# Patient Record
Sex: FEMALE
[De-identification: names, ages, dates, MRNs, and addresses within clinical notes are randomized per-mention and may not be internally consistent; named-entity substitution may affect disease eponyms.]

---

## 2018-01-14 ENCOUNTER — TRANSCRIPTION ENCOUNTER (OUTPATIENT)
Age: 68
End: 2018-01-14

## 2018-12-18 ENCOUNTER — TRANSCRIPTION ENCOUNTER (OUTPATIENT)
Age: 68
End: 2018-12-18

## 2022-06-18 ENCOUNTER — NON-APPOINTMENT (OUTPATIENT)
Age: 72
End: 2022-06-18

## 2022-09-10 ENCOUNTER — NON-APPOINTMENT (OUTPATIENT)
Age: 72
End: 2022-09-10

## 2022-11-17 ENCOUNTER — OFFICE (OUTPATIENT)
Dept: URBAN - METROPOLITAN AREA CLINIC 63 | Facility: CLINIC | Age: 72
Setting detail: OPHTHALMOLOGY
End: 2022-11-17
Payer: MEDICARE

## 2022-11-17 DIAGNOSIS — H52.4: ICD-10-CM

## 2022-11-17 DIAGNOSIS — H35.3132: ICD-10-CM

## 2022-11-17 PROCEDURE — 92014 COMPRE OPH EXAM EST PT 1/>: CPT | Performed by: STUDENT IN AN ORGANIZED HEALTH CARE EDUCATION/TRAINING PROGRAM

## 2022-11-17 PROCEDURE — 92015 DETERMINE REFRACTIVE STATE: CPT | Performed by: STUDENT IN AN ORGANIZED HEALTH CARE EDUCATION/TRAINING PROGRAM

## 2022-11-17 PROCEDURE — 92134 CPTRZ OPH DX IMG PST SGM RTA: CPT | Performed by: STUDENT IN AN ORGANIZED HEALTH CARE EDUCATION/TRAINING PROGRAM

## 2022-11-17 ASSESSMENT — TONOMETRY
OD_IOP_MMHG: 18
OS_IOP_MMHG: 17

## 2022-11-17 ASSESSMENT — REFRACTION_MANIFEST
OS_ADD: +2.75
OS_CYLINDER: -0.75
OD_CYLINDER: -1.25
OS_VA1: 20/25
OD_VA1: 20/20
OD_AXIS: 110
OD_SPHERE: +0.25
OS_SPHERE: -0.50
OD_ADD: +2.75
OS_AXIS: 110

## 2022-11-17 ASSESSMENT — AXIALLENGTH_DERIVED
OD_AL: 22.0947
OS_AL: 22.5202
OD_AL: 22.0947
OS_AL: 22.4317

## 2022-11-17 ASSESSMENT — CONFRONTATIONAL VISUAL FIELD TEST (CVF)
OD_FINDINGS: FULL
OS_FINDINGS: FULL

## 2022-11-17 ASSESSMENT — KERATOMETRY
OD_K2POWER_DIOPTERS: 48.50
OD_AXISANGLE_DEGREES: 045
OS_AXISANGLE_DEGREES: 039
OD_K1POWER_DIOPTERS: 48.00
OS_K2POWER_DIOPTERS: 48.25
OS_K1POWER_DIOPTERS: 47.25

## 2022-11-17 ASSESSMENT — VISUAL ACUITY
OD_BCVA: 20/20
OS_BCVA: 20/25

## 2022-11-17 ASSESSMENT — REFRACTION_AUTOREFRACTION
OD_CYLINDER: -1.25
OS_CYLINDER: -0.75
OS_AXIS: 112
OS_SPHERE: -0.75
OD_AXIS: 109
OD_SPHERE: +0.25

## 2022-11-17 ASSESSMENT — SPHEQUIV_DERIVED
OS_SPHEQUIV: -0.875
OS_SPHEQUIV: -1.125
OD_SPHEQUIV: -0.375
OD_SPHEQUIV: -0.375

## 2023-01-24 ENCOUNTER — NON-APPOINTMENT (OUTPATIENT)
Age: 73
End: 2023-01-24

## 2023-01-28 ENCOUNTER — NON-APPOINTMENT (OUTPATIENT)
Age: 73
End: 2023-01-28

## 2023-01-29 ENCOUNTER — INPATIENT (INPATIENT)
Facility: HOSPITAL | Age: 73
LOS: 2 days | Discharge: HOME | DRG: 286 | End: 2023-02-01
Attending: FAMILY MEDICINE | Admitting: HOSPITALIST
Payer: MEDICARE

## 2023-01-29 VITALS
SYSTOLIC BLOOD PRESSURE: 144 MMHG | TEMPERATURE: 98 F | OXYGEN SATURATION: 98 % | RESPIRATION RATE: 18 BRPM | DIASTOLIC BLOOD PRESSURE: 98 MMHG | HEART RATE: 79 BPM

## 2023-01-29 PROCEDURE — 99285 EMERGENCY DEPT VISIT HI MDM: CPT

## 2023-01-29 PROCEDURE — 93010 ELECTROCARDIOGRAM REPORT: CPT

## 2023-01-29 RX ORDER — IBUPROFEN 200 MG
600 TABLET ORAL ONCE
Refills: 0 | Status: COMPLETED | OUTPATIENT
Start: 2023-01-29 | End: 2023-01-29

## 2023-01-29 NOTE — ED ADULT TRIAGE NOTE - CHIEF COMPLAINT QUOTE
Ambulatory reporting flu-like symptoms since Monday, was seen by her primary care provider and was placed on medications to treat her symptoms however patient is complaining of worsening symptoms and now chest pains/SOB. Patient VS WNL in triage, no accessory muscle use, denies sick contacts. EKG in progress. UNVACCINATED.

## 2023-01-30 DIAGNOSIS — R07.9 CHEST PAIN, UNSPECIFIED: ICD-10-CM

## 2023-01-30 DIAGNOSIS — I67.1 CEREBRAL ANEURYSM, NONRUPTURED: Chronic | ICD-10-CM

## 2023-01-30 DIAGNOSIS — Z98.890 OTHER SPECIFIED POSTPROCEDURAL STATES: Chronic | ICD-10-CM

## 2023-01-30 LAB
ALBUMIN SERPL ELPH-MCNC: 3.8 G/DL — SIGNIFICANT CHANGE UP (ref 3.3–5.2)
ALP SERPL-CCNC: 56 U/L — SIGNIFICANT CHANGE UP (ref 40–120)
ALT FLD-CCNC: 19 U/L — SIGNIFICANT CHANGE UP
ANION GAP SERPL CALC-SCNC: 10 MMOL/L — SIGNIFICANT CHANGE UP (ref 5–17)
ANION GAP SERPL CALC-SCNC: 11 MMOL/L — SIGNIFICANT CHANGE UP (ref 5–17)
ANISOCYTOSIS BLD QL: SLIGHT — SIGNIFICANT CHANGE UP
APTT BLD: 146.7 SEC — CRITICAL HIGH (ref 27.5–35.5)
APTT BLD: 38.9 SEC — HIGH (ref 27.5–35.5)
AST SERPL-CCNC: 62 U/L — HIGH
BASE EXCESS BLDV CALC-SCNC: 6.5 MMOL/L — HIGH (ref -2–3)
BASOPHILS # BLD AUTO: 0.04 K/UL — SIGNIFICANT CHANGE UP (ref 0–0.2)
BASOPHILS NFR BLD AUTO: 0.5 % — SIGNIFICANT CHANGE UP (ref 0–2)
BILIRUB SERPL-MCNC: <0.2 MG/DL — LOW (ref 0.4–2)
BUN SERPL-MCNC: 11.6 MG/DL — SIGNIFICANT CHANGE UP (ref 8–20)
BUN SERPL-MCNC: 11.6 MG/DL — SIGNIFICANT CHANGE UP (ref 8–20)
CA-I SERPL-SCNC: 1.11 MMOL/L — LOW (ref 1.15–1.33)
CALCIUM SERPL-MCNC: 8.5 MG/DL — SIGNIFICANT CHANGE UP (ref 8.4–10.5)
CALCIUM SERPL-MCNC: 8.9 MG/DL — SIGNIFICANT CHANGE UP (ref 8.4–10.5)
CHLORIDE BLDV-SCNC: 100 MMOL/L — SIGNIFICANT CHANGE UP (ref 96–108)
CHLORIDE SERPL-SCNC: 99 MMOL/L — SIGNIFICANT CHANGE UP (ref 96–108)
CHLORIDE SERPL-SCNC: 99 MMOL/L — SIGNIFICANT CHANGE UP (ref 96–108)
CK SERPL-CCNC: 39 U/L — SIGNIFICANT CHANGE UP (ref 25–170)
CO2 SERPL-SCNC: 25 MMOL/L — SIGNIFICANT CHANGE UP (ref 22–29)
CO2 SERPL-SCNC: 26 MMOL/L — SIGNIFICANT CHANGE UP (ref 22–29)
CREAT SERPL-MCNC: 0.46 MG/DL — LOW (ref 0.5–1.3)
CREAT SERPL-MCNC: 0.49 MG/DL — LOW (ref 0.5–1.3)
EGFR: 101 ML/MIN/1.73M2 — SIGNIFICANT CHANGE UP
EGFR: 102 ML/MIN/1.73M2 — SIGNIFICANT CHANGE UP
ELLIPTOCYTES BLD QL SMEAR: SLIGHT — SIGNIFICANT CHANGE UP
EOSINOPHIL # BLD AUTO: 0.21 K/UL — SIGNIFICANT CHANGE UP (ref 0–0.5)
EOSINOPHIL NFR BLD AUTO: 2.7 % — SIGNIFICANT CHANGE UP (ref 0–6)
GAS PNL BLDV: 135 MMOL/L — LOW (ref 136–145)
GAS PNL BLDV: SIGNIFICANT CHANGE UP
GLUCOSE BLDV-MCNC: 136 MG/DL — HIGH (ref 70–99)
GLUCOSE SERPL-MCNC: 133 MG/DL — HIGH (ref 70–99)
GLUCOSE SERPL-MCNC: 168 MG/DL — HIGH (ref 70–99)
HCO3 BLDV-SCNC: 32 MMOL/L — HIGH (ref 22–29)
HCOV PNL SPEC NAA+PROBE: DETECTED
HCT VFR BLD CALC: 39.5 % — SIGNIFICANT CHANGE UP (ref 34.5–45)
HCT VFR BLD CALC: 39.7 % — SIGNIFICANT CHANGE UP (ref 34.5–45)
HCT VFR BLD CALC: 40.3 % — SIGNIFICANT CHANGE UP (ref 34.5–45)
HCT VFR BLD CALC: 40.4 % — SIGNIFICANT CHANGE UP (ref 34.5–45)
HCT VFR BLDA CALC: 39 % — SIGNIFICANT CHANGE UP
HGB BLD CALC-MCNC: 12.9 G/DL — SIGNIFICANT CHANGE UP (ref 11.7–16.1)
HGB BLD-MCNC: 11.9 G/DL — SIGNIFICANT CHANGE UP (ref 11.5–15.5)
HGB BLD-MCNC: 12.1 G/DL — SIGNIFICANT CHANGE UP (ref 11.5–15.5)
HGB BLD-MCNC: 12.2 G/DL — SIGNIFICANT CHANGE UP (ref 11.5–15.5)
HGB BLD-MCNC: 12.5 G/DL — SIGNIFICANT CHANGE UP (ref 11.5–15.5)
IMM GRANULOCYTES NFR BLD AUTO: 1 % — HIGH (ref 0–0.9)
INR BLD: 1.07 RATIO — SIGNIFICANT CHANGE UP (ref 0.88–1.16)
LACTATE BLDV-MCNC: 1.5 MMOL/L — SIGNIFICANT CHANGE UP (ref 0.5–2)
LIDOCAIN IGE QN: 40 U/L — SIGNIFICANT CHANGE UP (ref 22–51)
LYMPHOCYTES # BLD AUTO: 1.41 K/UL — SIGNIFICANT CHANGE UP (ref 1–3.3)
LYMPHOCYTES # BLD AUTO: 17.9 % — SIGNIFICANT CHANGE UP (ref 13–44)
MAGNESIUM SERPL-MCNC: 2 MG/DL — SIGNIFICANT CHANGE UP (ref 1.6–2.6)
MANUAL SMEAR VERIFICATION: SIGNIFICANT CHANGE UP
MCHC RBC-ENTMCNC: 22.8 PG — LOW (ref 27–34)
MCHC RBC-ENTMCNC: 22.9 PG — LOW (ref 27–34)
MCHC RBC-ENTMCNC: 23 PG — LOW (ref 27–34)
MCHC RBC-ENTMCNC: 23 PG — LOW (ref 27–34)
MCHC RBC-ENTMCNC: 30 GM/DL — LOW (ref 32–36)
MCHC RBC-ENTMCNC: 30 GM/DL — LOW (ref 32–36)
MCHC RBC-ENTMCNC: 30.9 GM/DL — LOW (ref 32–36)
MCHC RBC-ENTMCNC: 30.9 GM/DL — LOW (ref 32–36)
MCV RBC AUTO: 74.4 FL — LOW (ref 80–100)
MCV RBC AUTO: 74.4 FL — LOW (ref 80–100)
MCV RBC AUTO: 76 FL — LOW (ref 80–100)
MCV RBC AUTO: 76.3 FL — LOW (ref 80–100)
MONOCYTES # BLD AUTO: 0.69 K/UL — SIGNIFICANT CHANGE UP (ref 0–0.9)
MONOCYTES NFR BLD AUTO: 8.8 % — SIGNIFICANT CHANGE UP (ref 2–14)
MRSA PCR RESULT.: SIGNIFICANT CHANGE UP
NEUTROPHILS # BLD AUTO: 5.45 K/UL — SIGNIFICANT CHANGE UP (ref 1.8–7.4)
NEUTROPHILS NFR BLD AUTO: 69.1 % — SIGNIFICANT CHANGE UP (ref 43–77)
NT-PROBNP SERPL-SCNC: 142 PG/ML — SIGNIFICANT CHANGE UP (ref 0–300)
OVALOCYTES BLD QL SMEAR: SLIGHT — SIGNIFICANT CHANGE UP
PCO2 BLDV: 52 MMHG — HIGH (ref 39–42)
PH BLDV: 7.39 — SIGNIFICANT CHANGE UP (ref 7.32–7.43)
PLAT MORPH BLD: NORMAL — SIGNIFICANT CHANGE UP
PLATELET # BLD AUTO: 261 K/UL — SIGNIFICANT CHANGE UP (ref 150–400)
PLATELET # BLD AUTO: 275 K/UL — SIGNIFICANT CHANGE UP (ref 150–400)
PLATELET # BLD AUTO: 281 K/UL — SIGNIFICANT CHANGE UP (ref 150–400)
PLATELET # BLD AUTO: 291 K/UL — SIGNIFICANT CHANGE UP (ref 150–400)
PO2 BLDV: 47 MMHG — HIGH (ref 25–45)
POIKILOCYTOSIS BLD QL AUTO: SLIGHT — SIGNIFICANT CHANGE UP
POLYCHROMASIA BLD QL SMEAR: SLIGHT — SIGNIFICANT CHANGE UP
POTASSIUM BLDV-SCNC: 4.8 MMOL/L — SIGNIFICANT CHANGE UP (ref 3.5–5.1)
POTASSIUM SERPL-MCNC: 3.8 MMOL/L — SIGNIFICANT CHANGE UP (ref 3.5–5.3)
POTASSIUM SERPL-MCNC: 6 MMOL/L — HIGH (ref 3.5–5.3)
POTASSIUM SERPL-SCNC: 3.8 MMOL/L — SIGNIFICANT CHANGE UP (ref 3.5–5.3)
POTASSIUM SERPL-SCNC: 6 MMOL/L — HIGH (ref 3.5–5.3)
PROT SERPL-MCNC: 7.7 G/DL — SIGNIFICANT CHANGE UP (ref 6.6–8.7)
PROTHROM AB SERPL-ACNC: 12.4 SEC — SIGNIFICANT CHANGE UP (ref 10.5–13.4)
RAPID RVP RESULT: DETECTED
RBC # BLD: 5.2 M/UL — SIGNIFICANT CHANGE UP (ref 3.8–5.2)
RBC # BLD: 5.3 M/UL — HIGH (ref 3.8–5.2)
RBC # BLD: 5.31 M/UL — HIGH (ref 3.8–5.2)
RBC # BLD: 5.43 M/UL — HIGH (ref 3.8–5.2)
RBC # FLD: 16.1 % — HIGH (ref 10.3–14.5)
RBC # FLD: 16.3 % — HIGH (ref 10.3–14.5)
RBC # FLD: 16.4 % — HIGH (ref 10.3–14.5)
RBC # FLD: 16.6 % — HIGH (ref 10.3–14.5)
RBC BLD AUTO: ABNORMAL
S AUREUS DNA NOSE QL NAA+PROBE: SIGNIFICANT CHANGE UP
SAO2 % BLDV: 69.2 % — SIGNIFICANT CHANGE UP
SARS-COV-2 RNA SPEC QL NAA+PROBE: SIGNIFICANT CHANGE UP
SODIUM SERPL-SCNC: 135 MMOL/L — SIGNIFICANT CHANGE UP (ref 135–145)
SODIUM SERPL-SCNC: 135 MMOL/L — SIGNIFICANT CHANGE UP (ref 135–145)
TROPONIN T SERPL-MCNC: <0.01 NG/ML — SIGNIFICANT CHANGE UP (ref 0–0.06)
TROPONIN T SERPL-MCNC: <0.01 NG/ML — SIGNIFICANT CHANGE UP (ref 0–0.06)
WBC # BLD: 12.38 K/UL — HIGH (ref 3.8–10.5)
WBC # BLD: 7.88 K/UL — SIGNIFICANT CHANGE UP (ref 3.8–10.5)
WBC # BLD: 8.59 K/UL — SIGNIFICANT CHANGE UP (ref 3.8–10.5)
WBC # BLD: 8.82 K/UL — SIGNIFICANT CHANGE UP (ref 3.8–10.5)
WBC # FLD AUTO: 12.38 K/UL — HIGH (ref 3.8–10.5)
WBC # FLD AUTO: 7.88 K/UL — SIGNIFICANT CHANGE UP (ref 3.8–10.5)
WBC # FLD AUTO: 8.59 K/UL — SIGNIFICANT CHANGE UP (ref 3.8–10.5)
WBC # FLD AUTO: 8.82 K/UL — SIGNIFICANT CHANGE UP (ref 3.8–10.5)

## 2023-01-30 PROCEDURE — 71045 X-RAY EXAM CHEST 1 VIEW: CPT | Mod: 26,77

## 2023-01-30 PROCEDURE — 93458 L HRT ARTERY/VENTRICLE ANGIO: CPT | Mod: 26

## 2023-01-30 PROCEDURE — 93970 EXTREMITY STUDY: CPT | Mod: 26

## 2023-01-30 PROCEDURE — 99223 1ST HOSP IP/OBS HIGH 75: CPT

## 2023-01-30 PROCEDURE — 93306 TTE W/DOPPLER COMPLETE: CPT | Mod: 26

## 2023-01-30 PROCEDURE — 99152 MOD SED SAME PHYS/QHP 5/>YRS: CPT

## 2023-01-30 PROCEDURE — 93010 ELECTROCARDIOGRAM REPORT: CPT | Mod: 76

## 2023-01-30 PROCEDURE — 99221 1ST HOSP IP/OBS SF/LOW 40: CPT

## 2023-01-30 PROCEDURE — 71045 X-RAY EXAM CHEST 1 VIEW: CPT | Mod: 26

## 2023-01-30 RX ORDER — PANTOPRAZOLE SODIUM 20 MG/1
40 TABLET, DELAYED RELEASE ORAL
Refills: 0 | Status: DISCONTINUED | OUTPATIENT
Start: 2023-01-30 | End: 2023-01-30

## 2023-01-30 RX ORDER — NITROGLYCERIN 6.5 MG
0.4 CAPSULE, EXTENDED RELEASE ORAL ONCE
Refills: 0 | Status: COMPLETED | OUTPATIENT
Start: 2023-01-30 | End: 2023-01-30

## 2023-01-30 RX ORDER — DILTIAZEM HCL 120 MG
10 CAPSULE, EXT RELEASE 24 HR ORAL ONCE
Refills: 0 | Status: COMPLETED | OUTPATIENT
Start: 2023-01-30 | End: 2023-01-30

## 2023-01-30 RX ORDER — HEPARIN SODIUM 5000 [USP'U]/ML
INJECTION INTRAVENOUS; SUBCUTANEOUS
Qty: 25000 | Refills: 0 | Status: DISCONTINUED | OUTPATIENT
Start: 2023-01-30 | End: 2023-01-30

## 2023-01-30 RX ORDER — METOPROLOL TARTRATE 50 MG
25 TABLET ORAL
Refills: 0 | Status: DISCONTINUED | OUTPATIENT
Start: 2023-01-30 | End: 2023-02-01

## 2023-01-30 RX ORDER — ASPIRIN/CALCIUM CARB/MAGNESIUM 324 MG
324 TABLET ORAL ONCE
Refills: 0 | Status: COMPLETED | OUTPATIENT
Start: 2023-01-30 | End: 2023-01-30

## 2023-01-30 RX ORDER — COLCHICINE 0.6 MG
0.6 TABLET ORAL DAILY
Refills: 0 | Status: DISCONTINUED | OUTPATIENT
Start: 2023-01-30 | End: 2023-02-01

## 2023-01-30 RX ORDER — PANTOPRAZOLE SODIUM 20 MG/1
40 TABLET, DELAYED RELEASE ORAL EVERY 12 HOURS
Refills: 0 | Status: DISCONTINUED | OUTPATIENT
Start: 2023-01-30 | End: 2023-02-01

## 2023-01-30 RX ORDER — ONDANSETRON 8 MG/1
4 TABLET, FILM COATED ORAL EVERY 8 HOURS
Refills: 0 | Status: DISCONTINUED | OUTPATIENT
Start: 2023-01-30 | End: 2023-02-01

## 2023-01-30 RX ORDER — HEPARIN SODIUM 5000 [USP'U]/ML
5000 INJECTION INTRAVENOUS; SUBCUTANEOUS EVERY 12 HOURS
Refills: 0 | Status: DISCONTINUED | OUTPATIENT
Start: 2023-01-30 | End: 2023-01-30

## 2023-01-30 RX ORDER — ACETAMINOPHEN 500 MG
1000 TABLET ORAL ONCE
Refills: 0 | Status: COMPLETED | OUTPATIENT
Start: 2023-01-30 | End: 2023-01-30

## 2023-01-30 RX ORDER — SODIUM CHLORIDE 9 MG/ML
500 INJECTION INTRAMUSCULAR; INTRAVENOUS; SUBCUTANEOUS ONCE
Refills: 0 | Status: COMPLETED | OUTPATIENT
Start: 2023-01-30 | End: 2023-01-30

## 2023-01-30 RX ORDER — DILTIAZEM HCL 120 MG
30 CAPSULE, EXT RELEASE 24 HR ORAL ONCE
Refills: 0 | Status: COMPLETED | OUTPATIENT
Start: 2023-01-30 | End: 2023-01-30

## 2023-01-30 RX ORDER — LEVOTHYROXINE SODIUM 125 MCG
50 TABLET ORAL DAILY
Refills: 0 | Status: DISCONTINUED | OUTPATIENT
Start: 2023-01-30 | End: 2023-02-01

## 2023-01-30 RX ORDER — HEPARIN SODIUM 5000 [USP'U]/ML
5500 INJECTION INTRAVENOUS; SUBCUTANEOUS EVERY 6 HOURS
Refills: 0 | Status: DISCONTINUED | OUTPATIENT
Start: 2023-01-30 | End: 2023-01-31

## 2023-01-30 RX ORDER — HEPARIN SODIUM 5000 [USP'U]/ML
900 INJECTION INTRAVENOUS; SUBCUTANEOUS
Qty: 25000 | Refills: 0 | Status: DISCONTINUED | OUTPATIENT
Start: 2023-01-30 | End: 2023-01-31

## 2023-01-30 RX ORDER — IBUPROFEN 200 MG
600 TABLET ORAL THREE TIMES A DAY
Refills: 0 | Status: DISCONTINUED | OUTPATIENT
Start: 2023-01-30 | End: 2023-02-01

## 2023-01-30 RX ORDER — SODIUM CHLORIDE 9 MG/ML
250 INJECTION INTRAMUSCULAR; INTRAVENOUS; SUBCUTANEOUS ONCE
Refills: 0 | Status: COMPLETED | OUTPATIENT
Start: 2023-01-30 | End: 2023-01-30

## 2023-01-30 RX ORDER — CHLORHEXIDINE GLUCONATE 213 G/1000ML
1 SOLUTION TOPICAL
Refills: 0 | Status: DISCONTINUED | OUTPATIENT
Start: 2023-01-30 | End: 2023-02-01

## 2023-01-30 RX ORDER — MORPHINE SULFATE 50 MG/1
2 CAPSULE, EXTENDED RELEASE ORAL ONCE
Refills: 0 | Status: DISCONTINUED | OUTPATIENT
Start: 2023-01-30 | End: 2023-01-30

## 2023-01-30 RX ORDER — HEPARIN SODIUM 5000 [USP'U]/ML
1100 INJECTION INTRAVENOUS; SUBCUTANEOUS
Qty: 25000 | Refills: 0 | Status: DISCONTINUED | OUTPATIENT
Start: 2023-01-30 | End: 2023-01-30

## 2023-01-30 RX ORDER — HEPARIN SODIUM 5000 [USP'U]/ML
2500 INJECTION INTRAVENOUS; SUBCUTANEOUS EVERY 6 HOURS
Refills: 0 | Status: DISCONTINUED | OUTPATIENT
Start: 2023-01-30 | End: 2023-01-31

## 2023-01-30 RX ORDER — ASPIRIN/CALCIUM CARB/MAGNESIUM 324 MG
324 TABLET ORAL ONCE
Refills: 0 | Status: DISCONTINUED | OUTPATIENT
Start: 2023-01-30 | End: 2023-01-30

## 2023-01-30 RX ORDER — LANOLIN ALCOHOL/MO/W.PET/CERES
3 CREAM (GRAM) TOPICAL AT BEDTIME
Refills: 0 | Status: DISCONTINUED | OUTPATIENT
Start: 2023-01-30 | End: 2023-02-01

## 2023-01-30 RX ORDER — ENOXAPARIN SODIUM 100 MG/ML
50 INJECTION SUBCUTANEOUS ONCE
Refills: 0 | Status: DISCONTINUED | OUTPATIENT
Start: 2023-01-30 | End: 2023-01-30

## 2023-01-30 RX ORDER — ACETAMINOPHEN 500 MG
650 TABLET ORAL EVERY 6 HOURS
Refills: 0 | Status: DISCONTINUED | OUTPATIENT
Start: 2023-01-30 | End: 2023-01-30

## 2023-01-30 RX ORDER — METOPROLOL TARTRATE 50 MG
5 TABLET ORAL ONCE
Refills: 0 | Status: COMPLETED | OUTPATIENT
Start: 2023-01-30 | End: 2023-01-30

## 2023-01-30 RX ORDER — ATORVASTATIN CALCIUM 80 MG/1
20 TABLET, FILM COATED ORAL AT BEDTIME
Refills: 0 | Status: DISCONTINUED | OUTPATIENT
Start: 2023-01-30 | End: 2023-02-01

## 2023-01-30 RX ORDER — METOPROLOL TARTRATE 50 MG
5 TABLET ORAL EVERY 6 HOURS
Refills: 0 | Status: DISCONTINUED | OUTPATIENT
Start: 2023-01-30 | End: 2023-02-01

## 2023-01-30 RX ADMIN — MORPHINE SULFATE 2 MILLIGRAM(S): 50 CAPSULE, EXTENDED RELEASE ORAL at 03:21

## 2023-01-30 RX ADMIN — SODIUM CHLORIDE 500 MILLILITER(S): 9 INJECTION INTRAMUSCULAR; INTRAVENOUS; SUBCUTANEOUS at 01:02

## 2023-01-30 RX ADMIN — Medication 200 MILLIGRAM(S): at 16:46

## 2023-01-30 RX ADMIN — PANTOPRAZOLE SODIUM 40 MILLIGRAM(S): 20 TABLET, DELAYED RELEASE ORAL at 16:42

## 2023-01-30 RX ADMIN — PANTOPRAZOLE SODIUM 40 MILLIGRAM(S): 20 TABLET, DELAYED RELEASE ORAL at 04:53

## 2023-01-30 RX ADMIN — Medication 30 MILLIGRAM(S): at 01:44

## 2023-01-30 RX ADMIN — Medication 600 MILLIGRAM(S): at 00:22

## 2023-01-30 RX ADMIN — Medication 25 MILLIGRAM(S): at 04:12

## 2023-01-30 RX ADMIN — Medication 40 MILLIGRAM(S): at 04:52

## 2023-01-30 RX ADMIN — Medication 324 MILLIGRAM(S): at 08:19

## 2023-01-30 RX ADMIN — Medication 600 MILLIGRAM(S): at 06:19

## 2023-01-30 RX ADMIN — MORPHINE SULFATE 2 MILLIGRAM(S): 50 CAPSULE, EXTENDED RELEASE ORAL at 02:51

## 2023-01-30 RX ADMIN — HEPARIN SODIUM 900 UNIT(S)/HR: 5000 INJECTION INTRAVENOUS; SUBCUTANEOUS at 15:13

## 2023-01-30 RX ADMIN — Medication 50 MICROGRAM(S): at 04:12

## 2023-01-30 RX ADMIN — Medication 0.4 MILLIGRAM(S): at 04:53

## 2023-01-30 RX ADMIN — HEPARIN SODIUM 1200 UNIT(S)/HR: 5000 INJECTION INTRAVENOUS; SUBCUTANEOUS at 07:06

## 2023-01-30 RX ADMIN — HEPARIN SODIUM 1100 UNIT(S)/HR: 5000 INJECTION INTRAVENOUS; SUBCUTANEOUS at 14:15

## 2023-01-30 RX ADMIN — Medication 0.6 MILLIGRAM(S): at 06:19

## 2023-01-30 RX ADMIN — Medication 600 MILLIGRAM(S): at 13:34

## 2023-01-30 RX ADMIN — Medication 1000 MILLIGRAM(S): at 02:33

## 2023-01-30 RX ADMIN — Medication 10 MILLIGRAM(S): at 01:38

## 2023-01-30 RX ADMIN — ATORVASTATIN CALCIUM 20 MILLIGRAM(S): 80 TABLET, FILM COATED ORAL at 22:32

## 2023-01-30 RX ADMIN — ONDANSETRON 4 MILLIGRAM(S): 8 TABLET, FILM COATED ORAL at 05:03

## 2023-01-30 RX ADMIN — Medication 600 MILLIGRAM(S): at 00:52

## 2023-01-30 RX ADMIN — Medication 400 MILLIGRAM(S): at 01:54

## 2023-01-30 RX ADMIN — Medication 5 MILLIGRAM(S): at 02:52

## 2023-01-30 RX ADMIN — SODIUM CHLORIDE 750 MILLILITER(S): 9 INJECTION INTRAMUSCULAR; INTRAVENOUS; SUBCUTANEOUS at 10:36

## 2023-01-30 RX ADMIN — Medication 650 MILLIGRAM(S): at 04:12

## 2023-01-30 RX ADMIN — Medication 3 MILLIGRAM(S): at 22:32

## 2023-01-30 RX ADMIN — HEPARIN SODIUM 5000 UNIT(S): 5000 INJECTION INTRAVENOUS; SUBCUTANEOUS at 04:14

## 2023-01-30 RX ADMIN — Medication 600 MILLIGRAM(S): at 14:13

## 2023-01-30 RX ADMIN — Medication 400 MILLIGRAM(S): at 16:43

## 2023-01-30 RX ADMIN — Medication 25 MILLIGRAM(S): at 16:42

## 2023-01-30 RX ADMIN — Medication 10 MILLIGRAM(S): at 01:30

## 2023-01-30 NOTE — ED PROVIDER NOTE - OBJECTIVE STATEMENT
72 y f with pmh of hypothyroidism and hld presenting for cough, congestion, and chest pain. Cough and congestion started monday. Chest pain started 5 hours ago. Pain is worse with deep inspiration and with change in position. Pt denies f/c, n/v, ab pain, diaphoresis, productive cough. Pt does not know cardiologist.

## 2023-01-30 NOTE — PATIENT PROFILE ADULT - NSPROPOAPRESSUREINJURY_GEN_A_NUR
This is in follow-up regarding Mr. Lizet Stewart last CTA. Had discussed with the radiologist who done the read on it. He did not feel convinced that progressed to 5 cm. On 3D reconstructions and looks seen from the CD it does appear to be at 5 cm with a very short neck. I feel this may represent the last opportunity to treat this endovascular certainly he has no candidate for an open repair my concern is opportunity if this is at 5 cm and endovascular solution as possible be prudent to do this now. The patient is also in agreement. He is a chronic longtime smoker chronic shortness of breath. He has had a significant symptomatic event with his thoracic aorta that he has lived through. The plan would be to do an infrarenal device is on a certain criteria met the aneurysm was appropriate size for fixing, appropriate landing neck was identified. And preservation of hypogastric flow. This can be determined with intraoperative IVUS and intraoperative imaging. If appears appropriate there would be an intent to treat with a stent at that time.  He like to proceed with plan of IVUS and endovascular repair of infrarenal aneurysm as appropriate
no

## 2023-01-30 NOTE — CHART NOTE - NSCHARTNOTEFT_GEN_A_CORE
HISTORY OF PRESENT ILLNESS: AVIS PATEL is a 72y old female Notified by RN patient C/O 7/10 chest pain. Pt. seen and evaluated at bedside. Pt was admitted A-fin w/ RVR, Pericarditis with persistent chest pain.  Pt was started on Ibuprofen and Chlochine for chest pain.  Pt states chest pain worsen since admission. The pain is described as 7/10 in severity, and is substernal. The pain worsens with inspiration, and coughing  The chest pain is not associated with palpitations, shortness of breath, diaphoresis, lightheadedness, or nausea.     Vital Signs Last 24 Hrs  T(C): 36.7 (30 Jan 2023 08:20), Max: 36.8 (30 Jan 2023 04:00)  T(F): 98 (30 Jan 2023 08:20), Max: 98.2 (30 Jan 2023 04:00)  HR: 158 (30 Jan 2023 08:20) (79 - 174)  BP: 99/75 (30 Jan 2023 08:20) (83/51 - 144/98)  BP(mean): --  RR: 18 (30 Jan 2023 08:20) (16 - 18)  SpO2: 98% (30 Jan 2023 08:20) (98% - 100%)    Parameters below as of 30 Jan 2023 08:20  Patient On (Oxygen Delivery Method): nasal cannula  O2 Flow (L/min): 2    PHYSICAL EXAM:  GENERAL: Lying in bed visibly uncomfortable.  HEAD:  Atraumatic, Normocephalic  EYES: EOMI, PERRL, conjunctiva and sclera clear  ENT: Moist mucous membranes  NECK: Supple, No JVD  CHEST/LUNG: Clear to auscultation bilaterally; No rales, rhonchi, wheezing, or rubs. Unlabored respirations  HEART: Irregular regular rhythm and rate  ABDOMEN: Bowel sounds present; Soft, Nontender, Nondistended   EXTREMITIES:  2+ Peripheral Pulses, brisk capillary refill. No clubbing, cyanosis, or edema  NERVOUS SYSTEM:  Alert & Oriented X3, speech clear. No facial droop, tongue protrusion midline. Answers questions appropriately. No deficits   MSK: FROM x 4 extremities   SKIN: No rashes or lesions; Vascular changes in LE bilaterally.                              12.2   8.82  )-----------( 291      ( 30 Jan 2023 06:33 )             39.5     01-30    135  |  99  |  11.6  ----------------------------<  168<H>  3.8   |  25.0  |  0.46<L>    Ca    8.5      30 Jan 2023 06:33  Mg     2.0     01-30    TPro  7.7  /  Alb  3.8  /  TBili  <0.2<L>  /  DBili  x   /  AST  62<H>  /  ALT  19  /  AlkPhos  56  01-30        EKG: Noted for ST-changes, as well a changes on the monitor.  Pending Follow evaluation.        ASSESSMENT/PLAN: AVIS PATEL is a 72y old female with chest pain. Cardio was consulted overnight and re-consulted this morning, given now pt with persistent chest pain, and now EKG changes.  STAT trop and CK ordered, 250 cc bolus given pt now with SBP 99. [facility] at bedside a time over assessment, and given EKG changes code STEMI was activated.  Pt already on a Heparin drip, and given STAT dose of ASA 324mg, and pt taken to CATH lab.

## 2023-01-30 NOTE — PROGRESS NOTE ADULT - SUBJECTIVE AND OBJECTIVE BOX
Patient is a 72y old  Female who presents with a chief complaint of AFib with RVR (30 Jan 2023 08:31)    HPI:  72F with hx of HLD, hypothyroidism, hx of cerebral aneurysm s/p coiling w/ post operative course complicated by Afib presented to the ED with complaint of cough/chest congestion and chest pain. Pt had been seen in urgent care twice for these complaints and received "cough medicine" without improvement. Pt denies any associated fevers/chills. Denies any nausea/vomiting/abdominal pain/diarrhea. Denies any hx of pulmonary disease. Denies any hx of dysuria. Pt found to be in Afib RVR. RVP positive for coronavirus. EKG was concerning for pericarditis and started on colchicine/prednisone. Pt developed new inferolateral ST elevations this AM and code STEMI was called with cardiac cath showing normal coronaries and normal EF. Echo showed EF 65%-70%, diastolic dysfunction and no pericardial effusion. Post procedure pt developed an episode of Afib RVR to 160s and received a load of amiodarone 150mg after which she converted to NSR. Pt has remained hemodynamically stable in the ICU.       PAST MEDICAL & SURGICAL HISTORY:  High cholesterol  Hypothyroidism  H/O knee surgery  Brain aneurysm  L hernia repair   L knee replacement   C section    Medications:  metoprolol tartrate 25 milliGRAM(s) Oral two times a day  metoprolol tartrate Injectable 5 milliGRAM(s) IV Push every 6 hours PRN  guaiFENesin Oral Liquid (Sugar-Free) 200 milliGRAM(s) Oral every 6 hours PRN  ibuprofen  Tablet. 600 milliGRAM(s) Oral three times a day  melatonin 3 milliGRAM(s) Oral at bedtime PRN  ondansetron Injectable 4 milliGRAM(s) IV Push every 8 hours PRN  heparin   Injectable 2500 Unit(s) IV Push every 6 hours PRN  heparin   Injectable 5500 Unit(s) IV Push every 6 hours PRN  heparin  Infusion. 900 Unit(s)/Hr IV Continuous <Continuous>  aluminum hydroxide/magnesium hydroxide/simethicone Suspension 30 milliLiter(s) Oral every 4 hours PRN  pantoprazole  Injectable 40 milliGRAM(s) IV Push every 12 hours  atorvastatin 20 milliGRAM(s) Oral at bedtime  colchicine 0.6 milliGRAM(s) Oral daily  levothyroxine 50 MICROGram(s) Oral daily  predniSONE   Tablet 40 milliGRAM(s) Oral daily  chlorhexidine 2% Cloths 1 Application(s) Topical <User Schedule>    Allergies: NKDA    Social: denies substance use     ICU Vital Signs Last 24 Hrs  T(C): 36.8 (30 Jan 2023 16:00), Max: 37.1 (30 Jan 2023 09:45)  T(F): 98.3 (30 Jan 2023 16:00), Max: 98.7 (30 Jan 2023 09:45)  HR: 83 (30 Jan 2023 16:00) (77 - 174)  BP: 118/95 (30 Jan 2023 16:00) (83/51 - 995/56)  BP(mean): 105 (30 Jan 2023 16:00) (67 - 110)  ABP: --  ABP(mean): --  RR: 22 (30 Jan 2023 16:00) (15 - 27)  SpO2: 99% (30 Jan 2023 16:00) (88% - 100%)    O2 Parameters below as of 30 Jan 2023 16:00  Patient On (Oxygen Delivery Method): nasal cannula  O2 Flow (L/min): 2    I&O's Detail    30 Jan 2023 07:01  -  30 Jan 2023 17:07  --------------------------------------------------------  IN:    Heparin Infusion: 33 mL    Heparin Infusion: 27 mL  Total IN: 60 mL    OUT:  Total OUT: 0 mL    Total NET: 60 mL    LABS:                        11.9   12.38 )-----------( 281      ( 30 Jan 2023 12:54 )             39.7     01-30    135  |  99  |  11.6  ----------------------------<  168<H>  3.8   |  25.0  |  0.46<L>    Ca    8.5      30 Jan 2023 06:33  Mg     2.0     01-30    TPro  7.7  /  Alb  3.8  /  TBili  <0.2<L>  /  DBili  x   /  AST  62<H>  /  ALT  19  /  AlkPhos  56  01-30      CARDIAC MARKERS ( 30 Jan 2023 06:33 )  x     / <0.01 ng/mL / 39 U/L / x     / x      CARDIAC MARKERS ( 30 Jan 2023 00:30 )  x     / <0.01 ng/mL / x     / x     / x        CAPILLARY BLOOD GLUCOSE    PT/INR - ( 30 Jan 2023 00:30 )   PT: 12.4 sec;   INR: 1.07 ratio         PTT - ( 30 Jan 2023 12:54 )  PTT:146.7 sec    CULTURES:  Rapid RVP Result: Detected (01-30 @ 00:30)      Physical Examination:    General: awake, alert, in NAD    HEENT: NC/AT, anicteric, PERRL, MMM    PULM: bibasilar crackles     NECK: Supple, trachea midline    CVS: S1S2, RRR    ABD: Soft, nondistended, nontender, normoactive bowel sounds    EXT: No edema, nontender    SKIN: Warm and well perfused, no rashes noted    NEURO: Alert, oriented, interactive, nonfocal      RADIOLOGY:  Heart magnified by technique.    There is a minimal linear density at left base.    IMPRESSION: As above.

## 2023-01-30 NOTE — H&P ADULT - NSHPPHYSICALEXAM_GEN_ALL_CORE
T(C): 36.7 (01-29-23 @ 22:31), Max: 36.7 (01-29-23 @ 22:31)  HR: 102 (01-30-23 @ 02:54) (79 - 174)  BP: 118/64 (01-30-23 @ 02:54) (118/64 - 144/98)  RR: 18 (01-30-23 @ 02:54) (18 - 18)  SpO2: 98% (01-30-23 @ 02:54) (98% - 98%)    GENERAL: patient appears well, no acute distress, appropriate, pleasant  EYES: sclera clear, no exudates  ENMT: oropharynx clear without erythema, no exudates, moist mucous membranes  NECK: supple, soft, no thyromegaly noted  LUNGS: good air entry bilaterally, clear to auscultation, symmetric breath sounds, no wheezing or rhonchi appreciated  HEART: soft S1/S2, regular rate and rhythm, no murmurs noted, no lower extremity edema  GASTROINTESTINAL: abdomen is soft, nontender, nondistended, normoactive bowel sounds, no palpable masses  INTEGUMENT: good skin turgor, warm skin, appears well perfused  MUSCULOSKELETAL: no clubbing or cyanosis, no obvious deformity  NEUROLOGIC: awake, alert, oriented x3, good muscle tone in 4 extremities, no obvious sensory deficits  PSYCHIATRIC: mood is good, affect is congruent, linear and logical thought process  HEME/LYMPH: no palpable supraclavicular nodules, no obvious ecchymosis or petechiae

## 2023-01-30 NOTE — H&P ADULT - ASSESSMENT
71yo F with PMHx of  HLD, hypothyroid presented to ED c/o cough, congestion for the past 5 days, Son at beside states pt initially went to UC was prescribed cough medication but without relief, pt went back to UC yesterday and was given more cough medications but was c/o worsening symptoms now with cp and sob came to ED for further workup.    Afib with RVR   -initial EKG concern for pericarditis but subsequent EKG with Afib with RVR, given Ibuprofen 600mg x 1 dose.   -HR in the 150s, received diltiazem 10mg IVP x 2 doses and 30mg PO x 1 dose with minimal rate control  -Given stat Lopressor 5mg IVP x 1 dose with good rate control   -started on Lopressor 25mg PO BID, and Lopressor IV Q6hrs prn for HR>130  -CHADS-VASc score of  2, will give full dose Lovenox x 1 dose   -Troponin neg x 1, check serial Troponin, EKG   -check TTE   -Cardiology [facility] consulted     Chest pain   -likely demand from Afib with rvr   -troponin neg x 1, as noted above   -given stat ASA, Morphine 2mg x 1 dose   -trend troponin x 3, serial EKG with positive troponin   -given full dose Lovenox x 1 dose   -Cardiology consulted     HLD   -resumed atorvastatin     Hypothyroidism  -cont synthroid     DVT ppx  given full dose lovenox x 1 dose       73yo F with PMHx of  HLD, hypothyroid presented to ED c/o cough, congestion for the past 5 days, Son at beside states pt initially went to UC was prescribed cough medication but without relief, pt went back to UC yesterday and was given more cough medications but was c/o worsening symptoms now with cp and sob came to ED for further workup.    Afib with RVR   -initial EKG concern for pericarditis but subsequent EKG with Afib with RVR, given Ibuprofen 600mg x 1 dose.   -HR in the 150s, received diltiazem 10mg IVP x 2 doses and 30mg PO x 1 dose with minimal rate control  -Given stat Lopressor 5mg IVP x 1 dose with good rate control   -started on Lopressor 25mg PO BID, and Lopressor IV Q6hrs prn for HR>130  -CHADS-VASc score of  2, Heparin gtt ordered   -Troponin neg x 1, check serial Troponin, EKG   -check TTE   -Cardiology [facility] consulted     Pleuritic Chest pain concern for pericarditis    -initial EKG as noted above received Ibuprofen 600mg x 1 dose   -started on Colicine 0,6mg daily, Prednisone 40mg daily   -cont Ibuprofen 600mg TID      -troponin neg x 1  -Low risk threshold for PE, will hold off CTA for now   -trend troponin x 3, serial EKG with positive troponin   -start full dose heparin as above    -Cardiology consulted     HLD   -resumed atorvastatin     Hypothyroidism  -cont synthroid     DVT ppx  given full dose lovenox x 1 dose

## 2023-01-30 NOTE — CONSULT NOTE ADULT - SUBJECTIVE AND OBJECTIVE BOX
[facility]                                              INTERVENTIONAL CARDIOLOGY AT [address], [place] [address]                                             Telephone: [phone]. Fax:[phone]                                                       INTERVENTIONAL CARDIOLOGY PROGRESS NOTE                                                                                             History obtained by: Patient and medical record   Community Cardiologist: CARINA Gore Dr.   Reason for Consultation: Evaluation for cardiac catheterization  Available pt records reviewed: Yes [ x ] No [  ]    Chief complaint:    Patient is a 72y old  Female who presents with a chief complaint of AFib with RVR (30 Jan 2023 07:54)      HPI:  71yo F with PMHx of  HLD, hypothyroid presented to ED c/o cough, congestion for the past 5 days, Son at [facility] states pt initially went to  was prescribed cough medication but without relief, pt went back to UC yesterday and was given more cough medications but was c/o worsening symptoms now with cp and sob came to ED for further workup. Pt c/o 7/10 chest pressure worsened with breathing, denies fever, chills, n/v, abdominal pain, diarrhea or dysuria. In the ED found to have AFib with RVR HR in the 150s, pt was given diltiazem 10mg IVP x 2 doses and 30mg PO x 1 dose, initial EKG concern for pericarditis but subsequent EKG improved, she was given Ibuprofen 600mg x 1 dose. During encounter pt c/o substernal chest pressure, HR 150s on tele monitor, given stat Lopressor 5mg IV x 1 dose with good rate control, given morphine 2mg IV x dose. RVP positive for Coronavirus(229E), troponin neg x 1.    (30 Jan 2023 02:44)    UPDATES: STEMI called a by Dr Gore.  Pt developed cheat pain at 7 am 1st 722  EKG revealed ST elevation in anterior and inferior leads afib pain 9 of 10.  Remains on heparin drip, 324mg aspirin given nitro given in CDU. pt brought to the cath lab for emergent catheterization. Dr Aquino at bedside. Heparin 4000 IV fentanyl 50mcg given initally.      Anginal Class:        Angina (Class):        Ischemic Symptoms:     Heart Failure:        Systolic/Diastolic/Combined:        NYHA Class (within 2 weeks):       PAST MEDICAL HISTORY  High cholesterol  Hypothyroidism        Associated Risk Factors:        Frailty Assessment: (none/mild/mod/severe):       Cerebrovascular Disease: N/A       Chronic Lung Disease: N/A       Peripheral Arterial Disease: N/A       Chronic Kidney Disease (if yes, what is GFR): N/A       Uncontrolled Diabetes (if yes, what is HgbA1C or FBS): N/A       Poorly Controlled Hypertension (if yes, what is SBP): N/A       Morbid Obesity (if yes, what is BMI): N/A       History of Recent Ventricular Arrhythmia: N/A       Inability to Ambulate Safely: N/A       Need for Therapeutic Anticoagulation: N/A       Antiplatelet or Contrast Allergy: N/A      PAST SURGICAL HISTORY  H/O knee surgery  Brain aneurysm  C section      SOCIAL HISTORY:  No etoh No smoker     FAMILY HISTORY: non contributory   No pertinent family history    Family History of Premature Cardiovascular Disease:  Yes [  ] No [ x ]    HOME MEDICATIONS:  atorvastatin 20 mg oral tablet: 1 tab(s) orally once a day (30 Jan 2023 03:58)  levothyroxine 50 mcg (0.05 mg) oral tablet: 1 tab(s) orally once a day (30 Jan 2023 03:58)  omeprazole 40 mg oral delayed release capsule: 1 cap(s) orally once a day (30 Jan 2023 03:58)      CURRENT CARDIAC MEDICATIONS:  metoprolol tartrate 25 milliGRAM(s) Oral two times a day  metoprolol tartrate Injectable 5 milliGRAM(s) IV Push every 6 hours PRN for HR>130      Antianginal Therapies:        Beta Blockers:  metoprolol        Calcium Channel Blockers:        Long Acting Nitrates:        Ranexa:     ALLERGIES:   No Known Allergies      REVIEW OF SYMPTOMS:   CONSTITUTIONAL: o fever, no chills, no weight loss, no weight gain, no fatigue   CARDIOVASCULAR: Afib chest pain   RESPIRATORY: no Shortness of breath, no cough, no wheezing  : No dysuria, no hematuria   GI: No dark color stool, no nausea, no diarrhea, no constipation, no abdominal pain   NEURO: No headache, no slurred speech   ALL OTHER REVIEW OF SYSTEMS ARE NEGATIVE.    VITAL SIGNS:  T(C): 36.7 (01-30-23 @ 08:20), Max: 36.8 (01-30-23 @ 04:00)  T(F): 98 (01-30-23 @ 08:20), Max: 98.2 (01-30-23 @ 04:00)  HR: 158 (01-30-23 @ 08:20) (79 - 174)  BP: 99/75 (01-30-23 @ 08:20) (83/51 - 144/98)  RR: 18 (01-30-23 @ 08:20) (16 - 18)  SpO2: 98% (01-30-23 @ 08:20) (98% - 100%)    INTAKE AND OUTPUT:       PHYSICAL EXAM:  Constitutional: Comfortable . No acute distress.   HEENT: Atraumatic and normocephalic , neck is supple . no JVD. No carotid bruit.  CNS: A&Ox3. No focal deficits.   Respiratory: CTAB, unlabored   Cardiovascular: IRR afib   Gastrointestinal: Soft, non-tender. +Bowel sounds.   Extremities: 2+ Peripheral Pulses, No clubbing, cyanosis, or edema  Psychiatric: Calm . no agitation.   Skin: Warm and dry, no ulcers on extremities     LABS:  ( 30 Jan 2023 06:33 )  Troponin T  <0.01,  CPK  39   , CKMB  X    , BNP X        , ( 30 Jan 2023 00:30 )  Troponin T  <0.01,  CPK  X    , CKMB  X    ,                                 12.2   8.82  )-----------( 291      ( 30 Jan 2023 06:33 )             39.5     01-30    135  |  99  |  11.6  ----------------------------<  168<H>  3.8   |  25.0  |  0.46<L>    Ca    8.5      30 Jan 2023 06:33  Mg     2.0     01-30    TPro  7.7  /  Alb  3.8  /  TBili  <0.2<L>  /  DBili  x   /  AST  62<H>  /  ALT  19  /  AlkPhos  56  01-30    PT/INR - ( 30 Jan 2023 00:30 )   PT: 12.4 sec;   INR: 1.07 ratio         PTT - ( 30 Jan 2023 00:30 )  PTT:38.9 sec        Thyroid Stimulating Hormone, Serum: 1.54 uIU/mL (01-30-23 @ 00:30)        ECG:   Prior ECG: Yes [ x ] No [  ]    CARDIAC TESTING   ECHO:     STRESS:     Cardiac Interventions:    CATH: report pending  - prelim = normal coronaries, normal LV    ELECTROPHYSIOLOGY:       Indication:   MARIKA Score: only if ACS     Risk Stratification:  ASA: 2  Mallampati: 2   Bleeding Risk: 7.4  Creatinine: 0.46  GFR: 102    Assessment:   71 yo female admitted for rapid afib developed chest pain at 7 am ekg repeat revealed ST elevation in anterior and inferior leads.  STEMI called  brought to cath lab [facility] revealed normal coronaries. CP most likely due to pericarditis. Rate 160 irregular 150mg of AMIO given.      Plan/Recommendations:   -Admit to ICU report ICU pa.   -plan for TTE today   -Amiodarone 150mg X1   -preferred access: RRA Failed   -patient seen and examined  -confirmed appropriate NPO duration  -ECG and Labs reviewed  -Continue IV heparin   -Aspirin 325mg po pre cath   -NS 1liter   -procedure discussed with patient; risks and benefits explained, questions answered  -consent obtained by attending IC   -SBC - following  case discussed with Dr Gore                                                     [facility]                                              INTERVENTIONAL CARDIOLOGY AT [address], [place] [address]                                             Telephone: [phone]. Fax:[phone]                                                       INTERVENTIONAL CARDIOLOGY PROGRESS NOTE                                                                                             History obtained by: Patient and medical record   Community Cardiologist: CARINA Gore Dr.   Reason for Consultation: Evaluation for cardiac catheterization  Available pt records reviewed: Yes [ x ] No [  ]    Chief complaint:    Patient is a 72y old  Female who presents with a chief complaint of AFib with RVR (30 Jan 2023 07:54)      HPI:  71yo F with PMHx of  HLD, hypothyroid presented to ED c/o cough, congestion for the past 5 days, Son at [facility] states pt initially went to  was prescribed cough medication but without relief, pt went back to UC yesterday and was given more cough medications but was c/o worsening symptoms now with cp and sob came to ED for further workup. Pt c/o 7/10 chest pressure worsened with breathing, denies fever, chills, n/v, abdominal pain, diarrhea or dysuria. In the ED found to have AFib with RVR HR in the 150s, pt was given diltiazem 10mg IVP x 2 doses and 30mg PO x 1 dose, initial EKG concern for pericarditis but subsequent EKG improved, she was given Ibuprofen 600mg x 1 dose. During encounter pt c/o substernal chest pressure, HR 150s on tele monitor, given stat Lopressor 5mg IV x 1 dose with good rate control, given morphine 2mg IV x dose. RVP positive for Coronavirus(229E), troponin neg x 1.    (30 Jan 2023 02:44)    UPDATES: STEMI called a by Dr Gore.  Pt developed cheat pain at 7 am 1st 722  EKG revealed ST elevation in anterior and inferior leads afib pain 9 of 10.  Remains on heparin drip, 324mg aspirin given nitro given in CDU. pt brought to the cath lab for emergent catheterization. Dr Aquino at bedside. Heparin 4000 IV fentanyl 50mcg given initally.      Anginal Class:        Angina (Class):        Ischemic Symptoms:     Heart Failure:        Systolic/Diastolic/Combined:        NYHA Class (within 2 weeks):       PAST MEDICAL HISTORY  High cholesterol  Hypothyroidism        Associated Risk Factors:        Frailty Assessment: (none/mild/mod/severe):       Cerebrovascular Disease: N/A       Chronic Lung Disease: N/A       Peripheral Arterial Disease: N/A       Chronic Kidney Disease (if yes, what is GFR): N/A       Uncontrolled Diabetes (if yes, what is HgbA1C or FBS): N/A       Poorly Controlled Hypertension (if yes, what is SBP): N/A       Morbid Obesity (if yes, what is BMI): N/A       History of Recent Ventricular Arrhythmia: N/A       Inability to Ambulate Safely: N/A       Need for Therapeutic Anticoagulation: N/A       Antiplatelet or Contrast Allergy: N/A      PAST SURGICAL HISTORY  H/O knee surgery  Brain aneurysm  C section      SOCIAL HISTORY:  No etoh No smoker     FAMILY HISTORY: non contributory   No pertinent family history    Family History of Premature Cardiovascular Disease:  Yes [  ] No [ x ]    HOME MEDICATIONS:  atorvastatin 20 mg oral tablet: 1 tab(s) orally once a day (30 Jan 2023 03:58)  levothyroxine 50 mcg (0.05 mg) oral tablet: 1 tab(s) orally once a day (30 Jan 2023 03:58)  omeprazole 40 mg oral delayed release capsule: 1 cap(s) orally once a day (30 Jan 2023 03:58)      CURRENT CARDIAC MEDICATIONS:  metoprolol tartrate 25 milliGRAM(s) Oral two times a day  metoprolol tartrate Injectable 5 milliGRAM(s) IV Push every 6 hours PRN for HR>130      Antianginal Therapies:        Beta Blockers:  metoprolol        Calcium Channel Blockers:        Long Acting Nitrates:        Ranexa:     ALLERGIES:   No Known Allergies      REVIEW OF SYMPTOMS:   CONSTITUTIONAL: o fever, no chills, no weight loss, no weight gain, no fatigue   CARDIOVASCULAR: Afib chest pain   RESPIRATORY: no Shortness of breath, no cough, no wheezing  : No dysuria, no hematuria   GI: No dark color stool, no nausea, no diarrhea, no constipation, no abdominal pain   NEURO: No headache, no slurred speech   ALL OTHER REVIEW OF SYSTEMS ARE NEGATIVE.    VITAL SIGNS:  T(C): 36.7 (01-30-23 @ 08:20), Max: 36.8 (01-30-23 @ 04:00)  T(F): 98 (01-30-23 @ 08:20), Max: 98.2 (01-30-23 @ 04:00)  HR: 158 (01-30-23 @ 08:20) (79 - 174)  BP: 99/75 (01-30-23 @ 08:20) (83/51 - 144/98)  RR: 18 (01-30-23 @ 08:20) (16 - 18)  SpO2: 98% (01-30-23 @ 08:20) (98% - 100%)    INTAKE AND OUTPUT:       PHYSICAL EXAM:  Constitutional: Comfortable . No acute distress.   HEENT: Atraumatic and normocephalic , neck is supple . no JVD. No carotid bruit.  CNS: A&Ox3. No focal deficits.   Respiratory: CTAB, unlabored   Cardiovascular: IRR afib   Gastrointestinal: Soft, non-tender. +Bowel sounds.   Extremities: 2+ Peripheral Pulses, No clubbing, cyanosis, or edema  Psychiatric: Calm . no agitation.   Skin: Warm and dry, no ulcers on extremities     LABS:  ( 30 Jan 2023 06:33 )  Troponin T  <0.01,  CPK  39   , CKMB  X    , BNP X        , ( 30 Jan 2023 00:30 )  Troponin T  <0.01,  CPK  X    , CKMB  X    ,                                 12.2   8.82  )-----------( 291      ( 30 Jan 2023 06:33 )             39.5     01-30    135  |  99  |  11.6  ----------------------------<  168<H>  3.8   |  25.0  |  0.46<L>    Ca    8.5      30 Jan 2023 06:33  Mg     2.0     01-30    TPro  7.7  /  Alb  3.8  /  TBili  <0.2<L>  /  DBili  x   /  AST  62<H>  /  ALT  19  /  AlkPhos  56  01-30    PT/INR - ( 30 Jan 2023 00:30 )   PT: 12.4 sec;   INR: 1.07 ratio         PTT - ( 30 Jan 2023 00:30 )  PTT:38.9 sec        Thyroid Stimulating Hormone, Serum: 1.54 uIU/mL (01-30-23 @ 00:30)        ECG:   Prior ECG: Yes [ x ] No [  ]    CARDIAC TESTING   ECHO:     STRESS:     Cardiac Interventions:    CATH: report pending  - prelim = normal coronaries, normal LV    ELECTROPHYSIOLOGY:       Indication: NSTEMI   MARIKA Score: 1     Risk Stratification:  ASA: 2  Mallampati: 2   Bleeding Risk: 7.4  Creatinine: 0.46  GFR: 102    Assessment:   73 yo female admitted for rapid afib developed chest pain at 7 am ekg repeat revealed ST elevation in anterior and inferior leads.  STEMI called  brought to cath lab [facility] revealed normal coronaries. CP most likely due to pericarditis. Rate 160 irregular 150mg of AMIO given.      Plan/Recommendations:   -Admit to ICU report ICU pa.   -plan for TTE today   -Amiodarone 150mg X1   -preferred access: RRA Failed   -patient seen and examined  -confirmed appropriate NPO duration  -ECG and Labs reviewed  -Continue IV heparin   -Aspirin 325mg po pre cath   -NS 1liter   -procedure discussed with patient; risks and benefits explained, questions answered  -consent obtained by attending IC   -SBC - following  case discussed with Dr Gore

## 2023-01-30 NOTE — ED ADULT NURSE REASSESSMENT NOTE - NS ED NURSE REASSESS COMMENT FT1
Pt resting comfortably, stated her CP has decreased greatly./ RR even and unlabored on RA, afib on CM up to 127. CM and  in place, bed locked in the lowest position, side rails up. Pt admitted to telemetry.

## 2023-01-30 NOTE — PATIENT PROFILE ADULT - HEALTH LITERACY
HPI:  47 yo Male with hx of drug abuse, hepatitis C, chronic back pain, possible new diagnosis of hepatocellular carcinoma transferred from [facility] for pathologic fracture. Patient initially presented to the hospital two weeks ago for worsening lower back and left hip/leg pain. Pain limited ambulation. Imaging revealed pathologic fractures of the left sacrum, left ilium, and right acetabulum. Patient was accepted by Dr. Back (orthopedic surgery).   Denies fevers, chills, nausea, vomiting, hemoptysis, chest pain, SOB, abdominal pain, abdominal swelling, lower extremity swelling, rashes. Does have intermittent nose bleeds. (03 Feb 2018 04:49)      Allergies    No Known Allergies    Intolerances        ROS: [  ] Fever  [  ] Chills  [  ]Chest Pain [  ] SOB  [  ]Cough [  ] N/V  [  ] Diarrhea [  ]Constipation [  ]Other ROS:  [  ] ROS otherwise negative    PAST MEDICAL & SURGICAL HISTORY:  IV drug abuse  Hepatitis C  No significant past surgical history      FAMILY HISTORY:  No pertinent family history in first degree relatives      MEDICATIONS  (STANDING):  calcium carbonate 1250 mG + Vitamin D (OsCal 500 + D) 1 Tablet(s) Oral daily  gabapentin 300 milliGRAM(s) Oral every 8 hours  oxyCODONE  ER Tablet 20 milliGRAM(s) Oral every 12 hours  propranolol 10 milliGRAM(s) Oral every 12 hours    MEDICATIONS  (PRN):  HYDROmorphone  Injectable 3 milliGRAM(s) IV Push every 4 hours PRN Severe Pain (7 - 10)  polyethylene glycol 3350 17 Gram(s) Oral daily PRN Constipation      PHYSICAL EXAM  Vital Signs Last 24 Hrs  T(C): 36.7 (05 Feb 2018 05:58), Max: 37 (04 Feb 2018 14:41)  T(F): 98.1 (05 Feb 2018 05:58), Max: 98.6 (04 Feb 2018 14:41)  HR: 76 (05 Feb 2018 05:58) (76 - 83)  BP: 114/81 (05 Feb 2018 05:58) (100/63 - 127/82)  BP(mean): --  RR: 18 (05 Feb 2018 05:58) (18 - 18)  SpO2: 99% (05 Feb 2018 05:58) (97% - 99%)    General: Well nourished, well developed, no acute distress, KPS 60  HEENT: NC/AT; EOMI, PERRL, sclera nonicteric; external ears normal; no rhinorrhea or epistaxis; mucous membranes moist; oropharynx clear and without erythema  CV: NR, RR; no appreciable r/m/g  Lungs: CTAB, no increased work of breathing  Abdomen: Bowel sounds present; soft, NTND  MSK: Vertebral spine non-tender to palpation. L hip TTP  Neuro: AAOx3; cranial nerves II-XII intact; strength 5/5 in upper and lower extremities; sensation to light touch in tact bilaterally.  Psych: Full affect; mood congruent  Skin: no visible rashes on limited examination        ASSESSMENT/PLAN    CANDICE ARROYO is a 48y man with pain in L hip/leg 2/2 pathologic fracture, likely due to metastatic disease from hepatocellular carcinoma.  According to the patient, he had a biopsy done of the L hip while at [facility], which was non-diagnostic. If this is indeed true, he will need to be scheduled for another biopsy, likely by interventional radiology.     Assuming pathology comes back positive for metastatic malignancy, he may be a candidate for palliative radiation. Ortho is following patient, who requested MRI of the hip to evaluate whether he would be a candidate for surgery. If he is a surgical candidate, radiation could be done in the adjuvant setting, which would be scheduled about 2 weeks post-op. That could be done in the inpatient setting if he cannot be discharged soon after surgery, but would preferably done outpatient at the [facility] ([facility]) after discharge. If that is the case, we will set him up with an appointment for outpatient consultation.     We discussed the use of palliative radiation in this setting, namely to improve quality of life through the reduction of symptoms.  We talked about the risks, benefits, acute and long term side effects, as well as expected treatment outcomes.  He was given the opportunity to ask questions, which were answered to his apparent satisfaction.  He provided written consent to proceed with radiation therapy, should it be necessary. Please reach out to radiation oncology after pathology results are final and ortho has made their decision about surgical intervention. no

## 2023-01-30 NOTE — ED ADULT NURSE REASSESSMENT NOTE - NS ED NURSE REASSESS COMMENT FT1
monitor tech called this RN reporting pts HR was in the 160s, DR. Snow and Dr. Canada made aware, /61 [id number].   cardizem 10mg IVP given 0123 - HR came down to 128bpm but went back to up 150s again.   EKG in progress.   report given to Reva SANDHU of critical.

## 2023-01-30 NOTE — ED ADULT NURSE REASSESSMENT NOTE - NS ED NURSE REASSESS COMMENT FT1
Dr. Washington at bedside, pts HR at 174bpm with verbal orders made - 5mg Metoprolol given IVP.  0240 - HR 116bpm, /69

## 2023-01-30 NOTE — CONSULT NOTE ADULT - SUBJECTIVE AND OBJECTIVE BOX
[place] [facility], THE HEART CENTER                                   [address] [place] [address]                                                      PHONE: [phone]                                                         FAX: [phone]  [email or web address]  ---------------------------------------------------------------------------------------------------------------------------------    Reason for Consult: afib    HPI:  AVIS PATEL is an 72y Female with hypothyroidism HLD nonobst CAD by coronary CTA, normal EF, no sig valvular disease, admitted with cough, inspirational chest pain and new onset AF.    PAST MEDICAL & SURGICAL HISTORY:  High cholesterol      Hypothyroidism      H/O knee surgery      Brain aneurysm          No Known Allergies      MEDICATIONS  (STANDING):  atorvastatin 20 milliGRAM(s) Oral at bedtime  colchicine 0.6 milliGRAM(s) Oral daily  heparin  Infusion.  Unit(s)/Hr (12 mL/Hr) IV Continuous <Continuous>  ibuprofen  Tablet. 600 milliGRAM(s) Oral three times a day  levothyroxine 50 MICROGram(s) Oral daily  metoprolol tartrate 25 milliGRAM(s) Oral two times a day  pantoprazole  Injectable 40 milliGRAM(s) IV Push every 12 hours  predniSONE   Tablet 40 milliGRAM(s) Oral daily    MEDICATIONS  (PRN):  aluminum hydroxide/magnesium hydroxide/simethicone Suspension 30 milliLiter(s) Oral every 4 hours PRN Dyspepsia  heparin   Injectable 5500 Unit(s) IV Push every 6 hours PRN For aPTT less than 40  heparin   Injectable 2500 Unit(s) IV Push every 6 hours PRN For aPTT between 40 - 57  melatonin 3 milliGRAM(s) Oral at bedtime PRN Insomnia  metoprolol tartrate Injectable 5 milliGRAM(s) IV Push every 6 hours PRN for HR>130  ondansetron Injectable 4 milliGRAM(s) IV Push every 8 hours PRN Nausea and/or Vomiting      Social History:  Cigarettes:       neg             Alchohol:       neg          Illicit Drug Abuse:  neg  neg FH CAD    ROS: Negative other than as mentioned in HPI.    Vital Signs Last 24 Hrs  T(C): 36.8 (30 Jan 2023 04:00), Max: 36.8 (30 Jan 2023 04:00)  T(F): 98.2 (30 Jan 2023 04:00), Max: 98.2 (30 Jan 2023 04:00)  HR: 131 (30 Jan 2023 07:31) (79 - 174)  BP: 92/63 (30 Jan 2023 07:31) (92/63 - 144/98)  BP(mean): --  RR: 18 (30 Jan 2023 04:00) (18 - 18)  SpO2: 100% (30 Jan 2023 04:00) (98% - 100%)    Parameters below as of 30 Jan 2023 04:00  Patient On (Oxygen Delivery Method): nasal cannula  O2 Flow (L/min): 2    ICU Vital Signs Last 24 Hrs  AFAF ABIZEID  I&O's Detail    I&O's Summary    Drug Dosing Weight  AFAF ABIZEID      PHYSICAL EXAM:  General: Appears alert and cooperative.  HEENT: Head; normocephalic, atraumatic.  Eyes: Pupils reactive, cornea wnl.  Neck: Supple, no nodes adenopathy, no NVD or carotid bruit or thyromegaly.  CARDIOVASCULAR: Normal S1 and S2, No murmur, rub, gallop or lift.   LUNGS: No rales, rhonchi or wheeze. Normal breath sounds bilaterally.  ABDOMEN: Soft, nontender without mass or organomegaly. bowel sounds normoactive.  EXTREMITIES: No clubbing, cyanosis or edema. Distal pulses wnl.   SKIN: warm and dry with normal turgor.  NEURO: Alert/oriented x 3/normal motor exam. No pathologic reflexes.    PSYCH: normal affect.        LABS:                        12.2   8.82  )-----------( 291      ( 30 Jan 2023 06:33 )             39.5     01-30    135  |  99  |  11.6  ----------------------------<  168<H>  3.8   |  25.0  |  0.46<L>    Ca    8.5      30 Jan 2023 06:33  Mg     2.0     01-30    TPro  7.7  /  Alb  3.8  /  TBili  <0.2<L>  /  DBili  x   /  AST  62<H>  /  ALT  19  /  AlkPhos  56  01-30    AFAF ABIZEID  CARDIAC MARKERS ( 30 Jan 2023 06:33 )  x     / <0.01 ng/mL / 39 U/L / x     / x      CARDIAC MARKERS ( 30 Jan 2023 00:30 )  x     / <0.01 ng/mL / x     / x     / x          PT/INR - ( 30 Jan 2023 00:30 )   PT: 12.4 sec;   INR: 1.07 ratio         PTT - ( 30 Jan 2023 00:30 )  PTT:38.9 sec      RADIOLOGY & ADDITIONAL STUDIES:    INTERPRETATION OF TELEMETRY (personally reviewed):    ECG: reveiwed AF with MVR no acute changes                         [place] [facility], THE HEART CENTER                                   [address] [place] [address]                                                      PHONE: [phone]                                                         FAX: [phone]  [email or web address]  ---------------------------------------------------------------------------------------------------------------------------------    Reason for Consult: afib    HPI:  AVIS PATEL is an 72y Female with hypothyroidism HLD nonobst CAD by coronary CTA, normal EF, no sig valvular disease, admitted with cough, inspirational chest pain and new onset AF.  Initial ECGs revealed AF with no acute ST segment changes. Pt given lopressor and cardizem for rate control and placed on IV heparin.  Pt had recurrent chest pain at 7 am 7/10 with new ST segment elevations 3 F V4-V6 in setting of rapid AF, /78. STEMI team activated for urgent cath.    PAST MEDICAL & SURGICAL HISTORY:  High cholesterol      Hypothyroidism      H/O knee surgery      Brain aneurysm          No Known Allergies      MEDICATIONS  (STANDING):  atorvastatin 20 milliGRAM(s) Oral at bedtime  colchicine 0.6 milliGRAM(s) Oral daily  heparin  Infusion.  Unit(s)/Hr (12 mL/Hr) IV Continuous <Continuous>  ibuprofen  Tablet. 600 milliGRAM(s) Oral three times a day  levothyroxine 50 MICROGram(s) Oral daily  metoprolol tartrate 25 milliGRAM(s) Oral two times a day  pantoprazole  Injectable 40 milliGRAM(s) IV Push every 12 hours  predniSONE   Tablet 40 milliGRAM(s) Oral daily    MEDICATIONS  (PRN):  aluminum hydroxide/magnesium hydroxide/simethicone Suspension 30 milliLiter(s) Oral every 4 hours PRN Dyspepsia  heparin   Injectable 5500 Unit(s) IV Push every 6 hours PRN For aPTT less than 40  heparin   Injectable 2500 Unit(s) IV Push every 6 hours PRN For aPTT between 40 - 57  melatonin 3 milliGRAM(s) Oral at bedtime PRN Insomnia  metoprolol tartrate Injectable 5 milliGRAM(s) IV Push every 6 hours PRN for HR>130  ondansetron Injectable 4 milliGRAM(s) IV Push every 8 hours PRN Nausea and/or Vomiting      Social History:  Cigarettes:       neg             Alchohol:       neg          Illicit Drug Abuse:  neg  neg FH CAD    ROS: Negative other than as mentioned in HPI.    Vital Signs Last 24 Hrs  T(C): 36.8 (30 Jan 2023 04:00), Max: 36.8 (30 Jan 2023 04:00)  T(F): 98.2 (30 Jan 2023 04:00), Max: 98.2 (30 Jan 2023 04:00)  HR: 131 (30 Jan 2023 07:31) (79 - 174)  BP: 92/63 (30 Jan 2023 07:31) (92/63 - 144/98)  BP(mean): --  RR: 18 (30 Jan 2023 04:00) (18 - 18)  SpO2: 100% (30 Jan 2023 04:00) (98% - 100%)    Parameters below as of 30 Jan 2023 04:00  Patient On (Oxygen Delivery Method): nasal cannula  O2 Flow (L/min): 2    ICU Vital Signs Last 24 Hrs  AFAF ABIZEID  I&O's Detail    I&O's Summary    Drug Dosing Weight  AFAF ABIZEID      PHYSICAL EXAM:  General: Appears alert and cooperative.  HEENT: Head; normocephalic, atraumatic.  Eyes: Pupils reactive, cornea wnl.  Neck: Supple, no nodes adenopathy, no NVD or carotid bruit or thyromegaly.  CARDIOVASCULAR: Normal S1 and S2, No murmur, rub, gallop or lift.   LUNGS: No rales, rhonchi or wheeze. Normal breath sounds bilaterally.  ABDOMEN: Soft, nontender without mass or organomegaly. bowel sounds normoactive.  EXTREMITIES: No clubbing, cyanosis or edema. Distal pulses wnl.   SKIN: warm and dry with normal turgor.  NEURO: Alert/oriented x 3/normal motor exam. No pathologic reflexes.    PSYCH: normal affect.        LABS:                        12.2   8.82  )-----------( 291      ( 30 Jan 2023 06:33 )             39.5     01-30    135  |  99  |  11.6  ----------------------------<  168<H>  3.8   |  25.0  |  0.46<L>    Ca    8.5      30 Jan 2023 06:33  Mg     2.0     01-30    TPro  7.7  /  Alb  3.8  /  TBili  <0.2<L>  /  DBili  x   /  AST  62<H>  /  ALT  19  /  AlkPhos  56  01-30    AFAF ABIZEID  CARDIAC MARKERS ( 30 Jan 2023 06:33 )  x     / <0.01 ng/mL / 39 U/L / x     / x      CARDIAC MARKERS ( 30 Jan 2023 00:30 )  x     / <0.01 ng/mL / x     / x     / x          PT/INR - ( 30 Jan 2023 00:30 )   PT: 12.4 sec;   INR: 1.07 ratio         PTT - ( 30 Jan 2023 00:30 )  PTT:38.9 sec      RADIOLOGY & ADDITIONAL STUDIES: CXR reviewed: clear lungs    INTERPRETATION OF TELEMETRY (personally reviewed):    ECG: reveiwed # 1-4 :AF with MVR no acute changes         repeat this am AF with RVR 2 mm ST segment elevations 3 F V4-V6

## 2023-01-30 NOTE — ED ADULT NURSE NOTE - OBJECTIVE STATEMENT
son at bedside reports pt has been having coughing episodes x 2 days and today had chest pain while at rest, non radiating, no shortness of breath, dizziness, headache, jaw pain and neck pain.   reports hx of elevated cholesterol. pt anxious.

## 2023-01-30 NOTE — PATIENT PROFILE ADULT - FALL HARM RISK - HARM RISK INTERVENTIONS

## 2023-01-30 NOTE — CHART NOTE - NSCHARTNOTEFT_GEN_A_CORE
72 year old female with PMH Dyslipidemia, Hypothyroidism and remote smoking presented with cough, congestion and chest pain.  RVP significant for Coronavirus (Non-COVID). Admitted and treated for AF with RVR and pericarditis. Underwent cardiac catheterization which showed normal coronaries, depressed EF.  Patient was upgraded to MICU, now being downgraded.    Chart reviewed; patient seen, examined for AF, Coronavirus infection.  Continues to complain of chest pain, pressure - worse on coughing. Scant brown phlegm, denies any fever or chills  Occasional dizziness, none currently.    OBJECTIVE:  Vital Signs Last 24 Hrs  T(C): 37.2 (30 Jan 2023 23:15), Max: 37.2 (30 Jan 2023 23:15)  T(F): 99 (30 Jan 2023 23:15), Max: 99 (30 Jan 2023 23:15)  HR: 76 (30 Jan 2023 21:00) (76 - 174)  BP: 99/72 (30 Jan 2023 21:00) (83/51 - 995/56)  BP(mean): 68 (30 Jan 2023 21:00) (67 - 117)  RR: 15 (30 Jan 2023 21:00) (15 - 27)  SpO2: 94% (30 Jan 2023 21:00) (87% - 100%)    Parameters below as of 30 Jan 2023 21:00  Patient On (Oxygen Delivery Method): nasal cannula        PHYSICAL EXAMINATION  General: Middle aged female sitting up in bed, NAD  HEENT:  EOMI  NECK: Supple   CVS: Irregular S1 S2  RESP:  CTAB  GI:  Soft nondistended nontender BS+  : No suprapubic tenderness  MSK:  FROM  CNS:  AAOX3. no gross global or focal deficit  INTEG:  warm dry skin  PSYCH:  Fair mood    LE doppler (-)  TTE with EF 65-70%, Grade II DD  K 6 at presentation  RVP - Coronavirus  Cardiac Catheterization with clear coronaries; No effusion        A/P    Pericarditis  Coronavirus infection  AF with RVR  Hypothyroidism, normal TSH    - monitoring  - rate control with BB, Anticoagulation with Heparin  - Colchicine, NSAID. ?Need for Prednisone (will discontinue;  review with cardiology in am)  - LT4  - GI prophylaxis

## 2023-01-30 NOTE — ED ADULT NURSE NOTE - CHIEF COMPLAINT QUOTE
Ambulatory reporting flu-like symptoms since Monday, was seen by her primary care provider and was placed on medications to treat her symptoms however patient is complaining of worsening symptoms and now chest pains/SOB. Patient VS WNL in triage, no accessory muscle use, denies sick contacts. EKG in progress. UNVACCINATED. Female

## 2023-01-30 NOTE — ED PROVIDER NOTE - PROGRESS NOTE DETAILS
Pt developed new onset rapid A-fib for which Cardizem was given 10 mg x 2 IVP and then 30 mg po with improved control.  Cardiology consult called to [facility] and case d/w Hospitalist and will admit to Tele

## 2023-01-30 NOTE — CONSULT NOTE ADULT - ASSESSMENT
Assessment  Acute STEMI inferolateral distribution onset < 1 hour  New onset AF with RVR  Recent noncovid viral illness  HLD  H/o Nonobst LAD disease  hypothyroidism on supplementation    Rec:  STEMI team activated  IVF  IV lopressor for rate control  empiric statin  hold heparin precath  trend troponin  further rec based on cath results  echo     Assessment  Acute STEMI inferolateral distribution onset < 1 hour  New onset AF with RVR  Recent noncovid viral illness  HLD  H/o Nonobst LAD disease  hypothyroidism on supplementation    Rec:  STEMI team activated  IVF  IV lopressor for rate control  empiric statin  hold heparin precath  trend troponin  further rec based on cath results  echo        Addendum:  Cardiac cath revealed normal coronaries, normal EF  ECG c/w likely pericarditis  Will obtain echo to exclude pericardial effusion  Cont rate control and AC

## 2023-01-30 NOTE — ED PROVIDER NOTE - CLINICAL SUMMARY MEDICAL DECISION MAKING FREE TEXT BOX
71 yo F c/o increased cough and congestion 1 week and developed pleuritic chest pain hours PTA.  While in ED pt developed new onset A-fib with RVR.  Rate controlled with IV/po Cardizem and Cardiology consult called for this AM.  Case d/w Hospitalist and will admit to Tele

## 2023-01-30 NOTE — PROGRESS NOTE ADULT - ASSESSMENT
72F with hx of HLD, hypothyroidism, hx of cerebral aneurysm s/p coiling w/ post operative course complicated by Afib presented to the ED with complaint of cough/chest congestion and chest pain found to have coronavirus and EKG w/ pericarditis     Afib with RVR   likely in the setting of acute illness w/ coronavirus   s/p amio bolus w/ conversion to NSR   c/w metoprolol 25mg q12h and titrate as tolerated   c/w heparin drip   will check LE duplex although lower suspicion for DVT/PE as cause and given recent contrast exposure     Pericarditis   likely in setting of viral infection   c/w colchicine/prednisone/ibuprofen as per cardiology recs   Echo with no evidence of pericardial effusion     Full Code   on heparin drip     Pt stable for transfer to telemetry. Sign out provided to Dr. YIN Hale. Please call back with any questions or concerns.

## 2023-01-30 NOTE — ED ADULT NURSE REASSESSMENT NOTE - NS ED NURSE REASSESS COMMENT FT1
Assumed care of pt at 0135. ED monitor tech notified previous RN pt entered rapid afib, pt with no PMH of afib. Pt stated she is having CP. EKG completed.

## 2023-01-30 NOTE — ED PROVIDER NOTE - ATTENDING CONTRIBUTION TO CARE
73 yo F presents to ED c/o non-prod cough with assoc congestion which started 1 week ago.  Pt now c/o chest pain which started 5 hrs PTA which worsens with inspiration and movement.  No assoc fever, chills, abd pain, N/V or syncope.  On exam awake and alert in NAD, PERRL, throat clear mm moist, Neck supple, Cor Reg, Lungs clear b/l, Abd soft, NT, Ext FROM, Neuro non-focal.  CXR NADIYA, will check labs CE, Flu HAIR and re-eval after meds

## 2023-01-31 LAB
ANION GAP SERPL CALC-SCNC: 12 MMOL/L — SIGNIFICANT CHANGE UP (ref 5–17)
APTT BLD: 53 SEC — HIGH (ref 27.5–35.5)
APTT BLD: >200 SEC — CRITICAL HIGH (ref 27.5–35.5)
BASOPHILS # BLD AUTO: 0.04 K/UL — SIGNIFICANT CHANGE UP (ref 0–0.2)
BASOPHILS NFR BLD AUTO: 0.6 % — SIGNIFICANT CHANGE UP (ref 0–2)
BUN SERPL-MCNC: 10.3 MG/DL — SIGNIFICANT CHANGE UP (ref 8–20)
CALCIUM SERPL-MCNC: 8.3 MG/DL — LOW (ref 8.4–10.5)
CHLORIDE SERPL-SCNC: 102 MMOL/L — SIGNIFICANT CHANGE UP (ref 96–108)
CO2 SERPL-SCNC: 25 MMOL/L — SIGNIFICANT CHANGE UP (ref 22–29)
CREAT SERPL-MCNC: 0.35 MG/DL — LOW (ref 0.5–1.3)
EGFR: 109 ML/MIN/1.73M2 — SIGNIFICANT CHANGE UP
EOSINOPHIL # BLD AUTO: 0.18 K/UL — SIGNIFICANT CHANGE UP (ref 0–0.5)
EOSINOPHIL NFR BLD AUTO: 2.6 % — SIGNIFICANT CHANGE UP (ref 0–6)
GLUCOSE SERPL-MCNC: 123 MG/DL — HIGH (ref 70–99)
HCT VFR BLD CALC: 34 % — LOW (ref 34.5–45)
HCT VFR BLD CALC: 39.4 % — SIGNIFICANT CHANGE UP (ref 34.5–45)
HCV AB S/CO SERPL IA: 0.11 S/CO — SIGNIFICANT CHANGE UP (ref 0–0.99)
HCV AB SERPL-IMP: SIGNIFICANT CHANGE UP
HGB BLD-MCNC: 10.6 G/DL — LOW (ref 11.5–15.5)
HGB BLD-MCNC: 11.7 G/DL — SIGNIFICANT CHANGE UP (ref 11.5–15.5)
IMM GRANULOCYTES NFR BLD AUTO: 1 % — HIGH (ref 0–0.9)
LYMPHOCYTES # BLD AUTO: 1.48 K/UL — SIGNIFICANT CHANGE UP (ref 1–3.3)
LYMPHOCYTES # BLD AUTO: 21.7 % — SIGNIFICANT CHANGE UP (ref 13–44)
MAGNESIUM SERPL-MCNC: 2.1 MG/DL — SIGNIFICANT CHANGE UP (ref 1.6–2.6)
MCHC RBC-ENTMCNC: 22.6 PG — LOW (ref 27–34)
MCHC RBC-ENTMCNC: 23.2 PG — LOW (ref 27–34)
MCHC RBC-ENTMCNC: 29.7 GM/DL — LOW (ref 32–36)
MCHC RBC-ENTMCNC: 31.2 GM/DL — LOW (ref 32–36)
MCV RBC AUTO: 74.6 FL — LOW (ref 80–100)
MCV RBC AUTO: 76.1 FL — LOW (ref 80–100)
MONOCYTES # BLD AUTO: 0.6 K/UL — SIGNIFICANT CHANGE UP (ref 0–0.9)
MONOCYTES NFR BLD AUTO: 8.8 % — SIGNIFICANT CHANGE UP (ref 2–14)
NEUTROPHILS # BLD AUTO: 4.46 K/UL — SIGNIFICANT CHANGE UP (ref 1.8–7.4)
NEUTROPHILS NFR BLD AUTO: 65.3 % — SIGNIFICANT CHANGE UP (ref 43–77)
PHOSPHATE SERPL-MCNC: 2.2 MG/DL — LOW (ref 2.4–4.7)
PLATELET # BLD AUTO: 263 K/UL — SIGNIFICANT CHANGE UP (ref 150–400)
PLATELET # BLD AUTO: 282 K/UL — SIGNIFICANT CHANGE UP (ref 150–400)
POTASSIUM SERPL-MCNC: 3.8 MMOL/L — SIGNIFICANT CHANGE UP (ref 3.5–5.3)
POTASSIUM SERPL-SCNC: 3.8 MMOL/L — SIGNIFICANT CHANGE UP (ref 3.5–5.3)
RBC # BLD: 4.56 M/UL — SIGNIFICANT CHANGE UP (ref 3.8–5.2)
RBC # BLD: 5.18 M/UL — SIGNIFICANT CHANGE UP (ref 3.8–5.2)
RBC # FLD: 16.2 % — HIGH (ref 10.3–14.5)
RBC # FLD: 16.5 % — HIGH (ref 10.3–14.5)
SODIUM SERPL-SCNC: 139 MMOL/L — SIGNIFICANT CHANGE UP (ref 135–145)
WBC # BLD: 6.83 K/UL — SIGNIFICANT CHANGE UP (ref 3.8–10.5)
WBC # BLD: 7.35 K/UL — SIGNIFICANT CHANGE UP (ref 3.8–10.5)
WBC # FLD AUTO: 6.83 K/UL — SIGNIFICANT CHANGE UP (ref 3.8–10.5)
WBC # FLD AUTO: 7.35 K/UL — SIGNIFICANT CHANGE UP (ref 3.8–10.5)

## 2023-01-31 PROCEDURE — 99233 SBSQ HOSP IP/OBS HIGH 50: CPT

## 2023-01-31 PROCEDURE — 71250 CT THORAX DX C-: CPT | Mod: 26

## 2023-01-31 PROCEDURE — 93010 ELECTROCARDIOGRAM REPORT: CPT | Mod: 76

## 2023-01-31 RX ORDER — ACETAMINOPHEN 500 MG
650 TABLET ORAL EVERY 6 HOURS
Refills: 0 | Status: DISCONTINUED | OUTPATIENT
Start: 2023-01-31 | End: 2023-02-01

## 2023-01-31 RX ORDER — POTASSIUM PHOSPHATE, MONOBASIC POTASSIUM PHOSPHATE, DIBASIC 236; 224 MG/ML; MG/ML
15 INJECTION, SOLUTION INTRAVENOUS ONCE
Refills: 0 | Status: COMPLETED | OUTPATIENT
Start: 2023-01-31 | End: 2023-01-31

## 2023-01-31 RX ORDER — APIXABAN 2.5 MG/1
5 TABLET, FILM COATED ORAL EVERY 12 HOURS
Refills: 0 | Status: DISCONTINUED | OUTPATIENT
Start: 2023-01-31 | End: 2023-02-01

## 2023-01-31 RX ADMIN — Medication 25 MILLIGRAM(S): at 16:39

## 2023-01-31 RX ADMIN — Medication 600 MILLIGRAM(S): at 05:45

## 2023-01-31 RX ADMIN — PANTOPRAZOLE SODIUM 40 MILLIGRAM(S): 20 TABLET, DELAYED RELEASE ORAL at 16:38

## 2023-01-31 RX ADMIN — Medication 200 MILLIGRAM(S): at 21:42

## 2023-01-31 RX ADMIN — APIXABAN 5 MILLIGRAM(S): 2.5 TABLET, FILM COATED ORAL at 14:05

## 2023-01-31 RX ADMIN — Medication 600 MILLIGRAM(S): at 16:40

## 2023-01-31 RX ADMIN — POTASSIUM PHOSPHATE, MONOBASIC POTASSIUM PHOSPHATE, DIBASIC 62.5 MILLIMOLE(S): 236; 224 INJECTION, SOLUTION INTRAVENOUS at 08:58

## 2023-01-31 RX ADMIN — Medication 600 MILLIGRAM(S): at 22:00

## 2023-01-31 RX ADMIN — PANTOPRAZOLE SODIUM 40 MILLIGRAM(S): 20 TABLET, DELAYED RELEASE ORAL at 05:40

## 2023-01-31 RX ADMIN — HEPARIN SODIUM 700 UNIT(S)/HR: 5000 INJECTION INTRAVENOUS; SUBCUTANEOUS at 06:10

## 2023-01-31 RX ADMIN — Medication 200 MILLIGRAM(S): at 04:15

## 2023-01-31 RX ADMIN — Medication 25 MILLIGRAM(S): at 05:39

## 2023-01-31 RX ADMIN — HEPARIN SODIUM 0 UNIT(S)/HR: 5000 INJECTION INTRAVENOUS; SUBCUTANEOUS at 05:00

## 2023-01-31 RX ADMIN — Medication 3 MILLIGRAM(S): at 21:12

## 2023-01-31 RX ADMIN — Medication 5 MILLIGRAM(S): at 14:55

## 2023-01-31 RX ADMIN — Medication 50 MICROGRAM(S): at 05:40

## 2023-01-31 RX ADMIN — Medication 0.6 MILLIGRAM(S): at 16:39

## 2023-01-31 RX ADMIN — Medication 600 MILLIGRAM(S): at 21:11

## 2023-01-31 RX ADMIN — ATORVASTATIN CALCIUM 20 MILLIGRAM(S): 80 TABLET, FILM COATED ORAL at 21:11

## 2023-01-31 NOTE — CHART NOTE - NSCHARTNOTEFT_GEN_A_CORE
Called by RN, reporting pt c/o CP.  Pt seen resting in bed. Reports she has intermittent chest pain for about 30 minutes, exacerbated by coughing.  s/p LHC yesterday- nml coronary arteries. Pain c/w pericarditis. Hx of PAF CT chest pending.  Pt denies SOB.  afebrile /64  O2 sat 97 % on 1L NC    A & O x 3 NAD   Lungs - coarse BS throughout, no wheeze  Heart- irregularly irregular    EKG - afib at 109 bpm compared to EKG from yesterday, no acute change    Continue Metroprolol, eliquis, colchicine/ motrin  Chest CT    Dr. Boyle aware, no new orders at this time.

## 2023-01-31 NOTE — CONSULT NOTE ADULT - SUBJECTIVE AND OBJECTIVE BOX
[place] [facility], THE HEART CENTER                                   [address] [place] [address]                                                      PHONE: [phone]                                                         FAX: [phone]  [email or web address]  ---------------------------------------------------------------------------------------------------------------------------------    Overnight events/patient complaints: converted to SR after IV amio yesterday, cough persists, echo normal EF no pericardial effusion      No Known Allergies    MEDICATIONS  (STANDING):  atorvastatin 20 milliGRAM(s) Oral at bedtime  chlorhexidine 2% Cloths 1 Application(s) Topical <User Schedule>  colchicine 0.6 milliGRAM(s) Oral daily  heparin  Infusion. 900 Unit(s)/Hr (9 mL/Hr) IV Continuous <Continuous>  ibuprofen  Tablet. 600 milliGRAM(s) Oral three times a day  levothyroxine 50 MICROGram(s) Oral daily  metoprolol tartrate 25 milliGRAM(s) Oral two times a day  pantoprazole  Injectable 40 milliGRAM(s) IV Push every 12 hours  potassium phosphate IVPB 15 milliMole(s) IV Intermittent once    MEDICATIONS  (PRN):  aluminum hydroxide/magnesium hydroxide/simethicone Suspension 30 milliLiter(s) Oral every 4 hours PRN Dyspepsia  guaiFENesin Oral Liquid (Sugar-Free) 200 milliGRAM(s) Oral every 6 hours PRN Cough  heparin   Injectable 5500 Unit(s) IV Push every 6 hours PRN For aPTT less than 40  heparin   Injectable 2500 Unit(s) IV Push every 6 hours PRN For aPTT between 40 - 57  melatonin 3 milliGRAM(s) Oral at bedtime PRN Insomnia  metoprolol tartrate Injectable 5 milliGRAM(s) IV Push every 6 hours PRN for HR>130  ondansetron Injectable 4 milliGRAM(s) IV Push every 8 hours PRN Nausea and/or Vomiting      Vital Signs Last 24 Hrs  T(C): 37.1 (2023 07:05), Max: 37.2 (2023 23:15)  T(F): 98.7 (2023 07:05), Max: 99 (2023 23:15)  HR: 74 (2023 07:00) (67 - 123)  BP: 102/76 (2023 07:00) (87/76 - 995/56)  BP(mean): 86 (2023 07:00) (67 - 117)  RR: 20 (2023 07:00) (13 - 27)  SpO2: 99% (2023 07:00) (87% - 100%)    Parameters below as of 2023 22:00  Patient On (Oxygen Delivery Method): nasal cannula      Daily Height in cm: 157.48 (2023 09:45)    Daily Weight in k.1 (2023 03:26)  ICU Vital Signs Last 24 Hrs  CHUYAF ABIZEID  I&O's Detail    2023 07:01  -  2023 07:00  --------------------------------------------------------  IN:    Heparin Infusion: 33 mL    Heparin Infusion: 150 mL    IV PiggyBack: 100 mL    Oral Fluid: 400 mL  Total IN: 683 mL    OUT:    Voided (mL): 1300 mL  Total OUT: 1300 mL    Total NET: -617 mL        I&O's Summary    2023 07:01  -  2023 07:00  --------------------------------------------------------  IN: 683 mL / OUT: 1300 mL / NET: -617 mL      Drug Dosing Weight  AFAF ABIZEID      PHYSICAL EXAM:  General: Appears alert and cooperative.  HEENT: Head; normocephalic, atraumatic.  Eyes: Pupils reactive, cornea wnl.  Neck: Supple, no nodes adenopathy, no NVD or carotid bruit or thyromegaly.  CARDIOVASCULAR: Normal S1 and S2, No murmur, rub, gallop or lift.   LUNGS: rales left base  ABDOMEN: Soft, nontender without mass or organomegaly. bowel sounds normoactive.  EXTREMITIES: No clubbing, cyanosis or edema. Distal pulses wnl.   SKIN: warm and dry with normal turgor.  NEURO: Alert/oriented x 3/normal motor exam. No pathologic reflexes.    PSYCH: normal affect.        LABS:                        10.6   7.35  )-----------( 263      ( 2023 04:18 )             34.0         139  |  102  |  10.3  ----------------------------<  123<H>  3.8   |  25.0  |  0.35<L>    Ca    8.3<L>      2023 04:18  Phos  2.2       Mg     2.1         TPro  7.7  /  Alb  3.8  /  TBili  <0.2<L>  /  DBili  x   /  AST  62<H>  /  ALT  19  /  AlkPhos  56      AFAF ABIZEID  CARDIAC MARKERS ( 2023 06:33 )  x     / <0.01 ng/mL / 39 U/L / x     / x      CARDIAC MARKERS ( 2023 00:30 )  x     / <0.01 ng/mL / x     / x     / x          PT/INR - ( 2023 00:30 )   PT: 12.4 sec;   INR: 1.07 ratio         PTT - ( 2023 04:18 )  PTT:>200.0 sec      RADIOLOGY & ADDITIONAL STUDIES: CXR negative    INTERPRETATION OF TELEMETRY (personally reviewed):    EC/31 in ER Af with ST elevation 3 F V4-V6    ECHO:< from: TTE Echo Complete w/ Contrast w/ Doppler (23 @ 11:50) >      PHYSICIAN INTERPRETATION:  Left Ventricle: Endocardial visualization was enhanced with intravenous   echo contrast. The left ventricular internal cavity size is normal. Left   ventricular wall thickness is normal.  Global LV systolic function was normal. Left ventricular ejection   fraction, by visual estimation, is 65 to 70%. Spectral Doppler shows   pseudonormal pattern of left ventricular myocardial filling (Grade II   diastolic dysfunction).  Right Ventricle: Normal right ventricular size and function.  Left Atrium: Moderately enlarged left atrium.  Right Atrium: The right atrium is normal in size.  Pericardium: There is no evidence of pericardial effusion.  Mitral Valve: There is mild mitral annular calcification. No evidence of   mitral stenosis. Trace mitral valve regurgitation is seen.  Tricuspid Valve: The tricuspid valve is normal in structure.   Mild-moderate tricuspid regurgitation is visualized. Estimated pulmonary   artery systolic pressure is 36.1 mmHg assuming a right atrial pressure of   8 mmHg, which is consistent with borderline pulmonary hypertension.  Aortic Valve: The aortic valve is trileaflet. No evidence of aortic   stenosis. Trivial aortic valve regurgitation is seen.  Pulmonic Valve: The pulmonic valve was not well visualized. Mild pulmonic   valve regurgitation.  Aorta: The aortic root and ascending aorta are structurally normal, with   no evidence of dilitation.  Pulmonary Artery: The main pulmonary artery is normal in size.  Venous: The inferior vena cava was normal sized, with respiratory size   variation less than 50%.  In comparison to the previous echocardiogram(s): There are no prior   studies on this patient for comparison purposes.      Summary:   1. Technically difficult study.   2. Endocardial visualization was enhanced with intravenous echo contrast.   3. Left ventricular ejection fraction, by visual estimation, is 65 to   70%.   4. Normal global left ventricular systolic function.   5. Spectral Doppler shows pseudonormal pattern of left ventricular   myocardial filling (Grade IIdiastolic dysfunction).   6. Normal right ventricular size and function.   7. Moderately enlarged left atrium.   8. Mild mitral annular calcification.   9. Trace mitral valve regurgitation.  10. Mild-moderate tricuspid regurgitation.  11. Mild pulmonic valve regurgitation.  12. Estimated pulmonary artery systolic pressure is 36.1 mmHg assuming a   right atrial pressure of 8 mmHg, which is consistent with borderline   pulmonary hypertension.    Josh Cortés MD Electronically signed on 2023 at 1:57:56 PM        < end of copied text >      STRESS TEST:    CARDIAC CATHETERIZATION:< from: Cardiac Catheterization (23 @ 08:39) >      Study Date:     2023   Name:           AVIS PATEL   :            1950   (72 years)   Gender:         female   MR#:            [id number]   [facility]#:           [id number]   Patient Class:  Inpatient     Cath Lab Report    Diagnostic Cardiologist:       Tyler Aquino MD   Fellow:                        Kitty Nelson     Procedures Performed   Procedures:               1.    Arterial Access - Right Radial     2.    Ultrasound Guided Access   3.    Arterial Access - Right Femoral   4.   Left Heart Cath   5.    Diagnostic Coronary Angiography     Indications:                Chest pain   ST elevations     Diagnostic Conclusions:     Normal coronary arteries   Normal LV EDP   Moderately depressed LVEF with basal to mid inferior hypokinesis.   Her presentation is more likely percarditis in the setting of COVID  infection, and/or takasubu in setting of rapid Afib  Recommendations:     Risk factor reduction for CAD   Consideration of rhythm control for atrial fibrillation.     < end of copied text >

## 2023-01-31 NOTE — PROGRESS NOTE ADULT - ASSESSMENT
72F with hx of HLD, hypothyroidism, hx of cerebral aneurysm s/p coiling w/ post operative course complicated by Afib presented to the ED with complaint of cough/chest congestion and chest pain. Pt found to be in Afib RVR. RVP positive for coronavirus(not covid 19). EKG was concerning for pericarditis and started on colchicine/prednisone. Course complicated by chest pain with  Ekg changed with new inferolateral ST elevations 01/30/23 am  and code STEMI was called. Pt was transferred to MICU. s/p URGENT cardiac cath showing normal coronaries and normal EF. Echo showed EF 65%-70%, diastolic dysfunction and no pericardial effusion. Post procedure pt developed an episode of Afib RVR to 160s and received a load of amiodarone 150mg after which she converted to NSR. Pt has remained hemodynamically stable and transferred to medicine 01/30/23 Evening.    Pericarditis   Pericardial effusion  Chest pain ruled out ACS   -S/P LHC with normal coronary  -on Colicine 0,6mg daily,  -cont Ibuprofen 600mg TID  -Echo showed EF 65%-70%, diastolic dysfunction and no pericardial effusion.    New onset Afib rvr  -rate controlled, NSR  - cont Lopressor 25mg PO BID, and Lopressor IV Q6hrs prn for HR>130  - on Heparin gtt     Viral URI  -positive  rvp,corona virus (not covid 19)  -supportive care      HTN  -monitor bp closley  -bb    HLP   -statin    Hypothyroidism  -synthroid    dvt ppx heparin   GI PPX-PPI 72F with hx of HLD, hypothyroidism, hx of cerebral aneurysm s/p coiling w/ post operative course complicated by Afib presented to the ED with complaint of cough/chest congestion and chest pain. Pt found to be in Afib RVR. RVP positive for coronavirus(not covid 19). EKG was concerning for pericarditis and started on colchicine/prednisone. Course complicated by chest pain with  Ekg changed with new inferolateral ST elevations 01/30/23 am  and code STEMI was called. Pt was transferred to MICU. s/p URGENT cardiac cath showing normal coronaries and normal EF. Echo showed EF 65%-70%, diastolic dysfunction and no pericardial effusion. Post procedure pt developed an episode of Afib RVR to 160s and received a load of amiodarone 150mg after which she converted to NSR. Pt has remained hemodynamically stable and transferred to medicine 01/30/23 Evening.      Acute hypoxic respiratory failure multifactorial  Viral URI,positive  rvp,corona virus (not covid 19)  Pericarditis   Pericardial effusion  Chest pain ruled out ACS   -pt on 4l oxygen,will titrate of as tolerate  -ct chest r/o pna  -S/P LHC with normal coronary  -on Colicine 0,6mg daily,  -cont Ibuprofen 600mg TID  -Echo showed EF 65%-70%, diastolic dysfunction and no pericardial effusion.      New onset Afib rvr  -rate controlled, NSR  - cont Lopressor 25mg PO BID, and Lopressor IV Q6hrs prn for HR>130  - on Heparin gtt,will change to eliquis     Viral URI  -positive  rvp,corona virus (not covid 19)  -supportive care      HTN  -monitor bp wiley  -bb    HLP   -statin    Hypothyroidism  -synthroid    dvt ppx heparin   GI PPX-PPI    plan of care vic pt and pt's sister at bedside  vic moran  PT johanna 72F with hx of HLD, hypothyroidism, hx of cerebral aneurysm s/p coiling w/ post operative course complicated by Afib presented to the ED with complaint of cough/chest congestion and chest pain. Pt found to be in Afib RVR. RVP positive for coronavirus(not covid 19). EKG was concerning for pericarditis and started on colchicine/prednisone. Course complicated by chest pain with  Ekg changed with new inferolateral ST elevations 01/30/23 am  and code STEMI was called. Pt was transferred to MICU. s/p URGENT cardiac cath showing normal coronaries and normal EF. Echo showed EF 65%-70%, diastolic dysfunction and no pericardial effusion. Post procedure pt developed an episode of Afib RVR to 160s and received a load of amiodarone 150mg after which she converted to NSR. Pt has remained hemodynamically stable and transferred to medicine 01/30/23 Evening.      Acute hypoxic respiratory failure multifactorial  Viral URI,positive  rvp,corona virus (not covid 19)  Pericarditis   Pericardial effusion  Chest pain ruled out ACS   -pt on 4l oxygen,will titrate of as tolerate  -ct chest r/o pna  -S/P LHC with normal coronary  -on Colicine 0,6mg daily,  -cont Ibuprofen 600mg TID  -Echo showed EF 65%-70%, diastolic dysfunction and no pericardial effusion.      New onset Afib rvr  -rate controlled, NSR  - cont Lopressor 25mg PO BID, and Lopressor IV Q6hrs prn for HR>130  - on Heparin gtt,will change to eliquis     Viral URI  -positive  rvp,corona virus (not covid 19)  -supportive care      HTN  -monitor bp closley  -bb    HLP   -statin    Hypothyroidism  -synthroid    dvt ppx heparin   GI PPX-PPI    plan of care dw pt and pt's sister at bedside  called son--left msg  dw rn  PT selvinal

## 2023-01-31 NOTE — PHYSICAL THERAPY INITIAL EVALUATION ADULT - ADDITIONAL COMMENTS
Pt reports living with son and family (live upstairs) in a house with 0 ELIUD and resides on the main level. Pt amb without device and is independent with functional mobility, ADLs, and IADLs. Pt drives and is retired, however negotiates 10 steps c rail to babysit grandson. Pt has support of family. Pt owns [employer].

## 2023-01-31 NOTE — CONSULT NOTE ADULT - ASSESSMENT
Assessment  chest pain in setting of coronavirus non-covid c/w pericarditis  Normal coronary arteries, normal EF, no evidence of pericardial effusion  AF ? duration clinical picture c/w  > 1 week duration  PAF elevated Chadsvasc score 2  Viral illness  ? LLL infiltrate on exam      Rec  transfer to tele  repeat ECG this am  Cont metoprolol  DC IV heparin switch to eliquis 5 BID  agree with cont colchicine  TFTs  consider CT chest w/o contrast to exclude LLL infiltrate  ambulate today, consider DC home later this afternoon

## 2023-01-31 NOTE — PROGRESS NOTE ADULT - SUBJECTIVE AND OBJECTIVE BOX
Patient is a 72y old  Female who presents with a chief complaint of AFib with RVR (31 Jan 2023 08:49)      pt is c/o cough,sob on exertion,pl chest pain  denies abd pain,n/v/d/dysuria  REVIEW OF SYSTEMS: All systems are reviewed and found to be negative except above    MEDICATIONS  (STANDING):  atorvastatin 20 milliGRAM(s) Oral at bedtime  chlorhexidine 2% Cloths 1 Application(s) Topical <User Schedule>  colchicine 0.6 milliGRAM(s) Oral daily  heparin  Infusion. 900 Unit(s)/Hr (9 mL/Hr) IV Continuous <Continuous>  ibuprofen  Tablet. 600 milliGRAM(s) Oral three times a day  levothyroxine 50 MICROGram(s) Oral daily  metoprolol tartrate 25 milliGRAM(s) Oral two times a day  pantoprazole  Injectable 40 milliGRAM(s) IV Push every 12 hours    MEDICATIONS  (PRN):  aluminum hydroxide/magnesium hydroxide/simethicone Suspension 30 milliLiter(s) Oral every 4 hours PRN Dyspepsia  guaiFENesin Oral Liquid (Sugar-Free) 200 milliGRAM(s) Oral every 6 hours PRN Cough  heparin   Injectable 5500 Unit(s) IV Push every 6 hours PRN For aPTT less than 40  heparin   Injectable 2500 Unit(s) IV Push every 6 hours PRN For aPTT between 40 - 57  melatonin 3 milliGRAM(s) Oral at bedtime PRN Insomnia  metoprolol tartrate Injectable 5 milliGRAM(s) IV Push every 6 hours PRN for HR>130  ondansetron Injectable 4 milliGRAM(s) IV Push every 8 hours PRN Nausea and/or Vomiting      CAPILLARY BLOOD GLUCOSE        I&O's Summary    30 Jan 2023 07:01  -  31 Jan 2023 07:00  --------------------------------------------------------  IN: 683 mL / OUT: 1300 mL / NET: -617 mL        PHYSICAL EXAM:  Vital Signs Last 24 Hrs  T(C): 36.7 (31 Jan 2023 12:26), Max: 37.2 (30 Jan 2023 23:15)  T(F): 98.1 (31 Jan 2023 12:26), Max: 99 (30 Jan 2023 23:15)  HR: 82 (31 Jan 2023 12:26) (67 - 91)  BP: 128/73 (31 Jan 2023 12:26) (90/73 - 150/98)  BP(mean): 92 (31 Jan 2023 11:00) (68 - 117)  RR: 18 (31 Jan 2023 12:26) (13 - 26)  SpO2: 95% (4l nc)          CONSTITUTIONAL: NAD,  EYES: PERRLA; conjunctiva and sclera clear  ENMT: Moist oral mucosa,   RESPIRATORY: Normal respiratory effort; crackles  to auscultation bilaterally  CARDIOVASCULAR: Regular rate and rhythm, normal S1 and S2, no murmur   EXTS: No lower extremity edema; Peripheral pulses are 2+ bilaterally  ABDOMEN: Nontender to palpation, normoactive bowel sounds, no rebound/guarding;   MUSCLOSKELETAL:   no joint swelling or tenderness to palpation  PSYCH: affect appropriate  NEUROLOGY: A+O to person, place, and time; CN 2-12 are intact and symmetric; no gross sensory deficits;       LABS:                        10.6   7.35  )-----------( 263      ( 31 Jan 2023 04:18 )             34.0     01-31    139  |  102  |  10.3  ----------------------------<  123<H>  3.8   |  25.0  |  0.35<L>    Ca    8.3<L>      31 Jan 2023 04:18  Phos  2.2     01-31  Mg     2.1     01-31    TPro  7.7  /  Alb  3.8  /  TBili  <0.2<L>  /  DBili  x   /  AST  62<H>  /  ALT  19  /  AlkPhos  56  01-30    PT/INR - ( 30 Jan 2023 00:30 )   PT: 12.4 sec;   INR: 1.07 ratio         PTT - ( 31 Jan 2023 04:18 )  PTT:>200.0 sec  CARDIAC MARKERS ( 30 Jan 2023 06:33 )  x     / <0.01 ng/mL / 39 U/L / x     / x      CARDIAC MARKERS ( 30 Jan 2023 00:30 )  x     / <0.01 ng/mL / x     / x     / x                RADIOLOGY & ADDITIONAL TESTS:  Results Reviewed:

## 2023-02-01 ENCOUNTER — TRANSCRIPTION ENCOUNTER (OUTPATIENT)
Age: 73
End: 2023-02-01

## 2023-02-01 VITALS
RESPIRATION RATE: 16 BRPM | HEART RATE: 108 BPM | DIASTOLIC BLOOD PRESSURE: 60 MMHG | TEMPERATURE: 98 F | SYSTOLIC BLOOD PRESSURE: 100 MMHG | OXYGEN SATURATION: 97 %

## 2023-02-01 LAB
ALBUMIN SERPL ELPH-MCNC: 2.9 G/DL — LOW (ref 3.3–5.2)
ALP SERPL-CCNC: 54 U/L — SIGNIFICANT CHANGE UP (ref 40–120)
ALT FLD-CCNC: 7 U/L — SIGNIFICANT CHANGE UP
ANION GAP SERPL CALC-SCNC: 13 MMOL/L — SIGNIFICANT CHANGE UP (ref 5–17)
AST SERPL-CCNC: 11 U/L — SIGNIFICANT CHANGE UP
BASOPHILS # BLD AUTO: 0.02 K/UL — SIGNIFICANT CHANGE UP (ref 0–0.2)
BASOPHILS NFR BLD AUTO: 0.3 % — SIGNIFICANT CHANGE UP (ref 0–2)
BILIRUB SERPL-MCNC: 0.3 MG/DL — LOW (ref 0.4–2)
BUN SERPL-MCNC: 11.2 MG/DL — SIGNIFICANT CHANGE UP (ref 8–20)
CALCIUM SERPL-MCNC: 8.4 MG/DL — SIGNIFICANT CHANGE UP (ref 8.4–10.5)
CHLORIDE SERPL-SCNC: 104 MMOL/L — SIGNIFICANT CHANGE UP (ref 96–108)
CO2 SERPL-SCNC: 24 MMOL/L — SIGNIFICANT CHANGE UP (ref 22–29)
CREAT SERPL-MCNC: 0.42 MG/DL — LOW (ref 0.5–1.3)
EGFR: 105 ML/MIN/1.73M2 — SIGNIFICANT CHANGE UP
EOSINOPHIL # BLD AUTO: 0.2 K/UL — SIGNIFICANT CHANGE UP (ref 0–0.5)
EOSINOPHIL NFR BLD AUTO: 3.1 % — SIGNIFICANT CHANGE UP (ref 0–6)
GLUCOSE SERPL-MCNC: 103 MG/DL — HIGH (ref 70–99)
HCT VFR BLD CALC: 35.7 % — SIGNIFICANT CHANGE UP (ref 34.5–45)
HGB BLD-MCNC: 10.9 G/DL — LOW (ref 11.5–15.5)
IMM GRANULOCYTES NFR BLD AUTO: 0.8 % — SIGNIFICANT CHANGE UP (ref 0–0.9)
LYMPHOCYTES # BLD AUTO: 1.44 K/UL — SIGNIFICANT CHANGE UP (ref 1–3.3)
LYMPHOCYTES # BLD AUTO: 22.1 % — SIGNIFICANT CHANGE UP (ref 13–44)
MCHC RBC-ENTMCNC: 22.8 PG — LOW (ref 27–34)
MCHC RBC-ENTMCNC: 30.5 GM/DL — LOW (ref 32–36)
MCV RBC AUTO: 74.7 FL — LOW (ref 80–100)
MONOCYTES # BLD AUTO: 0.57 K/UL — SIGNIFICANT CHANGE UP (ref 0–0.9)
MONOCYTES NFR BLD AUTO: 8.7 % — SIGNIFICANT CHANGE UP (ref 2–14)
NEUTROPHILS # BLD AUTO: 4.25 K/UL — SIGNIFICANT CHANGE UP (ref 1.8–7.4)
NEUTROPHILS NFR BLD AUTO: 65 % — SIGNIFICANT CHANGE UP (ref 43–77)
PHOSPHATE SERPL-MCNC: 2.9 MG/DL — SIGNIFICANT CHANGE UP (ref 2.4–4.7)
PLATELET # BLD AUTO: 280 K/UL — SIGNIFICANT CHANGE UP (ref 150–400)
POTASSIUM SERPL-MCNC: 3.7 MMOL/L — SIGNIFICANT CHANGE UP (ref 3.5–5.3)
POTASSIUM SERPL-SCNC: 3.7 MMOL/L — SIGNIFICANT CHANGE UP (ref 3.5–5.3)
PROT SERPL-MCNC: 6 G/DL — LOW (ref 6.6–8.7)
RBC # BLD: 4.78 M/UL — SIGNIFICANT CHANGE UP (ref 3.8–5.2)
RBC # FLD: 16.2 % — HIGH (ref 10.3–14.5)
SODIUM SERPL-SCNC: 141 MMOL/L — SIGNIFICANT CHANGE UP (ref 135–145)
WBC # BLD: 6.53 K/UL — SIGNIFICANT CHANGE UP (ref 3.8–10.5)
WBC # FLD AUTO: 6.53 K/UL — SIGNIFICANT CHANGE UP (ref 3.8–10.5)

## 2023-02-01 PROCEDURE — 87640 STAPH A DNA AMP PROBE: CPT

## 2023-02-01 PROCEDURE — 85610 PROTHROMBIN TIME: CPT

## 2023-02-01 PROCEDURE — 71045 X-RAY EXAM CHEST 1 VIEW: CPT

## 2023-02-01 PROCEDURE — 84484 ASSAY OF TROPONIN QUANT: CPT

## 2023-02-01 PROCEDURE — 84443 ASSAY THYROID STIM HORMONE: CPT

## 2023-02-01 PROCEDURE — 99285 EMERGENCY DEPT VISIT HI MDM: CPT

## 2023-02-01 PROCEDURE — 80048 BASIC METABOLIC PNL TOTAL CA: CPT

## 2023-02-01 PROCEDURE — 83690 ASSAY OF LIPASE: CPT

## 2023-02-01 PROCEDURE — 0225U NFCT DS DNA&RNA 21 SARSCOV2: CPT

## 2023-02-01 PROCEDURE — 82330 ASSAY OF CALCIUM: CPT

## 2023-02-01 PROCEDURE — 83880 ASSAY OF NATRIURETIC PEPTIDE: CPT

## 2023-02-01 PROCEDURE — C1760: CPT

## 2023-02-01 PROCEDURE — C1894: CPT

## 2023-02-01 PROCEDURE — 84132 ASSAY OF SERUM POTASSIUM: CPT

## 2023-02-01 PROCEDURE — 83735 ASSAY OF MAGNESIUM: CPT

## 2023-02-01 PROCEDURE — C1887: CPT

## 2023-02-01 PROCEDURE — 84100 ASSAY OF PHOSPHORUS: CPT

## 2023-02-01 PROCEDURE — 85027 COMPLETE CBC AUTOMATED: CPT

## 2023-02-01 PROCEDURE — 80053 COMPREHEN METABOLIC PANEL: CPT

## 2023-02-01 PROCEDURE — 36415 COLL VENOUS BLD VENIPUNCTURE: CPT

## 2023-02-01 PROCEDURE — C8929: CPT

## 2023-02-01 PROCEDURE — 82550 ASSAY OF CK (CPK): CPT

## 2023-02-01 PROCEDURE — 99239 HOSP IP/OBS DSCHRG MGMT >30: CPT

## 2023-02-01 PROCEDURE — 85014 HEMATOCRIT: CPT

## 2023-02-01 PROCEDURE — 85018 HEMOGLOBIN: CPT

## 2023-02-01 PROCEDURE — 93458 L HRT ARTERY/VENTRICLE ANGIO: CPT

## 2023-02-01 PROCEDURE — 83605 ASSAY OF LACTIC ACID: CPT

## 2023-02-01 PROCEDURE — C1769: CPT

## 2023-02-01 PROCEDURE — 82435 ASSAY OF BLOOD CHLORIDE: CPT

## 2023-02-01 PROCEDURE — 82947 ASSAY GLUCOSE BLOOD QUANT: CPT

## 2023-02-01 PROCEDURE — 85025 COMPLETE CBC W/AUTO DIFF WBC: CPT

## 2023-02-01 PROCEDURE — 87641 MR-STAPH DNA AMP PROBE: CPT

## 2023-02-01 PROCEDURE — 84295 ASSAY OF SERUM SODIUM: CPT

## 2023-02-01 PROCEDURE — 93970 EXTREMITY STUDY: CPT

## 2023-02-01 PROCEDURE — 93005 ELECTROCARDIOGRAM TRACING: CPT

## 2023-02-01 PROCEDURE — 96375 TX/PRO/DX INJ NEW DRUG ADDON: CPT

## 2023-02-01 PROCEDURE — 96365 THER/PROPH/DIAG IV INF INIT: CPT

## 2023-02-01 PROCEDURE — 71250 CT THORAX DX C-: CPT

## 2023-02-01 PROCEDURE — 82803 BLOOD GASES ANY COMBINATION: CPT

## 2023-02-01 PROCEDURE — 86803 HEPATITIS C AB TEST: CPT

## 2023-02-01 PROCEDURE — 85730 THROMBOPLASTIN TIME PARTIAL: CPT

## 2023-02-01 RX ORDER — APIXABAN 2.5 MG/1
1 TABLET, FILM COATED ORAL
Qty: 60 | Refills: 0
Start: 2023-02-01 | End: 2023-03-02

## 2023-02-01 RX ORDER — IBUPROFEN 200 MG
1 TABLET ORAL
Qty: 42 | Refills: 0
Start: 2023-02-01 | End: 2023-02-14

## 2023-02-01 RX ORDER — COLCHICINE 0.6 MG
1 TABLET ORAL
Qty: 30 | Refills: 0
Start: 2023-02-01 | End: 2023-03-02

## 2023-02-01 RX ORDER — LEVOTHYROXINE SODIUM 125 MCG
1 TABLET ORAL
Qty: 0 | Refills: 0 | DISCHARGE
Start: 2023-02-01

## 2023-02-01 RX ORDER — ATORVASTATIN CALCIUM 80 MG/1
1 TABLET, FILM COATED ORAL
Qty: 0 | Refills: 0 | DISCHARGE
Start: 2023-02-01

## 2023-02-01 RX ORDER — METOPROLOL TARTRATE 50 MG
3 TABLET ORAL
Qty: 90 | Refills: 0
Start: 2023-02-01 | End: 2023-03-02

## 2023-02-01 RX ORDER — ATORVASTATIN CALCIUM 80 MG/1
1 TABLET, FILM COATED ORAL
Qty: 0 | Refills: 0 | DISCHARGE

## 2023-02-01 RX ORDER — METOPROLOL TARTRATE 50 MG
75 TABLET ORAL DAILY
Refills: 0 | Status: DISCONTINUED | OUTPATIENT
Start: 2023-02-01 | End: 2023-02-01

## 2023-02-01 RX ORDER — METOPROLOL TARTRATE 50 MG
50 TABLET ORAL ONCE
Refills: 0 | Status: COMPLETED | OUTPATIENT
Start: 2023-02-01 | End: 2023-02-01

## 2023-02-01 RX ORDER — LEVOTHYROXINE SODIUM 125 MCG
1 TABLET ORAL
Qty: 0 | Refills: 0 | DISCHARGE

## 2023-02-01 RX ORDER — METOPROLOL TARTRATE 50 MG
1 TABLET ORAL
Qty: 60 | Refills: 0
Start: 2023-02-01 | End: 2023-03-02

## 2023-02-01 RX ADMIN — Medication 25 MILLIGRAM(S): at 05:22

## 2023-02-01 RX ADMIN — Medication 600 MILLIGRAM(S): at 06:12

## 2023-02-01 RX ADMIN — Medication 50 MICROGRAM(S): at 05:22

## 2023-02-01 RX ADMIN — PANTOPRAZOLE SODIUM 40 MILLIGRAM(S): 20 TABLET, DELAYED RELEASE ORAL at 05:22

## 2023-02-01 RX ADMIN — Medication 600 MILLIGRAM(S): at 05:22

## 2023-02-01 RX ADMIN — Medication 0.6 MILLIGRAM(S): at 12:16

## 2023-02-01 RX ADMIN — PANTOPRAZOLE SODIUM 40 MILLIGRAM(S): 20 TABLET, DELAYED RELEASE ORAL at 16:46

## 2023-02-01 RX ADMIN — APIXABAN 5 MILLIGRAM(S): 2.5 TABLET, FILM COATED ORAL at 05:22

## 2023-02-01 RX ADMIN — Medication 200 MILLIGRAM(S): at 04:12

## 2023-02-01 RX ADMIN — Medication 600 MILLIGRAM(S): at 13:09

## 2023-02-01 RX ADMIN — Medication 200 MILLIGRAM(S): at 15:04

## 2023-02-01 RX ADMIN — Medication 50 MILLIGRAM(S): at 12:15

## 2023-02-01 RX ADMIN — CHLORHEXIDINE GLUCONATE 1 APPLICATION(S): 213 SOLUTION TOPICAL at 05:25

## 2023-02-01 RX ADMIN — APIXABAN 5 MILLIGRAM(S): 2.5 TABLET, FILM COATED ORAL at 16:46

## 2023-02-01 RX ADMIN — Medication 600 MILLIGRAM(S): at 12:15

## 2023-02-01 NOTE — DISCHARGE NOTE PROVIDER - NSDCMRMEDTOKEN_GEN_ALL_CORE_FT
apixaban 5 mg oral tablet: 1 tab(s) orally every 12 hours  atorvastatin 20 mg oral tablet: 1 tab(s) orally once a day (at bedtime)  colchicine 0.6 mg oral tablet: 1 tab(s) orally once a day  ibuprofen 600 mg oral tablet: 1 tab(s) orally 3 times a day  levothyroxine 50 mcg (0.05 mg) oral tablet: 1 tab(s) orally once a day  metoprolol tartrate 25 mg oral tablet: 1 tab(s) orally 2 times a day  omeprazole 40 mg oral delayed release capsule: 1 cap(s) orally once a day   apixaban 5 mg oral tablet: 1 tab(s) orally every 12 hours  atorvastatin 20 mg oral tablet: 1 tab(s) orally once a day (at bedtime)  colchicine 0.6 mg oral tablet: 1 tab(s) orally once a day  ibuprofen 600 mg oral tablet: 1 tab(s) orally 3 times a day  levothyroxine 50 mcg (0.05 mg) oral tablet: 1 tab(s) orally once a day  Metoprolol Succinate ER 25 mg oral tablet, extended release: 3 tab(s) orally once a day   omeprazole 40 mg oral delayed release capsule: 1 cap(s) orally once a day

## 2023-02-01 NOTE — DIETITIAN INITIAL EVALUATION ADULT - PERTINENT LABORATORY DATA
02-01    141  |  104  |  11.2  ----------------------------<  103<H>  3.7   |  24.0  |  0.42<L>    Ca    8.4      01 Feb 2023 05:55  Phos  2.9     02-01  Mg     2.1     01-31    TPro  6.0<L>  /  Alb  2.9<L>  /  TBili  0.3<L>  /  DBili  x   /  AST  11  /  ALT  7   /  AlkPhos  54  02-01

## 2023-02-01 NOTE — DIETITIAN INITIAL EVALUATION ADULT - PERTINENT MEDS FT
MEDICATIONS  (STANDING):  apixaban 5 milliGRAM(s) Oral every 12 hours  atorvastatin 20 milliGRAM(s) Oral at bedtime  chlorhexidine 2% Cloths 1 Application(s) Topical <User Schedule>  colchicine 0.6 milliGRAM(s) Oral daily  ibuprofen  Tablet. 600 milliGRAM(s) Oral three times a day  levothyroxine 50 MICROGram(s) Oral daily  metoprolol succinate ER 75 milliGRAM(s) Oral daily  pantoprazole  Injectable 40 milliGRAM(s) IV Push every 12 hours    MEDICATIONS  (PRN):  acetaminophen     Tablet .. 650 milliGRAM(s) Oral every 6 hours PRN Moderate Pain (4 - 6)  aluminum hydroxide/magnesium hydroxide/simethicone Suspension 30 milliLiter(s) Oral every 4 hours PRN Dyspepsia  guaiFENesin Oral Liquid (Sugar-Free) 200 milliGRAM(s) Oral every 6 hours PRN Cough  melatonin 3 milliGRAM(s) Oral at bedtime PRN Insomnia  metoprolol tartrate Injectable 5 milliGRAM(s) IV Push every 6 hours PRN for HR>130  ondansetron Injectable 4 milliGRAM(s) IV Push every 8 hours PRN Nausea and/or Vomiting

## 2023-02-01 NOTE — DIETITIAN INITIAL EVALUATION ADULT - CALCULATED FROM (G/KG)
Pt states he noticed 3 days his right hand was swollen, pt c/o 9/10 pain, hand noted to be swollen and warm to the touch.
78.36

## 2023-02-01 NOTE — DIETITIAN INITIAL EVALUATION ADULT - OTHER INFO
72F with hx of HLD, hypothyroidism, hx of cerebral aneurysm s/p coiling w/ post operative course complicated by Afib presented to the ED with complaint of cough/chest congestion and chest pain. Pt found to be in Afib RVR. RVP positive for coronavirus(not covid 19). EKG was concerning for pericarditis and started on colchicine/prednisone. Course complicated by chest pain with  Ekg changed with new inferolateral ST elevations 01/30/23 am  and code STEMI was called. Pt was transferred to MICU. s/p URGENT cardiac cath showing normal coronaries and normal EF. Echo showed EF 65%-70%, diastolic dysfunction and no pericardial effusion. Post procedure pt developed an episode of Afib RVR to 160s and received a load of amiodarone 150mg after which she converted to NSR. Pt has remained hemodynamically stable and transferred to medicine 01/30/23 Evening.

## 2023-02-01 NOTE — DISCHARGE NOTE NURSING/CASE MANAGEMENT/SOCIAL WORK - PATIENT PORTAL LINK FT
You can access the FollowMyHealth Patient Portal offered by [facility] by registering at the following website: [email or web address]. By joining [employer]’s FollowMyHealth portal, you will also be able to view your health information using other applications (apps) compatible with our system.

## 2023-02-01 NOTE — DISCHARGE NOTE PROVIDER - NSDCCPCAREPLAN_GEN_ALL_CORE_FT
PRINCIPAL DISCHARGE DIAGNOSIS  Diagnosis: Acute respiratory failure with hypoxia  Assessment and Plan of Treatment:       SECONDARY DISCHARGE DIAGNOSES  Diagnosis: New onset atrial fibrillation  Assessment and Plan of Treatment:     Diagnosis: Pericarditis  Assessment and Plan of Treatment:     Diagnosis: Lung nodules  Assessment and Plan of Treatment:     Diagnosis: Hypothyroid  Assessment and Plan of Treatment:     Diagnosis: Urinary tract infection  Assessment and Plan of Treatment:

## 2023-02-01 NOTE — DIETITIAN INITIAL EVALUATION ADULT - ORAL INTAKE PTA/DIET HISTORY
Pt reported poor po intake x 2 days PTA, now improved. Stated  lbs. Encouraged po intake and HBV protein. Pt denied further nutrition related questions/concerns.

## 2023-02-01 NOTE — DISCHARGE NOTE NURSING/CASE MANAGEMENT/SOCIAL WORK - NSDCPEFALRISK_GEN_ALL_CORE
For information on Fall & Injury Prevention, visit: [email or web address] OR  [email or web address] OR  https://www.cdc.gov/steadi/patient.html

## 2023-02-01 NOTE — DISCHARGE NOTE PROVIDER - CARE PROVIDER_API CALL
Dilip Blackmon)  Critical Care Medicine; HospicePalliative Medicine; Pulmonary Disease; Sleep Medicine  [address] [facility] [address]  [place], [place] [id number]  Phone: [phone]  Fax: [phone]  Follow Up Time: 1 month    Sharda Gore)  Internal Medicine  [address] [place] [id number]  Phone: [phone]  Fax: [phone]  Follow Up Time: 1 week

## 2023-02-01 NOTE — PROGRESS NOTE ADULT - SUBJECTIVE AND OBJECTIVE BOX
[facility] - [facility]                                   [address] [place] [address]                                                      PHONE: [phone]                                                         FAX: [phone]  [email or web address]  ---------------------------------------------------------------------------------------------------------------------------------    Overnight events/patient complaints:      NAD     No Known Allergies    MEDICATIONS  (STANDING):  apixaban 5 milliGRAM(s) Oral every 12 hours  atorvastatin 20 milliGRAM(s) Oral at bedtime  chlorhexidine 2% Cloths 1 Application(s) Topical <User Schedule>  colchicine 0.6 milliGRAM(s) Oral daily  ibuprofen  Tablet. 600 milliGRAM(s) Oral three times a day  levothyroxine 50 MICROGram(s) Oral daily  metoprolol succinate ER 75 milliGRAM(s) Oral daily  pantoprazole  Injectable 40 milliGRAM(s) IV Push every 12 hours    MEDICATIONS  (PRN):  acetaminophen     Tablet .. 650 milliGRAM(s) Oral every 6 hours PRN Moderate Pain (4 - 6)  aluminum hydroxide/magnesium hydroxide/simethicone Suspension 30 milliLiter(s) Oral every 4 hours PRN Dyspepsia  guaiFENesin Oral Liquid (Sugar-Free) 200 milliGRAM(s) Oral every 6 hours PRN Cough  melatonin 3 milliGRAM(s) Oral at bedtime PRN Insomnia  metoprolol tartrate Injectable 5 milliGRAM(s) IV Push every 6 hours PRN for HR>130  ondansetron Injectable 4 milliGRAM(s) IV Push every 8 hours PRN Nausea and/or Vomiting      Vital Signs Last 24 Hrs  T(C): 36.8 (01 Feb 2023 08:02), Max: 37.1 (31 Jan 2023 11:05)  T(F): 98.2 (01 Feb 2023 08:02), Max: 98.8 (31 Jan 2023 15:20)  HR: 96 (01 Feb 2023 08:02) (78 - 130)  BP: 104/66 (01 Feb 2023 08:02) (96/66 - 128/73)  BP(mean): 92 (31 Jan 2023 11:00) (76 - 92)  RR: 18 (01 Feb 2023 08:02) (15 - 19)  SpO2: 98% (01 Feb 2023 08:02) (94% - 98%)    Parameters below as of 01 Feb 2023 08:02  Patient On (Oxygen Delivery Method): nasal cannula      ICU Vital Signs Last 24 Hrs  AFAF ABIZEID  I&O's Detail    I&O's Summary    Drug Dosing Weight  AFAF ABIZEID      PHYSICAL EXAM:  General: Appears well developed, alert and cooperative.  HEENT: Head; normocephalic, atraumatic.  Eyes: Pupils reactive, cornea wnl.  Neck: Supple, no nodes adenopathy, no NVD or carotid bruit or thyromegaly.  CARDIOVASCULAR: Normal S1 and S2, No murmur, rub, gallop or lift.   LUNGS: No rales, rhonchi or wheeze. Normal breath sounds bilaterally.  ABDOMEN: Soft, nontender without mass or organomegaly. bowel sounds normoactive.  EXTREMITIES: No clubbing, cyanosis or edema. Distal pulses wnl.   SKIN: warm and dry with normal turgor.  NEURO: Alert/oriented x 3/normal motor exam. No pathologic reflexes.    PSYCH: normal affect.        LABS:                        10.9   6.53  )-----------( 280      ( 01 Feb 2023 05:55 )             35.7     02-01    141  |  104  |  11.2  ----------------------------<  103<H>  3.7   |  24.0  |  0.42<L>    Ca    8.4      01 Feb 2023 05:55  Phos  2.9     02-01  Mg     2.1     01-31    TPro  6.0<L>  /  Alb  2.9<L>  /  TBili  0.3<L>  /  DBili  x   /  AST  11  /  ALT  7   /  AlkPhos  54  02-01    AFAF ABIZEID      PTT - ( 31 Jan 2023 12:15 )  PTT:53.0 sec      RADIOLOGY & ADDITIONAL STUDIES:    INTERPRETATION OF TELEMETRY (personally reviewed):    < from: TTE Echo Complete w/ Contrast w/ Doppler (01.30.23 @ 11:50) >      Summary:   1. Technically difficult study.   2. Endocardial visualization was enhanced with intravenous echo contrast.   3. Left ventricular ejection fraction, by visual estimation, is 65 to   70%.   4. Normal global left ventricular systolic function.   5. Spectral Doppler shows pseudonormal pattern of left ventricular   myocardial filling (Grade IIdiastolic dysfunction).   6. Normal right ventricular size and function.   7. Moderately enlarged left atrium.   8. Mild mitral annular calcification.   9. Trace mitral valve regurgitation.  10. Mild-moderate tricuspid regurgitation.  11. Mild pulmonic valve regurgitation.  12. Estimated pulmonary artery systolic pressure is 36.1 mmHg assuming a   right atrial pressure of 8 mmHg, which is consistent with borderline   pulmonary hypertension.    Josh Cortés MD Electronically signed on 1/30/2023 at 1:57:56 PM    < end of copied text >      < from: Cardiac Catheterization (01.30.23 @ 08:39) >    Coronary Angiography   Left Coronary System:   A catheter was positioned into the vessel ostia under fluoroscopic  guidance. Angiograms were obtained in multiple views.  Right Coronary System:   A catheter was positioned into the vessel ostia under fluoroscopic  guidance. Angiograms were obtained in multiple views.    Diagnostic Findings:     Coronary Angiography   The coronary circulation is left dominant.      LM   Left main artery: The segment is visually normal in size and  structure. Angiography shows no disease.    LAD  Left anterior descending artery: The segment is visually normal in  size and structure. Angiography shows no disease.    CX   Circumflex: The segment is visually normal in size and structure.  Angiography shows no disease.    RCA   Right coronary artery: The segment is visually normal in size and  structure. Angiography shows no disease.    Left Heart Cath   and demonstrates abnormal LV wall motion. Ejection fraction was  visually estimated by LV Gram with a value of 40%. The left  ventricular end diastolic pressure was 13 mmHg. Basal to mid inferior  hypokinesis, normal pressure..    History and Risk Factors:   Hypertension:                                 Yes   A-Fib or Flutter in past 2 weeks:               Yes     X-Ray:   Diagnostic Flouro time:       4.7 min.                     Exam record  DAP:           21.40 cGycm2  Total Flouro Time:            4.7 min.                     Exam total  DAP:            21.40  cGycm2    Exam Start Time:   08:39 AM   Exam End Time:     09:05 AM   Exam Duration:     26 min     < end of copied text >      ASSESSMENT AND PLAN:  Assessment  chest pain in setting of coronavirus non-covid c/w pericarditis  Normal coronary arteries, normal EF, no evidence of pericardial effusion  AF ? duration clinical picture c/w  > 1 week duration  PAF elevated Chadsvasc score 2  Viral illness      Plan  Agree with long term AC   B-Blockers with Toprol XL 75 mg daily   Continue Colchicine     DC planning

## 2023-02-01 NOTE — PROGRESS NOTE ADULT - ASSESSMENT
72F with hx of HLD, hypothyroidism, hx of cerebral aneurysm s/p coiling w/ post operative course complicated by Afib presented to the ED with complaint of cough/chest congestion and chest pain. Pt found to be in Afib RVR. RVP positive for coronavirus(not covid 19). EKG was concerning for pericarditis and started on colchicine/prednisone. Course complicated by chest pain with  Ekg changed with new inferolateral ST elevations 01/30/23 am  and code STEMI was called. Pt was transferred to MICU. s/p URGENT cardiac cath showing normal coronaries and normal EF. Echo showed EF 65%-70%, diastolic dysfunction and no pericardial effusion. Post procedure pt developed an episode of Afib RVR to 160s and received a load of amiodarone 150mg after which she converted to NSR. Pt has remained hemodynamically stable and transferred to medicine 01/30/23 Evening.      Acute hypoxic respiratory failure multifactorial  Viral URI,positive  rvp,corona virus (not covid 19)  Pericarditis   Pericardial effusion  Chest pain ruled out ACS   -pt on 1l oxygen,will titrate of as tolerate  -ct chest r/o pna  -S/P LHC with normal coronary  -on Colicine 0,6mg daily,  -cont Ibuprofen 600mg TID  -Echo showed EF 65%-70%, diastolic dysfunction and no pericardial effusion.  -ct chest neg PNA      New onset Afib rvr  -rate controlled, NSR  - cont Lopressor 25mg PO BID, and Lopressor IV Q6hrs prn for HR>130  - DC Heparin gtt, changeD to eliquis     0.7 cm nodular opacity in the left lower lobe  -f/u pulm out pt,needs repeat ct chest in 3 months    Viral URI  -positive  rvp,corona virus (not covid 19)  -supportive care      HTN  -monitor bp closley  -bb    HLP   -statin    Hypothyroidism  -synthroid    dvt ppx heparin   GI PPX-PPI    plan of care dw  PENDING AMBULATORY SO2   dw rn  PT eval HOME HOME PT 72F with hx of HLD, hypothyroidism, hx of cerebral aneurysm s/p coiling w/ post operative course complicated by Afib presented to the ED with complaint of cough/chest congestion and chest pain. Pt found to be in Afib RVR. RVP positive for coronavirus(not covid 19). EKG was concerning for pericarditis and started on colchicine/prednisone. Course complicated by chest pain with  Ekg changed with new inferolateral ST elevations 01/30/23 am  and code STEMI was called. Pt was transferred to MICU. s/p URGENT cardiac cath showing normal coronaries and normal EF. Echo showed EF 65%-70%, diastolic dysfunction and no pericardial effusion. Post procedure pt developed an episode of Afib RVR to 160s and received a load of amiodarone 150mg after which she converted to NSR. Pt has remained hemodynamically stable and transferred to medicine 01/30/23 Evening.      Acute hypoxic respiratory failure multifactorial  Viral URI,positive  rvp,corona virus (not covid 19)  Pericarditis   Pericardial effusion  Chest pain ruled out ACS   -pt on 1l oxygen,will titrate of as tolerate  -ct chest r/o pna  -S/P LHC with normal coronary  -on Colicine 0,6mg daily,  -cont Ibuprofen 600mg TID  -Echo showed EF 65%-70%, diastolic dysfunction and no pericardial effusion.  -ct chest neg PNA      New onset Afib rvr  -rate controlled, NSR  - cont Lopressor 25mg PO BID, and Lopressor IV Q6hrs prn for HR>130  - DC Heparin gtt, changeD to eliquis     0.7 cm nodular opacity in the left lower lobe  -f/u pulm out pt,needs repeat ct chest in 3 months    Viral URI  -positive  rvp,corona virus (not covid 19)  -supportive care      HTN  -monitor bp closley  -bb    HLP   -statin    Hypothyroidism  -synthroid    dvt ppx heparin   GI PPX-PPI  dc plan HOME HOME PT  plan of care dw  pt and sister in detailed at bedside    dw rn

## 2023-02-01 NOTE — DISCHARGE NOTE PROVIDER - PROVIDER TOKENS
PROVIDER:[TOKEN:[[id number]],FOLLOWUP:[1 month]],PROVIDER:[TOKEN:[[id number]],FOLLOWUP:[1 week]]

## 2023-02-01 NOTE — PROGRESS NOTE ADULT - SUBJECTIVE AND OBJECTIVE BOX
< from: TTE Echo Complete w/ Contrast w/ Doppler (01.30.23 @ 11:50) >   1. Technically difficult study.   2. Endocardial visualization was enhanced with intravenous echo contrast.   3. Left ventricular ejection fraction, by visual estimation, is 65 to   70%.   4. Normal global left ventricular systolic function.   5. Spectral Doppler shows pseudonormal pattern of left ventricular   myocardial filling (Grade IIdiastolic dysfunction).   6. Normal right ventricular size and function.   7. Moderately enlarged left atrium.   8. Mild mitral annular calcification.   9. Trace mitral valve regurgitation.  10. Mild-moderate tricuspid regurgitation.  11. Mild pulmonic valve regurgitation.  12. Estimated pulmonary artery systolic pressure is 36.1 mmHg assuming a   right atrial pressure of 8 mmHg, which is consistent with borderline   pulmonary hypertension.    < end of copied text >  < from: CT Chest No Cont (01.31.23 @ 18:12) >  IMPRESSION:.    No evidence of pneumonia.    Small bilateral pleural effusions.    0.7 cm nodular opacity in the left lower lobe abutting the descending   thoracic aorta is nonspecific, but may represent subsegmental   atelectasis. Recommend CT chest follow-up in 3 months to assess for   stability/clearing.    < end of copied text >       Patient is a 72y old  Female who presents with a chief complaint of Chest pain     (01 Feb 2023 10:17)    pt denies any cp,sob,fever,chill,cough,n/v/d. Breathing well RA , SO2 96-97%  pt is ambulating to bathroom w/o assist w/o sob    REVIEW OF SYSTEMS: All systems are reviewed and found to be negative except above    MEDICATIONS  (STANDING):  apixaban 5 milliGRAM(s) Oral every 12 hours  atorvastatin 20 milliGRAM(s) Oral at bedtime  chlorhexidine 2% Cloths 1 Application(s) Topical <User Schedule>  colchicine 0.6 milliGRAM(s) Oral daily  ibuprofen  Tablet. 600 milliGRAM(s) Oral three times a day  levothyroxine 50 MICROGram(s) Oral daily  metoprolol succinate ER 75 milliGRAM(s) Oral daily  pantoprazole  Injectable 40 milliGRAM(s) IV Push every 12 hours    MEDICATIONS  (PRN):  acetaminophen     Tablet .. 650 milliGRAM(s) Oral every 6 hours PRN Moderate Pain (4 - 6)  aluminum hydroxide/magnesium hydroxide/simethicone Suspension 30 milliLiter(s) Oral every 4 hours PRN Dyspepsia  guaiFENesin Oral Liquid (Sugar-Free) 200 milliGRAM(s) Oral every 6 hours PRN Cough  melatonin 3 milliGRAM(s) Oral at bedtime PRN Insomnia  metoprolol tartrate Injectable 5 milliGRAM(s) IV Push every 6 hours PRN for HR>130  ondansetron Injectable 4 milliGRAM(s) IV Push every 8 hours PRN Nausea and/or Vomiting      CAPILLARY BLOOD GLUCOSE        I&O's Summary      PHYSICAL EXAM:  Vital Signs Last 24 Hrs  T(C): 36.8 (01 Feb 2023 08:02), Max: 37.1 (31 Jan 2023 15:20)  T(F): 98.2 (01 Feb 2023 08:02), Max: 98.8 (31 Jan 2023 15:20)  HR: 96 (01 Feb 2023 08:02) (78 - 130)  BP: 104/66 (01 Feb 2023 08:02) (98/65 - 128/73)  BP(mean): --  RR: 18 (01 Feb 2023 08:02) (17 - 18)  SpO2: 96% (01 Feb 2023 10:00) (94% - 98%)    Parameters below as of 01 Feb 2023 10:00  Patient On (Oxygen Delivery Method): room air        CONSTITUTIONAL: NAD,  EYES: PERRLA; conjunctiva and sclera clear  ENMT: Moist oral mucosa,   RESPIRATORY: Normal respiratory effort; lungs are clear to auscultation bilaterally  CARDIOVASCULAR: Regular rate and rhythm, normal S1 and S2, no murmur   EXTS: No lower extremity edema; Peripheral pulses are 2+ bilaterally  ABDOMEN: Nontender to palpation, normoactive bowel sounds, no rebound/guarding;   MUSCLOSKELETAL:   no joint swelling or tenderness to palpation  PSYCH: affect appropriate  NEUROLOGY: A+O to person, place, and time; CN 2-12 are intact and symmetric; no gross sensory/MOTOR deficits;       LABS:                        10.9   6.53  )-----------( 280      ( 01 Feb 2023 05:55 )             35.7     02-01    141  |  104  |  11.2  ----------------------------<  103<H>  3.7   |  24.0  |  0.42<L>    Ca    8.4      01 Feb 2023 05:55  Phos  2.9     02-01  Mg     2.1     01-31    TPro  6.0<L>  /  Alb  2.9<L>  /  TBili  0.3<L>  /  DBili  x   /  AST  11  /  ALT  7   /  AlkPhos  54  02-01    PTT - ( 31 Jan 2023 12:15 )  PTT:53.0 sec            RADIOLOGY & ADDITIONAL TESTS:  Results Reviewed:                   < from: TTE Echo Complete w/ Contrast w/ Doppler (01.30.23 @ 11:50) >   1. Technically difficult study.   2. Endocardial visualization was enhanced with intravenous echo contrast.   3. Left ventricular ejection fraction, by visual estimation, is 65 to   70%.   4. Normal global left ventricular systolic function.   5. Spectral Doppler shows pseudonormal pattern of left ventricular   myocardial filling (Grade IIdiastolic dysfunction).   6. Normal right ventricular size and function.   7. Moderately enlarged left atrium.   8. Mild mitral annular calcification.   9. Trace mitral valve regurgitation.  10. Mild-moderate tricuspid regurgitation.  11. Mild pulmonic valve regurgitation.  12. Estimated pulmonary artery systolic pressure is 36.1 mmHg assuming a   right atrial pressure of 8 mmHg, which is consistent with borderline   pulmonary hypertension.    < end of copied text >  < from: CT Chest No Cont (01.31.23 @ 18:12) >  IMPRESSION:.    No evidence of pneumonia.    Small bilateral pleural effusions.    0.7 cm nodular opacity in the left lower lobe abutting the descending   thoracic aorta is nonspecific, but may represent subsegmental   atelectasis. Recommend CT chest follow-up in 3 months to assess for   stability/clearing.    < end of copied text >

## 2023-02-01 NOTE — DISCHARGE NOTE PROVIDER - HOSPITAL COURSE
72F with hx of HLD, hypothyroidism, hx of cerebral aneurysm s/p coiling w/ post operative course complicated by Afib presented to the ED with complaint of cough/chest congestion and chest pain. Pt found to be in Afib RVR. RVP positive for coronavirus(not covid 19). EKG was concerning for pericarditis and started on colchicine/prednisone. Course complicated by chest pain with  Ekg changed with new inferolateral ST elevations 01/30/23 am  and code STEMI was called. Pt was transferred to MICU. s/p URGENT cardiac cath showing normal coronaries and normal EF. Echo showed EF 65%-70%, diastolic dysfunction and no pericardial effusion. Post procedure pt developed an episode of Afib RVR to 160s and received a load of amiodarone 150mg after which she converted to NSR. Pt has remained hemodynamically stable and transferred to medicine 01/30/23 Evening.Acute hypoxic respiratory failure likely multifactorial from Viral URI,positive  rvp,corona virus (not covid 19) Pericarditis,Pericardial effusion.continue Colicine 0,6mg daily, Ibuprofen 600mg TID, bb for afib and dc heparin gtt,started eliquis. Pt to f/u with cardiology one week.Found to have lt lung nodule 0.4mm , rec out pt pulmonary for for repeat ct chest in 3 months. Wean off oxygen. home with home PT.

## 2023-02-02 PROBLEM — E03.9 HYPOTHYROIDISM, UNSPECIFIED: Chronic | Status: ACTIVE | Noted: 2023-01-30

## 2023-02-16 ENCOUNTER — INPATIENT (INPATIENT)
Facility: HOSPITAL | Age: 73
LOS: 9 days | Discharge: HOME | DRG: 314 | End: 2023-02-26
Attending: HOSPITALIST | Admitting: HOSPITALIST
Payer: MEDICARE

## 2023-02-16 VITALS
RESPIRATION RATE: 26 BRPM | HEART RATE: 160 BPM | TEMPERATURE: 100 F | HEIGHT: 62 IN | OXYGEN SATURATION: 96 % | SYSTOLIC BLOOD PRESSURE: 110 MMHG | WEIGHT: 139.99 LBS | DIASTOLIC BLOOD PRESSURE: 67 MMHG

## 2023-02-16 DIAGNOSIS — Z98.890 OTHER SPECIFIED POSTPROCEDURAL STATES: Chronic | ICD-10-CM

## 2023-02-16 DIAGNOSIS — I48.91 UNSPECIFIED ATRIAL FIBRILLATION: ICD-10-CM

## 2023-02-16 DIAGNOSIS — I67.1 CEREBRAL ANEURYSM, NONRUPTURED: Chronic | ICD-10-CM

## 2023-02-16 LAB
ALBUMIN SERPL ELPH-MCNC: 4.1 G/DL — SIGNIFICANT CHANGE UP (ref 3.3–5.2)
ALP SERPL-CCNC: 57 U/L — SIGNIFICANT CHANGE UP (ref 40–120)
ALT FLD-CCNC: 10 U/L — SIGNIFICANT CHANGE UP
ANION GAP SERPL CALC-SCNC: 11 MMOL/L — SIGNIFICANT CHANGE UP (ref 5–17)
ANION GAP SERPL CALC-SCNC: 12 MMOL/L — SIGNIFICANT CHANGE UP (ref 5–17)
APPEARANCE UR: ABNORMAL
APTT BLD: 40.6 SEC — HIGH (ref 27.5–35.5)
AST SERPL-CCNC: 11 U/L — SIGNIFICANT CHANGE UP
BACTERIA # UR AUTO: ABNORMAL
BASE EXCESS BLDV CALC-SCNC: 4.9 MMOL/L — HIGH (ref -2–3)
BASOPHILS # BLD AUTO: 0.03 K/UL — SIGNIFICANT CHANGE UP (ref 0–0.2)
BASOPHILS NFR BLD AUTO: 0.4 % — SIGNIFICANT CHANGE UP (ref 0–2)
BILIRUB SERPL-MCNC: 0.5 MG/DL — SIGNIFICANT CHANGE UP (ref 0.4–2)
BILIRUB UR-MCNC: NEGATIVE — SIGNIFICANT CHANGE UP
BUN SERPL-MCNC: 11 MG/DL — SIGNIFICANT CHANGE UP (ref 8–20)
BUN SERPL-MCNC: 14.6 MG/DL — SIGNIFICANT CHANGE UP (ref 8–20)
CA-I SERPL-SCNC: 1.06 MMOL/L — LOW (ref 1.15–1.33)
CALCIUM SERPL-MCNC: 8 MG/DL — LOW (ref 8.4–10.5)
CALCIUM SERPL-MCNC: 9.5 MG/DL — SIGNIFICANT CHANGE UP (ref 8.4–10.5)
CHLORIDE BLDV-SCNC: 103 MMOL/L — SIGNIFICANT CHANGE UP (ref 96–108)
CHLORIDE SERPL-SCNC: 101 MMOL/L — SIGNIFICANT CHANGE UP (ref 96–108)
CHLORIDE SERPL-SCNC: 105 MMOL/L — SIGNIFICANT CHANGE UP (ref 96–108)
CO2 SERPL-SCNC: 24 MMOL/L — SIGNIFICANT CHANGE UP (ref 22–29)
CO2 SERPL-SCNC: 27 MMOL/L — SIGNIFICANT CHANGE UP (ref 22–29)
COLOR SPEC: YELLOW — SIGNIFICANT CHANGE UP
COMMENT - URINE: SIGNIFICANT CHANGE UP
CREAT SERPL-MCNC: 0.54 MG/DL — SIGNIFICANT CHANGE UP (ref 0.5–1.3)
CREAT SERPL-MCNC: 0.63 MG/DL — SIGNIFICANT CHANGE UP (ref 0.5–1.3)
DIFF PNL FLD: NEGATIVE — SIGNIFICANT CHANGE UP
EGFR: 95 ML/MIN/1.73M2 — SIGNIFICANT CHANGE UP
EGFR: 98 ML/MIN/1.73M2 — SIGNIFICANT CHANGE UP
EOSINOPHIL # BLD AUTO: 0.11 K/UL — SIGNIFICANT CHANGE UP (ref 0–0.5)
EOSINOPHIL NFR BLD AUTO: 1.4 % — SIGNIFICANT CHANGE UP (ref 0–6)
EPI CELLS # UR: SIGNIFICANT CHANGE UP
GAS PNL BLDV: 134 MMOL/L — LOW (ref 136–145)
GAS PNL BLDV: SIGNIFICANT CHANGE UP
GLUCOSE BLDC GLUCOMTR-MCNC: 125 MG/DL — HIGH (ref 70–99)
GLUCOSE BLDV-MCNC: 121 MG/DL — HIGH (ref 70–99)
GLUCOSE SERPL-MCNC: 123 MG/DL — HIGH (ref 70–99)
GLUCOSE SERPL-MCNC: 124 MG/DL — HIGH (ref 70–99)
GLUCOSE UR QL: NEGATIVE MG/DL — SIGNIFICANT CHANGE UP
HCO3 BLDV-SCNC: 29 MMOL/L — SIGNIFICANT CHANGE UP (ref 22–29)
HCT VFR BLD CALC: 37.9 % — SIGNIFICANT CHANGE UP (ref 34.5–45)
HCT VFR BLDA CALC: 36 % — SIGNIFICANT CHANGE UP
HGB BLD CALC-MCNC: 11.9 G/DL — SIGNIFICANT CHANGE UP (ref 11.7–16.1)
HGB BLD-MCNC: 11.9 G/DL — SIGNIFICANT CHANGE UP (ref 11.5–15.5)
IMM GRANULOCYTES NFR BLD AUTO: 0.5 % — SIGNIFICANT CHANGE UP (ref 0–0.9)
INR BLD: 1.32 RATIO — HIGH (ref 0.88–1.16)
KETONES UR-MCNC: ABNORMAL
LACTATE BLDV-MCNC: 1.6 MMOL/L — SIGNIFICANT CHANGE UP (ref 0.5–2)
LEUKOCYTE ESTERASE UR-ACNC: ABNORMAL
LYMPHOCYTES # BLD AUTO: 1.57 K/UL — SIGNIFICANT CHANGE UP (ref 1–3.3)
LYMPHOCYTES # BLD AUTO: 19.7 % — SIGNIFICANT CHANGE UP (ref 13–44)
MAGNESIUM SERPL-MCNC: 2 MG/DL — SIGNIFICANT CHANGE UP (ref 1.6–2.6)
MCHC RBC-ENTMCNC: 23.3 PG — LOW (ref 27–34)
MCHC RBC-ENTMCNC: 31.4 GM/DL — LOW (ref 32–36)
MCV RBC AUTO: 74.3 FL — LOW (ref 80–100)
MONOCYTES # BLD AUTO: 0.76 K/UL — SIGNIFICANT CHANGE UP (ref 0–0.9)
MONOCYTES NFR BLD AUTO: 9.6 % — SIGNIFICANT CHANGE UP (ref 2–14)
NEUTROPHILS # BLD AUTO: 5.44 K/UL — SIGNIFICANT CHANGE UP (ref 1.8–7.4)
NEUTROPHILS NFR BLD AUTO: 68.4 % — SIGNIFICANT CHANGE UP (ref 43–77)
NITRITE UR-MCNC: NEGATIVE — SIGNIFICANT CHANGE UP
PCO2 BLDV: 44 MMHG — HIGH (ref 39–42)
PH BLDV: 7.43 — SIGNIFICANT CHANGE UP (ref 7.32–7.43)
PH UR: 5 — SIGNIFICANT CHANGE UP (ref 5–8)
PHOSPHATE SERPL-MCNC: 3.2 MG/DL — SIGNIFICANT CHANGE UP (ref 2.4–4.7)
PLATELET # BLD AUTO: 305 K/UL — SIGNIFICANT CHANGE UP (ref 150–400)
PO2 BLDV: 70 MMHG — HIGH (ref 25–45)
POTASSIUM BLDV-SCNC: 5.8 MMOL/L — HIGH (ref 3.5–5.1)
POTASSIUM SERPL-MCNC: 4 MMOL/L — SIGNIFICANT CHANGE UP (ref 3.5–5.3)
POTASSIUM SERPL-MCNC: 4.2 MMOL/L — SIGNIFICANT CHANGE UP (ref 3.5–5.3)
POTASSIUM SERPL-SCNC: 4 MMOL/L — SIGNIFICANT CHANGE UP (ref 3.5–5.3)
POTASSIUM SERPL-SCNC: 4.2 MMOL/L — SIGNIFICANT CHANGE UP (ref 3.5–5.3)
PROT SERPL-MCNC: 7.2 G/DL — SIGNIFICANT CHANGE UP (ref 6.6–8.7)
PROT UR-MCNC: 15
PROTHROM AB SERPL-ACNC: 15.3 SEC — HIGH (ref 10.5–13.4)
RAPID RVP RESULT: SIGNIFICANT CHANGE UP
RBC # BLD: 5.1 M/UL — SIGNIFICANT CHANGE UP (ref 3.8–5.2)
RBC # FLD: 16.6 % — HIGH (ref 10.3–14.5)
RBC CASTS # UR COMP ASSIST: NEGATIVE /HPF — SIGNIFICANT CHANGE UP (ref 0–4)
SAO2 % BLDV: 95.9 % — SIGNIFICANT CHANGE UP
SARS-COV-2 RNA SPEC QL NAA+PROBE: SIGNIFICANT CHANGE UP
SODIUM SERPL-SCNC: 140 MMOL/L — SIGNIFICANT CHANGE UP (ref 135–145)
SODIUM SERPL-SCNC: 140 MMOL/L — SIGNIFICANT CHANGE UP (ref 135–145)
SP GR SPEC: 1.02 — SIGNIFICANT CHANGE UP (ref 1.01–1.02)
TROPONIN T SERPL-MCNC: <0.01 NG/ML — SIGNIFICANT CHANGE UP (ref 0–0.06)
UROBILINOGEN FLD QL: NEGATIVE MG/DL — SIGNIFICANT CHANGE UP
WBC # BLD: 7.95 K/UL — SIGNIFICANT CHANGE UP (ref 3.8–10.5)
WBC # FLD AUTO: 7.95 K/UL — SIGNIFICANT CHANGE UP (ref 3.8–10.5)
WBC UR QL: SIGNIFICANT CHANGE UP /HPF (ref 0–5)

## 2023-02-16 PROCEDURE — 99223 1ST HOSP IP/OBS HIGH 75: CPT

## 2023-02-16 PROCEDURE — 71045 X-RAY EXAM CHEST 1 VIEW: CPT | Mod: 26

## 2023-02-16 PROCEDURE — 99291 CRITICAL CARE FIRST HOUR: CPT

## 2023-02-16 PROCEDURE — 93010 ELECTROCARDIOGRAM REPORT: CPT

## 2023-02-16 RX ORDER — NITROGLYCERIN 6.5 MG
0.4 CAPSULE, EXTENDED RELEASE ORAL ONCE
Refills: 0 | Status: COMPLETED | OUTPATIENT
Start: 2023-02-16 | End: 2023-02-16

## 2023-02-16 RX ORDER — KETOROLAC TROMETHAMINE 30 MG/ML
15 SYRINGE (ML) INJECTION ONCE
Refills: 0 | Status: DISCONTINUED | OUTPATIENT
Start: 2023-02-16 | End: 2023-02-16

## 2023-02-16 RX ORDER — SODIUM CHLORIDE 9 MG/ML
500 INJECTION INTRAMUSCULAR; INTRAVENOUS; SUBCUTANEOUS ONCE
Refills: 0 | Status: COMPLETED | OUTPATIENT
Start: 2023-02-16 | End: 2023-02-16

## 2023-02-16 RX ORDER — PANTOPRAZOLE SODIUM 20 MG/1
40 TABLET, DELAYED RELEASE ORAL
Refills: 0 | Status: DISCONTINUED | OUTPATIENT
Start: 2023-02-16 | End: 2023-02-22

## 2023-02-16 RX ORDER — SODIUM CHLORIDE 9 MG/ML
1000 INJECTION INTRAMUSCULAR; INTRAVENOUS; SUBCUTANEOUS ONCE
Refills: 0 | Status: COMPLETED | OUTPATIENT
Start: 2023-02-16 | End: 2023-02-16

## 2023-02-16 RX ORDER — METOPROLOL TARTRATE 50 MG
25 TABLET ORAL DAILY
Refills: 0 | Status: DISCONTINUED | OUTPATIENT
Start: 2023-02-16 | End: 2023-02-17

## 2023-02-16 RX ORDER — IBUPROFEN 200 MG
800 TABLET ORAL THREE TIMES A DAY
Refills: 0 | Status: DISCONTINUED | OUTPATIENT
Start: 2023-02-16 | End: 2023-02-22

## 2023-02-16 RX ORDER — AMIODARONE HYDROCHLORIDE 400 MG/1
150 TABLET ORAL ONCE
Refills: 0 | Status: COMPLETED | OUTPATIENT
Start: 2023-02-16 | End: 2023-02-16

## 2023-02-16 RX ORDER — ENOXAPARIN SODIUM 100 MG/ML
60 INJECTION SUBCUTANEOUS EVERY 12 HOURS
Refills: 0 | Status: DISCONTINUED | OUTPATIENT
Start: 2023-02-16 | End: 2023-02-18

## 2023-02-16 RX ORDER — DILTIAZEM HCL 120 MG
60 CAPSULE, EXT RELEASE 24 HR ORAL THREE TIMES A DAY
Refills: 0 | Status: DISCONTINUED | OUTPATIENT
Start: 2023-02-16 | End: 2023-02-20

## 2023-02-16 RX ORDER — METOPROLOL TARTRATE 50 MG
5 TABLET ORAL ONCE
Refills: 0 | Status: COMPLETED | OUTPATIENT
Start: 2023-02-16 | End: 2023-02-16

## 2023-02-16 RX ORDER — LEVOTHYROXINE SODIUM 125 MCG
50 TABLET ORAL DAILY
Refills: 0 | Status: DISCONTINUED | OUTPATIENT
Start: 2023-02-16 | End: 2023-02-22

## 2023-02-16 RX ORDER — METOPROLOL TARTRATE 50 MG
25 TABLET ORAL ONCE
Refills: 0 | Status: COMPLETED | OUTPATIENT
Start: 2023-02-16 | End: 2023-02-16

## 2023-02-16 RX ORDER — ATORVASTATIN CALCIUM 80 MG/1
20 TABLET, FILM COATED ORAL AT BEDTIME
Refills: 0 | Status: DISCONTINUED | OUTPATIENT
Start: 2023-02-16 | End: 2023-02-22

## 2023-02-16 RX ORDER — ACETAMINOPHEN 500 MG
1000 TABLET ORAL ONCE
Refills: 0 | Status: COMPLETED | OUTPATIENT
Start: 2023-02-16 | End: 2023-02-16

## 2023-02-16 RX ADMIN — Medication 800 MILLIGRAM(S): at 13:59

## 2023-02-16 RX ADMIN — ATORVASTATIN CALCIUM 20 MILLIGRAM(S): 80 TABLET, FILM COATED ORAL at 21:15

## 2023-02-16 RX ADMIN — AMIODARONE HYDROCHLORIDE 618 MILLIGRAM(S): 400 TABLET ORAL at 07:05

## 2023-02-16 RX ADMIN — Medication 1000 MILLIGRAM(S): at 16:40

## 2023-02-16 RX ADMIN — Medication 800 MILLIGRAM(S): at 11:09

## 2023-02-16 RX ADMIN — Medication 60 MILLIGRAM(S): at 09:46

## 2023-02-16 RX ADMIN — Medication 100 MILLIGRAM(S): at 21:15

## 2023-02-16 RX ADMIN — PANTOPRAZOLE SODIUM 40 MILLIGRAM(S): 20 TABLET, DELAYED RELEASE ORAL at 17:59

## 2023-02-16 RX ADMIN — Medication 5 MILLIGRAM(S): at 06:05

## 2023-02-16 RX ADMIN — SODIUM CHLORIDE 1000 MILLILITER(S): 9 INJECTION INTRAMUSCULAR; INTRAVENOUS; SUBCUTANEOUS at 21:40

## 2023-02-16 RX ADMIN — Medication 800 MILLIGRAM(S): at 12:00

## 2023-02-16 RX ADMIN — Medication 15 MILLIGRAM(S): at 09:23

## 2023-02-16 RX ADMIN — Medication 400 MILLIGRAM(S): at 16:14

## 2023-02-16 RX ADMIN — ENOXAPARIN SODIUM 60 MILLIGRAM(S): 100 INJECTION SUBCUTANEOUS at 17:59

## 2023-02-16 RX ADMIN — Medication 60 MILLIGRAM(S): at 21:14

## 2023-02-16 RX ADMIN — Medication 800 MILLIGRAM(S): at 21:14

## 2023-02-16 RX ADMIN — Medication 15 MILLIGRAM(S): at 05:49

## 2023-02-16 RX ADMIN — Medication 25 MILLIGRAM(S): at 09:44

## 2023-02-16 RX ADMIN — Medication 5 MILLIGRAM(S): at 22:29

## 2023-02-16 RX ADMIN — Medication 0.4 MILLIGRAM(S): at 22:25

## 2023-02-16 RX ADMIN — SODIUM CHLORIDE 1000 MILLILITER(S): 9 INJECTION INTRAMUSCULAR; INTRAVENOUS; SUBCUTANEOUS at 19:24

## 2023-02-16 RX ADMIN — Medication 800 MILLIGRAM(S): at 14:45

## 2023-02-16 RX ADMIN — Medication 100 MILLIGRAM(S): at 11:47

## 2023-02-16 RX ADMIN — Medication 5 MILLIGRAM(S): at 05:49

## 2023-02-16 RX ADMIN — Medication 25 MILLIGRAM(S): at 06:12

## 2023-02-16 RX ADMIN — SODIUM CHLORIDE 1000 MILLILITER(S): 9 INJECTION INTRAMUSCULAR; INTRAVENOUS; SUBCUTANEOUS at 05:50

## 2023-02-16 NOTE — H&P ADULT - HISTORY OF PRESENT ILLNESS
72yr old Icelandic speaking female with PMH hypothyroidism, cerebral aneurysm s/p coiling w/ post operative course complicated by Afib recently admitted 1/30/23-2/1/23 with pericarditis possibly related non-covid coronaviral illness, afib RVR. She presents with same symptoms of left sided chest pain that acutely started yesterday, her pain is exacerbated with couging, some mucoid sputum, denies any fever/chills. In ER,. she was found to be in Afib RVR with HR in 140s, trop - neg, Coivd - neg, RVP - neg she received iv and po lopressor, with some improvement in -->110s, she still c/o mild chest pain. She was seen by cardio and is recommended admission.

## 2023-02-16 NOTE — ED ADULT NURSE NOTE - NSIMPLEMENTINTERV_GEN_ALL_ED
-- DO NOT REPLY / DO NOT REPLY ALL --  -- Message is from the [facility]--    General Patient Message      Reason for Call:   [employer] is calling to advise injection is rejected, they need referral for injection.  Please call back to obtain approval.      Caller Information       Type Contact Phone/Fax    07/12/2022 10:19 AM CDT Phone (Incoming) [facility]  (Network) [phone]     w/ KANE          Alternative phone number:   [phone] patient     Turnaround time given to caller:   \"This message will be sent to [state Provider's name]. The clinical team will fulfill your request as soon as they review your message.\"     Implemented All Universal Safety Interventions:  [place] to call system. Call bell, personal items and telephone within reach. Instruct patient to call for assistance. Room bathroom lighting operational. Non-slip footwear when patient is off stretcher. Physically safe environment: no spills, clutter or unnecessary equipment. Stretcher in lowest position, wheels locked, appropriate side rails in place.

## 2023-02-16 NOTE — ED ADULT NURSE REASSESSMENT NOTE - NS ED NURSE REASSESS COMMENT FT1
EKG done and given to Dr. zaidi, provider at bedside with pt.
assumed care of patient 0715 charting as noted. pt a&ox4 charting as noted. pt denies current chest pain. pt maintains in afib. HR 97, amio bolus finisihed
pt received from critical care RN at this time. pt is awake and alert, no SOB noted, chest pain present but improved from time of arrival. AF noted on cardiac monitor at this time. pt with otherwise stable vitals as charted. pt on room air, skin warm dry and in tact. son at bedside, made aware of plan for cardio consult. S/L to right AC in place and patent. awaiting cardiology at this time, will monitor.
pt intermittently showing NSR on monitor, but converts back to AF. pt remains awake and alert with no change in mental status, report handed off to [facility] RN and pt moved to holding.

## 2023-02-16 NOTE — ED PROVIDER NOTE - CLINICAL SUMMARY MEDICAL DECISION MAKING FREE TEXT BOX
72F hx AF, recent pericarditis presents for acutely worsening L sided chest pain. On exam, pt uncomfortable appearing 2/2 pain otherwise normal. EKG showing AF with RVR. Pt takes beta blockers at home, will attempt to rate control with metoprolol. Pt febrile on rectal temp - possibly 2/2 pericarditis, but will eval for sepsis as trigger for AF with RVR as well. Bloodwork including cultures, CXR, sx control, dispo pending workup

## 2023-02-16 NOTE — H&P ADULT - ASSESSMENT
72yr old Kiswahili speaking female with PMH hypothyroidism, cerebral aneurysm s/p coiling w/ post operative course complicated by Afib recently admitted 1/30/23-2/1/23 with pericarditis possibly related non-covid coronaviral illness, afib RVR. She presents with same symptoms of left sided chest pain that acutely started yesterday admitted with Afib RVR with HR.    # Chest pain - likely 2/2 Afib RVR vs Acute pericarditis Vs other   Started on Motrin 800mg tid , add PPI  was on colchicine before   ECHo recent - EF normal   rate control for afib   Recent cath 1/30 - Normal coronaries - Moderately depressed LVEF with basal to mid inferior hypokinesis. Her presentation is more likely percarditis in the setting of coronaviral infection, and/or takasubu in setting of rapid Afib        # Afib RVR - start po lopressors + cardizem po   telemetry   was on apixaban at home - would switch to lovenox for possible need for intervention?    # Hypothyroidism - ct levothyroxine, check TSH  # VTe px - full AC     SOn was updated over phone at bedside.

## 2023-02-16 NOTE — PROVIDER CONTACT NOTE (CHANGE IN STATUS NOTIFICATION) - BACKGROUND
Patient a readmission from 01/30 with same CC of chest pain. Patient admitted today with chest pain worsened by cough, Afib RVR. Patient received on shift with -120. Physicians were aware and had transfer orders to the unit. At 2114 patient given cough PRN medicine and cardizem for A FIB RVR.

## 2023-02-16 NOTE — ED PROVIDER NOTE - PROGRESS NOTE DETAILS
Received sign-out from night team pending Cards consult. Seen by Dr. Yun who recommends medical admission for continued tx of afib and pericarditis. Patient otherwise comfortable and in no distress with HR in low 100s on monitor. - Daniel

## 2023-02-16 NOTE — PROVIDER CONTACT NOTE (CHANGE IN STATUS NOTIFICATION) - RECOMMENDATIONS
Patient stated and witnessed by staff that family member gave her coffee. PHOEBE Helton and I advised patient and family to not drink coffee as it has caffeine and will induce more tachycardia.  02 NC 6L, cardiac monitor, ekg, manual BP.

## 2023-02-16 NOTE — PROVIDER CONTACT NOTE (CHANGE IN STATUS NOTIFICATION) - SITUATION
Reported by PHOEBE valencia during other patient transfer that patient was experiencing s/s of crushing chest pain.

## 2023-02-16 NOTE — RAPID RESPONSE TEAM SUMMARY - NSSITUATIONBACKGROUNDRRT_GEN_ALL_CORE
72yr old Irish speaking female with PMH hypothyroidism, cerebral aneurysm s/p coiling w/ post operative course complicated by Afib recently admitted 1/30/23-2/1/23 with pericarditis possibly related non-covid coronaviral illness, afib RVR. She presented to the ED with same symptoms of left sided chest pain that acutely started yesterday, her pain is exacerbated with coughing, some mucoid sputum, denies any fever/chills. In ER,. she was found to be in Afib RVR with HR in 140s, trop - neg, Coivd - neg, RVP - neg she received iv and po lopressor, with some improvement in -->110s, she still c/o mild chest pain. She was seen by cardio - continue with po lopressor and cardizem. At 1900 HR increased to 120 with EKG afib with RVR , BP 93/64 requiring NS 1L bolus. Blood pressure increased to 113/75 at 21:03 and patient was given po cardizem 60mg.   Rapid response was called for increased chest pain 10/10 at 22:20. Vitals /66, , RR 18, temperature 97.5F orally, Spo2 93%, . Patient was accessed at bedside, ordered stat EKG, BMP, mag, phos, and troponin labs ordered.  Physical Exam  CONSTITUTIONAL: patient laying in bed, in apparent distress  RESP: no respiratory distress, no use of accessory muscles; CTA b/l, no WRR  CV: irregularly irregular rhythm, +S1S2  PSYCH: Appropriate insight/judgment; A+O x 3, mood and affect appropriate, recent/remote memory intact    At 22:27 nitroglycerin sublingual given. Patient with some relief - chest pain down to 7/10 severity. Repeat vitals with /75 and  afib, RR 17 and SpO2 94%. EKG with afib in RVR, no other acute changes noted. Lopressor 5mg    72yr old Hungarian speaking female with PMH hypothyroidism, cerebral aneurysm s/p coiling w/ post operative course complicated by Afib recently admitted 1/30/23-2/1/23 with pericarditis possibly related non-covid coronaviral illness, afib RVR. She presented to the ED with same symptoms of left sided chest pain that acutely started yesterday, her pain is exacerbated with coughing, some mucoid sputum, denies any fever/chills. In ER,. she was found to be in Afib RVR with HR in 140s, trop - neg, Coivd - neg, RVP - neg she received iv and po lopressor, with some improvement in -->110s, she still c/o mild chest pain. She was seen by cardio - continue with po lopressor and cardizem. At 1900 HR increased to 120 with EKG afib with RVR , BP 93/64 requiring NS 1L bolus. Blood pressure increased to 113/75 at 21:03 and patient was given po cardizem 60mg.   Rapid response was called for increased chest pain 10/10 at 22:20. Vitals /66, , RR 18, temperature 97.5F orally, Spo2 93%, . Patient was accessed at bedside, ordered stat EKG, BMP, mag, phos, and troponin labs ordered.  Physical Exam  CONSTITUTIONAL: patient laying in bed, in apparent distress  RESP: no respiratory distress, no use of accessory muscles; CTA b/l, no WRR  CV: irregularly irregular rhythm, +S1S2  PSYCH: Appropriate insight/judgment; A+O x 3, mood and affect appropriate, recent/remote memory intact    At 22:27 nitroglycerin sublingual given. Patient with some relief - chest pain down to 7/10 severity. Repeat vitals with /75 and  afib, RR 17 and SpO2 94%. EKG with afib in RVR, no other acute changes noted. Lopressor 5mg given    72yr old Urdu speaking female with PMH hypothyroidism, cerebral aneurysm s/p coiling w/ post operative course complicated by Afib recently admitted 1/30/23-2/1/23 with pericarditis possibly related non-covid coronaviral illness, afib RVR. She presented to the ED with same symptoms of left sided chest pain that acutely started yesterday, her pain is exacerbated with coughing, some mucoid sputum, denies any fever/chills. In ER,. she was found to be in Afib RVR with HR in 140s, trop - neg, Coivd - neg, RVP - neg she received iv and po lopressor, with some improvement in -->110s, she still c/o mild chest pain. She was seen by cardio - continue with po lopressor and cardizem. At 1900 HR increased to 120 with EKG afib with RVR , BP 93/64 requiring NS 1L bolus. Blood pressure increased to 113/75 at 21:03 and patient was given po cardizem 60mg.   Rapid response was called for increased chest pain 10/10 at 22:20. Vitals /66, , RR 18, temperature 97.5F orally, Spo2 93%, . Patient was accessed at bedside, ordered stat EKG, BMP, mag, phos, and troponin labs ordered.  Physical Exam  CONSTITUTIONAL: patient laying in bed, in apparent distress  RESP: no respiratory distress, no use of accessory muscles; CTA b/l, no WRR  CV: irregularly irregular rhythm, +S1S2  PSYCH: Appropriate insight/judgment; A+O x 3, mood and affect appropriate, recent/remote memory intact    At 22:27 nitroglycerin sublingual given. Patient with some relief - chest pain down to 7/10 severity. Repeat vitals with /75 and  afib, RR 17 and SpO2 94%. EKG with afib in RVR, no other acute changes noted. Lopressor 5mg IVP given with improvement of HR to 110 at 22:37 and BP 96/53. Patient given 500cc NS bolus.  Physical Exam at 22:40   CONSTITUTIONAL: patient laying in bed, reduced apparent distress  RESP: No respiratory distress, no use of accessory muscles; CTA b/l, no WRR  CV: irregularly irregular rhythm, +S1S2  PSYCH: Appropriate insight/judgment; A+O x 3, mood and affect appropriate, recent/remote memory intact

## 2023-02-16 NOTE — ED PROVIDER NOTE - NS ED ROS FT
General: Denies fever, chills  HEENT: Denies sore throat  Neck: Denies neck pain  Resp: Denies coughing, SOB  Cardiovascular: CP. Denies palpitations, LE edema  GI: Denies nausea, vomiting, abdominal pain, diarrhea, constipation, blood in stool  : Denies dysuria, hematuria, frequency, incontinence  MSK: Denies back pain  Neuro: Denies HA, dizziness, numbness, weakness  Skin: Denies rashes.

## 2023-02-16 NOTE — ED ADULT NURSE NOTE - PAIN: PRESENCE, MLM
PT PRESENTS TO PAST WITH NO SOB, CP, PALPITATIONS, DYSURIA, UTI OR URI AT PRESENT.   PT UNABLE TO WALK UP 2-3 FLIGHTS OF STEPS WITH NO SOB. PT STATES-- EXERCISE TOLERANCE-  VERY LIMITED.    AS PER THE PT, THIS IS HIS/HER COMPLETE MEDICAL AND SURGICAL HX, INCLUDING MEDICATIONS PRESCRIBED AND OVER THE COUNTER  pt denies any covid s/s, or tested positive in the past--PT AWARE OF DATE AND TIME OF COVID TESTING PRIOR TO SURGERY.  pt advised self quarantine till day of procedure  denies travel outside the [place] in the past 30 days  Anesthesia Alert  NO--Difficult Airway  NO--History of neck surgery or radiation  NO--Limited ROM of neck  NO--History of Malignant hyperthermia  NO--Personal or family history of Pseudocholinesterase deficiency  NO--Prior Anesthesia Complication  NO--Latex Allergy  NO--Loose teeth  NO--History of Rheumatoid Arthritis  YES --TU  NO BLEEDING RISK  NO--Other_____   chest/complains of pain/discomfort

## 2023-02-16 NOTE — CONSULT NOTE ADULT - SUBJECTIVE AND OBJECTIVE BOX
[facility], [facility]                              [address] [place] [address]                                                 PHONE: [phone]                                                 FAX: [phone]  ------------------------------------------------------------------------------------------------    Chief complaint: chest pain    72y Female with past medical history as under with acute onset L sided chest pain similar to her previous pericarditis pain starting last night. Pt known to us from recent admission for pericarditis. As per pt, she had been taking Ibuprofen only as needed and was compliant with the remainder of her medications. She was noted to be in AF with RVR. She received iv metoprolol and iv amiodarone but still remains in rapid AF.  At the time of evaluation, pt still reports chest pain and palpitations.    PAST MEDICAL & SURGICAL HISTORY:  High cholesterol      Hypothyroidism      H/O knee surgery      Brain aneurysm          No Known Allergies      Social History:  No smoking   No alcohol  No other drug use    Vital Signs Last 24 Hrs  T(C): 36.9 (16 Feb 2023 07:18), Max: 38.2 (16 Feb 2023 05:31)  T(F): 98.5 (16 Feb 2023 07:18), Max: 100.8 (16 Feb 2023 05:31)  HR: 112 (16 Feb 2023 08:00) (76 - 162)  BP: 110/64 (16 Feb 2023 08:00) (94/65 - 120/77)  BP(mean): 74 (16 Feb 2023 07:18) (67 - 92)  RR: 18 (16 Feb 2023 08:00) (17 - 26)  SpO2: 97% (16 Feb 2023 08:00) (95% - 97%)    Parameters below as of 16 Feb 2023 08:00  Patient On (Oxygen Delivery Method): room air        PHYSICAL EXAM:  Constitutional: Appears in discomfort  Eyes: Conjunctiva normal, No scleral icterus  Neck: Supple, No JVD  Cardiovascular: irregular S1, S2 tachy  Respiratory: Lungs clear to auscultation; no crepitations, no wheeze  Gastrointestinal:  Soft, Non-tender, + BS	  Musculoskeletal: No edema          LABS:                        11.9   7.95  )-----------( 305      ( 16 Feb 2023 05:40 )             37.9     02-16    140  |  101  |  11.0  ----------------------------<  123<H>  4.2   |  27.0  |  0.54    Ca    9.5      16 Feb 2023 05:40    TPro  7.2  /  Alb  4.1  /  TBili  0.5  /  DBili  x   /  AST  11  /  ALT  10  /  AlkPhos  57  02-16    CARDIAC MARKERS ( 16 Feb 2023 05:40 )  x     / <0.01 ng/mL / x     / x     / x          PT/INR - ( 16 Feb 2023 05:40 )   PT: 15.3 sec;   INR: 1.32 ratio         PTT - ( 16 Feb 2023 05:40 )  PTT:40.6 sec    RADIOLOGY & ADDITIONAL STUDIES: (reviewed)  CXR was independently visualized/reviewed and demonstrated: small effusion    CARDIOLOGY TESTING:(reviewed)     ECG (Independent visualization): AF with RVR    ECHOCARDIOGRAM :  Summary:   1. Technically difficult study.   2. Endocardial visualization was enhanced with intravenous echo contrast.   3. Left ventricular ejection fraction, by visual estimation, is 65 to   70%.   4. Normal global left ventricular systolic function.   5. Spectral Doppler shows pseudonormal pattern of left ventricular   myocardial filling (Grade IIdiastolic dysfunction).   6. Normal right ventricular size and function.   7. Moderately enlarged left atrium.   8. Mild mitral annular calcification.   9. Trace mitral valve regurgitation.  10. Mild-moderate tricuspid regurgitation.  11. Mild pulmonic valve regurgitation.  12. Estimated pulmonary artery systolic pressure is 36.1 mmHg assuming a   right atrial pressure of 8 mmHg, which is consistent with borderline   pulmonary hypertension.    CARDIAC CATHETERIZATION:  Normal coronary arteries   Normal LV EDP   Moderately depressed LVEF with basal to mid inferior hypokinesis.   Her presentation is more likely percarditis in the setting of COVID  infection, and/or takasubu in setting of rapid Afib    TELEMETRY independently visualized/reviewed and demonstrated : AF    MEDICATIONS:(reviewed)  Home Medications:    MEDICATIONS  (STANDING):    MEDICATIONS  (PRN):      ASSESSMENT AND PLAN:    72y Female with prior history of recent admission for chest pain in setting of coronavirus non-covid c/w pericarditis, Normal coronary arteries, normal EF, no evidence of pericardial effusion, AF with RVR who presented with chest pain and palpitations.    Chest pain  - ? related to pericarditis vs. rapid AF   -Troponin negative so far and recent cath with normal coronaries  - Will need admission for rate and pain control  - Add Cardizem and increase metoprolol  - Continue Eliquis  - Add Ibuprofen 800 mg TID with colchicine  - GI prophylaxis with BID Protonix  -  Defer ALLYSSA guided CVN at this time given concern for acute pericarditis  - Echo ordered to assess LV function    - Telemetry monitoring    Plan discussed with Dr. Wilson    Additional history obtained and plan discussed with family by phone                                                          [facility], [facility]                              [address] [place] [address]                                                 PHONE: [phone]                                                 FAX: [phone]  ------------------------------------------------------------------------------------------------    Chief complaint: chest pain    72y Female with past medical history as under with acute onset L sided chest pain similar to her previous pericarditis pain starting last night. Pt known to us from recent admission for pericarditis. As per pt, she had been taking Ibuprofen only as needed and was compliant with the remainder of her medications. She was noted to be in AF with RVR. She received iv metoprolol and iv amiodarone but still remains in rapid AF.  At the time of evaluation, pt still reports chest pain and palpitations.    PAST MEDICAL & SURGICAL HISTORY:  High cholesterol      Hypothyroidism      H/O knee surgery      Brain aneurysm          No Known Allergies      Social History:  No smoking   No alcohol  No other drug use    Vital Signs Last 24 Hrs  T(C): 36.9 (16 Feb 2023 07:18), Max: 38.2 (16 Feb 2023 05:31)  T(F): 98.5 (16 Feb 2023 07:18), Max: 100.8 (16 Feb 2023 05:31)  HR: 112 (16 Feb 2023 08:00) (76 - 162)  BP: 110/64 (16 Feb 2023 08:00) (94/65 - 120/77)  BP(mean): 74 (16 Feb 2023 07:18) (67 - 92)  RR: 18 (16 Feb 2023 08:00) (17 - 26)  SpO2: 97% (16 Feb 2023 08:00) (95% - 97%)    Parameters below as of 16 Feb 2023 08:00  Patient On (Oxygen Delivery Method): room air        PHYSICAL EXAM:  Constitutional: Appears in discomfort  Eyes: Conjunctiva normal, No scleral icterus  Neck: Supple, No JVD  Cardiovascular: irregular S1, S2 tachy  Respiratory: Lungs clear to auscultation; no crepitations, no wheeze  Gastrointestinal:  Soft, Non-tender, + BS	  Musculoskeletal: No edema          LABS:                        11.9   7.95  )-----------( 305      ( 16 Feb 2023 05:40 )             37.9     02-16    140  |  101  |  11.0  ----------------------------<  123<H>  4.2   |  27.0  |  0.54    Ca    9.5      16 Feb 2023 05:40    TPro  7.2  /  Alb  4.1  /  TBili  0.5  /  DBili  x   /  AST  11  /  ALT  10  /  AlkPhos  57  02-16    CARDIAC MARKERS ( 16 Feb 2023 05:40 )  x     / <0.01 ng/mL / x     / x     / x          PT/INR - ( 16 Feb 2023 05:40 )   PT: 15.3 sec;   INR: 1.32 ratio         PTT - ( 16 Feb 2023 05:40 )  PTT:40.6 sec    RADIOLOGY & ADDITIONAL STUDIES: (reviewed)  CXR was independently visualized/reviewed and demonstrated: small effusion    CARDIOLOGY TESTING:(reviewed)     ECG (Independent visualization): AF with RVR    ECHOCARDIOGRAM :  Summary:   1. Technically difficult study.   2. Endocardial visualization was enhanced with intravenous echo contrast.   3. Left ventricular ejection fraction, by visual estimation, is 65 to   70%.   4. Normal global left ventricular systolic function.   5. Spectral Doppler shows pseudonormal pattern of left ventricular   myocardial filling (Grade IIdiastolic dysfunction).   6. Normal right ventricular size and function.   7. Moderately enlarged left atrium.   8. Mild mitral annular calcification.   9. Trace mitral valve regurgitation.  10. Mild-moderate tricuspid regurgitation.  11. Mild pulmonic valve regurgitation.  12. Estimated pulmonary artery systolic pressure is 36.1 mmHg assuming a   right atrial pressure of 8 mmHg, which is consistent with borderline   pulmonary hypertension.    CARDIAC CATHETERIZATION:  Normal coronary arteries   Normal LV EDP   Moderately depressed LVEF with basal to mid inferior hypokinesis.   Her presentation is more likely percarditis in the setting of COVID  infection, and/or takasubu in setting of rapid Afib    TELEMETRY independently visualized/reviewed and demonstrated : AF    MEDICATIONS:(reviewed)  Home Medications:    MEDICATIONS  (STANDING):    MEDICATIONS  (PRN):      ASSESSMENT AND PLAN:    72y Female with prior history of recent admission for chest pain in setting of coronavirus non-covid c/w pericarditis, Normal coronary arteries, normal EF, no evidence of pericardial effusion, AF with RVR who presented with chest pain and palpitations.    Chest pain  - ? related to pericarditis vs. rapid AF   -Troponin negative so far and recent cath with normal coronaries  - Will need admission for rate and pain control  - Add Cardizem and increase metoprolol  - Continue Eliquis  - Add Ibuprofen 800 mg TID with colchicine  - If pt continues to have chest pain, will consider short course of prednisone  - GI prophylaxis with BID Protonix  - Defer ALLYSSA guided CVN at this time given concern for acute pericarditis  - Echo to r/o pericardial effusion   - Telemetry monitoring    Plan discussed with Dr. Wilson    Additional history obtained and plan discussed with family by phone

## 2023-02-16 NOTE — ED PROVIDER NOTE - PHYSICAL EXAMINATION
General: Awake, alert, lying in bed in moderate distress 2/2 pain  HEENT: Normocephalic, atraumatic. No scleral icterus or conjunctival injection. EOMI. Moist mucous membranes. Oropharynx clear.   Neck:. Soft and supple.  Cardiac: Tachy, irregularly irregular, +S1/S2. No murmurs, rubs, gallops. Peripheral pulses 2+ and symmetric. No LE edema.  Resp: Lungs CTAB. Speaking in full sentences. No accessory muscle use  Abd: Soft, non-tender, non-distended. No guarding, rebound, or rigidity.  Back: Spine midline and non-tender. No CVA tenderness.    Skin: No rashes, abrasions, or lacerations.  Neuro: AO x 4. Moves all extremities symmetrically. Motor strength and sensation grossly intact.  Psych: Appropriate mood and affect

## 2023-02-16 NOTE — ED ADULT NURSE NOTE - OBJECTIVE STATEMENT
pt to ED with complaints of chest pain. pt with pmh pericarditis. pt states pain started yesterday at 1800. pt states she was lying in bed this morning and she had a sudden onset of worsening pain. pt states the pain is 8/10 constant pain that radiates to her left arm and neck. pt reports Left sided abdominal pain and states she was nauseous but denies any vomiting or diarrhea. pt denies any fever/chills/sick contacts. pt brought to  area of ED. pt placed on CM.

## 2023-02-16 NOTE — ED PROVIDER NOTE - ATTENDING CONTRIBUTION TO CARE
AF w/ RVR recent pericarditis, requiring IV medications for rate control. Will admit for continued rate control and optimization.

## 2023-02-16 NOTE — ED PROVIDER NOTE - OBJECTIVE STATEMENT
72F hx AF on Eliquis, recent pericarditis presents with acute onset L sided chest pain similar to her previous pericarditis pain starting last night. Pt and son at bedside state pain worsened through the night, now 10/10, prompting ED visit. No fever, chills, cough, SOB, N/V, dizziness

## 2023-02-16 NOTE — H&P ADULT - NSHPPHYSICALEXAM_GEN_ALL_CORE
PHYSICAL EXAM:    GENERAL: NAD, well-groomed, well-developed, appears anxious  HEAD:  Atraumatic, Normocephalic  EYES: EOMI, PERRLA, conjunctiva and sclera clear  ENMT:  dry mucous membranes, Good dentition  NECK: Supple, No JVD  NERVOUS SYSTEM:  Alert & Oriented X3, Good concentration; Motor Strength 5/5 B/L upper and lower extremities  CHEST/LUNG: Clear to percussion bilaterally; No rales, rhonchi,  HEART: irregular tachycardic, No murmurs,   ABDOMEN: Soft, Nontender, Nondistended; Bowel sounds present  EXTREMITIES:   No clubbing, cyanosis, or edema  SKIN: No rashes or lesions

## 2023-02-16 NOTE — PATIENT PROFILE ADULT - FALL HARM RISK - HARM RISK INTERVENTIONS

## 2023-02-16 NOTE — ED ADULT TRIAGE NOTE - CHIEF COMPLAINT QUOTE
BIBA from home c/o chest pain started about an hour ago that woke pt up from sleep. hx of pericarditis and was admitted 2 weeks ago. complaint eliquis and aspirin. 10/10 pain

## 2023-02-16 NOTE — CHART NOTE - NSCHARTNOTEFT_GEN_A_CORE
Interval Hx: Called by RN for patient in afib with RVR, HR 120s and BP 93/64, with chest pain and palpitations from admission. Patient seen at bedside with complaints of chest pain and palpitations and slight difficulty breathing. Denies lightheadedness, dizziness, and lethargy.     Vitals  T(C): 36.8 (02-16-23 @ 15:58), Max: 38.2 (02-16-23 @ 05:31)  HR: 120 (02-16-23 @ 18:44) (76 - 162) irregular  BP: 93/64 (02-16-23 @ 18:44) (87/54 - 120/77)  RR: 19 (02-16-23 @ 11:33) (17 - 26)  SpO2: 98% (02-16-23 @ 15:58) (95% - 98%) on RA    Physical Exam  CONSTITUTIONAL: Patient laying in bed, mild apparent distress   RESP: No respiratory distress, no use of accessory muscles; CTA b/l, no WRR  CV: irregularly irregular rhythm, +S1S2  PSYCH: Appropriate insight/judgment; A+O x 3, mood and affect anxious, recent/remote memory intact    EKG irregularly irregular rhythm, afib with RVR, no acute changes.  ECHO - EF 65-70%    A/P: 72yr old Khmer speaking female with PMH hypothyroidism, cerebral aneurysm s/p coiling w/ post operative course complicated by Afib recently admitted 1/30/23-2/1/23 with pericarditis possibly related non-covid coronaviral illness, afib RVR. She presented with same symptoms of left sided chest pain that acutely started yesterday admitted with Afib RVR with HR, seen today for lack of relief and lack of rate control for Afib. NS 1L bolus given to stablize blood pressure before administering PO lopressor or Cardizem. RN aware to recheck vitals after bolus. Will continue to monitor and RN to call with any changes in patient status.

## 2023-02-17 LAB
ALBUMIN SERPL ELPH-MCNC: 3.6 G/DL — SIGNIFICANT CHANGE UP (ref 3.3–5.2)
ALP SERPL-CCNC: 69 U/L — SIGNIFICANT CHANGE UP (ref 40–120)
ALT FLD-CCNC: 10 U/L — SIGNIFICANT CHANGE UP
ANION GAP SERPL CALC-SCNC: 12 MMOL/L — SIGNIFICANT CHANGE UP (ref 5–17)
ANION GAP SERPL CALC-SCNC: 14 MMOL/L — SIGNIFICANT CHANGE UP (ref 5–17)
AST SERPL-CCNC: 14 U/L — SIGNIFICANT CHANGE UP
BILIRUB SERPL-MCNC: 0.5 MG/DL — SIGNIFICANT CHANGE UP (ref 0.4–2)
BUN SERPL-MCNC: 13.1 MG/DL — SIGNIFICANT CHANGE UP (ref 8–20)
BUN SERPL-MCNC: 16.4 MG/DL — SIGNIFICANT CHANGE UP (ref 8–20)
CALCIUM SERPL-MCNC: 8.3 MG/DL — LOW (ref 8.4–10.5)
CALCIUM SERPL-MCNC: 8.3 MG/DL — LOW (ref 8.4–10.5)
CHLORIDE SERPL-SCNC: 103 MMOL/L — SIGNIFICANT CHANGE UP (ref 96–108)
CHLORIDE SERPL-SCNC: 106 MMOL/L — SIGNIFICANT CHANGE UP (ref 96–108)
CK SERPL-CCNC: 26 U/L — SIGNIFICANT CHANGE UP (ref 25–170)
CO2 SERPL-SCNC: 19 MMOL/L — LOW (ref 22–29)
CO2 SERPL-SCNC: 23 MMOL/L — SIGNIFICANT CHANGE UP (ref 22–29)
CREAT SERPL-MCNC: 0.58 MG/DL — SIGNIFICANT CHANGE UP (ref 0.5–1.3)
CREAT SERPL-MCNC: 0.68 MG/DL — SIGNIFICANT CHANGE UP (ref 0.5–1.3)
EGFR: 93 ML/MIN/1.73M2 — SIGNIFICANT CHANGE UP
EGFR: 97 ML/MIN/1.73M2 — SIGNIFICANT CHANGE UP
GLUCOSE SERPL-MCNC: 123 MG/DL — HIGH (ref 70–99)
GLUCOSE SERPL-MCNC: 125 MG/DL — HIGH (ref 70–99)
HCT VFR BLD CALC: 33.5 % — LOW (ref 34.5–45)
HCT VFR BLD CALC: 34 % — LOW (ref 34.5–45)
HGB BLD-MCNC: 10.2 G/DL — LOW (ref 11.5–15.5)
HGB BLD-MCNC: 10.6 G/DL — LOW (ref 11.5–15.5)
MAGNESIUM SERPL-MCNC: 2.1 MG/DL — SIGNIFICANT CHANGE UP (ref 1.6–2.6)
MAGNESIUM SERPL-MCNC: 2.2 MG/DL — SIGNIFICANT CHANGE UP (ref 1.6–2.6)
MCHC RBC-ENTMCNC: 23.1 PG — LOW (ref 27–34)
MCHC RBC-ENTMCNC: 23.5 PG — LOW (ref 27–34)
MCHC RBC-ENTMCNC: 30.4 GM/DL — LOW (ref 32–36)
MCHC RBC-ENTMCNC: 31.2 GM/DL — LOW (ref 32–36)
MCV RBC AUTO: 75.4 FL — LOW (ref 80–100)
MCV RBC AUTO: 76 FL — LOW (ref 80–100)
PHOSPHATE SERPL-MCNC: 3 MG/DL — SIGNIFICANT CHANGE UP (ref 2.4–4.7)
PLATELET # BLD AUTO: 235 K/UL — SIGNIFICANT CHANGE UP (ref 150–400)
PLATELET # BLD AUTO: 254 K/UL — SIGNIFICANT CHANGE UP (ref 150–400)
POTASSIUM SERPL-MCNC: 3.9 MMOL/L — SIGNIFICANT CHANGE UP (ref 3.5–5.3)
POTASSIUM SERPL-MCNC: 4.8 MMOL/L — SIGNIFICANT CHANGE UP (ref 3.5–5.3)
POTASSIUM SERPL-SCNC: 3.9 MMOL/L — SIGNIFICANT CHANGE UP (ref 3.5–5.3)
POTASSIUM SERPL-SCNC: 4.8 MMOL/L — SIGNIFICANT CHANGE UP (ref 3.5–5.3)
PROT SERPL-MCNC: 6.8 G/DL — SIGNIFICANT CHANGE UP (ref 6.6–8.7)
RBC # BLD: 4.41 M/UL — SIGNIFICANT CHANGE UP (ref 3.8–5.2)
RBC # BLD: 4.51 M/UL — SIGNIFICANT CHANGE UP (ref 3.8–5.2)
RBC # FLD: 16.6 % — HIGH (ref 10.3–14.5)
RBC # FLD: 16.7 % — HIGH (ref 10.3–14.5)
SODIUM SERPL-SCNC: 138 MMOL/L — SIGNIFICANT CHANGE UP (ref 135–145)
SODIUM SERPL-SCNC: 139 MMOL/L — SIGNIFICANT CHANGE UP (ref 135–145)
TROPONIN T SERPL-MCNC: <0.01 NG/ML — SIGNIFICANT CHANGE UP (ref 0–0.06)
TSH SERPL-MCNC: 1.22 UIU/ML — SIGNIFICANT CHANGE UP (ref 0.27–4.2)
WBC # BLD: 6.37 K/UL — SIGNIFICANT CHANGE UP (ref 3.8–10.5)
WBC # BLD: 8.62 K/UL — SIGNIFICANT CHANGE UP (ref 3.8–10.5)
WBC # FLD AUTO: 6.37 K/UL — SIGNIFICANT CHANGE UP (ref 3.8–10.5)
WBC # FLD AUTO: 8.62 K/UL — SIGNIFICANT CHANGE UP (ref 3.8–10.5)

## 2023-02-17 PROCEDURE — 93010 ELECTROCARDIOGRAM REPORT: CPT | Mod: 76

## 2023-02-17 PROCEDURE — 99233 SBSQ HOSP IP/OBS HIGH 50: CPT

## 2023-02-17 RX ORDER — POLYETHYLENE GLYCOL 3350 17 G/17G
17 POWDER, FOR SOLUTION ORAL DAILY
Refills: 0 | Status: DISCONTINUED | OUTPATIENT
Start: 2023-02-17 | End: 2023-02-22

## 2023-02-17 RX ORDER — METOPROLOL TARTRATE 50 MG
2.5 TABLET ORAL ONCE
Refills: 0 | Status: COMPLETED | OUTPATIENT
Start: 2023-02-17 | End: 2023-02-17

## 2023-02-17 RX ORDER — SENNA PLUS 8.6 MG/1
2 TABLET ORAL AT BEDTIME
Refills: 0 | Status: DISCONTINUED | OUTPATIENT
Start: 2023-02-17 | End: 2023-02-22

## 2023-02-17 RX ORDER — KETOROLAC TROMETHAMINE 30 MG/ML
15 SYRINGE (ML) INJECTION ONCE
Refills: 0 | Status: DISCONTINUED | OUTPATIENT
Start: 2023-02-17 | End: 2023-02-21

## 2023-02-17 RX ORDER — METOPROLOL TARTRATE 50 MG
25 TABLET ORAL
Refills: 0 | Status: DISCONTINUED | OUTPATIENT
Start: 2023-02-17 | End: 2023-02-20

## 2023-02-17 RX ORDER — COLCHICINE 0.6 MG
0.6 TABLET ORAL DAILY
Refills: 0 | Status: DISCONTINUED | OUTPATIENT
Start: 2023-02-17 | End: 2023-02-20

## 2023-02-17 RX ORDER — ACETAMINOPHEN 500 MG
1000 TABLET ORAL ONCE
Refills: 0 | Status: COMPLETED | OUTPATIENT
Start: 2023-02-17 | End: 2023-02-17

## 2023-02-17 RX ORDER — KETOROLAC TROMETHAMINE 30 MG/ML
15 SYRINGE (ML) INJECTION ONCE
Refills: 0 | Status: DISCONTINUED | OUTPATIENT
Start: 2023-02-17 | End: 2023-02-17

## 2023-02-17 RX ADMIN — Medication 800 MILLIGRAM(S): at 07:06

## 2023-02-17 RX ADMIN — Medication 100 MILLIGRAM(S): at 22:18

## 2023-02-17 RX ADMIN — ENOXAPARIN SODIUM 60 MILLIGRAM(S): 100 INJECTION SUBCUTANEOUS at 06:09

## 2023-02-17 RX ADMIN — Medication 0.6 MILLIGRAM(S): at 14:43

## 2023-02-17 RX ADMIN — POLYETHYLENE GLYCOL 3350 17 GRAM(S): 17 POWDER, FOR SOLUTION ORAL at 13:45

## 2023-02-17 RX ADMIN — PANTOPRAZOLE SODIUM 40 MILLIGRAM(S): 20 TABLET, DELAYED RELEASE ORAL at 18:22

## 2023-02-17 RX ADMIN — Medication 25 MILLIGRAM(S): at 06:06

## 2023-02-17 RX ADMIN — Medication 100 MILLIGRAM(S): at 06:18

## 2023-02-17 RX ADMIN — PANTOPRAZOLE SODIUM 40 MILLIGRAM(S): 20 TABLET, DELAYED RELEASE ORAL at 06:07

## 2023-02-17 RX ADMIN — Medication 50 MICROGRAM(S): at 06:06

## 2023-02-17 RX ADMIN — Medication 800 MILLIGRAM(S): at 21:28

## 2023-02-17 RX ADMIN — Medication 100 MILLIGRAM(S): at 18:26

## 2023-02-17 RX ADMIN — Medication 800 MILLIGRAM(S): at 22:28

## 2023-02-17 RX ADMIN — Medication 800 MILLIGRAM(S): at 12:49

## 2023-02-17 RX ADMIN — Medication 400 MILLIGRAM(S): at 02:29

## 2023-02-17 RX ADMIN — Medication 1000 MILLIGRAM(S): at 02:59

## 2023-02-17 RX ADMIN — Medication 15 MILLIGRAM(S): at 21:58

## 2023-02-17 RX ADMIN — Medication 60 MILLIGRAM(S): at 13:45

## 2023-02-17 RX ADMIN — Medication 800 MILLIGRAM(S): at 06:06

## 2023-02-17 RX ADMIN — ENOXAPARIN SODIUM 60 MILLIGRAM(S): 100 INJECTION SUBCUTANEOUS at 18:22

## 2023-02-17 RX ADMIN — Medication 25 MILLIGRAM(S): at 18:22

## 2023-02-17 RX ADMIN — SENNA PLUS 2 TABLET(S): 8.6 TABLET ORAL at 21:29

## 2023-02-17 RX ADMIN — Medication 15 MILLIGRAM(S): at 21:28

## 2023-02-17 RX ADMIN — Medication 60 MILLIGRAM(S): at 06:06

## 2023-02-17 RX ADMIN — Medication 800 MILLIGRAM(S): at 13:50

## 2023-02-17 RX ADMIN — ATORVASTATIN CALCIUM 20 MILLIGRAM(S): 80 TABLET, FILM COATED ORAL at 21:31

## 2023-02-17 RX ADMIN — Medication 60 MILLIGRAM(S): at 21:28

## 2023-02-17 NOTE — CHART NOTE - NSCHARTNOTEFT_GEN_A_CORE
LATE ENTRY  Patient seen at 00:20 AM 02/17/2023 for follow up from rapid response called for increased chest pain. Patient states that her chest pain is improved and that the chest pain is now 6/10 severity. Denies lightheadedness, dizziness, lethargy, palpitations, SOB, difficulty breathing, abdominal pain.     Vitals  T(C): 37.2 (02-17-23 @ 00:11), Max: 38.2 (02-16-23 @ 05:31)  HR: 113 (02-17-23 @ 00:11) (76 - 162)  BP: 120/88 (02-17-23 @ 00:11) (87/54 - 120/88)  RR: 21 (02-17-23 @ 00:11) (17 - 26)  SpO2: 97% (02-17-23 @ 00:11) (95% - 98%)    Physical Exam  CONSTITUTIONAL: patient sitting in bed, no apparent distress  RESP: No respiratory distress, no use of accessory muscles; CTA b/l, no WRR  CV: slight tenderness to palpation of sternum area, irregularly irregular rhythm, +S1S2  GI: Soft, NT, ND, no rebound, no guarding  PSYCH: Appropriate insight/judgment; A+O x 3, mood and affect appropriate, recent/remote memory intact    Labs  02-16-23 @ 22:45    140  |  105  |  14.6             --------------------------< 124<H>     4.0  |  24.0  | 0.63    Magnesium: 2.0   Phosphorus: 3.2    Troponin T, Serum: <0.01: Reference Interval for Troponin T   Less than 0.06 ng/mL - includes the 99th percentile of a healthy   population at a method C.V. of 10% or less.   NOTE: Troponin T is measured by the Roche CLIA method and these   results are not interchangable with Troponin I results since they are   different assays with different reference intervals. ng/mL (02.16.23 @ 22:45)     A/P: LATE ENTRY  Patient seen at 00:20 AM 02/17/2023 for follow up from rapid response called for increased chest pain. Patient states that her chest pain is improved and that the chest pain is now 6/10 severity. Denies lightheadedness, dizziness, lethargy, palpitations, SOB, difficulty breathing, abdominal pain.     Vitals  T(C): 37.2 (02-17-23 @ 00:11), Max: 38.2 (02-16-23 @ 05:31)  HR: 113 (02-17-23 @ 00:11) (76 - 162)  BP: 120/88 (02-17-23 @ 00:11) (87/54 - 120/88)  RR: 21 (02-17-23 @ 00:11) (17 - 26)  SpO2: 97% (02-17-23 @ 00:11) (95% - 98%)    Physical Exam  CONSTITUTIONAL: patient sitting in bed, no apparent distress  RESP: No respiratory distress, no use of accessory muscles; CTA b/l, no WRR  CV: slight tenderness to palpation of sternum area, irregularly irregular rhythm, +S1S2  GI: Soft, NT, ND, no rebound, no guarding  PSYCH: Appropriate insight/judgment; A+O x 3, mood and affect appropriate, recent/remote memory intact    Labs  02-16-23 @ 22:45    140  |  105  |  14.6             --------------------------< 124<H>     4.0  |  24.0  | 0.63    Magnesium: 2.0   Phosphorus: 3.2    Troponin T, Serum: <0.01: Reference Interval for Troponin T   Less than 0.06 ng/mL - includes the 99th percentile of a healthy   population at a method C.V. of 10% or less.   NOTE: Troponin T is measured by the Roche CLIA method and these   results are not interchangable with Troponin I results since they are   different assays with different reference intervals. ng/mL (02.16.23 @ 22:45)     CXR - < from: Xray Chest 1 View- PORTABLE-Urgent (02.16.23 @ 06:12) >      ACC: [id number] EXAM:  XR CHEST PORTABLE URGENT 1V   ORDERED BY: OLEG MOBLEY     PROCEDURE DATE:  02/16/2023          INTERPRETATION:  AP erect chest on February 16, 2023 at 5:52 AM. Patient   has sepsis.    Heart magnified by technique.    There is 1 persistent linear density each lung base.    Chest is similar to January 30 this year.    IMPRESSION: Unchanged chest as above.    --- End of Report ---    < end of copied text >        A/P:  72yr old female admitted with chest pain and Afib in RVR, with rapid response 2 hours ago for chest pain, seen now for a follow-up. Patient with improved symptoms and vitals. ofirmev ordered for pain. Troponin negative - will continue to trend. CXR negative. BMP wnl. Goals of K+ above 4 and Mag above 2. f/u AM labs. Will continue to monitor and RN to call provider with any changes in patient status.

## 2023-02-17 NOTE — PROGRESS NOTE ADULT - ASSESSMENT
72 yr old female with hypothyroidism, cerebral aneurysm s/p coiling, atrial fibrillation, recent pericarditis presented with complaints of left sided chest pain and cough. Noted to be in atrial fibrillation with RVR in ED. She was evaluated by cardiology in ED, rate control medications were adjusted. Eliquis was given instead Lovenox. CT chest, no pneumonia, small effusion. RRT was called overnight for chest pain and rapid atrial fibrillation. She was given fluids, IV Lopressor and NTG. ACS was ruled out. Ibuprofen given in addition to Colchicine for pericarditis.  72 yr old female with hypothyroidism, cerebral aneurysm s/p coiling, atrial fibrillation, recent pericarditis presented with complaints of left sided chest pain and cough. Noted to be in atrial fibrillation with RVR in ED. She was evaluated by cardiology in ED, rate control medications were adjusted. Eliquis was given instead Lovenox. CT chest, no pneumonia, small effusion. RRT was called overnight for chest pain and rapid atrial fibrillation. She was given fluids, IV Lopressor and NTG. ACS was ruled out. Ibuprofen given in addition to Colchicine for pericarditis.     1. Atrial fibrillation with RVR:  Continue Diltiazem  Toprol 25 mg BID  on Lovenox  cardiology following  continue telemetry.    2. Acute pericarditis:  Continue Ibuprofen and Colchicine  Pantoprazole for GI ppx.    3. Hypothyroidism:  Continue Synthroid.    4. DVT ppx:  Lovenox.    Discussed with patient.  Updated son Yovany over the phone.

## 2023-02-17 NOTE — PROGRESS NOTE ADULT - SUBJECTIVE AND OBJECTIVE BOX
INTERVAL HPI/OVERNIGHT EVENTS:    CC: atrial fibrillation with RVR, acute pericarditis, hypothyroidism      Chart and course reviewed.  events noted  still with some shortness of breath    Vital Signs Last 24 Hrs  T(C): 37.3 (2023 11:19), Max: 37.3 (2023 11:19)  T(F): 99.2 (2023 11:19), Max: 99.2 (2023 11:19)  HR: 106 (2023 11:) (91 - 120)  BP: 101/68 (2023 11:19) (87/54 - 120/88)  BP(mean): 79 (:) (79 - 99)  RR: 20 (:) (20 - 21)  SpO2: 97% (:) (96% - 98%)    Parameters below as of 2023 11:19  Patient On (Oxygen Delivery Method): nasal cannula  O2 Flow (L/min): 2      PHYSICAL EXAM:    GENERAL: alert, not in distress  CHEST/LUNG: b/l air entry  HEART: irregular  ABDOMEN: soft, non tender  EXTREMITIES:  no edema, tenderness    MEDICATIONS  (STANDING):  atorvastatin 20 milliGRAM(s) Oral at bedtime  diltiazem    Tablet 60 milliGRAM(s) Oral three times a day  enoxaparin Injectable 60 milliGRAM(s) SubCutaneous every 12 hours  ibuprofen  Tablet. 800 milliGRAM(s) Oral three times a day  levothyroxine 50 MICROGram(s) Oral daily  metoprolol succinate ER 25 milliGRAM(s) Oral two times a day  pantoprazole    Tablet 40 milliGRAM(s) Oral two times a day    MEDICATIONS  (PRN):  guaiFENesin Oral Liquid (Sugar-Free) 100 milliGRAM(s) Oral every 6 hours PRN Cough      Allergies    No Known Allergies    Intolerances          LABS:                          10.2   6.37  )-----------( 235      ( 2023 06:09 )             33.5     02-17    139  |  106  |  13.1  ----------------------------<  125<H>  3.9   |  19.0<L>  |  0.58    Ca    8.3<L>      2023 06:09  Phos  3.2     02-  Mg     2.1     -    TPro  7.2  /  Alb  4.1  /  TBili  0.5  /  DBili  x   /  AST  11  /  ALT  10  /  AlkPhos  57  02-16    PT/INR - ( 2023 05:40 )   PT: 15.3 sec;   INR: 1.32 ratio         PTT - ( 2023 05:40 )  PTT:40.6 sec  Urinalysis Basic - ( 2023 09:35 )    Color: Yellow / Appearance: Slightly Turbid / S.020 / pH: x  Gluc: x / Ketone: Trace  / Bili: Negative / Urobili: Negative mg/dL   Blood: x / Protein: 15 / Nitrite: Negative   Leuk Esterase: Trace / RBC: Negative /HPF / WBC 3-5 /HPF   Sq Epi: x / Non Sq Epi: Few / Bacteria: Few        RADIOLOGY & ADDITIONAL TESTS:

## 2023-02-17 NOTE — CHART NOTE - NSCHARTNOTEFT_GEN_A_CORE
RN called patient was having CP which has been consistent for 2 days.   72y Female with PMHx of coronavirus, afib RVR  pt is currently being w/u w/ pericarditis. pt recently had a cath showing normal coronary arteries, normal EF, no evidence of pericardial effusion. Pt admits to 6/10 dull chest pain which is radiates from the sternal border to the LT side of the chest pain. Pt states she has been having the same chest pain x 2 days. pt states the pain is worse with coughing and movement. Pt states the pain medication have been improving her pain. Pt admits to "some palpitations". Denies SOB, dyspnea, dizziness, HA, and leg pain, fever, chills . She admits to cough and nausea. Tajik translation done by [employer] [id number].    T(C): 37.3  HR: 79   BP: 114/78   RR: 18  SpO2: 96%     CONSTITUTIONAL: Well groomed, laying in bed   EYES: PERRLA and symmetric, EOMI  ENMT: Oral mucosa with moist membranes.   RESP: No respiratory distress, no use of accessory muscles; CTA b/l, no WRR  CV: regular rhythm, +S1S2, no MRG; no JVD; no peripheral edema; chest tenderness noted with palpitation of the sternal border, and LT chest region   GI: Soft, admits to mild epigastric tenderness   LYMPH: No cervical LAD or tenderness; no axillary LAD or tenderness; no inguinal LAD or tenderness  SKIN: No rashes or ulcers noted  PSYCH: Appropriate insight/judgment; A+O x 3, mood and affect appropriate, speaking Yoruba.     EKG: normal sinus rhythm nonspecific ST abnormality particularly noted in lead 1,2     Chest pain ? 2/2 pericarditis   - c/w eliquis, ibuprofen 800 mg TID w/ colchicine   -add on toradol IV for chest discomfort  -consider MDP if pain continues   -Discussed w/ [facility], in agreement with plan   -check labs    -recent cath with normal coronaries  - continue Cardizem and increased metoprolol to BID   - GI prophylaxis with BID Protonix; continue to monitor epigastric sx w/ NSAID use   -will continue to monitor VS, labs and sx

## 2023-02-17 NOTE — PROGRESS NOTE ADULT - SUBJECTIVE AND OBJECTIVE BOX
[place] [facility] - [facility], THE HEART CENTER                                   [address] [place] [address]                                                      PHONE: [phone]                                                         FAX: [phone]  [email or web address]  ---------------------------------------------------------------------------------------------------------------------------------    Overnight events/patient complaints:    INTERPRETATION OF TELEMETRY (personally reviewed):    ECG:    ECHO:    STRESS TEST:    CARDIAC CATHETERIZATION:    I&O's Summary      PAST MEDICAL & SURGICAL HISTORY:  High cholesterol      Hypothyroidism      H/O knee surgery      Brain aneurysm          No Known Allergies    MEDICATIONS  (STANDING):  atorvastatin 20 milliGRAM(s) Oral at bedtime  diltiazem    Tablet 60 milliGRAM(s) Oral three times a day  enoxaparin Injectable 60 milliGRAM(s) SubCutaneous every 12 hours  ibuprofen  Tablet. 800 milliGRAM(s) Oral three times a day  levothyroxine 50 MICROGram(s) Oral daily  metoprolol succinate ER 25 milliGRAM(s) Oral daily  pantoprazole    Tablet 40 milliGRAM(s) Oral two times a day    MEDICATIONS  (PRN):  guaiFENesin Oral Liquid (Sugar-Free) 100 milliGRAM(s) Oral every 6 hours PRN Cough      Vital Signs Last 24 Hrs  T(C): 36.7 (17 Feb 2023 05:59), Max: 37.2 (17 Feb 2023 00:11)  T(F): 98 (17 Feb 2023 05:59), Max: 99 (17 Feb 2023 00:11)  HR: 116 (17 Feb 2023 05:59) (79 - 120)  BP: 107/72 (17 Feb 2023 05:59) (87/54 - 120/88)  BP(mean): 99 (17 Feb 2023 00:11) (74 - 99)  RR: 20 (17 Feb 2023 05:59) (19 - 21)  SpO2: 96% (17 Feb 2023 05:59) (95% - 98%)    Parameters below as of 17 Feb 2023 05:59  Patient On (Oxygen Delivery Method): nasal cannula  O2 Flow (L/min): 2    ICU Vital Signs Last 24 Hrs    PHYSICAL EXAM:  General: Appears well developed, well nourished alert and cooperative.  HEENT: Head; normocephalic, atraumatic.Pupils reactive, cornea wnl. Neck supple, no nodes adenopathy, no JVD  CARDIOVASCULAR: IRIR   LUNGS: No rales, rhonchi or wheeze. Normal breath sounds bilaterally.  ABDOMEN: Soft, nontender without mass or organomegaly. bowel sounds normoactive.  EXTREMITIES: No clubbing, cyanosis or edema.   SKIN: warm and dry with normal turgor.  NEURO: Alert/oriented x 3/normal motor exam.   PSYCH: normal affect.        LABS:                        10.2   6.37  )-----------( 235      ( 17 Feb 2023 06:09 )             33.5     02-17    139  |  106  |  13.1  ----------------------------<  125<H>  3.9   |  19.0<L>  |  0.58    Ca    8.3<L>      17 Feb 2023 06:09  Phos  3.2     02-16  Mg     2.1     02-17    TPro  7.2  /  Alb  4.1  /  TBili  0.5  /  DBili  x   /  AST  11  /  ALT  10  /  AlkPhos  57  02-16    AFAF ABIZEID  CARDIAC MARKERS ( 16 Feb 2023 22:45 )  x     / <0.01 ng/mL / x     / x     / x      CARDIAC MARKERS ( 16 Feb 2023 05:40 )  x     / <0.01 ng/mL / x     / x     / x          PT/INR - ( 16 Feb 2023 05:40 )   PT: 15.3 sec;   INR: 1.32 ratio         PTT - ( 16 Feb 2023 05:40 )  PTT:40.6 sec      RADIOLOGY & ADDITIONAL STUDIES:    ASSESSMENT AND PLAN:  In summary, AFAF ABIZEID is a 72y Female with past medical history significant for     Thank you for allowing [facility] to participate in the care of this patient.  Please feel free to call with any questions or concerns.                  [facility], THE HEART CENTER                                   [address] [place] [address]                                                      PHONE: [phone]                                                         FAX: [phone]  [email or web address]  ---------------------------------------------------------------------------------------------------------------------------------    Overnight events/patient complaints: rapid response for chest pain in the setting of AF with RVR   s/p IV amiodarone and currently with improvement in rate control   Denies chest pain at time of assessment     INTERPRETATION OF TELEMETRY (personally reviewed):    < from: TTE Echo Complete w/ Contrast w/ Doppler (01.30.23 @ 11:50) >  Summary:   1. Technically difficult study.   2. Endocardial visualization was enhanced with intravenous echo contrast.   3. Left ventricular ejection fraction, by visual estimation, is 65 to   70%.   4. Normal global left ventricular systolic function.   5. Spectral Doppler shows pseudonormal pattern of left ventricular   myocardial filling (Grade IIdiastolic dysfunction).   6. Normal right ventricular size and function.   7. Moderately enlarged left atrium.   8. Mild mitral annular calcification.   9. Trace mitral valve regurgitation.  10. Mild-moderate tricuspid regurgitation.  11. Mild pulmonic valve regurgitation.  12. Estimated pulmonary artery systolic pressure is 36.1 mmHg assuming a   right atrial pressure of 8 mmHg, which is consistent with borderline   pulmonary hypertension.    I&O's Summary    PAST MEDICAL & SURGICAL HISTORY:  High cholesterol  Hypothyroidism  H/O knee surgery  Brain aneurysm    No Known Allergies    MEDICATIONS  (STANDING):  atorvastatin 20 milliGRAM(s) Oral at bedtime  diltiazem    Tablet 60 milliGRAM(s) Oral three times a day  enoxaparin Injectable 60 milliGRAM(s) SubCutaneous every 12 hours  ibuprofen  Tablet. 800 milliGRAM(s) Oral three times a day  levothyroxine 50 MICROGram(s) Oral daily  metoprolol succinate ER 25 milliGRAM(s) Oral daily  pantoprazole    Tablet 40 milliGRAM(s) Oral two times a day    MEDICATIONS  (PRN):  guaiFENesin Oral Liquid (Sugar-Free) 100 milliGRAM(s) Oral every 6 hours PRN Cough      Vital Signs Last 24 Hrs  T(C): 36.7 (17 Feb 2023 05:59), Max: 37.2 (17 Feb 2023 00:11)  T(F): 98 (17 Feb 2023 05:59), Max: 99 (17 Feb 2023 00:11)  HR: 116 (17 Feb 2023 05:59) (79 - 120)  BP: 107/72 (17 Feb 2023 05:59) (87/54 - 120/88)  BP(mean): 99 (17 Feb 2023 00:11) (74 - 99)  RR: 20 (17 Feb 2023 05:59) (19 - 21)  SpO2: 96% (17 Feb 2023 05:59) (95% - 98%)    Parameters below as of 17 Feb 2023 05:59  Patient On (Oxygen Delivery Method): nasal cannula  O2 Flow (L/min): 2    ICU Vital Signs Last 24 Hrs    PHYSICAL EXAM:  General: no acute distress  HEENT: Head; normocephalic, atraumatic.Pupils reactive, cornea wnl. Neck supple, no nodes adenopathy, no JVD  CARDIOVASCULAR: IRIR, 2/6 HILARIO  LUNGS: No rales, rhonchi or wheeze. Normal breath sounds bilaterally.  ABDOMEN: Soft, nontender without mass or organomegaly. bowel sounds normoactive.  EXTREMITIES: No clubbing, cyanosis or edema.   SKIN: warm and dry with normal turgor.  NEURO: Alert/oriented x 3/normal motor exam.   PSYCH: normal affect.        LABS:                        10.2   6.37  )-----------( 235      ( 17 Feb 2023 06:09 )             33.5     02-17    139  |  106  |  13.1  ----------------------------<  125<H>  3.9   |  19.0<L>  |  0.58    Ca    8.3<L>      17 Feb 2023 06:09  Phos  3.2     02-16  Mg     2.1     02-17    TPro  7.2  /  Alb  4.1  /  TBili  0.5  /  DBili  x   /  AST  11  /  ALT  10  /  AlkPhos  57  02-16    AVIS PATEL  CARDIAC MARKERS ( 16 Feb 2023 22:45 )  x     / <0.01 ng/mL / x     / x     / x      CARDIAC MARKERS ( 16 Feb 2023 05:40 )  x     / <0.01 ng/mL / x     / x     / x          PT/INR - ( 16 Feb 2023 05:40 )   PT: 15.3 sec;   INR: 1.32 ratio         PTT - ( 16 Feb 2023 05:40 )  PTT:40.6 sec      RADIOLOGY & ADDITIONAL STUDIES:    ASSESSMENT AND PLAN:  In summary, AVIS PATEL is a 72y Female with past medical history significant for  recent admission for chest pain in setting of coronavirus non-covid c/w pericarditis, Normal coronary arteries, normal EF, no evidence of pericardial effusion, AF with RVR who presented with chest pain and palpitations.    Chest pain  - ? related to pericarditis vs. rapid AF   -Troponin negative so far and recent cath with normal coronaries  - continue Cardizem and increased metoprolol to BID   - Continue Eliquis  - Ibuprofen 800 mg TID with colchicine  - Consider short course of prednisone  - GI prophylaxis with BID Protonix  - Defer ALLYSSA guided CVN at this time given concern for acute pericarditis  - Echo to r/o pericardial effusion pending       Thank you for allowing [facility] to participate in the care of this patient.  Please feel free to call with any questions or concerns.

## 2023-02-18 LAB
ANION GAP SERPL CALC-SCNC: 15 MMOL/L — SIGNIFICANT CHANGE UP (ref 5–17)
BASOPHILS # BLD AUTO: 0.02 K/UL — SIGNIFICANT CHANGE UP (ref 0–0.2)
BASOPHILS NFR BLD AUTO: 0.3 % — SIGNIFICANT CHANGE UP (ref 0–2)
BUN SERPL-MCNC: 18.8 MG/DL — SIGNIFICANT CHANGE UP (ref 8–20)
CALCIUM SERPL-MCNC: 8.5 MG/DL — SIGNIFICANT CHANGE UP (ref 8.4–10.5)
CHLORIDE SERPL-SCNC: 103 MMOL/L — SIGNIFICANT CHANGE UP (ref 96–108)
CO2 SERPL-SCNC: 21 MMOL/L — LOW (ref 22–29)
CREAT SERPL-MCNC: 0.8 MG/DL — SIGNIFICANT CHANGE UP (ref 0.5–1.3)
CULTURE RESULTS: SIGNIFICANT CHANGE UP
EGFR: 79 ML/MIN/1.73M2 — SIGNIFICANT CHANGE UP
EOSINOPHIL # BLD AUTO: 0.06 K/UL — SIGNIFICANT CHANGE UP (ref 0–0.5)
EOSINOPHIL NFR BLD AUTO: 0.9 % — SIGNIFICANT CHANGE UP (ref 0–6)
GLUCOSE SERPL-MCNC: 106 MG/DL — HIGH (ref 70–99)
HCT VFR BLD CALC: 34.1 % — LOW (ref 34.5–45)
HGB BLD-MCNC: 10.6 G/DL — LOW (ref 11.5–15.5)
IMM GRANULOCYTES NFR BLD AUTO: 0.4 % — SIGNIFICANT CHANGE UP (ref 0–0.9)
LYMPHOCYTES # BLD AUTO: 1.36 K/UL — SIGNIFICANT CHANGE UP (ref 1–3.3)
LYMPHOCYTES # BLD AUTO: 19.4 % — SIGNIFICANT CHANGE UP (ref 13–44)
MAGNESIUM SERPL-MCNC: 2.3 MG/DL — SIGNIFICANT CHANGE UP (ref 1.8–2.6)
MCHC RBC-ENTMCNC: 23.1 PG — LOW (ref 27–34)
MCHC RBC-ENTMCNC: 31.1 GM/DL — LOW (ref 32–36)
MCV RBC AUTO: 74.5 FL — LOW (ref 80–100)
MONOCYTES # BLD AUTO: 0.79 K/UL — SIGNIFICANT CHANGE UP (ref 0–0.9)
MONOCYTES NFR BLD AUTO: 11.3 % — SIGNIFICANT CHANGE UP (ref 2–14)
NEUTROPHILS # BLD AUTO: 4.76 K/UL — SIGNIFICANT CHANGE UP (ref 1.8–7.4)
NEUTROPHILS NFR BLD AUTO: 67.7 % — SIGNIFICANT CHANGE UP (ref 43–77)
PHOSPHATE SERPL-MCNC: 3.3 MG/DL — SIGNIFICANT CHANGE UP (ref 2.4–4.7)
PLATELET # BLD AUTO: 235 K/UL — SIGNIFICANT CHANGE UP (ref 150–400)
POTASSIUM SERPL-MCNC: 4 MMOL/L — SIGNIFICANT CHANGE UP (ref 3.5–5.3)
POTASSIUM SERPL-SCNC: 4 MMOL/L — SIGNIFICANT CHANGE UP (ref 3.5–5.3)
RBC # BLD: 4.58 M/UL — SIGNIFICANT CHANGE UP (ref 3.8–5.2)
RBC # FLD: 16.7 % — HIGH (ref 10.3–14.5)
SODIUM SERPL-SCNC: 139 MMOL/L — SIGNIFICANT CHANGE UP (ref 135–145)
SPECIMEN SOURCE: SIGNIFICANT CHANGE UP
TROPONIN T SERPL-MCNC: <0.01 NG/ML — SIGNIFICANT CHANGE UP (ref 0–0.06)
WBC # BLD: 7.02 K/UL — SIGNIFICANT CHANGE UP (ref 3.8–10.5)
WBC # FLD AUTO: 7.02 K/UL — SIGNIFICANT CHANGE UP (ref 3.8–10.5)

## 2023-02-18 PROCEDURE — 93306 TTE W/DOPPLER COMPLETE: CPT | Mod: 26

## 2023-02-18 PROCEDURE — 99233 SBSQ HOSP IP/OBS HIGH 50: CPT

## 2023-02-18 RX ORDER — ENOXAPARIN SODIUM 100 MG/ML
40 INJECTION SUBCUTANEOUS EVERY 24 HOURS
Refills: 0 | Status: DISCONTINUED | OUTPATIENT
Start: 2023-02-18 | End: 2023-02-22

## 2023-02-18 RX ADMIN — Medication 50 MICROGRAM(S): at 05:30

## 2023-02-18 RX ADMIN — Medication 100 MILLIGRAM(S): at 13:20

## 2023-02-18 RX ADMIN — Medication 25 MILLIGRAM(S): at 17:17

## 2023-02-18 RX ADMIN — ENOXAPARIN SODIUM 40 MILLIGRAM(S): 100 INJECTION SUBCUTANEOUS at 21:37

## 2023-02-18 RX ADMIN — Medication 800 MILLIGRAM(S): at 21:10

## 2023-02-18 RX ADMIN — Medication 60 MILLIGRAM(S): at 05:31

## 2023-02-18 RX ADMIN — Medication 800 MILLIGRAM(S): at 20:08

## 2023-02-18 RX ADMIN — Medication 800 MILLIGRAM(S): at 13:51

## 2023-02-18 RX ADMIN — Medication 25 MILLIGRAM(S): at 05:30

## 2023-02-18 RX ADMIN — Medication 800 MILLIGRAM(S): at 06:30

## 2023-02-18 RX ADMIN — Medication 800 MILLIGRAM(S): at 13:20

## 2023-02-18 RX ADMIN — Medication 60 MILLIGRAM(S): at 13:25

## 2023-02-18 RX ADMIN — Medication 800 MILLIGRAM(S): at 05:30

## 2023-02-18 RX ADMIN — SENNA PLUS 2 TABLET(S): 8.6 TABLET ORAL at 21:37

## 2023-02-18 RX ADMIN — PANTOPRAZOLE SODIUM 40 MILLIGRAM(S): 20 TABLET, DELAYED RELEASE ORAL at 05:31

## 2023-02-18 RX ADMIN — Medication 100 MILLIGRAM(S): at 05:30

## 2023-02-18 RX ADMIN — PANTOPRAZOLE SODIUM 40 MILLIGRAM(S): 20 TABLET, DELAYED RELEASE ORAL at 17:17

## 2023-02-18 RX ADMIN — Medication 0.6 MILLIGRAM(S): at 13:20

## 2023-02-18 RX ADMIN — Medication 100 MILLIGRAM(S): at 21:37

## 2023-02-18 RX ADMIN — Medication 60 MILLIGRAM(S): at 21:37

## 2023-02-18 RX ADMIN — ENOXAPARIN SODIUM 60 MILLIGRAM(S): 100 INJECTION SUBCUTANEOUS at 05:30

## 2023-02-18 RX ADMIN — ATORVASTATIN CALCIUM 20 MILLIGRAM(S): 80 TABLET, FILM COATED ORAL at 21:37

## 2023-02-18 NOTE — PROGRESS NOTE ADULT - SUBJECTIVE AND OBJECTIVE BOX
[facility] Division of Hospital Medicine    Chief Complaint: Atrial fibrillation with RVR, acute pericarditis    SUBJECTIVE / OVERNIGHT EVENTS:   Pt reports she feels tired   Has been walking to the bathroom but feels exhausted   +cough, requesting stronger meds     Patient denies chest pain, abd pain, N/V, fever, chills, dysuria or any other complaints. All remainder ROS negative.     MEDICATIONS  (STANDING):  atorvastatin 20 milliGRAM(s) Oral at bedtime  benzonatate 100 milliGRAM(s) Oral three times a day  colchicine 0.6 milliGRAM(s) Oral daily  diltiazem    Tablet 60 milliGRAM(s) Oral three times a day  enoxaparin Injectable 60 milliGRAM(s) SubCutaneous every 12 hours  ibuprofen  Tablet. 800 milliGRAM(s) Oral three times a day  ketorolac   Injectable 15 milliGRAM(s) IV Push once  levothyroxine 50 MICROGram(s) Oral daily  metoprolol succinate ER 25 milliGRAM(s) Oral two times a day  pantoprazole    Tablet 40 milliGRAM(s) Oral two times a day  polyethylene glycol 3350 17 Gram(s) Oral daily  senna 2 Tablet(s) Oral at bedtime    MEDICATIONS  (PRN):  guaiFENesin Oral Liquid (Sugar-Free) 100 milliGRAM(s) Oral every 6 hours PRN Cough        I&O's Summary    17 Feb 2023 07:01  -  18 Feb 2023 07:00  --------------------------------------------------------  IN: 0 mL / OUT: 400 mL / NET: -400 mL        PHYSICAL EXAM:  Vital Signs Last 24 Hrs  T(C): 36.5 (18 Feb 2023 11:25), Max: 37.3 (17 Feb 2023 17:16)  T(F): 97.7 (18 Feb 2023 11:25), Max: 99.2 (17 Feb 2023 17:16)  HR: 109 (18 Feb 2023 11:25) (79 - 118)  BP: 100/69 (18 Feb 2023 11:25) (100/69 - 114/78)  BP(mean): 90 (17 Feb 2023 21:24) (90 - 90)  RR: 19 (18 Feb 2023 11:25) (18 - 19)  SpO2: 94% (18 Feb 2023 11:25) (93% - 96%)    Parameters below as of 18 Feb 2023 05:00  Patient On (Oxygen Delivery Method): nasal cannula  O2 Flow (L/min): 2        CONSTITUTIONAL: NAD, sitting up in bed  ENMT: Moist oral mucosa, EOMI   RESPIRATORY: Normal respiratory effort; lungs are clear to auscultation bilaterally  CARDIOVASCULAR: Irregular rate and rhythm, normal S1 and S2, No lower extremity edema  ABDOMEN: Nontender to palpation, normoactive bowel sounds  MUSCULOSKELETAL:  No clubbing or cyanosis of digits   PSYCH: A+O to person, place, and time; affect appropriate  NEUROLOGY: No gross sensory or motor deficits   SKIN: No rashes; no palpable lesions      LABS:                        10.6   7.02  )-----------( 235      ( 18 Feb 2023 05:02 )             34.1     02-18    139  |  103  |  18.8  ----------------------------<  106<H>  4.0   |  21.0<L>  |  0.80    Ca    8.5      18 Feb 2023 05:02  Phos  3.3     02-18  Mg     2.3     02-18    TPro  6.8  /  Alb  3.6  /  TBili  0.5  /  DBili  x   /  AST  14  /  ALT  10  /  AlkPhos  69  02-17      CARDIAC MARKERS ( 18 Feb 2023 05:02 )  x     / <0.01 ng/mL / x     / x     / x      CARDIAC MARKERS ( 17 Feb 2023 19:40 )  x     / <0.01 ng/mL / 26 U/L / x     / x      CARDIAC MARKERS ( 16 Feb 2023 22:45 )  x     / <0.01 ng/mL / x     / x     / x              Culture - Urine (collected 16 Feb 2023 09:35)  Source: Clean Catch Clean Catch (Midstream)  Final Report (18 Feb 2023 08:33):    >=3 organisms. Probable collection contamination.    Culture - Blood (collected 16 Feb 2023 05:45)  Source: .Blood Blood-Peripheral  Preliminary Report (17 Feb 2023 09:02):    No growth to date.    Culture - Blood (collected 16 Feb 2023 05:40)  Source: .Blood Blood-Peripheral  Preliminary Report (17 Feb 2023 09:02):    No growth to date.

## 2023-02-18 NOTE — PROGRESS NOTE ADULT - ASSESSMENT
72 yr old female with hypothyroidism, cerebral aneurysm s/p coiling, atrial fibrillation, recent pericarditis presented with complaints of left sided chest pain and cough. Noted to be in atrial fibrillation with RVR in ED. She was evaluated by cardiology in ED, rate control medications were adjusted. CT chest showed no pneumonia, small effusion, Pt worked up for chest pain and rapid atrial fibrillation. ACS was ruled out. Ibuprofen given in addition to Colchicine for pericarditis.     Atrial fibrillation with RVR   Continue Diltiazem and Toprol   Lovenox prophylaxis dose given increase in pericardial effusion   cardiology follow up appreciated   continue telemetry    Acute pericarditis  with associated pericardial effusion without tamponade physiology   repeat TTE today with increase pericardial effusion   Hold Lovenox full dose and switch to prophylaxis dose   Continue Ibuprofen and Colchicine   Pantoprazole for GI ppx     Hypothyroidism   Continue Synthroid     DVT ppx: Lovenox     Discussed with patient and son

## 2023-02-18 NOTE — PROGRESS NOTE ADULT - SUBJECTIVE AND OBJECTIVE BOX
[place] [facility] - [facility], THE HEART CENTER                                   [address] [place] [address]                                                      PHONE: [phone]                                                         FAX: [phone]  [email or web address]  ---------------------------------------------------------------------------------------------------------------------------------    Overnight events/patient complaints: continues to have intermittent nocturnal chest pain, however, currently is chest pain free  s/p toradol IV     INTERPRETATION OF TELEMETRY (personally reviewed):    ECG:    ECHO:      I&O's Summary    17 Feb 2023 07:01  -  18 Feb 2023 07:00  --------------------------------------------------------  IN: 0 mL / OUT: 400 mL / NET: -400 mL        PAST MEDICAL & SURGICAL HISTORY:  High cholesterol      Hypothyroidism      H/O knee surgery      Brain aneurysm          No Known Allergies    MEDICATIONS  (STANDING):  atorvastatin 20 milliGRAM(s) Oral at bedtime  benzonatate 100 milliGRAM(s) Oral three times a day  colchicine 0.6 milliGRAM(s) Oral daily  diltiazem    Tablet 60 milliGRAM(s) Oral three times a day  enoxaparin Injectable 60 milliGRAM(s) SubCutaneous every 12 hours  ibuprofen  Tablet. 800 milliGRAM(s) Oral three times a day  ketorolac   Injectable 15 milliGRAM(s) IV Push once  levothyroxine 50 MICROGram(s) Oral daily  metoprolol succinate ER 25 milliGRAM(s) Oral two times a day  pantoprazole    Tablet 40 milliGRAM(s) Oral two times a day  polyethylene glycol 3350 17 Gram(s) Oral daily  senna 2 Tablet(s) Oral at bedtime    MEDICATIONS  (PRN):  guaiFENesin Oral Liquid (Sugar-Free) 100 milliGRAM(s) Oral every 6 hours PRN Cough      Vital Signs Last 24 Hrs  T(C): 36.5 (18 Feb 2023 11:25), Max: 37.3 (17 Feb 2023 17:16)  T(F): 97.7 (18 Feb 2023 11:25), Max: 99.2 (17 Feb 2023 17:16)  HR: 109 (18 Feb 2023 11:25) (79 - 118)  BP: 100/69 (18 Feb 2023 11:25) (100/69 - 114/78)  BP(mean): 90 (17 Feb 2023 21:24) (90 - 90)  RR: 19 (18 Feb 2023 11:25) (18 - 19)  SpO2: 94% (18 Feb 2023 11:25) (93% - 96%)    Parameters below as of 18 Feb 2023 05:00  Patient On (Oxygen Delivery Method): nasal cannula  O2 Flow (L/min): 2    ICU Vital Signs Last 24 Hrs    PHYSICAL EXAM:  General: Appears well developed, well nourished alert and cooperative.  HEENT: Head; normocephalic, atraumatic.Pupils reactive, cornea wnl. Neck supple, no nodes adenopathy, no JVD  CARDIOVASCULAR: Normal S1 and S2, 1/6 HILARIO, no rub, gallop or lift.   LUNGS: No rales, rhonchi or wheeze. Normal breath sounds bilaterally.  ABDOMEN: Soft, nontender without mass or organomegaly. bowel sounds normoactive.  EXTREMITIES: No clubbing, cyanosis or edema.   SKIN: warm and dry with normal turgor.  NEURO: Alert/oriented x 3/normal motor exam.   PSYCH: normal affect.        LABS:                        10.6   7.02  )-----------( 235      ( 18 Feb 2023 05:02 )             34.1     02-18    139  |  103  |  18.8  ----------------------------<  106<H>  4.0   |  21.0<L>  |  0.80    Ca    8.5      18 Feb 2023 05:02  Phos  3.3     02-18  Mg     2.3     02-18    TPro  6.8  /  Alb  3.6  /  TBili  0.5  /  DBili  x   /  AST  14  /  ALT  10  /  AlkPhos  69  02-17    AFCHUY PATEL  CARDIAC MARKERS ( 18 Feb 2023 05:02 )  x     / <0.01 ng/mL / x     / x     / x      CARDIAC MARKERS ( 17 Feb 2023 19:40 )  x     / <0.01 ng/mL / 26 U/L / x     / x      CARDIAC MARKERS ( 16 Feb 2023 22:45 )  x     / <0.01 ng/mL / x     / x     / x          ASSESSMENT AND PLAN:  In summary, AVIS PATEL is a 72y Female with past medical history significant for  recent admission for chest pain in setting of coronavirus non-covid c/w pericarditis, Normal coronary arteries, normal EF, no evidence of pericardial effusion, AF with RVR who presented with chest pain and palpitations.    Pleuritic chest pain consistent with pericarditis with associated pericardial effusion without tamponade physiology    -Troponin negative so far and recent cath with normal coronaries  - continue Cardizem and metoprolol to BID   - given increasing pericardial effusion, will hold systemic AC   - Ibuprofen 800 mg TID with colchicine  - Consider short course of prednisone   - GI prophylaxis with BID Protonix  - Defer ALLYSSA guided CVN at this time given concern for acute pericarditis  - DVT ppx     Thank you for allowing [facility] to participate in the care of this patient.  Please feel free to call with any questions or concerns.

## 2023-02-19 LAB
ANION GAP SERPL CALC-SCNC: 13 MMOL/L — SIGNIFICANT CHANGE UP (ref 5–17)
ANISOCYTOSIS BLD QL: SLIGHT — SIGNIFICANT CHANGE UP
BASOPHILS # BLD AUTO: 0.02 K/UL — SIGNIFICANT CHANGE UP (ref 0–0.2)
BASOPHILS NFR BLD AUTO: 0.4 % — SIGNIFICANT CHANGE UP (ref 0–2)
BUN SERPL-MCNC: 23.5 MG/DL — HIGH (ref 8–20)
BURR CELLS BLD QL SMEAR: PRESENT — SIGNIFICANT CHANGE UP
CALCIUM SERPL-MCNC: 8.3 MG/DL — LOW (ref 8.4–10.5)
CHLORIDE SERPL-SCNC: 102 MMOL/L — SIGNIFICANT CHANGE UP (ref 96–108)
CO2 SERPL-SCNC: 21 MMOL/L — LOW (ref 22–29)
CREAT SERPL-MCNC: 0.95 MG/DL — SIGNIFICANT CHANGE UP (ref 0.5–1.3)
EGFR: 64 ML/MIN/1.73M2 — SIGNIFICANT CHANGE UP
ELLIPTOCYTES BLD QL SMEAR: SLIGHT — SIGNIFICANT CHANGE UP
EOSINOPHIL # BLD AUTO: 0.12 K/UL — SIGNIFICANT CHANGE UP (ref 0–0.5)
EOSINOPHIL NFR BLD AUTO: 2.2 % — SIGNIFICANT CHANGE UP (ref 0–6)
GLUCOSE SERPL-MCNC: 125 MG/DL — HIGH (ref 70–99)
HCT VFR BLD CALC: 30.7 % — LOW (ref 34.5–45)
HGB BLD-MCNC: 9.7 G/DL — LOW (ref 11.5–15.5)
IMM GRANULOCYTES NFR BLD AUTO: 0.4 % — SIGNIFICANT CHANGE UP (ref 0–0.9)
LYMPHOCYTES # BLD AUTO: 0.88 K/UL — LOW (ref 1–3.3)
LYMPHOCYTES # BLD AUTO: 16.1 % — SIGNIFICANT CHANGE UP (ref 13–44)
MANUAL SMEAR VERIFICATION: SIGNIFICANT CHANGE UP
MCHC RBC-ENTMCNC: 23 PG — LOW (ref 27–34)
MCHC RBC-ENTMCNC: 31.6 GM/DL — LOW (ref 32–36)
MCV RBC AUTO: 72.9 FL — LOW (ref 80–100)
MONOCYTES # BLD AUTO: 0.62 K/UL — SIGNIFICANT CHANGE UP (ref 0–0.9)
MONOCYTES NFR BLD AUTO: 11.4 % — SIGNIFICANT CHANGE UP (ref 2–14)
NEUTROPHILS # BLD AUTO: 3.79 K/UL — SIGNIFICANT CHANGE UP (ref 1.8–7.4)
NEUTROPHILS NFR BLD AUTO: 69.5 % — SIGNIFICANT CHANGE UP (ref 43–77)
OVALOCYTES BLD QL SMEAR: SLIGHT — SIGNIFICANT CHANGE UP
PLAT MORPH BLD: NORMAL — SIGNIFICANT CHANGE UP
PLATELET # BLD AUTO: 293 K/UL — SIGNIFICANT CHANGE UP (ref 150–400)
POIKILOCYTOSIS BLD QL AUTO: SIGNIFICANT CHANGE UP
POLYCHROMASIA BLD QL SMEAR: SLIGHT — SIGNIFICANT CHANGE UP
POTASSIUM SERPL-MCNC: 4 MMOL/L — SIGNIFICANT CHANGE UP (ref 3.5–5.3)
POTASSIUM SERPL-SCNC: 4 MMOL/L — SIGNIFICANT CHANGE UP (ref 3.5–5.3)
RBC # BLD: 4.21 M/UL — SIGNIFICANT CHANGE UP (ref 3.8–5.2)
RBC # FLD: 16.4 % — HIGH (ref 10.3–14.5)
RBC BLD AUTO: ABNORMAL
SODIUM SERPL-SCNC: 136 MMOL/L — SIGNIFICANT CHANGE UP (ref 135–145)
WBC # BLD: 5.45 K/UL — SIGNIFICANT CHANGE UP (ref 3.8–10.5)
WBC # FLD AUTO: 5.45 K/UL — SIGNIFICANT CHANGE UP (ref 3.8–10.5)

## 2023-02-19 PROCEDURE — 99233 SBSQ HOSP IP/OBS HIGH 50: CPT

## 2023-02-19 RX ORDER — METOPROLOL TARTRATE 50 MG
2.5 TABLET ORAL ONCE
Refills: 0 | Status: COMPLETED | OUTPATIENT
Start: 2023-02-19 | End: 2023-02-19

## 2023-02-19 RX ADMIN — Medication 800 MILLIGRAM(S): at 22:10

## 2023-02-19 RX ADMIN — Medication 60 MILLIGRAM(S): at 21:32

## 2023-02-19 RX ADMIN — Medication 100 MILLIGRAM(S): at 13:11

## 2023-02-19 RX ADMIN — ENOXAPARIN SODIUM 40 MILLIGRAM(S): 100 INJECTION SUBCUTANEOUS at 21:34

## 2023-02-19 RX ADMIN — Medication 100 MILLIGRAM(S): at 17:12

## 2023-02-19 RX ADMIN — Medication 800 MILLIGRAM(S): at 21:33

## 2023-02-19 RX ADMIN — Medication 100 MILLIGRAM(S): at 23:45

## 2023-02-19 RX ADMIN — Medication 25 MILLIGRAM(S): at 05:34

## 2023-02-19 RX ADMIN — Medication 100 MILLIGRAM(S): at 04:07

## 2023-02-19 RX ADMIN — PANTOPRAZOLE SODIUM 40 MILLIGRAM(S): 20 TABLET, DELAYED RELEASE ORAL at 17:12

## 2023-02-19 RX ADMIN — PANTOPRAZOLE SODIUM 40 MILLIGRAM(S): 20 TABLET, DELAYED RELEASE ORAL at 05:35

## 2023-02-19 RX ADMIN — Medication 60 MILLIGRAM(S): at 05:34

## 2023-02-19 RX ADMIN — Medication 2.5 MILLIGRAM(S): at 23:33

## 2023-02-19 RX ADMIN — Medication 50 MICROGRAM(S): at 05:34

## 2023-02-19 RX ADMIN — Medication 800 MILLIGRAM(S): at 14:11

## 2023-02-19 RX ADMIN — Medication 100 MILLIGRAM(S): at 21:33

## 2023-02-19 RX ADMIN — Medication 0.6 MILLIGRAM(S): at 13:10

## 2023-02-19 RX ADMIN — Medication 800 MILLIGRAM(S): at 13:11

## 2023-02-19 RX ADMIN — Medication 100 MILLIGRAM(S): at 05:34

## 2023-02-19 RX ADMIN — SENNA PLUS 2 TABLET(S): 8.6 TABLET ORAL at 21:33

## 2023-02-19 RX ADMIN — Medication 60 MILLIGRAM(S): at 14:56

## 2023-02-19 RX ADMIN — Medication 60 MILLIGRAM(S): at 13:11

## 2023-02-19 RX ADMIN — ATORVASTATIN CALCIUM 20 MILLIGRAM(S): 80 TABLET, FILM COATED ORAL at 21:34

## 2023-02-19 RX ADMIN — Medication 800 MILLIGRAM(S): at 06:40

## 2023-02-19 RX ADMIN — Medication 800 MILLIGRAM(S): at 05:35

## 2023-02-19 RX ADMIN — Medication 25 MILLIGRAM(S): at 17:12

## 2023-02-19 NOTE — CHART NOTE - NSCHARTNOTEFT_GEN_A_CORE
RN called patient had a fib RVR @ 150's sustaining. VSS otherwise stable. lopressor 2.5 mg IV push x1 given. Will continue to monitor labs, VS and sx.

## 2023-02-19 NOTE — PROGRESS NOTE ADULT - SUBJECTIVE AND OBJECTIVE BOX
[facility] Division of Hospital Medicine    Chief Complaint: Atrial fibrillation with RVR, acute pericarditis    SUBJECTIVE / OVERNIGHT EVENTS:   Pt reports she feels tired   cough is better with meds   Spoke to son on the phone      Patient denies chest pain, abd pain, N/V, fever, chills, dysuria or any other complaints. All remainder ROS negative.     MEDICATIONS  (STANDING):  atorvastatin 20 milliGRAM(s) Oral at bedtime  benzonatate 100 milliGRAM(s) Oral three times a day  colchicine 0.6 milliGRAM(s) Oral daily  diltiazem    Tablet 60 milliGRAM(s) Oral three times a day  enoxaparin Injectable 40 milliGRAM(s) SubCutaneous every 24 hours  ibuprofen  Tablet. 800 milliGRAM(s) Oral three times a day  ketorolac   Injectable 15 milliGRAM(s) IV Push once  levothyroxine 50 MICROGram(s) Oral daily  metoprolol succinate ER 25 milliGRAM(s) Oral two times a day  pantoprazole    Tablet 40 milliGRAM(s) Oral two times a day  polyethylene glycol 3350 17 Gram(s) Oral daily  senna 2 Tablet(s) Oral at bedtime    MEDICATIONS  (PRN):  guaiFENesin Oral Liquid (Sugar-Free) 100 milliGRAM(s) Oral every 6 hours PRN Cough        I&O's Summary    18 Feb 2023 07:01  -  19 Feb 2023 07:00  --------------------------------------------------------  IN: 240 mL / OUT: 750 mL / NET: -510 mL        PHYSICAL EXAM:  Vital Signs Last 24 Hrs  T(C): 36.7 (19 Feb 2023 04:56), Max: 36.7 (18 Feb 2023 21:34)  T(F): 98.1 (19 Feb 2023 04:56), Max: 98.1 (18 Feb 2023 21:34)  HR: 111 (19 Feb 2023 04:56) (105 - 115)  BP: 105/69 (19 Feb 2023 04:56) (99/67 - 107/71)  BP(mean): 78 (18 Feb 2023 21:34) (78 - 78)  RR: 18 (19 Feb 2023 04:56) (18 - 18)  SpO2: 96% (19 Feb 2023 04:56) (92% - 96%)    Parameters below as of 18 Feb 2023 21:34  Patient On (Oxygen Delivery Method): room air        CONSTITUTIONAL: NAD, sitting up in bed  ENMT: Moist oral mucosa, EOMI   RESPIRATORY: Normal respiratory effort; lungs are clear to auscultation bilaterally  CARDIOVASCULAR: Irregular rate and rhythm, normal S1 and S2, No lower extremity edema  ABDOMEN: Nontender to palpation, normoactive bowel sounds  MUSCULOSKELETAL:  No clubbing or cyanosis of digits   PSYCH: A+O to person, place, and time; affect appropriate  NEUROLOGY: No gross sensory or motor deficits   SKIN: No rashes; no palpable lesions      LABS:                        9.7    5.45  )-----------( 293      ( 19 Feb 2023 07:08 )             30.7     02-19    136  |  102  |  23.5<H>  ----------------------------<  125<H>  4.0   |  21.0<L>  |  0.95    Ca    8.3<L>      19 Feb 2023 07:08  Phos  3.3     02-18  Mg     2.3     02-18    TPro  6.8  /  Alb  3.6  /  TBili  0.5  /  DBili  x   /  AST  14  /  ALT  10  /  AlkPhos  69  02-17      CARDIAC MARKERS ( 18 Feb 2023 05:02 )  x     / <0.01 ng/mL / x     / x     / x      CARDIAC MARKERS ( 17 Feb 2023 19:40 )  x     / <0.01 ng/mL / 26 U/L / x     / x

## 2023-02-19 NOTE — PROGRESS NOTE ADULT - ASSESSMENT
72 yr old female with hypothyroidism, cerebral aneurysm s/p coiling, atrial fibrillation, recent pericarditis presented with complaints of left sided chest pain and cough. Noted to be in atrial fibrillation with RVR in ED. She was evaluated by cardiology in ED, rate control medications were adjusted. CT chest showed no pneumonia, small effusion, Pt worked up for chest pain and rapid atrial fibrillation. ACS was ruled out. Ibuprofen given in addition to Colchicine for pericarditis.     Atrial fibrillation with RVR   Continue Diltiazem and Toprol   change AC to Lovenox prophylaxis dose given new pericardial effusion   cardiology follow up appreciated   continue telemetry    Acute pericarditis  with associated pericardial effusion without tamponade physiology   repeat TTE 2/18 with increase pericardial effusion   Hedld Lovenox full dose and switched to prophylaxis dose   Continue Ibuprofen and Colchicine  consider steroids    Pantoprazole for GI ppx     Hypothyroidism   Continue Synthroid     DVT ppx: Lovenox     Discussed with patient and son

## 2023-02-19 NOTE — PROGRESS NOTE ADULT - SUBJECTIVE AND OBJECTIVE BOX
[facility], THE [facility]ENTER                                   [address] [place] [address]                                                      PHONE: [phone]                                                         FAX: [phone]  [email or web address]  ---------------------------------------------------------------------------------------------------------------------------------    Overnight events/patient complaints: continues to report pleuritic chest pain. overall HR trend is improved     INTERPRETATION OF TELEMETRY (personally reviewed): AF    < from: TTE Echo Complete w/o Contrast w/ Doppler (02.18.23 @ 12:04) >   1. Left ventricular ejection fraction, by visual estimation, is 50 to 55%.   2. Normal global left ventricular systolic function.   3. The mitral in-flow pattern reveals no discernable A-wave, therefore   no comment on diastolic function can be made.   4. Normal right ventricular size and function.   5. Estimated pulmonary artery systolic pressure is 39.1 mmHg assuming a   right atrial pressure of 8 mmHg, which is consistent with borderline   pulmonary hypertension.   6. Mildly enlarged right atrium.   7. Moderately enlarged left atrium.   8. Mild mitral annular calcification.   9. Trace mitral valve regurgitation.  10. Moderate tricuspid regurgitation.  11. Sclerotic aortic valvewith normal opening.  12. Small to moderate size circumferential pericardial effusion (measures   up to 1.3 cm) with fibrinous thickened layering over the RV. No   echocardiographic evidence of tamponade.  13. Compared to the prior TTE study from 1/30/23, new pericardial effusion noted. Findings reported to Dr. Flynn.    I&O's Summary    18 Feb 2023 07:01  -  19 Feb 2023 07:00  --------------------------------------------------------  IN: 240 mL / OUT: 750 mL / NET: -510 mL        PAST MEDICAL & SURGICAL HISTORY:  High cholesterol      Hypothyroidism      H/O knee surgery      Brain aneurysm          No Known Allergies    MEDICATIONS  (STANDING):  atorvastatin 20 milliGRAM(s) Oral at bedtime  benzonatate 100 milliGRAM(s) Oral three times a day  colchicine 0.6 milliGRAM(s) Oral daily  diltiazem    Tablet 60 milliGRAM(s) Oral three times a day  enoxaparin Injectable 40 milliGRAM(s) SubCutaneous every 24 hours  ibuprofen  Tablet. 800 milliGRAM(s) Oral three times a day  ketorolac   Injectable 15 milliGRAM(s) IV Push once  levothyroxine 50 MICROGram(s) Oral daily  metoprolol succinate ER 25 milliGRAM(s) Oral two times a day  pantoprazole    Tablet 40 milliGRAM(s) Oral two times a day  polyethylene glycol 3350 17 Gram(s) Oral daily  senna 2 Tablet(s) Oral at bedtime    MEDICATIONS  (PRN):  guaiFENesin Oral Liquid (Sugar-Free) 100 milliGRAM(s) Oral every 6 hours PRN Cough      Vital Signs Last 24 Hrs  T(C): 36.9 (19 Feb 2023 12:20), Max: 36.9 (19 Feb 2023 12:20)  T(F): 98.5 (19 Feb 2023 12:20), Max: 98.5 (19 Feb 2023 12:20)  HR: 98 (19 Feb 2023 12:20) (98 - 115)  BP: 106/68 (19 Feb 2023 12:20) (99/67 - 107/71)  BP(mean): 78 (18 Feb 2023 21:34) (78 - 78)  RR: 18 (19 Feb 2023 12:20) (18 - 18)  SpO2: 92% (19 Feb 2023 12:20) (92% - 96%)    Parameters below as of 19 Feb 2023 12:20  Patient On (Oxygen Delivery Method): room air      ICU Vital Signs Last 24 Hrs    PHYSICAL EXAM:  General: Appears well developed, well nourished alert and cooperative.  HEENT: Head; normocephalic, atraumatic.Pupils reactive, cornea wnl. Neck supple, no nodes adenopathy, no JVD  CARDIOVASCULAR: Normal S1 and S2, 1/6 HILARIO, no rub, gallop or lift.   LUNGS: No rales, rhonchi or wheeze. Normal breath sounds bilaterally.  ABDOMEN: Soft, nontender without mass or organomegaly. bowel sounds normoactive.  EXTREMITIES: No clubbing, cyanosis or edema.   SKIN: warm and dry with normal turgor.  NEURO: Alert/oriented x 3/normal motor exam.   PSYCH: normal affect.        LABS:                        9.7    5.45  )-----------( 293      ( 19 Feb 2023 07:08 )             30.7     02-19    136  |  102  |  23.5<H>  ----------------------------<  125<H>  4.0   |  21.0<L>  |  0.95    Ca    8.3<L>      19 Feb 2023 07:08  Phos  3.3     02-18  Mg     2.3     02-18    TPro  6.8  /  Alb  3.6  /  TBili  0.5  /  DBili  x   /  AST  14  /  ALT  10  /  AlkPhos  69  02-17    AVIS PATEL  CARDIAC MARKERS ( 18 Feb 2023 05:02 )  x     / <0.01 ng/mL / x     / x     / x      CARDIAC MARKERS ( 17 Feb 2023 19:40 )  x     / <0.01 ng/mL / 26 U/L / x     / x        ASSESSMENT AND PLAN:  In summary, AVIS PATEL is a 72y Female with past medical history significant for  recent admission for chest pain in setting of coronavirus non-covid c/w pericarditis, Normal coronary arteries, normal EF, no evidence of pericardial effusion, AF with RVR who presented with chest pain and palpitations.    Pleuritic chest pain consistent with pericarditis with associated pericardial effusion without tamponade physiology    -Troponin negative so far and recent cath with normal coronaries  - continue Cardizem and metoprolol to BID   - given increasing pericardial effusion, will hold systemic AC   - Ibuprofen 800 mg TID with colchicine  - persistent CP: recommend short course of prednisone   - GI prophylaxis with BID Protonix  - Defer ALLYSSA guided CVN at this time given concern for acute pericarditis  - DVT ppx     Thank you for allowing [facility] to participate in the care of this patient.  Please feel free to call with any questions or concerns.

## 2023-02-20 LAB
ANION GAP SERPL CALC-SCNC: 14 MMOL/L — SIGNIFICANT CHANGE UP (ref 5–17)
ANION GAP SERPL CALC-SCNC: 15 MMOL/L — SIGNIFICANT CHANGE UP (ref 5–17)
BASOPHILS # BLD AUTO: 0.01 K/UL — SIGNIFICANT CHANGE UP (ref 0–0.2)
BASOPHILS NFR BLD AUTO: 0.2 % — SIGNIFICANT CHANGE UP (ref 0–2)
BUN SERPL-MCNC: 22.9 MG/DL — HIGH (ref 8–20)
BUN SERPL-MCNC: 25.1 MG/DL — HIGH (ref 8–20)
CALCIUM SERPL-MCNC: 8 MG/DL — LOW (ref 8.4–10.5)
CALCIUM SERPL-MCNC: 8 MG/DL — LOW (ref 8.4–10.5)
CHLORIDE SERPL-SCNC: 102 MMOL/L — SIGNIFICANT CHANGE UP (ref 96–108)
CHLORIDE SERPL-SCNC: 104 MMOL/L — SIGNIFICANT CHANGE UP (ref 96–108)
CO2 SERPL-SCNC: 21 MMOL/L — LOW (ref 22–29)
CO2 SERPL-SCNC: 21 MMOL/L — LOW (ref 22–29)
CREAT SERPL-MCNC: 0.86 MG/DL — SIGNIFICANT CHANGE UP (ref 0.5–1.3)
CREAT SERPL-MCNC: 0.91 MG/DL — SIGNIFICANT CHANGE UP (ref 0.5–1.3)
EGFR: 67 ML/MIN/1.73M2 — SIGNIFICANT CHANGE UP
EGFR: 72 ML/MIN/1.73M2 — SIGNIFICANT CHANGE UP
EOSINOPHIL # BLD AUTO: 0 K/UL — SIGNIFICANT CHANGE UP (ref 0–0.5)
EOSINOPHIL NFR BLD AUTO: 0 % — SIGNIFICANT CHANGE UP (ref 0–6)
GLUCOSE SERPL-MCNC: 132 MG/DL — HIGH (ref 70–99)
GLUCOSE SERPL-MCNC: 206 MG/DL — HIGH (ref 70–99)
HCT VFR BLD CALC: 32.4 % — LOW (ref 34.5–45)
HGB BLD-MCNC: 10.1 G/DL — LOW (ref 11.5–15.5)
IMM GRANULOCYTES NFR BLD AUTO: 0.6 % — SIGNIFICANT CHANGE UP (ref 0–0.9)
LYMPHOCYTES # BLD AUTO: 0.58 K/UL — LOW (ref 1–3.3)
LYMPHOCYTES # BLD AUTO: 11.8 % — LOW (ref 13–44)
MAGNESIUM SERPL-MCNC: 2.6 MG/DL — SIGNIFICANT CHANGE UP (ref 1.6–2.6)
MCHC RBC-ENTMCNC: 23.1 PG — LOW (ref 27–34)
MCHC RBC-ENTMCNC: 31.2 GM/DL — LOW (ref 32–36)
MCV RBC AUTO: 74.1 FL — LOW (ref 80–100)
MONOCYTES # BLD AUTO: 0.37 K/UL — SIGNIFICANT CHANGE UP (ref 0–0.9)
MONOCYTES NFR BLD AUTO: 7.5 % — SIGNIFICANT CHANGE UP (ref 2–14)
NEUTROPHILS # BLD AUTO: 3.93 K/UL — SIGNIFICANT CHANGE UP (ref 1.8–7.4)
NEUTROPHILS NFR BLD AUTO: 79.9 % — HIGH (ref 43–77)
PHOSPHATE SERPL-MCNC: 3.3 MG/DL — SIGNIFICANT CHANGE UP (ref 2.4–4.7)
PLATELET # BLD AUTO: 303 K/UL — SIGNIFICANT CHANGE UP (ref 150–400)
POTASSIUM SERPL-MCNC: 4 MMOL/L — SIGNIFICANT CHANGE UP (ref 3.5–5.3)
POTASSIUM SERPL-MCNC: 4.3 MMOL/L — SIGNIFICANT CHANGE UP (ref 3.5–5.3)
POTASSIUM SERPL-SCNC: 4 MMOL/L — SIGNIFICANT CHANGE UP (ref 3.5–5.3)
POTASSIUM SERPL-SCNC: 4.3 MMOL/L — SIGNIFICANT CHANGE UP (ref 3.5–5.3)
RBC # BLD: 4.37 M/UL — SIGNIFICANT CHANGE UP (ref 3.8–5.2)
RBC # FLD: 16.3 % — HIGH (ref 10.3–14.5)
SODIUM SERPL-SCNC: 137 MMOL/L — SIGNIFICANT CHANGE UP (ref 135–145)
SODIUM SERPL-SCNC: 140 MMOL/L — SIGNIFICANT CHANGE UP (ref 135–145)
WBC # BLD: 4.92 K/UL — SIGNIFICANT CHANGE UP (ref 3.8–10.5)
WBC # FLD AUTO: 4.92 K/UL — SIGNIFICANT CHANGE UP (ref 3.8–10.5)

## 2023-02-20 PROCEDURE — 99233 SBSQ HOSP IP/OBS HIGH 50: CPT

## 2023-02-20 RX ORDER — METOPROLOL TARTRATE 50 MG
50 TABLET ORAL
Refills: 0 | Status: DISCONTINUED | OUTPATIENT
Start: 2023-02-20 | End: 2023-02-21

## 2023-02-20 RX ORDER — COLCHICINE 0.6 MG
0.6 TABLET ORAL
Refills: 0 | Status: DISCONTINUED | OUTPATIENT
Start: 2023-02-20 | End: 2023-02-26

## 2023-02-20 RX ORDER — DILTIAZEM HCL 120 MG
90 CAPSULE, EXT RELEASE 24 HR ORAL EVERY 6 HOURS
Refills: 0 | Status: DISCONTINUED | OUTPATIENT
Start: 2023-02-20 | End: 2023-02-21

## 2023-02-20 RX ORDER — CALCIUM CARBONATE 500(1250)
1 TABLET ORAL ONCE
Refills: 0 | Status: DISCONTINUED | OUTPATIENT
Start: 2023-02-20 | End: 2023-02-22

## 2023-02-20 RX ADMIN — ENOXAPARIN SODIUM 40 MILLIGRAM(S): 100 INJECTION SUBCUTANEOUS at 21:39

## 2023-02-20 RX ADMIN — ATORVASTATIN CALCIUM 20 MILLIGRAM(S): 80 TABLET, FILM COATED ORAL at 21:38

## 2023-02-20 RX ADMIN — Medication 60 MILLIGRAM(S): at 06:21

## 2023-02-20 RX ADMIN — Medication 800 MILLIGRAM(S): at 06:21

## 2023-02-20 RX ADMIN — Medication 800 MILLIGRAM(S): at 21:38

## 2023-02-20 RX ADMIN — Medication 50 MICROGRAM(S): at 06:21

## 2023-02-20 RX ADMIN — Medication 25 MILLIGRAM(S): at 06:47

## 2023-02-20 RX ADMIN — Medication 0.6 MILLIGRAM(S): at 17:31

## 2023-02-20 RX ADMIN — Medication 50 MILLIGRAM(S): at 17:31

## 2023-02-20 RX ADMIN — Medication 90 MILLIGRAM(S): at 11:32

## 2023-02-20 RX ADMIN — Medication 100 MILLIGRAM(S): at 14:24

## 2023-02-20 RX ADMIN — Medication 800 MILLIGRAM(S): at 07:00

## 2023-02-20 RX ADMIN — Medication 100 MILLIGRAM(S): at 06:20

## 2023-02-20 RX ADMIN — Medication 100 MILLIGRAM(S): at 21:38

## 2023-02-20 RX ADMIN — POLYETHYLENE GLYCOL 3350 17 GRAM(S): 17 POWDER, FOR SOLUTION ORAL at 11:32

## 2023-02-20 RX ADMIN — PANTOPRAZOLE SODIUM 40 MILLIGRAM(S): 20 TABLET, DELAYED RELEASE ORAL at 17:31

## 2023-02-20 RX ADMIN — Medication 800 MILLIGRAM(S): at 22:00

## 2023-02-20 RX ADMIN — SENNA PLUS 2 TABLET(S): 8.6 TABLET ORAL at 21:38

## 2023-02-20 RX ADMIN — Medication 90 MILLIGRAM(S): at 23:03

## 2023-02-20 RX ADMIN — PANTOPRAZOLE SODIUM 40 MILLIGRAM(S): 20 TABLET, DELAYED RELEASE ORAL at 06:20

## 2023-02-20 RX ADMIN — Medication 90 MILLIGRAM(S): at 17:31

## 2023-02-20 NOTE — PROGRESS NOTE ADULT - SUBJECTIVE AND OBJECTIVE BOX
[facility], [facility]                              [address] [place] [address]                                                 PHONE: [phone]                                                 FAX: [phone]  -----------------------------------------------------------------------------------------------  Pt seen and examined. FU for pericarditis    Overnight events/Complaints: Pt reports no chest pain. Did have rapid AF last nite but pt reports she was sleeping. No significant palpitations. Spoke with son by phone.    Vital Signs Last 24 Hrs  T(C): 36.5 (20 Feb 2023 04:53), Max: 36.9 (19 Feb 2023 12:20)  T(F): 97.7 (20 Feb 2023 04:53), Max: 98.5 (19 Feb 2023 12:20)  HR: 78 (20 Feb 2023 06:00) (78 - 150)  BP: 124/72 (20 Feb 2023 06:00) (96/62 - 124/72)  BP(mean): --  RR: 18 (20 Feb 2023 06:00) (18 - 20)  SpO2: 96% (20 Feb 2023 06:00) (92% - 97%)    Parameters below as of 20 Feb 2023 06:00  Patient On (Oxygen Delivery Method): nasal cannula  O2 Flow (L/min): 2    I&O's Summary    19 Feb 2023 07:01  -  20 Feb 2023 07:00  --------------------------------------------------------  IN: 0 mL / OUT: 100 mL / NET: -100 mL        PHYSICAL EXAM:  Constitutional: Appears well; alert and co-operative  Eyes: Conjunctiva normal, No scleral icterus  Neck: Supple, No JVD  Cardiovascular: irregular S1, S2 tachy  Respiratory: Lungs clear to auscultation; no crepitations, no wheeze  Gastrointestinal:  Soft, Non-tender, + BS	  Musculoskeletal: No edema        LABS:                        10.1   4.92  )-----------( 303      ( 20 Feb 2023 06:12 )             32.4     02-20    140  |  104  |  25.1<H>  ----------------------------<  132<H>  4.0   |  21.0<L>  |  0.91    Ca    8.0<L>      20 Feb 2023 06:12  Phos  3.3     02-19  Mg     2.6     02-19      RADIOLOGY & ADDITIONAL STUDIES: (reviewed)  CXR was independently visualized/reviewed and demonstrated: small effusion    CARDIOLOGY TESTING:(reviewed)     ECG (Independent visualization): AF with RVR    ECHOCARDIOGRAM :  Summary:   1. Left ventricular ejection fraction, by visual estimation, is 50 to   55%.   2. Normal global left ventricular systolic function.   3. The mitral in-flow pattern reveals no discernable A-wave, therefore   no comment on diastolic function can be made.   4. Normal right ventricular size and function.   5. Estimated pulmonary artery systolic pressure is 39.1 mmHg assuming a   right atrial pressure of 8 mmHg, which is consistent with borderline   pulmonary hypertension.   6. Mildly enlarged right atrium.   7. Moderately enlarged left atrium.   8. Mild mitral annular calcification.   9. Trace mitral valve regurgitation.  10. Moderate tricuspid regurgitation.  11. Sclerotic aortic valvewith normal opening.  12. Small to moderate size circumferential pericardial effusion (measures   up to 1.3 cm) with fibrinous thickened layering over the RV. No   echocardiographic evidence of tamponade.  13. Compared to the prior TTE study from 1/30/23, new pericardial   effusion noted. Findings reported to Dr. Flynn.    Maynor Sal DO Electronically signed on 2/18/2023 at 12:50:24 PM    CARDIAC CATHETERIZATION:  Normal coronary arteries   Normal LV EDP   Moderately depressed LVEF with basal to mid inferior hypokinesis.   Her presentation is more likely percarditis in the setting of COVID  infection, and/or takasubu in setting of rapid Afib    TELEMETRY independently visualized/reviewed and demonstrated : AF    MEDICATIONS:(reviewed)  MEDICATIONS  (STANDING):  atorvastatin 20 milliGRAM(s) Oral at bedtime  benzonatate 100 milliGRAM(s) Oral three times a day  calcium carbonate    500 mG (Tums) Chewable 1 Tablet(s) Chew once  colchicine 0.6 milliGRAM(s) Oral daily  diltiazem    Tablet 60 milliGRAM(s) Oral three times a day  enoxaparin Injectable 40 milliGRAM(s) SubCutaneous every 24 hours  ibuprofen  Tablet. 800 milliGRAM(s) Oral three times a day  ketorolac   Injectable 15 milliGRAM(s) IV Push once  levothyroxine 50 MICROGram(s) Oral daily  metoprolol succinate ER 25 milliGRAM(s) Oral two times a day  pantoprazole    Tablet 40 milliGRAM(s) Oral two times a day  polyethylene glycol 3350 17 Gram(s) Oral daily  predniSONE   Tablet 60 milliGRAM(s) Oral daily  senna 2 Tablet(s) Oral at bedtime      ASSESSMENT AND PLAN:    72y Female with past medical history significant for  recent admission for chest pain in setting of coronavirus non-covid c/w pericarditis, Normal coronary arteries, normal EF, no evidence of pericardial effusion, AF with RVR who presented with chest pain and palpitations.    Pleuritic chest pain consistent with pericarditis with associated pericardial effusion without tamponade physiology    -Troponin negative so far and recent cath with normal coronaries  - Increase cardizem to 90 mg q6 and toprol to 100 mg daily  - given increasing pericardial effusion, will hold systemic AC   - Ibuprofen 800 mg TID with colchicine  - Continue prednisone with plan for slow taper over 4 weeks  - FU Echo in office in 1-2 weeks to assess effusion  - GI prophylaxis with BID Protonix  - Defer ALLYSSA guided CVN at this time given concern for acute pericarditis  - DVT ppx   - If pt remains chest pain free, can plan for discharge in AM    Additional history obtained and plan discussed with son by phone    Total time dedicated to this patient visit today including preparing to see the patient (eg. Review of tests), obtaining and/or revieweing separately obtained history, obtaining/ reviewing vitals, performing a medically appropriate examination, and/or evaluation, counseling and educating the patient/family/caregiver, ordering medications, reviewing previous notes and test results, and procedures, communicating with other health professionals (when not separately reported), documenting clinical information, in the electronic or other health records, independently interpreting results (not separately reported) and communicating results to the patient/family/caregiver, was 42 minutes.                                                           [facility], [facility]                              [address] [place] [address]                                                 PHONE: [phone]                                                 FAX: [phone]  -----------------------------------------------------------------------------------------------  Pt seen and examined. FU for pericarditis    Overnight events/Complaints: Pt reports no chest pain. Did have rapid AF last nite but pt reports she was sleeping. No significant palpitations. Spoke with son by phone.    Vital Signs Last 24 Hrs  T(C): 36.5 (20 Feb 2023 04:53), Max: 36.9 (19 Feb 2023 12:20)  T(F): 97.7 (20 Feb 2023 04:53), Max: 98.5 (19 Feb 2023 12:20)  HR: 78 (20 Feb 2023 06:00) (78 - 150)  BP: 124/72 (20 Feb 2023 06:00) (96/62 - 124/72)  BP(mean): --  RR: 18 (20 Feb 2023 06:00) (18 - 20)  SpO2: 96% (20 Feb 2023 06:00) (92% - 97%)    Parameters below as of 20 Feb 2023 06:00  Patient On (Oxygen Delivery Method): nasal cannula  O2 Flow (L/min): 2    I&O's Summary    19 Feb 2023 07:01  -  20 Feb 2023 07:00  --------------------------------------------------------  IN: 0 mL / OUT: 100 mL / NET: -100 mL        PHYSICAL EXAM:  Constitutional: Appears well; alert and co-operative  Eyes: Conjunctiva normal, No scleral icterus  Neck: Supple, No JVD  Cardiovascular: irregular S1, S2 tachy  Respiratory: Lungs clear to auscultation; no crepitations, no wheeze  Gastrointestinal:  Soft, Non-tender, + BS	  Musculoskeletal: No edema        LABS:                        10.1   4.92  )-----------( 303      ( 20 Feb 2023 06:12 )             32.4     02-20    140  |  104  |  25.1<H>  ----------------------------<  132<H>  4.0   |  21.0<L>  |  0.91    Ca    8.0<L>      20 Feb 2023 06:12  Phos  3.3     02-19  Mg     2.6     02-19      RADIOLOGY & ADDITIONAL STUDIES: (reviewed)  CXR was independently visualized/reviewed and demonstrated: small effusion    CARDIOLOGY TESTING:(reviewed)     ECG (Independent visualization): AF with RVR    ECHOCARDIOGRAM :  Summary:   1. Left ventricular ejection fraction, by visual estimation, is 50 to   55%.   2. Normal global left ventricular systolic function.   3. The mitral in-flow pattern reveals no discernable A-wave, therefore   no comment on diastolic function can be made.   4. Normal right ventricular size and function.   5. Estimated pulmonary artery systolic pressure is 39.1 mmHg assuming a   right atrial pressure of 8 mmHg, which is consistent with borderline   pulmonary hypertension.   6. Mildly enlarged right atrium.   7. Moderately enlarged left atrium.   8. Mild mitral annular calcification.   9. Trace mitral valve regurgitation.  10. Moderate tricuspid regurgitation.  11. Sclerotic aortic valvewith normal opening.  12. Small to moderate size circumferential pericardial effusion (measures   up to 1.3 cm) with fibrinous thickened layering over the RV. No   echocardiographic evidence of tamponade.  13. Compared to the prior TTE study from 1/30/23, new pericardial   effusion noted. Findings reported to Dr. Flynn.    Maynor Sal DO Electronically signed on 2/18/2023 at 12:50:24 PM    CARDIAC CATHETERIZATION:  Normal coronary arteries   Normal LV EDP   Moderately depressed LVEF with basal to mid inferior hypokinesis.   Her presentation is more likely percarditis in the setting of COVID  infection, and/or takasubu in setting of rapid Afib    TELEMETRY independently visualized/reviewed and demonstrated : AF    MEDICATIONS:(reviewed)  MEDICATIONS  (STANDING):  atorvastatin 20 milliGRAM(s) Oral at bedtime  benzonatate 100 milliGRAM(s) Oral three times a day  calcium carbonate    500 mG (Tums) Chewable 1 Tablet(s) Chew once  colchicine 0.6 milliGRAM(s) Oral daily  diltiazem    Tablet 60 milliGRAM(s) Oral three times a day  enoxaparin Injectable 40 milliGRAM(s) SubCutaneous every 24 hours  ibuprofen  Tablet. 800 milliGRAM(s) Oral three times a day  ketorolac   Injectable 15 milliGRAM(s) IV Push once  levothyroxine 50 MICROGram(s) Oral daily  metoprolol succinate ER 25 milliGRAM(s) Oral two times a day  pantoprazole    Tablet 40 milliGRAM(s) Oral two times a day  polyethylene glycol 3350 17 Gram(s) Oral daily  predniSONE   Tablet 60 milliGRAM(s) Oral daily  senna 2 Tablet(s) Oral at bedtime      ASSESSMENT AND PLAN:    72y Female with past medical history significant for  recent admission for chest pain in setting of coronavirus non-covid c/w pericarditis, Normal coronary arteries, normal EF, no evidence of pericardial effusion, AF with RVR who presented with chest pain and palpitations.    Pleuritic chest pain consistent with pericarditis with associated pericardial effusion without tamponade physiology    -Troponin negative so far and recent cath with normal coronaries  - Increase cardizem to 90 mg q6 and toprol to 100 mg daily  - given increasing pericardial effusion, will hold systemic AC   - Ibuprofen 800 mg TID with colchicine 0.6 mg BID [increased dose]  - Continue prednisone with plan for slow taper over 4 weeks  - FU Echo in office in 1-2 weeks to assess effusion  - GI prophylaxis with BID Protonix  - Defer ALLYSSA guided CVN at this time given concern for acute pericarditis  - DVT ppx   - If pt remains chest pain free, can plan for discharge in AM    Additional history obtained and plan discussed with son by phone    Total time dedicated to this patient visit today including preparing to see the patient (eg. Review of tests), obtaining and/or revieweing separately obtained history, obtaining/ reviewing vitals, performing a medically appropriate examination, and/or evaluation, counseling and educating the patient/family/caregiver, ordering medications, reviewing previous notes and test results, and procedures, communicating with other health professionals (when not separately reported), documenting clinical information, in the electronic or other health records, independently interpreting results (not separately reported) and communicating results to the patient/family/caregiver, was 42 minutes.                                                           [facility], [facility]                              [address] [place] [address]                                                 PHONE: [phone]                                                 FAX: [phone]  -----------------------------------------------------------------------------------------------  Pt seen and examined. FU for pericarditis    Overnight events/Complaints: Pt reports no chest pain. Did have rapid AF last nite but pt reports she was sleeping. No significant palpitations. Spoke with son by phone.    Vital Signs Last 24 Hrs  T(C): 36.5 (20 Feb 2023 04:53), Max: 36.9 (19 Feb 2023 12:20)  T(F): 97.7 (20 Feb 2023 04:53), Max: 98.5 (19 Feb 2023 12:20)  HR: 78 (20 Feb 2023 06:00) (78 - 150)  BP: 124/72 (20 Feb 2023 06:00) (96/62 - 124/72)  BP(mean): --  RR: 18 (20 Feb 2023 06:00) (18 - 20)  SpO2: 96% (20 Feb 2023 06:00) (92% - 97%)    Parameters below as of 20 Feb 2023 06:00  Patient On (Oxygen Delivery Method): nasal cannula  O2 Flow (L/min): 2    I&O's Summary    19 Feb 2023 07:01  -  20 Feb 2023 07:00  --------------------------------------------------------  IN: 0 mL / OUT: 100 mL / NET: -100 mL        PHYSICAL EXAM:  Constitutional: Appears well; alert and co-operative  Eyes: Conjunctiva normal, No scleral icterus  Neck: Supple, No JVD  Cardiovascular: irregular S1, S2 tachy  Respiratory: Lungs clear to auscultation; no crepitations, no wheeze  Gastrointestinal:  Soft, Non-tender, + BS	  Musculoskeletal: No edema        LABS:                        10.1   4.92  )-----------( 303      ( 20 Feb 2023 06:12 )             32.4     02-20    140  |  104  |  25.1<H>  ----------------------------<  132<H>  4.0   |  21.0<L>  |  0.91    Ca    8.0<L>      20 Feb 2023 06:12  Phos  3.3     02-19  Mg     2.6     02-19      RADIOLOGY & ADDITIONAL STUDIES: (reviewed)  CXR was independently visualized/reviewed and demonstrated: small effusion    CARDIOLOGY TESTING:(reviewed)     ECG (Independent visualization): AF with RVR    ECHOCARDIOGRAM :  Summary:   1. Left ventricular ejection fraction, by visual estimation, is 50 to   55%.   2. Normal global left ventricular systolic function.   3. The mitral in-flow pattern reveals no discernable A-wave, therefore   no comment on diastolic function can be made.   4. Normal right ventricular size and function.   5. Estimated pulmonary artery systolic pressure is 39.1 mmHg assuming a   right atrial pressure of 8 mmHg, which is consistent with borderline   pulmonary hypertension.   6. Mildly enlarged right atrium.   7. Moderately enlarged left atrium.   8. Mild mitral annular calcification.   9. Trace mitral valve regurgitation.  10. Moderate tricuspid regurgitation.  11. Sclerotic aortic valvewith normal opening.  12. Small to moderate size circumferential pericardial effusion (measures   up to 1.3 cm) with fibrinous thickened layering over the RV. No   echocardiographic evidence of tamponade.  13. Compared to the prior TTE study from 1/30/23, new pericardial   effusion noted. Findings reported to Dr. Flynn.    Maynor Sal DO Electronically signed on 2/18/2023 at 12:50:24 PM    CARDIAC CATHETERIZATION:  Normal coronary arteries   Normal LV EDP   Moderately depressed LVEF with basal to mid inferior hypokinesis.   Her presentation is more likely percarditis in the setting of COVID  infection, and/or takasubu in setting of rapid Afib    TELEMETRY independently visualized/reviewed and demonstrated : AF    MEDICATIONS:(reviewed)  MEDICATIONS  (STANDING):  atorvastatin 20 milliGRAM(s) Oral at bedtime  benzonatate 100 milliGRAM(s) Oral three times a day  calcium carbonate    500 mG (Tums) Chewable 1 Tablet(s) Chew once  colchicine 0.6 milliGRAM(s) Oral daily  diltiazem    Tablet 60 milliGRAM(s) Oral three times a day  enoxaparin Injectable 40 milliGRAM(s) SubCutaneous every 24 hours  ibuprofen  Tablet. 800 milliGRAM(s) Oral three times a day  ketorolac   Injectable 15 milliGRAM(s) IV Push once  levothyroxine 50 MICROGram(s) Oral daily  metoprolol succinate ER 25 milliGRAM(s) Oral two times a day  pantoprazole    Tablet 40 milliGRAM(s) Oral two times a day  polyethylene glycol 3350 17 Gram(s) Oral daily  predniSONE   Tablet 60 milliGRAM(s) Oral daily  senna 2 Tablet(s) Oral at bedtime      ASSESSMENT AND PLAN:    72y Female with past medical history significant for  recent admission for chest pain in setting of coronavirus non-covid c/w pericarditis, Normal coronary arteries, normal EF, no evidence of pericardial effusion, AF with RVR who presented with chest pain and palpitations.    Pleuritic chest pain consistent with pericarditis with associated pericardial effusion without tamponade physiology    -Troponin negative so far and recent cath with normal coronaries  - Increase cardizem to 90 mg q6 and toprol to 100 mg daily on discharge  - given increasing pericardial effusion, will hold systemic AC   - Ibuprofen 800 mg TID with colchicine 0.6 mg BID [increased dose]  - Continue prednisone with plan for slow taper over 4 weeks  - FU Echo in office in 1-2 weeks to assess effusion  - GI prophylaxis with BID Protonix  - Defer ALLYSSA guided CVN at this time given concern for acute pericarditis  - DVT ppx   - If pt remains chest pain free, can plan for discharge in AM    Additional history obtained and plan discussed with son by phone    Total time dedicated to this patient visit today including preparing to see the patient (eg. Review of tests), obtaining and/or revieweing separately obtained history, obtaining/ reviewing vitals, performing a medically appropriate examination, and/or evaluation, counseling and educating the patient/family/caregiver, ordering medications, reviewing previous notes and test results, and procedures, communicating with other health professionals (when not separately reported), documenting clinical information, in the electronic or other health records, independently interpreting results (not separately reported) and communicating results to the patient/family/caregiver, was 42 minutes.

## 2023-02-20 NOTE — PROGRESS NOTE ADULT - SUBJECTIVE AND OBJECTIVE BOX
[facility] Division of Hospital Medicine    Chief Complaint: Atrial fibrillation with RVR, acute pericarditis    SUBJECTIVE / OVERNIGHT EVENTS:   Pt reports she feels better   Overnight HR went into 150s   cough is better with meds   Spoke to son on the phone      Patient denies chest pain, abd pain, N/V, fever, chills, dysuria or any other complaints. All remainder ROS negative.     MEDICATIONS  (STANDING):  atorvastatin 20 milliGRAM(s) Oral at bedtime  benzonatate 100 milliGRAM(s) Oral three times a day  calcium carbonate    500 mG (Tums) Chewable 1 Tablet(s) Chew once  colchicine 0.6 milliGRAM(s) Oral two times a day  diltiazem    Tablet 90 milliGRAM(s) Oral every 6 hours  enoxaparin Injectable 40 milliGRAM(s) SubCutaneous every 24 hours  ibuprofen  Tablet. 800 milliGRAM(s) Oral three times a day  ketorolac   Injectable 15 milliGRAM(s) IV Push once  levothyroxine 50 MICROGram(s) Oral daily  metoprolol succinate ER 50 milliGRAM(s) Oral two times a day  pantoprazole    Tablet 40 milliGRAM(s) Oral two times a day  polyethylene glycol 3350 17 Gram(s) Oral daily  predniSONE   Tablet 60 milliGRAM(s) Oral daily  senna 2 Tablet(s) Oral at bedtime    MEDICATIONS  (PRN):  guaiFENesin Oral Liquid (Sugar-Free) 100 milliGRAM(s) Oral every 6 hours PRN Cough        I&O's Summary    19 Feb 2023 07:01  -  20 Feb 2023 07:00  --------------------------------------------------------  IN: 0 mL / OUT: 100 mL / NET: -100 mL        PHYSICAL EXAM:  Vital Signs Last 24 Hrs  T(C): 36.6 (20 Feb 2023 11:22), Max: 36.9 (19 Feb 2023 12:20)  T(F): 97.9 (20 Feb 2023 11:22), Max: 98.5 (19 Feb 2023 12:20)  HR: 76 (20 Feb 2023 11:22) (76 - 150)  BP: 106/69 (20 Feb 2023 11:22) (96/62 - 124/72)  BP(mean): --  RR: 18 (20 Feb 2023 11:22) (18 - 20)  SpO2: 93% (20 Feb 2023 11:22) (92% - 97%)    Parameters below as of 20 Feb 2023 11:22  Patient On (Oxygen Delivery Method): nasal cannula        CONSTITUTIONAL: NAD, sitting up in bed  ENMT: Moist oral mucosa, EOMI   RESPIRATORY: Normal respiratory effort; lungs are clear to auscultation bilaterally  CARDIOVASCULAR: Irregular rate and rhythm, normal S1 and S2, No lower extremity edema  ABDOMEN: Nontender to palpation, normoactive bowel sounds  MUSCULOSKELETAL:  No clubbing or cyanosis of digits   PSYCH: A+O to person, place, and time; affect appropriate  NEUROLOGY: No gross sensory or motor deficits   SKIN: No rashes; no palpable lesions      LABS:                        10.1   4.92  )-----------( 303      ( 20 Feb 2023 06:12 )             32.4     02-20    140  |  104  |  25.1<H>  ----------------------------<  132<H>  4.0   |  21.0<L>  |  0.91    Ca    8.0<L>      20 Feb 2023 06:12  Phos  3.3     02-19  Mg     2.6     02-19

## 2023-02-20 NOTE — PROGRESS NOTE ADULT - ASSESSMENT
72 yr old female with hypothyroidism, cerebral aneurysm s/p coiling, atrial fibrillation, recent pericarditis presented with complaints of left sided chest pain and cough. Noted to be in atrial fibrillation with RVR in ED. She was evaluated by cardiology in ED, rate control medications were adjusted. CT chest showed no pneumonia, small effusion, Pt worked up for chest pain and rapid atrial fibrillation. ACS was ruled out. Ibuprofen given in addition to Colchicine for pericarditis.     Atrial fibrillation with RVR   Continue Diltiazem and Toprol, increased doses today given RVR overnight   change AC to Lovenox prophylaxis dose given new pericardial effusion   cardiology follow up appreciated   continue telemetry    Acute pericarditis  with associated pericardial effusion without tamponade physiology   repeat TTE 2/18 with increase pericardial effusion   Held Lovenox full dose and switched to prophylaxis dose   Continue Ibuprofen and Colchicine  started on steroids, prednisone 60 mg for 3 days then taper over 4 weeks   Pantoprazole for GI ppx     Hypothyroidism   Continue Synthroid     DVT ppx: Lovenox     Discussed with patient and son

## 2023-02-21 ENCOUNTER — TRANSCRIPTION ENCOUNTER (OUTPATIENT)
Age: 73
End: 2023-02-21

## 2023-02-21 LAB
ANION GAP SERPL CALC-SCNC: 13 MMOL/L — SIGNIFICANT CHANGE UP (ref 5–17)
BASOPHILS # BLD AUTO: 0.01 K/UL — SIGNIFICANT CHANGE UP (ref 0–0.2)
BASOPHILS NFR BLD AUTO: 0.2 % — SIGNIFICANT CHANGE UP (ref 0–2)
BUN SERPL-MCNC: 28.2 MG/DL — HIGH (ref 8–20)
CALCIUM SERPL-MCNC: 8.5 MG/DL — SIGNIFICANT CHANGE UP (ref 8.4–10.5)
CHLORIDE SERPL-SCNC: 101 MMOL/L — SIGNIFICANT CHANGE UP (ref 96–108)
CO2 SERPL-SCNC: 23 MMOL/L — SIGNIFICANT CHANGE UP (ref 22–29)
CREAT SERPL-MCNC: 0.99 MG/DL — SIGNIFICANT CHANGE UP (ref 0.5–1.3)
CULTURE RESULTS: SIGNIFICANT CHANGE UP
CULTURE RESULTS: SIGNIFICANT CHANGE UP
EGFR: 61 ML/MIN/1.73M2 — SIGNIFICANT CHANGE UP
EOSINOPHIL # BLD AUTO: 0.03 K/UL — SIGNIFICANT CHANGE UP (ref 0–0.5)
EOSINOPHIL NFR BLD AUTO: 0.5 % — SIGNIFICANT CHANGE UP (ref 0–6)
GLUCOSE SERPL-MCNC: 111 MG/DL — HIGH (ref 70–99)
HCT VFR BLD CALC: 31.5 % — LOW (ref 34.5–45)
HGB BLD-MCNC: 9.8 G/DL — LOW (ref 11.5–15.5)
IMM GRANULOCYTES NFR BLD AUTO: 0.6 % — SIGNIFICANT CHANGE UP (ref 0–0.9)
LYMPHOCYTES # BLD AUTO: 1.53 K/UL — SIGNIFICANT CHANGE UP (ref 1–3.3)
LYMPHOCYTES # BLD AUTO: 24 % — SIGNIFICANT CHANGE UP (ref 13–44)
MCHC RBC-ENTMCNC: 22.7 PG — LOW (ref 27–34)
MCHC RBC-ENTMCNC: 31.1 GM/DL — LOW (ref 32–36)
MCV RBC AUTO: 72.9 FL — LOW (ref 80–100)
MONOCYTES # BLD AUTO: 0.58 K/UL — SIGNIFICANT CHANGE UP (ref 0–0.9)
MONOCYTES NFR BLD AUTO: 9.1 % — SIGNIFICANT CHANGE UP (ref 2–14)
NEUTROPHILS # BLD AUTO: 4.18 K/UL — SIGNIFICANT CHANGE UP (ref 1.8–7.4)
NEUTROPHILS NFR BLD AUTO: 65.6 % — SIGNIFICANT CHANGE UP (ref 43–77)
PLATELET # BLD AUTO: 400 K/UL — SIGNIFICANT CHANGE UP (ref 150–400)
POTASSIUM SERPL-MCNC: 4 MMOL/L — SIGNIFICANT CHANGE UP (ref 3.5–5.3)
POTASSIUM SERPL-SCNC: 4 MMOL/L — SIGNIFICANT CHANGE UP (ref 3.5–5.3)
RBC # BLD: 4.32 M/UL — SIGNIFICANT CHANGE UP (ref 3.8–5.2)
RBC # FLD: 16.3 % — HIGH (ref 10.3–14.5)
SODIUM SERPL-SCNC: 137 MMOL/L — SIGNIFICANT CHANGE UP (ref 135–145)
SPECIMEN SOURCE: SIGNIFICANT CHANGE UP
SPECIMEN SOURCE: SIGNIFICANT CHANGE UP
WBC # BLD: 6.37 K/UL — SIGNIFICANT CHANGE UP (ref 3.8–10.5)
WBC # FLD AUTO: 6.37 K/UL — SIGNIFICANT CHANGE UP (ref 3.8–10.5)

## 2023-02-21 PROCEDURE — 99232 SBSQ HOSP IP/OBS MODERATE 35: CPT

## 2023-02-21 RX ORDER — METOPROLOL TARTRATE 50 MG
100 TABLET ORAL DAILY
Refills: 0 | Status: DISCONTINUED | OUTPATIENT
Start: 2023-02-22 | End: 2023-02-22

## 2023-02-21 RX ORDER — METOPROLOL TARTRATE 50 MG
50 TABLET ORAL
Refills: 0 | Status: COMPLETED | OUTPATIENT
Start: 2023-02-21 | End: 2023-02-21

## 2023-02-21 RX ORDER — DILTIAZEM HCL 120 MG
240 CAPSULE, EXT RELEASE 24 HR ORAL DAILY
Refills: 0 | Status: DISCONTINUED | OUTPATIENT
Start: 2023-02-22 | End: 2023-02-22

## 2023-02-21 RX ORDER — DILTIAZEM HCL 120 MG
90 CAPSULE, EXT RELEASE 24 HR ORAL EVERY 6 HOURS
Refills: 0 | Status: DISCONTINUED | OUTPATIENT
Start: 2023-02-21 | End: 2023-02-22

## 2023-02-21 RX ADMIN — PANTOPRAZOLE SODIUM 40 MILLIGRAM(S): 20 TABLET, DELAYED RELEASE ORAL at 17:09

## 2023-02-21 RX ADMIN — POLYETHYLENE GLYCOL 3350 17 GRAM(S): 17 POWDER, FOR SOLUTION ORAL at 14:21

## 2023-02-21 RX ADMIN — Medication 100 MILLIGRAM(S): at 06:07

## 2023-02-21 RX ADMIN — ENOXAPARIN SODIUM 40 MILLIGRAM(S): 100 INJECTION SUBCUTANEOUS at 21:52

## 2023-02-21 RX ADMIN — SENNA PLUS 2 TABLET(S): 8.6 TABLET ORAL at 21:52

## 2023-02-21 RX ADMIN — Medication 90 MILLIGRAM(S): at 06:07

## 2023-02-21 RX ADMIN — PANTOPRAZOLE SODIUM 40 MILLIGRAM(S): 20 TABLET, DELAYED RELEASE ORAL at 06:07

## 2023-02-21 RX ADMIN — Medication 90 MILLIGRAM(S): at 17:10

## 2023-02-21 RX ADMIN — ATORVASTATIN CALCIUM 20 MILLIGRAM(S): 80 TABLET, FILM COATED ORAL at 21:52

## 2023-02-21 RX ADMIN — Medication 50 MILLIGRAM(S): at 17:09

## 2023-02-21 RX ADMIN — Medication 50 MICROGRAM(S): at 06:06

## 2023-02-21 RX ADMIN — Medication 800 MILLIGRAM(S): at 07:00

## 2023-02-21 RX ADMIN — Medication 60 MILLIGRAM(S): at 06:06

## 2023-02-21 RX ADMIN — Medication 0.6 MILLIGRAM(S): at 06:06

## 2023-02-21 RX ADMIN — Medication 100 MILLIGRAM(S): at 14:19

## 2023-02-21 RX ADMIN — Medication 0.6 MILLIGRAM(S): at 17:09

## 2023-02-21 RX ADMIN — Medication 800 MILLIGRAM(S): at 06:06

## 2023-02-21 RX ADMIN — Medication 800 MILLIGRAM(S): at 14:19

## 2023-02-21 RX ADMIN — Medication 800 MILLIGRAM(S): at 15:15

## 2023-02-21 RX ADMIN — Medication 50 MILLIGRAM(S): at 06:07

## 2023-02-21 RX ADMIN — Medication 100 MILLIGRAM(S): at 21:52

## 2023-02-21 RX ADMIN — Medication 800 MILLIGRAM(S): at 21:52

## 2023-02-21 NOTE — PROGRESS NOTE ADULT - SUBJECTIVE AND OBJECTIVE BOX
[facility]                              [address] [place] [address]                                                 PHONE: [phone]                                                 FAX: [phone]  -----------------------------------------------------------------------------------------------  Pt seen and examined. FU for pericarditis    Overnight events/Complaints: Pt reports no chest pain. BP low but AF rates controlled. No significant palpitations. Spoke with son by phone.    Vital Signs Last 24 Hrs  T(C): 36.3 (21 Feb 2023 08:32), Max: 36.6 (20 Feb 2023 11:22)  T(F): 97.4 (21 Feb 2023 08:32), Max: 97.9 (20 Feb 2023 11:22)  HR: 72 (21 Feb 2023 08:32) (72 - 100)  BP: 92/62 (21 Feb 2023 08:32) (92/62 - 106/69)  BP(mean): --  RR: 18 (21 Feb 2023 08:32) (18 - 18)  SpO2: 94% (21 Feb 2023 08:32) (93% - 100%)    Parameters below as of 21 Feb 2023 08:32  Patient On (Oxygen Delivery Method): room air      I&O's Summary    PHYSICAL EXAM:  Constitutional: Appears well; alert and co-operative  Eyes: Conjunctiva normal, No scleral icterus  Neck: Supple, No JVD  Cardiovascular: S1, S2 normal.  Respiratory: Lungs clear to auscultation; no crepitations, no wheeze  Gastrointestinal:  Soft, Non-tender, + BS	  Musculoskeletal: No edema        LABS:                        9.8    6.37  )-----------( 400      ( 21 Feb 2023 06:37 )             31.5     02-21    137  |  101  |  28.2<H>  ----------------------------<  111<H>  4.0   |  23.0  |  0.99    Ca    8.5      21 Feb 2023 06:37  Phos  3.3     02-19  Mg     2.6     02-19      RADIOLOGY & ADDITIONAL STUDIES: (reviewed)  CXR was independently visualized/reviewed and demonstrated: small effusion    CARDIOLOGY TESTING:(reviewed)     ECG (Independent visualization): AF with RVR    ECHOCARDIOGRAM :  Summary:   1. Left ventricular ejection fraction, by visual estimation, is 50 to   55%.   2. Normal global left ventricular systolic function.   3. The mitral in-flow pattern reveals no discernable A-wave, therefore   no comment on diastolic function can be made.   4. Normal right ventricular size and function.   5. Estimated pulmonary artery systolic pressure is 39.1 mmHg assuming a   right atrial pressure of 8 mmHg, which is consistent with borderline   pulmonary hypertension.   6. Mildly enlarged right atrium.   7. Moderately enlarged left atrium.   8. Mild mitral annular calcification.   9. Trace mitral valve regurgitation.  10. Moderate tricuspid regurgitation.  11. Sclerotic aortic valvewith normal opening.  12. Small to moderate size circumferential pericardial effusion (measures   up to 1.3 cm) with fibrinous thickened layering over the RV. No   echocardiographic evidence of tamponade.  13. Compared to the prior TTE study from 1/30/23, new pericardial   effusion noted. Findings reported to Dr. Flynn.    Maynor Sal DO Electronically signed on 2/18/2023 at 12:50:24 PM    CARDIAC CATHETERIZATION:  Normal coronary arteries   Normal LV EDP   Moderately depressed LVEF with basal to mid inferior hypokinesis.   Her presentation is more likely percarditis in the setting of COVID  infection, and/or takasubu in setting of rapid Afib    TELEMETRY independently visualized/reviewed and demonstrated : NSR    MEDICATIONS:(reviewed)  MEDICATIONS  (STANDING):  atorvastatin 20 milliGRAM(s) Oral at bedtime  benzonatate 100 milliGRAM(s) Oral three times a day  calcium carbonate    500 mG (Tums) Chewable 1 Tablet(s) Chew once  colchicine 0.6 milliGRAM(s) Oral two times a day  diltiazem    Tablet 90 milliGRAM(s) Oral every 6 hours  enoxaparin Injectable 40 milliGRAM(s) SubCutaneous every 24 hours  ibuprofen  Tablet. 800 milliGRAM(s) Oral three times a day  ketorolac   Injectable 15 milliGRAM(s) IV Push once  levothyroxine 50 MICROGram(s) Oral daily  metoprolol succinate ER 50 milliGRAM(s) Oral two times a day  pantoprazole    Tablet 40 milliGRAM(s) Oral two times a day  polyethylene glycol 3350 17 Gram(s) Oral daily  predniSONE   Tablet 60 milliGRAM(s) Oral daily  senna 2 Tablet(s) Oral at bedtime    ASSESSMENT AND PLAN:    72y Female with past medical history significant for  recent admission for chest pain in setting of coronavirus non-covid c/w pericarditis, Normal coronary arteries, normal EF, no evidence of pericardial effusion, AF with RVR who presented with chest pain and palpitations.    Pleuritic chest pain consistent with pericarditis with associated pericardial effusion without tamponade physiology    -Troponin negative so far and recent cath with normal coronaries  - Will change to cardizem 240 mg daily and toprol 100 mg daily on discharge  - given increasing pericardial effusion, will hold systemic AC   - Ibuprofen 800 mg TID with colchicine 0.6 mg BID  - Continue prednisone with plan for slow taper over 4 weeks  - FU Echo in office in 1-2 weeks to assess effusion  - GI prophylaxis with BID Protonix  - Defer ALLYSSA guided CVN at this time given concern for acute pericarditis  - DVT ppx   - If pt remains chest pain free, can plan for discharge     Additional history obtained and plan discussed with son by phone

## 2023-02-21 NOTE — PROGRESS NOTE ADULT - ASSESSMENT
72 yr old female with hypothyroidism, cerebral aneurysm s/p coiling, atrial fibrillation, recent pericarditis presented with complaints of left sided chest pain and cough. Noted to be in atrial fibrillation with RVR in ED. She was evaluated by cardiology in ED, rate control medications were adjusted. CT chest showed no pneumonia, small effusion, Pt worked up for chest pain and rapid atrial fibrillation. ACS was ruled out. Ibuprofen given in addition to Colchicine for pericarditis.     Atrial fibrillation with RVR   Continue Diltiazem and Toprol, change to long acting in AM    changed AC to Lovenox prophylaxis dose given new pericardial effusion   cardiology follow up appreciated   continue telemetry    Acute pericarditis  with associated pericardial effusion without tamponade physiology   repeat TTE 2/18 with increase pericardial effusion   Held Lovenox full dose and switched to prophylaxis dose   Continue Ibuprofen and Colchicine  started on steroids, prednisone 60 mg for 3 days then taper over 4 weeks on DC   Pantoprazole for GI ppx     Hypothyroidism   Continue Synthroid     DVT ppx: Lovenox     Discussed with patient and son   Dispo: DC in AM

## 2023-02-21 NOTE — PROGRESS NOTE ADULT - SUBJECTIVE AND OBJECTIVE BOX
[facility] Division of Hospital Medicine    Chief Complaint: Atrial fibrillation with RVR, acute pericarditis    SUBJECTIVE / OVERNIGHT EVENTS:   Pt reports she feels better   HR better controlled today   Spoke to son on the phone      Patient denies chest pain, abd pain, N/V, fever, chills, dysuria or any other complaints. All remainder ROS negative.     MEDICATIONS  (STANDING):  atorvastatin 20 milliGRAM(s) Oral at bedtime  benzonatate 100 milliGRAM(s) Oral three times a day  calcium carbonate    500 mG (Tums) Chewable 1 Tablet(s) Chew once  colchicine 0.6 milliGRAM(s) Oral two times a day  diltiazem    Tablet 90 milliGRAM(s) Oral every 6 hours  enoxaparin Injectable 40 milliGRAM(s) SubCutaneous every 24 hours  ibuprofen  Tablet. 800 milliGRAM(s) Oral three times a day  levothyroxine 50 MICROGram(s) Oral daily  metoprolol succinate ER 50 milliGRAM(s) Oral two times a day  pantoprazole    Tablet 40 milliGRAM(s) Oral two times a day  polyethylene glycol 3350 17 Gram(s) Oral daily  predniSONE   Tablet 60 milliGRAM(s) Oral daily  senna 2 Tablet(s) Oral at bedtime    MEDICATIONS  (PRN):  guaiFENesin Oral Liquid (Sugar-Free) 100 milliGRAM(s) Oral every 6 hours PRN Cough        I&O's Summary    21 Feb 2023 07:01  -  21 Feb 2023 13:14  --------------------------------------------------------  IN: 250 mL / OUT: 350 mL / NET: -100 mL        PHYSICAL EXAM:  Vital Signs Last 24 Hrs  T(C): 36.3 (21 Feb 2023 08:32), Max: 36.6 (20 Feb 2023 20:47)  T(F): 97.4 (21 Feb 2023 08:32), Max: 97.9 (20 Feb 2023 20:47)  HR: 68 (21 Feb 2023 11:39) (68 - 100)  BP: 90/55 (21 Feb 2023 11:39) (90/55 - 101/69)  BP(mean): --  RR: 18 (21 Feb 2023 11:39) (18 - 18)  SpO2: 95% (21 Feb 2023 11:39) (94% - 100%)    Parameters below as of 21 Feb 2023 11:39  Patient On (Oxygen Delivery Method): room air        CONSTITUTIONAL: NAD, sitting up in bed  ENMT: Moist oral mucosa, EOMI   RESPIRATORY: Normal respiratory effort; lungs are clear to auscultation bilaterally  CARDIOVASCULAR: Regular rate and rhythm, normal S1 and S2, No lower extremity edema  ABDOMEN: Nontender to palpation, normoactive bowel sounds  MUSCULOSKELETAL:  No clubbing or cyanosis of digits   PSYCH: A+O to person, place, and time; affect appropriate  NEUROLOGY: No gross sensory or motor deficits   SKIN: No rashes; no palpable lesions      LABS:                        9.8    6.37  )-----------( 400      ( 21 Feb 2023 06:37 )             31.5     02-21    137  |  101  |  28.2<H>  ----------------------------<  111<H>  4.0   |  23.0  |  0.99    Ca    8.5      21 Feb 2023 06:37  Phos  3.3     02-19  Mg     2.6     02-19

## 2023-02-22 ENCOUNTER — APPOINTMENT (OUTPATIENT)
Dept: PULMONOLOGY | Facility: CLINIC | Age: 73
End: 2023-02-22

## 2023-02-22 LAB
ALBUMIN SERPL ELPH-MCNC: 3.3 G/DL — SIGNIFICANT CHANGE UP (ref 3.3–5.2)
ALP SERPL-CCNC: 88 U/L — SIGNIFICANT CHANGE UP (ref 40–120)
ALT FLD-CCNC: 27 U/L — SIGNIFICANT CHANGE UP
ANION GAP SERPL CALC-SCNC: 14 MMOL/L — SIGNIFICANT CHANGE UP (ref 5–17)
APTT BLD: 33.9 SEC — SIGNIFICANT CHANGE UP (ref 27.5–35.5)
AST SERPL-CCNC: 18 U/L — SIGNIFICANT CHANGE UP
BASOPHILS # BLD AUTO: 0 K/UL — SIGNIFICANT CHANGE UP (ref 0–0.2)
BASOPHILS NFR BLD AUTO: 0 % — SIGNIFICANT CHANGE UP (ref 0–2)
BILIRUB SERPL-MCNC: 0.2 MG/DL — LOW (ref 0.4–2)
BUN SERPL-MCNC: 29.8 MG/DL — HIGH (ref 8–20)
CALCIUM SERPL-MCNC: 8.7 MG/DL — SIGNIFICANT CHANGE UP (ref 8.4–10.5)
CHLORIDE SERPL-SCNC: 101 MMOL/L — SIGNIFICANT CHANGE UP (ref 96–108)
CK SERPL-CCNC: 28 U/L — SIGNIFICANT CHANGE UP (ref 25–170)
CO2 SERPL-SCNC: 22 MMOL/L — SIGNIFICANT CHANGE UP (ref 22–29)
CREAT SERPL-MCNC: 0.89 MG/DL — SIGNIFICANT CHANGE UP (ref 0.5–1.3)
EGFR: 69 ML/MIN/1.73M2 — SIGNIFICANT CHANGE UP
EOSINOPHIL # BLD AUTO: 0 K/UL — SIGNIFICANT CHANGE UP (ref 0–0.5)
EOSINOPHIL NFR BLD AUTO: 0 % — SIGNIFICANT CHANGE UP (ref 0–6)
GAS PNL BLDA: SIGNIFICANT CHANGE UP
GLUCOSE BLDC GLUCOMTR-MCNC: 103 MG/DL — HIGH (ref 70–99)
GLUCOSE BLDC GLUCOMTR-MCNC: 161 MG/DL — HIGH (ref 70–99)
GLUCOSE BLDC GLUCOMTR-MCNC: 87 MG/DL — SIGNIFICANT CHANGE UP (ref 70–99)
GLUCOSE SERPL-MCNC: 153 MG/DL — HIGH (ref 70–99)
HCT VFR BLD CALC: 33.6 % — LOW (ref 34.5–45)
HGB BLD-MCNC: 10.8 G/DL — LOW (ref 11.5–15.5)
IMM GRANULOCYTES NFR BLD AUTO: 1.4 % — HIGH (ref 0–0.9)
INR BLD: 1.04 RATIO — SIGNIFICANT CHANGE UP (ref 0.88–1.16)
LYMPHOCYTES # BLD AUTO: 1.52 K/UL — SIGNIFICANT CHANGE UP (ref 1–3.3)
LYMPHOCYTES # BLD AUTO: 23.5 % — SIGNIFICANT CHANGE UP (ref 13–44)
MCHC RBC-ENTMCNC: 23 PG — LOW (ref 27–34)
MCHC RBC-ENTMCNC: 32.1 GM/DL — SIGNIFICANT CHANGE UP (ref 32–36)
MCV RBC AUTO: 71.5 FL — LOW (ref 80–100)
MONOCYTES # BLD AUTO: 0.57 K/UL — SIGNIFICANT CHANGE UP (ref 0–0.9)
MONOCYTES NFR BLD AUTO: 8.8 % — SIGNIFICANT CHANGE UP (ref 2–14)
NEUTROPHILS # BLD AUTO: 4.3 K/UL — SIGNIFICANT CHANGE UP (ref 1.8–7.4)
NEUTROPHILS NFR BLD AUTO: 66.3 % — SIGNIFICANT CHANGE UP (ref 43–77)
PLATELET # BLD AUTO: 427 K/UL — HIGH (ref 150–400)
POTASSIUM SERPL-MCNC: 4 MMOL/L — SIGNIFICANT CHANGE UP (ref 3.5–5.3)
POTASSIUM SERPL-SCNC: 4 MMOL/L — SIGNIFICANT CHANGE UP (ref 3.5–5.3)
PROT SERPL-MCNC: 7.2 G/DL — SIGNIFICANT CHANGE UP (ref 6.6–8.7)
PROTHROM AB SERPL-ACNC: 12.1 SEC — SIGNIFICANT CHANGE UP (ref 10.5–13.4)
RBC # BLD: 4.7 M/UL — SIGNIFICANT CHANGE UP (ref 3.8–5.2)
RBC # FLD: 16.2 % — HIGH (ref 10.3–14.5)
SODIUM SERPL-SCNC: 137 MMOL/L — SIGNIFICANT CHANGE UP (ref 135–145)
TROPONIN T SERPL-MCNC: <0.01 NG/ML — SIGNIFICANT CHANGE UP (ref 0–0.06)
WBC # BLD: 6.48 K/UL — SIGNIFICANT CHANGE UP (ref 3.8–10.5)
WBC # FLD AUTO: 6.48 K/UL — SIGNIFICANT CHANGE UP (ref 3.8–10.5)

## 2023-02-22 PROCEDURE — 99223 1ST HOSP IP/OBS HIGH 75: CPT

## 2023-02-22 PROCEDURE — 99233 SBSQ HOSP IP/OBS HIGH 50: CPT

## 2023-02-22 PROCEDURE — 93010 ELECTROCARDIOGRAM REPORT: CPT

## 2023-02-22 PROCEDURE — 0042T: CPT

## 2023-02-22 PROCEDURE — 70496 CT ANGIOGRAPHY HEAD: CPT | Mod: 26

## 2023-02-22 PROCEDURE — 93308 TTE F-UP OR LMTD: CPT | Mod: 26

## 2023-02-22 PROCEDURE — 70551 MRI BRAIN STEM W/O DYE: CPT | Mod: 26

## 2023-02-22 PROCEDURE — 99222 1ST HOSP IP/OBS MODERATE 55: CPT

## 2023-02-22 PROCEDURE — 70498 CT ANGIOGRAPHY NECK: CPT | Mod: 26

## 2023-02-22 RX ORDER — LEVOTHYROXINE SODIUM 125 MCG
25 TABLET ORAL AT BEDTIME
Refills: 0 | Status: DISCONTINUED | OUTPATIENT
Start: 2023-02-22 | End: 2023-02-23

## 2023-02-22 RX ORDER — HEPARIN SODIUM 5000 [USP'U]/ML
INJECTION INTRAVENOUS; SUBCUTANEOUS
Qty: 25000 | Refills: 0 | Status: DISCONTINUED | OUTPATIENT
Start: 2023-02-22 | End: 2023-02-23

## 2023-02-22 RX ORDER — PANTOPRAZOLE SODIUM 20 MG/1
40 TABLET, DELAYED RELEASE ORAL DAILY
Refills: 0 | Status: DISCONTINUED | OUTPATIENT
Start: 2023-02-22 | End: 2023-02-26

## 2023-02-22 RX ORDER — SODIUM CHLORIDE 9 MG/ML
500 INJECTION INTRAMUSCULAR; INTRAVENOUS; SUBCUTANEOUS ONCE
Refills: 0 | Status: COMPLETED | OUTPATIENT
Start: 2023-02-22 | End: 2023-02-22

## 2023-02-22 RX ORDER — DILTIAZEM HCL 120 MG
10 CAPSULE, EXT RELEASE 24 HR ORAL ONCE
Refills: 0 | Status: COMPLETED | OUTPATIENT
Start: 2023-02-22 | End: 2023-02-22

## 2023-02-22 RX ORDER — METOPROLOL TARTRATE 50 MG
5 TABLET ORAL EVERY 6 HOURS
Refills: 0 | Status: DISCONTINUED | OUTPATIENT
Start: 2023-02-22 | End: 2023-02-24

## 2023-02-22 RX ORDER — DIGOXIN 250 MCG
250 TABLET ORAL ONCE
Refills: 0 | Status: COMPLETED | OUTPATIENT
Start: 2023-02-22 | End: 2023-02-22

## 2023-02-22 RX ORDER — ASPIRIN/CALCIUM CARB/MAGNESIUM 324 MG
300 TABLET ORAL DAILY
Refills: 0 | Status: DISCONTINUED | OUTPATIENT
Start: 2023-02-23 | End: 2023-02-23

## 2023-02-22 RX ORDER — DILTIAZEM HCL 120 MG
10 CAPSULE, EXT RELEASE 24 HR ORAL EVERY 6 HOURS
Refills: 0 | Status: DISCONTINUED | OUTPATIENT
Start: 2023-02-22 | End: 2023-02-22

## 2023-02-22 RX ORDER — ASPIRIN/CALCIUM CARB/MAGNESIUM 324 MG
325 TABLET ORAL DAILY
Refills: 0 | Status: DISCONTINUED | OUTPATIENT
Start: 2023-02-22 | End: 2023-02-22

## 2023-02-22 RX ORDER — DIGOXIN 250 MCG
125 TABLET ORAL DAILY
Refills: 0 | Status: DISCONTINUED | OUTPATIENT
Start: 2023-02-23 | End: 2023-02-23

## 2023-02-22 RX ORDER — ATORVASTATIN CALCIUM 80 MG/1
80 TABLET, FILM COATED ORAL AT BEDTIME
Refills: 0 | Status: DISCONTINUED | OUTPATIENT
Start: 2023-02-22 | End: 2023-02-26

## 2023-02-22 RX ORDER — ASPIRIN/CALCIUM CARB/MAGNESIUM 324 MG
300 TABLET ORAL ONCE
Refills: 0 | Status: COMPLETED | OUTPATIENT
Start: 2023-02-22 | End: 2023-02-22

## 2023-02-22 RX ORDER — SODIUM CHLORIDE 9 MG/ML
1000 INJECTION, SOLUTION INTRAVENOUS
Refills: 0 | Status: DISCONTINUED | OUTPATIENT
Start: 2023-02-22 | End: 2023-02-23

## 2023-02-22 RX ADMIN — HEPARIN SODIUM 750 UNIT(S)/HR: 5000 INJECTION INTRAVENOUS; SUBCUTANEOUS at 22:36

## 2023-02-22 RX ADMIN — HEPARIN SODIUM 750 UNIT(S)/HR: 5000 INJECTION INTRAVENOUS; SUBCUTANEOUS at 20:06

## 2023-02-22 RX ADMIN — Medication 250 MICROGRAM(S): at 12:57

## 2023-02-22 RX ADMIN — Medication 25 MICROGRAM(S): at 22:39

## 2023-02-22 RX ADMIN — Medication 10 MILLIGRAM(S): at 17:30

## 2023-02-22 RX ADMIN — SODIUM CHLORIDE 75 MILLILITER(S): 9 INJECTION, SOLUTION INTRAVENOUS at 14:29

## 2023-02-22 RX ADMIN — SODIUM CHLORIDE 1000 MILLILITER(S): 9 INJECTION INTRAMUSCULAR; INTRAVENOUS; SUBCUTANEOUS at 18:03

## 2023-02-22 RX ADMIN — Medication 300 MILLIGRAM(S): at 05:14

## 2023-02-22 NOTE — STROKE CODE NOTE - CT READING
CTA head showed acute ischemia in the left MCA territory with questionable sma,ll surrounding ischemic pneumbra

## 2023-02-22 NOTE — DIETITIAN INITIAL EVALUATION ADULT - ENTERAL
Consider initiation of enteral nutrition via NGT given inability for PO diet at this time (if consistent with GOC); recommend Jevity 1.5 @ 20 ml/hr and advance 10 ml/hr q4 hrs until goal rate of 50 ml/hr (x20 hrs) to 1000 ml, 1500 kcal, 64g protein, 760 ml free water, and 100% of RDIs for vitamins/minerals. Additional free water per MD discretion.

## 2023-02-22 NOTE — CHART NOTE - NSCHARTNOTEFT_GEN_A_CORE
Palliative care social work note.    SW and palliative care physician DR Pat contacted patients son to offer support and introduce service.

## 2023-02-22 NOTE — DISCHARGE NOTE NURSING/CASE MANAGEMENT/SOCIAL WORK - PATIENT PORTAL LINK FT
You can access the FollowMyHealth Patient Portal offered by [facility] by registering at the following website: [email or web address]. By joining [employer]’s FollowMyHealth portal, you will also be able to view your health information using other applications (apps) compatible with our system.

## 2023-02-22 NOTE — DISCHARGE NOTE NURSING/CASE MANAGEMENT/SOCIAL WORK - NSDCPEFALRISK_GEN_ALL_CORE
For information on Fall & Injury Prevention, visit: [email or web address] OR  [email or web address] OR  https://www.cdc.gov/steadi/patient.html .

## 2023-02-22 NOTE — DIETITIAN INITIAL EVALUATION ADULT - PERTINENT MEDS FT
MEDICATIONS  (STANDING):  atorvastatin 80 milliGRAM(s) Oral at bedtime  benzonatate 100 milliGRAM(s) Oral three times a day  calcium carbonate    500 mG (Tums) Chewable 1 Tablet(s) Chew once  colchicine 0.6 milliGRAM(s) Oral two times a day  dextrose 5% + sodium chloride 0.9%. 1000 milliLiter(s) (75 mL/Hr) IV Continuous <Continuous>  diltiazem    Tablet 90 milliGRAM(s) Oral every 6 hours  diltiazem    milliGRAM(s) Oral daily  enoxaparin Injectable 40 milliGRAM(s) SubCutaneous every 24 hours  levothyroxine 50 MICROGram(s) Oral daily  metoprolol succinate  milliGRAM(s) Oral daily  pantoprazole    Tablet 40 milliGRAM(s) Oral two times a day  polyethylene glycol 3350 17 Gram(s) Oral daily  predniSONE   Tablet 60 milliGRAM(s) Oral daily  senna 2 Tablet(s) Oral at bedtime    MEDICATIONS  (PRN):  guaiFENesin Oral Liquid (Sugar-Free) 100 milliGRAM(s) Oral every 6 hours PRN Cough

## 2023-02-22 NOTE — CONSULT NOTE ADULT - SUBJECTIVE AND OBJECTIVE BOX
HPI:  72yr old Khmer speaking female with PMH hypothyroidism, cerebral aneurysm s/p coiling w/ post operative course complicated by Afib recently admitted 1/30/23-2/1/23 with pericarditis possibly related non-covid coronaviral illness, afib RVR. She presents with same symptoms of left sided chest pain that acutely started yesterday, her pain is exacerbated with couging, some mucoid sputum, denies any fever/chills. In ER,. she was found to be in Afib RVR with HR in 140s, trop - neg, Coivd - neg, RVP - neg she received iv and po lopressor, with some improvement in -->110s, she still c/o mild chest pain. She was seen by cardio and is recommended admission.  (16 Feb 2023 10:13)      PERTINENT PMH REVIEWED: Yes     PAST MEDICAL & SURGICAL HISTORY:  High cholesterol  Hypothyroidism  H/O knee surgery  Brain aneurysm    SOCIAL HISTORY:  Limited/ Unable to obtain secondary to poor mentation/historian                    Substance history:                     Admitted from:  home  [facility]                     Zoroastrian/spirituality:  Congregational                    Cultural concerns:    Baseline ADLs (prior to admission):  Independent/ Dependent                        Surrogate/HCP/Guardian:  son Saul - listed in EMR    FAMILY HISTORY:  No pertinent family history        Allergies    No Known Allergies    Intolerances    http://npcrc.org/files/[email or web address]    Present Symptoms:   Dyspnea:  No Mild Moderate Severe  Nausea/Vomiting:  No  Yes  Anxiety:   No  Yes  Depression: No Yes Unable  Fatigue: Yes No Unable  Loss of appetite: Yes No  N/A  NPO  Constipation:  Not reported   Yes    Pain:  No  No signs            Character-            Duration-            Factors-            Severity    Pain AD Score:  [email or web address]/[email or web address] (press ctrl + left click to view)    Review of Systems: Reviewed    All other ROS negative  Unable to obtain due to poor mentation historian      MEDICATIONS  (STANDING):  atorvastatin 80 milliGRAM(s) Oral at bedtime  benzonatate 100 milliGRAM(s) Oral three times a day  calcium carbonate    500 mG (Tums) Chewable 1 Tablet(s) Chew once  colchicine 0.6 milliGRAM(s) Oral two times a day  diltiazem    Tablet 90 milliGRAM(s) Oral every 6 hours  diltiazem    milliGRAM(s) Oral daily  enoxaparin Injectable 40 milliGRAM(s) SubCutaneous every 24 hours  levothyroxine 50 MICROGram(s) Oral daily  metoprolol succinate  milliGRAM(s) Oral daily  pantoprazole    Tablet 40 milliGRAM(s) Oral two times a day  polyethylene glycol 3350 17 Gram(s) Oral daily  predniSONE   Tablet 60 milliGRAM(s) Oral daily  senna 2 Tablet(s) Oral at bedtime    MEDICATIONS  (PRN):  guaiFENesin Oral Liquid (Sugar-Free) 100 milliGRAM(s) Oral every 6 hours PRN Cough      PHYSICAL EXAM:    Vital Signs Last 24 Hrs  T(C): 36.6 (22 Feb 2023 11:15), Max: 36.7 (22 Feb 2023 03:30)  T(F): 97.9 (22 Feb 2023 11:15), Max: 98 (22 Feb 2023 03:30)  HR: 125 (22 Feb 2023 07:00) (71 - 144)  BP: 125/99 (22 Feb 2023 07:00) (110/74 - 148/101)  BP(mean): 108 (22 Feb 2023 07:00) (92 - 133)  RR: 36 (22 Feb 2023 07:00) (17 - 36)  SpO2: 99% (22 Feb 2023 07:00) (94% - 100%)    Parameters below as of 22 Feb 2023 03:00  Patient On (Oxygen Delivery Method): nasal cannula  O2 Flow (L/min): 5      Karnofsky     %  General:    HEENT: NCAT      (  )  ET tube   (    ) NGT  Lungs: comfortable  CV:   GI:           (  )  PEG  MSK: normal   weak  chair/bedbound  Neuro:   Skin: warm dry  Psych:    LABS:                        10.8   6.48  )-----------( 427      ( 22 Feb 2023 01:50 )             33.6     02-22    137  |  101  |  29.8<H>  ----------------------------<  153<H>  4.0   |  22.0  |  0.89    Ca    8.7      22 Feb 2023 01:50    TPro  7.2  /  Alb  3.3  /  TBili  0.2<L>  /  DBili  x   /  AST  18  /  ALT  27  /  AlkPhos  88  02-22    PT/INR - ( 22 Feb 2023 01:50 )   PT: 12.1 sec;   INR: 1.04 ratio         PTT - ( 22 Feb 2023 01:50 )  PTT:33.9 sec    I&O's Summary    21 Feb 2023 07:01  -  22 Feb 2023 07:00  --------------------------------------------------------  IN: 375 mL / OUT: 850 mL / NET: -475 mL        RADIOLOGY & ADDITIONAL STUDIES:    < from: CT Brain Stroke Protocol (02.22.23 @ 02:14) >    ACC: [id number] EXAM:  CT BRAIN STROKE PROTOCOL   ORDERED BY: ESHA RODRÍGUEZ DATE:  02/22/2023          INTERPRETATION:  CT HEAD STROKE PROTOCOL    INDICATIONS: Code Stroke, Admitting Dxs: I48.91 UNSPECIFIED ATRIAL   FIBRILLATION, XCT    TECHNIQUE:  Serial axial images were obtained from the skull base to the vertex   without the use of intravenous contrast. RAPID artificial intelligence   was used for preliminary evaluation of intracranial hemorrhage.    COMPARISON EXAMINATION:None.    FINDINGS:  Brain parenchyma: Patchy areas of hypoattenuation present in the   bilateral deep white matter. There is no mass effect or intracranial   hemorrhage.    Extra-axial compartments: No extra-axial fluid collections are present.   Theventricles and sulci are normal in size and configuration for   patient's stated age. The basal cisterns are patent. Craniocervical   junction and sella turcica are within normal limits. Right   cavernous/supraclinoid ICA pipeline stent identified.    Calvarium, paranasal sinuses, and orbits: The calvarium is intact.   Paranasal sinuses and mastoid air cells are clear. The orbits demonstrate   lens replacements.    IMPRESSION:  No large territory acute infarct, intracranial hemorrhage, or mass effect.    Findings were discussed directly by telephone by the ED radiologist on   call, Jone Alvarez MD, with the emergency department physicians   assistant, Esha ARTEAGA, at 2:14 AM on 2/22/2023.    --- End of Report ---    < end of copied text >    < from: CT Angio Neck w/ IV Cont (02.22.23 @ 02:28) >    IMPRESSION:    CTA HEAD/NECK:  Occlusion of a superiorly oriented left M3 subdivision identified.    Incidentally noted small right pleural effusion.    No proximal large vessel occlusion, aneurysm, or dissection present.    CT PERFUSION:  Motion degraded.    Findings compatible with focal acute ischemia in the left MCA territory,   with questionable small surrounding ischemic penumbra (17 mL).    Critical findings above related to suspected acute ischemia in the left   MCA territory were relayed by telephone by the ED radiologist on call,   Jone Alvarez MD, with the emergency Department physician's assistant   caring for the patient at the time of imaging, Esha ARTEAGA, at   2:35 AM on 2/22/2023.    --- End of Report ---      < end of copied text >        < from: TTE Echo Complete w/o Contrast w/ Doppler (02.18.23 @ 12:04) >    Summary:   1. Left ventricular ejection fraction, by visual estimation, is 50 to   55%.   2. Normal global left ventricular systolic function.   3. The mitral in-flow pattern reveals no discernable A-wave, therefore   no comment on diastolic function can be made.   4. Normal right ventricular size and function.   5. Estimated pulmonary artery systolic pressure is 39.1 mmHg assuming a   right atrial pressure of 8 mmHg, which is consistent with borderline   pulmonary hypertension.   6. Mildly enlarged right atrium.   7. Moderately enlarged left atrium.   8. Mild mitral annular calcification.   9. Trace mitral valve regurgitation.  10. Moderate tricuspid regurgitation.  11. Sclerotic aortic valvewith normal opening.  12. Small to moderate size circumferential pericardial effusion (measures   up to 1.3 cm) with fibrinous thickened layering over the RV. No   echocardiographic evidence of tamponade.  13. Compared to the prior TTE study from 1/30/23, new pericardial   effusion noted. Findings reported to Dr. Flynn.      < end of copied text >        ADVANCE DIRECTIVES/TREATMENT PREFERENCES:  Currently Full code, all aggressive measures desired          HPI:  72yr old Swedish speaking female with PMH hypothyroidism, cerebral aneurysm s/p coiling w/ post operative course complicated by Afib recently admitted 1/30/23-2/1/23 with pericarditis possibly related non-covid coronaviral illness, afib RVR. She presents with same symptoms of left sided chest pain that acutely started yesterday, her pain is exacerbated with couging, some mucoid sputum, denies any fever/chills. In ER,. she was found to be in Afib RVR with HR in 140s, trop - neg, Coivd - neg, RVP - neg she received iv and po lopressor, with some improvement in -->110s, she still c/o mild chest pain. She was seen by cardio and is recommended admission.  (16 Feb 2023 10:13)    Brief hospital course: patient tx for Pericarditis with pericardial effusion.  Full dose AC held 2/2 effusion.  Code stroke this am with LMCA infarct  Sister in Law at bedside.  States patient can understand English as long as one speaks slowly    PERTINENT PMH REVIEWED: Yes     PAST MEDICAL & SURGICAL HISTORY:  High cholesterol  Hypothyroidism  H/O knee surgery  Brain aneurysm    SOCIAL HISTORY:  Limited/ Unable to obtain secondary to poor mentation/historian                    Substance history:                     Admitted from:  home                     Zoroastrian/spirituality:  Protestant                    Cultural concerns:    Baseline ADLs (prior to admission):  Independent/                      Surrogate/HCP/Guardian:  colin Hughes - listed in EMR    FAMILY HISTORY:  Limited/ Unable to obtain secondary to poor mentation/historian        Allergies    No Known Allergies    Intolerances    http://npcrc.org/files/[email or web address]    Present Symptoms:   Dyspnea:  No   Nausea/Vomiting:  No  Yes  Anxiety:   No    Depression: sUnable  Fatigue: Yes   Loss of appetite:  N/A  NPO  Constipation:  Not reported      Pain:  No             Character-            Duration-            Factors-            Severity    Pain AD Score:  [email or web address].[place].edu/cog/painad.pdf (press ctrl + left click to view)    Review of Systems: Reviewed    Unable to obtain due to poor  historian      MEDICATIONS  (STANDING):  atorvastatin 80 milliGRAM(s) Oral at bedtime  benzonatate 100 milliGRAM(s) Oral three times a day  calcium carbonate    500 mG (Tums) Chewable 1 Tablet(s) Chew once  colchicine 0.6 milliGRAM(s) Oral two times a day  diltiazem    Tablet 90 milliGRAM(s) Oral every 6 hours  diltiazem    milliGRAM(s) Oral daily  enoxaparin Injectable 40 milliGRAM(s) SubCutaneous every 24 hours  levothyroxine 50 MICROGram(s) Oral daily  metoprolol succinate  milliGRAM(s) Oral daily  pantoprazole    Tablet 40 milliGRAM(s) Oral two times a day  polyethylene glycol 3350 17 Gram(s) Oral daily  predniSONE   Tablet 60 milliGRAM(s) Oral daily  senna 2 Tablet(s) Oral at bedtime    MEDICATIONS  (PRN):  guaiFENesin Oral Liquid (Sugar-Free) 100 milliGRAM(s) Oral every 6 hours PRN Cough      PHYSICAL EXAM:    Vital Signs Last 24 Hrs  T(C): 36.6 (22 Feb 2023 11:15), Max: 36.7 (22 Feb 2023 03:30)  T(F): 97.9 (22 Feb 2023 11:15), Max: 98 (22 Feb 2023 03:30)  HR: 125 (22 Feb 2023 07:00) (71 - 144)  BP: 125/99 (22 Feb 2023 07:00) (110/74 - 148/101)  BP(mean): 108 (22 Feb 2023 07:00) (92 - 133)  RR: 36 (22 Feb 2023 07:00) (17 - 36)  SpO2: 99% (22 Feb 2023 07:00) (94% - 100%)    Parameters below as of 22 Feb 2023 03:00  Patient On (Oxygen Delivery Method): nasal cannula  O2 Flow (L/min): 5      Karnofsky 30    %  General:  Elderly woman awakens to voice  HEENT: NCAT       Lungs: comfortable  CV: RR  GI: soft NTND  MSK:  weak    Neuro: aphasic - nods yes/no to simple questions  Skin: warm dry      LABS:                        10.8   6.48  )-----------( 427      ( 22 Feb 2023 01:50 )             33.6     02-22    137  |  101  |  29.8<H>  ----------------------------<  153<H>  4.0   |  22.0  |  0.89    Ca    8.7      22 Feb 2023 01:50    TPro  7.2  /  Alb  3.3  /  TBili  0.2<L>  /  DBili  x   /  AST  18  /  ALT  27  /  AlkPhos  88  02-22    PT/INR - ( 22 Feb 2023 01:50 )   PT: 12.1 sec;   INR: 1.04 ratio         PTT - ( 22 Feb 2023 01:50 )  PTT:33.9 sec    I&O's Summary    21 Feb 2023 07:01  -  22 Feb 2023 07:00  --------------------------------------------------------  IN: 375 mL / OUT: 850 mL / NET: -475 mL        RADIOLOGY & ADDITIONAL STUDIES:    < from: CT Brain Stroke Protocol (02.22.23 @ 02:14) >    ACC: [id number] EXAM:  CT BRAIN STROKE PROTOCOL   ORDERED BY: ESHA MAYFIELD     PROCEDURE DATE:  02/22/2023          INTERPRETATION:  CT HEAD STROKE PROTOCOL    INDICATIONS: Code Stroke, Admitting Dxs: I48.91 UNSPECIFIED ATRIAL   FIBRILLATION, XCT    TECHNIQUE:  Serial axial images were obtained from the skull base to the vertex   without the use of intravenous contrast. RAPID artificial intelligence   was used for preliminary evaluation of intracranial hemorrhage.    COMPARISON EXAMINATION:None.    FINDINGS:  Brain parenchyma: Patchy areas of hypoattenuation present in the   bilateral deep white matter. There is no mass effect or intracranial   hemorrhage.    Extra-axial compartments: No extra-axial fluid collections are present.   Theventricles and sulci are normal in size and configuration for   patient's stated age. The basal cisterns are patent. Craniocervical   junction and sella turcica are within normal limits. Right   cavernous/supraclinoid ICA pipeline stent identified.    Calvarium, paranasal sinuses, and orbits: The calvarium is intact.   Paranasal sinuses and mastoid air cells are clear. The orbits demonstrate   lens replacements.    IMPRESSION:  No large territory acute infarct, intracranial hemorrhage, or mass effect.    Findings were discussed directly by telephone by the ED radiologist on   call, Jone Alvarez MD, with the emergency department physicians   assistant, Esha ARTEAGA, at 2:14 AM on 2/22/2023.    --- End of Report ---    < end of copied text >    < from: CT Angio Neck w/ IV Cont (02.22.23 @ 02:28) >    IMPRESSION:    CTA HEAD/NECK:  Occlusion of a superiorly oriented left M3 subdivision identified.    Incidentally noted small right pleural effusion.    No proximal large vessel occlusion, aneurysm, or dissection present.    CT PERFUSION:  Motion degraded.    Findings compatible with focal acute ischemia in the left MCA territory,   with questionable small surrounding ischemic penumbra (17 mL).    Critical findings above related to suspected acute ischemia in the left   MCA territory were relayed by telephone by the ED radiologist on call,   Jone Alvarez MD, with the emergency Department physician's assistant   caring for the patient at the time of imaging, Esha ARTEAGA, at   2:35 AM on 2/22/2023.    --- End of Report ---      < end of copied text >        < from: TTE Echo Complete w/o Contrast w/ Doppler (02.18.23 @ 12:04) >    Summary:   1. Left ventricular ejection fraction, by visual estimation, is 50 to   55%.   2. Normal global left ventricular systolic function.   3. The mitral in-flow pattern reveals no discernable A-wave, therefore   no comment on diastolic function can be made.   4. Normal right ventricular size and function.   5. Estimated pulmonary artery systolic pressure is 39.1 mmHg assuming a   right atrial pressure of 8 mmHg, which is consistent with borderline   pulmonary hypertension.   6. Mildly enlarged right atrium.   7. Moderately enlarged left atrium.   8. Mild mitral annular calcification.   9. Trace mitral valve regurgitation.  10. Moderate tricuspid regurgitation.  11. Sclerotic aortic valvewith normal opening.  12. Small to moderate size circumferential pericardial effusion (measures   up to 1.3 cm) with fibrinous thickened layering over the RV. No   echocardiographic evidence of tamponade.  13. Compared to the prior TTE study from 1/30/23, new pericardial   effusion noted. Findings reported to Dr. Flynn.      < end of copied text >        ADVANCE DIRECTIVES/TREATMENT PREFERENCES:  Currently Full code, all aggressive measures desired

## 2023-02-22 NOTE — CONSULT NOTE ADULT - NS ATTEND AMEND GEN_ALL_CORE FT
Agree with PA's assessment and plan.  In addition: 73 YO F w/acute aphasia is found to have left M3 branch occlusion. Pt was on AC prior to 4 days ago, when it was stopped due to pericardial effusion. Pt's symptoms were noticed 5+ hours from last known well, and did not qualify for TNK. M3-M4 branches are not amendable to thrombectomy at this time, and no intervention was offered to the pt.  - recommend permissive HTN - SBP up to 180  - MRI brain when possible  - echo   - PT/OT/Speech eval

## 2023-02-22 NOTE — CHART NOTE - NSCHARTNOTEFT_GEN_A_CORE
72 yr old female with hypothyroidism, cerebral aneurysm s/p coiling, atrial fibrillation, recent pericarditis complicated w/ pericardial effusion w/o tamponade presented w/ expressive aphasia at 1:30 am. Pt was able to respond to commands and move all extremities but was unable to communicate. Translation done by tele-interrupter.     T(C): 36.7   HR: 115  BP: 132/108   RR: 20   SpO2: 100%    CONSTITUTIONAL: pt laying in bed, unable to communicate despite opening her mouth   EYES: PERRLA and symmetric, unable to do EOM, No conjunctival or scleral injection, non-icteric  ENMT: dry mucosal membranes, mouth open   RESP: No respiratory distress, no use of accessory muscles; decreased breath sounds   CV: RRR, +S1S2, no MRG; no JVD; no peripheral edema  GI: Soft, NT, ND, no rebound, no guarding; no palpable masses; no hepatosplenomegaly; no hernia palpated  MSK: 5/5 strength of the LT UE/ LE, 4/5 strength of the RT UE and RT LE   SKIN: No rashes or ulcers noted  NEURO: unable to perform EOM, 4/5 strength of the RT UE/RT LE, 5/5 strength of the LT UE/LE, pt has no motor drift but able to raise the LT LE higher than RT LE. No pronator drift noted. unable to perform the heel to shin test b/l or finger to nose exam. no facial droop, pt is mute/aphasic. Able to follow commands such as close eyes, and blink.   PSYCH: unable to assess due to aphasia     NIH: now 9, limb ataxia noted on exam     CTA of brain: ACC: [id number] EXAM:  CT PERFUSION W MAPS IC   ORDERED BY: JASON MAYFIELD     PROCEDURE DATE:  02/22/2023    INTERPRETATION:  CTA HEAD AND NECK    CLINICAL INDICATIONS:Code Stroke    COMPARISON: CT head from same day    FINDINGS:  CTA BRAIN:  Anterior circulation: The petrous, cavernous, and supraclinoid portions   of the internal carotid arteries opacify normally. Redemonstration of ICA   stent extending from the cavernous segment into the supraclinoid portion.   The bilateral M1 segments are widely patent. There is an abrupt cut off   of a superiorly oriented left M3 branch (series 7, image 44, series 8,   image 51). Otherwise, the bilateral M2/M3 subdivisions following normal   course and caliber with symmetric appearance. Similarly, the bilateral A1   segments are widely patent and the A2/A3 subdivisions following normal   course and caliber with symmetric appearance. The anterior communicating   artery is clearly visualized. Bilateral posterior communicating arteries   are present.    Posterior circulation: The intracranial portions of the bilateral   vertebral arteries are within normal limits. Bilateral PICA origins arise   normally from the distal V4 segments. The basilar confluence is   unremarkable and the basilar artery follows a normal course and caliber.   AICA origins are identified. The bilateral superior cerebellar arteries   are within normal limits. The bilateral P1 segments are widely patent,   and the P2/P3 subdivisions following normal course and caliber with   symmetric appearance.    CTA NECK:  Normal 3 vessel aortic arch is identified.    Anterior circulation: The bilateral common carotid arteries opacify   normally from their origins up to the level of the bifurcations. The   bilateral carotid bulbs are within normal limits. The cervical portions   of the internal carotid arteries opacify normally up to the level of the   skull base.    Posterior circulation: The bilateral vertebral arteries demonstrate   normal anatomic origins from the bilateral subclavian arteries. The   bilateral vertebral arteries opacify normally to their cervical segments   up to the level of the skull base.    LUNG APICES: Small right pleural effusion.    THYROID: Homogeneous in attenuation.    SOFT TISSUES: Within normal limits.    BONES: Unremarkable.      CT PERFUSION:  Motion artifact present within the CT perfusion acquisition. Adequate   arterial inflow and venous outflow contrast bolus identified.    Corresponding focal deficits are identified in the cerebral blood flow   and mean transit time/time-to-peak data sets within the inferior left   frontal lobe. Abnormal cerebral blood volume is not identified in that   region at this time.    Quantitative data analysis is as follows:  CBF <30%: 0 mL  T-Max >6.0 seconds: 17 mL  Mismatch volume: 17 mL  Mismatch ratio: Infinite      IMPRESSION:    CTA HEAD/NECK:  Occlusion of a superiorly oriented left M3 subdivision identified.    Incidentally noted small right pleural effusion.    No proximal large vessel occlusion, aneurysm, or dissection present.    CT PERFUSION:  Motion degraded.    Findings compatible with focal acute ischemia in the left MCA territory,   with questionable small surrounding ischemic penumbra (17 mL).    Critical findings above related to suspected acute ischemia in the left   MCA territory were relayed by telephone by the ED radiologist.         Assessment/Plan:   1. Acute stroke of the LT MCA  -code stroke order set ordered  -pt's NIH was 7 at time of stroke call, pt is now NIH 9 w/ now noted limb ataxia- discussed w/ Dr. Schuler, neurologist, will hold off on AC for now, MRI of the brain for AM, and ECHO ordered.   -pt was not a candidate for tPA due to LWK > 3 hours and on lovenox, and not a candidate for thrombectomy at this time, discussed w/ Dr. Schuler  -failed bedside speech and swallow, pt NPO   -q2 neuro checks and vital checks   -son at bedside, discussed results and pt's treatment. Son has no questions at this time  -will continue to monitor for rate control for a fib   -palliative care consult placed   - Discussed plan and in agreement w/ Dr. Scott  -notify PA for any changes in mental status or worsening sx

## 2023-02-22 NOTE — PROGRESS NOTE ADULT - SUBJECTIVE AND OBJECTIVE BOX
Patient is a 72y old  Female who presents with a chief complaint of chest pain (22 Feb 2023 07:45)      Patient seen and examined at bedside. No overnight events reported.     ALLERGIES:  No Known Allergies    MEDICATIONS  (STANDING):  atorvastatin 80 milliGRAM(s) Oral at bedtime  benzonatate 100 milliGRAM(s) Oral three times a day  calcium carbonate    500 mG (Tums) Chewable 1 Tablet(s) Chew once  colchicine 0.6 milliGRAM(s) Oral two times a day  diltiazem    Tablet 90 milliGRAM(s) Oral every 6 hours  diltiazem    milliGRAM(s) Oral daily  enoxaparin Injectable 40 milliGRAM(s) SubCutaneous every 24 hours  ibuprofen  Tablet. 800 milliGRAM(s) Oral three times a day  levothyroxine 50 MICROGram(s) Oral daily  metoprolol succinate  milliGRAM(s) Oral daily  pantoprazole    Tablet 40 milliGRAM(s) Oral two times a day  polyethylene glycol 3350 17 Gram(s) Oral daily  predniSONE   Tablet 60 milliGRAM(s) Oral daily  senna 2 Tablet(s) Oral at bedtime    MEDICATIONS  (PRN):  guaiFENesin Oral Liquid (Sugar-Free) 100 milliGRAM(s) Oral every 6 hours PRN Cough    Vital Signs Last 24 Hrs  T(F): 97.8 (22 Feb 2023 07:25), Max: 98 (22 Feb 2023 03:30)  HR: 125 (22 Feb 2023 07:00) (68 - 144)  BP: 125/99 (22 Feb 2023 07:00) (90/55 - 148/101)  RR: 36 (22 Feb 2023 07:00) (17 - 36)  SpO2: 99% (22 Feb 2023 07:00) (94% - 100%)  I&O's Summary    21 Feb 2023 07:01  -  22 Feb 2023 07:00  --------------------------------------------------------  IN: 375 mL / OUT: 850 mL / NET: -475 mL        PHYSICAL EXAM:  General:   ENT:   Neck:   Lungs:   Cardio: RRR, S1/S2, No murmur  Abdomen: Soft, Nontender, Nondistended; Bowel sounds present  Extremities: No calf tenderness, No pitting edema    LABS:                        10.8   6.48  )-----------( 427      ( 22 Feb 2023 01:50 )             33.6     02-22    137  |  101  |  29.8  ----------------------------<  153  4.0   |  22.0  |  0.89    Ca    8.7      22 Feb 2023 01:50  Phos  3.3     02-19  Mg     2.6     02-19    TPro  7.2  /  Alb  3.3  /  TBili  0.2  /  DBili  x   /  AST  18  /  ALT  27  /  AlkPhos  88  02-22          PT/INR - ( 22 Feb 2023 01:50 )   PT: 12.1 sec;   INR: 1.04 ratio         PTT - ( 22 Feb 2023 01:50 )  PTT:33.9 sec      CARDIAC MARKERS ( 22 Feb 2023 01:50 )  x     / x     / 28 U/L / x     / x                ABG - ( 22 Feb 2023 02:51 )  pH, Arterial: 7.530 pH, Blood: x     /  pCO2: 33    /  pO2: 113   / HCO3: 27    / Base Excess: x     /  SaO2: 98.0                161 (22 Feb 2023 04:05)      POCT Blood Glucose.: 161 mg/dL (22 Feb 2023 01:08)          Culture - Urine (collected 16 Feb 2023 09:35)  Source: Clean Catch Clean Catch (Midstream)  Final Report (18 Feb 2023 08:33):    >=3 organisms. Probable collection contamination.    Culture - Blood (collected 16 Feb 2023 05:45)  Source: .Blood Blood-Peripheral  Final Report (21 Feb 2023 09:01):    No Growth Final    Culture - Blood (collected 16 Feb 2023 05:40)  Source: .Blood Blood-Peripheral  Final Report (21 Feb 2023 09:01):    No Growth Final        RADIOLOGY & ADDITIONAL TESTS:       Patient is a 72y old  Female seen for stroke , rapid atrial fib     she is awake , sister in law at the bedside prefers translating   pt seem upset frustrated , can not answer , understands questions and able to follow simple commnads   I used the [employer]  phone services as well Trav [phone] , she is shaking her head   RUE weak , unbale to speak , she shakes her head and hand no       d/w neuro stroke team at the bedside   son updated by stroke attending on the phone at the bedside     d/w cardiology team rate is high     ALLERGIES:  No Known Allergies    MEDICATIONS  (STANDING):  atorvastatin 80 milliGRAM(s) Oral at bedtime  benzonatate 100 milliGRAM(s) Oral three times a day  calcium carbonate    500 mG (Tums) Chewable 1 Tablet(s) Chew once  colchicine 0.6 milliGRAM(s) Oral two times a day  diltiazem    Tablet 90 milliGRAM(s) Oral every 6 hours  diltiazem    milliGRAM(s) Oral daily  enoxaparin Injectable 40 milliGRAM(s) SubCutaneous every 24 hours  ibuprofen  Tablet. 800 milliGRAM(s) Oral three times a day  levothyroxine 50 MICROGram(s) Oral daily  metoprolol succinate  milliGRAM(s) Oral daily  pantoprazole    Tablet 40 milliGRAM(s) Oral two times a day  polyethylene glycol 3350 17 Gram(s) Oral daily  predniSONE   Tablet 60 milliGRAM(s) Oral daily  senna 2 Tablet(s) Oral at bedtime    MEDICATIONS  (PRN):  guaiFENesin Oral Liquid (Sugar-Free) 100 milliGRAM(s) Oral every 6 hours PRN Cough    Vital Signs Last 24 Hrs  T(F): 97.8 (22 Feb 2023 07:25), Max: 98 (22 Feb 2023 03:30)  HR: 125 (22 Feb 2023 07:00) (68 - 144)  BP: 125/99 (22 Feb 2023 07:00) (90/55 - 148/101)  RR: 36 (22 Feb 2023 07:00) (17 - 36)  SpO2: 99% (22 Feb 2023 07:00) (94% - 100%)  I&O's Summary    21 Feb 2023 07:01  -  22 Feb 2023 07:00  --------------------------------------------------------  IN: 375 mL / OUT: 850 mL / NET: -475 mL        PHYSICAL EXAM:  General: awake alert , anxious aphasic   ENT: aphasic ,   Neck: supple , no JVD   Lungs: CTA bilateral   Cardio: RRR, S1/S2, No murmur  Abdomen: Soft, Nontender, Nondistended; Bowel sounds present  Extremities: No calf tenderness, No pitting edema  Neuro : awake alert , not able to speak ,however seems able to understand questions and instructions     LABS:                        10.8   6.48  )-----------( 427      ( 22 Feb 2023 01:50 )             33.6     02-22    137  |  101  |  29.8  ----------------------------<  153  4.0   |  22.0  |  0.89    Ca    8.7      22 Feb 2023 01:50  Phos  3.3     02-19  Mg     2.6     02-19    TPro  7.2  /  Alb  3.3  /  TBili  0.2  /  DBili  x   /  AST  18  /  ALT  27  /  AlkPhos  88  02-22          PT/INR - ( 22 Feb 2023 01:50 )   PT: 12.1 sec;   INR: 1.04 ratio         PTT - ( 22 Feb 2023 01:50 )  PTT:33.9 sec      CARDIAC MARKERS ( 22 Feb 2023 01:50 )  x     / x     / 28 U/L / x     / x                ABG - ( 22 Feb 2023 02:51 )  pH, Arterial: 7.530 pH, Blood: x     /  pCO2: 33    /  pO2: 113   / HCO3: 27    / Base Excess: x     /  SaO2: 98.0                161 (22 Feb 2023 04:05)      POCT Blood Glucose.: 161 mg/dL (22 Feb 2023 01:08)          Culture - Urine (collected 16 Feb 2023 09:35)  Source: Clean Catch Clean Catch (Midstream)  Final Report (18 Feb 2023 08:33):    >=3 organisms. Probable collection contamination.    Culture - Blood (collected 16 Feb 2023 05:45)  Source: .Blood Blood-Peripheral  Final Report (21 Feb 2023 09:01):    No Growth Final    Culture - Blood (collected 16 Feb 2023 05:40)  Source: .Blood Blood-Peripheral  Final Report (21 Feb 2023 09:01):    No Growth Final        RADIOLOGY & ADDITIONAL TESTS:    cct< from: CT Angio Head w/ IV Cont (02.22.23 @ 02:33) >    IMPRESSION:    CTA HEAD/NECK:  Occlusion of a superiorly oriented left M3 subdivision identified.    Incidentally noted small right pleural effusion.    No proximal large vessel occlusion, aneurysm, or dissection present.    CT PERFUSION:  Motion degraded.    Findings compatible with focal acute ischemia in the left MCA territory,   with questionable small surrounding ischemic penumbra (17 mL).    Critical findings above related to suspected acute ischemia in the left   MCA territory were relayed by telephone by the ED radiologist on call,   Jone Alvarez MD, with the emergency Department physician's assistant   caring for the patient at the time of imaging, Esha ARTEAGA, at   2:35 AM on 2/22/2023.    --- End of Report ---        < end of copied text >

## 2023-02-22 NOTE — DIETITIAN INITIAL EVALUATION ADULT - PERTINENT LABORATORY DATA
02-22    137  |  101  |  29.8<H>  ----------------------------<  153<H>  4.0   |  22.0  |  0.89    Ca    8.7      22 Feb 2023 01:50    TPro  7.2  /  Alb  3.3  /  TBili  0.2<L>  /  DBili  x   /  AST  18  /  ALT  27  /  AlkPhos  88  02-22  POCT Blood Glucose.: 87 mg/dL (02-22-23 @ 12:42)

## 2023-02-22 NOTE — CONSULT NOTE ADULT - ASSESSMENT
72yr old woman with cerebral aneurysm s/p coiling, atrial fibrillation, recent pericarditis admitted  pericarditis complicated with LMCA stroke    CVA  ASA   neuro consulted    Pericarditis    Dysphagia  NPO   aspiration precautions 72yr old woman with cerebral aneurysm s/p coiling, atrial fibrillation, recent pericarditis admitted  pericarditis complicated with LMCA stroke    CVA- LMCA  ASA   neuro consulted  for MRI    Pericarditis  await MRI   Cardio following    Aphasia  NPO   aspiration precautions  Speech therapy    Encounter for Palliative Care  Palliative Care consulted to assist with goals of care  Patient with LMCA CVA, issues with aphasia.  Patient weak  72yr old woman with cerebral aneurysm s/p coiling, atrial fibrillation, recent pericarditis admitted  pericarditis complicated with LMCA stroke    CVA- LMCA  ASA   neuro consulted  for MRI    Pericarditis  await MRI   Cardio following    Aphasia  NPO   aspiration precautions  Speech therapy    Encounter for Palliative Care  Palliative Care consulted to assist with goals of care.    Patient is a , has one son Saul.  Saul is the main surrogate  Patient with LMCA CVA, issues with aphasia.  Patient weak, aphasic and able to understand simple  conversation.     Called Saul with [facility] and introduce scope of Palliative Care.  He states he was updated by the other doctors and had no questions.   Discussed issue with aphasia and inability to swallow.  Discussed the challenge of AC with CVA coupled with  pericarditis and pleural effusion. For now, he is waiting for the MRI and seeing how things go.  Psychosocial support.     PC to monitor hospital course.  SLP eval - IF dysphagic, may need discussion about PEG.

## 2023-02-22 NOTE — STROKE CODE NOTE - NSMDCONSULT QTN_Y FT
Neurology, spoke to Dr. Barnes. Spoke with on call stroke neurologist Dr. Barnes pt is not a candidate for tPA, she is out of the window for tPA and is taking lovenox. She is not a candidate for thrombectomy due to location of stroke in the LT temporal MCA distal M3. Advised to order ECHO, MRI of the brain for AM. no change in AC at this time per Dr. Barnes. Aspirin given rectally since patient is NPO.

## 2023-02-22 NOTE — CONSULT NOTE ADULT - SUBJECTIVE AND OBJECTIVE BOX
72yF was admitted on 02-16    Patient is a 72y old  Female who presents with a chief complaint of Atrial fibrillation     (22 Feb 2023 14:40)    HPI:  Ms. Keller is a 72yr old female with PMH hypothyroidism, cerebral aneurysm s/p coiling w/ post operative course complicated by Afib recently admitted 1/30/23-2/1/23 with pericarditis possibly related non-covid coronaviral illness, afib RVR. She presents with same symptoms of left sided chest pain. In ER, she was found to be in Afib RVR. Stroke code 2/22/23 0405 patient found to be aphasic and on imaging, CTA head showed nonhemorrhagic acute ischemia in the left MCA territory with questionable small surrounding ischemic penumbra.       Imaging reviewed showed:    ACC: [id number] EXAM:  CT BRAIN STROKE PROTOCOL   ORDERED BY: JASON MAYFIELD     PROCEDURE DATE:  02/22/2023          INTERPRETATION:  CT HEAD STROKE PROTOCOL    INDICATIONS: Code Stroke, Admitting Dxs: I48.91 UNSPECIFIED ATRIAL   FIBRILLATION, XCT    TECHNIQUE:  Serial axial images were obtained from the skull base to the vertex   without the use of intravenous contrast. RAPID artificial intelligence   was used for preliminary evaluation of intracranial hemorrhage.    COMPARISON EXAMINATION: None.    FINDINGS:  Brain parenchyma: Patchy areas of hypoattenuation present in the   bilateral deep white matter. There is no mass effect or intracranial   hemorrhage.    Extra-axial compartments: No extra-axial fluid collections are present.   The ventricles and sulci are normal in size and configuration for   patient's stated age. The basal cisterns are patent. Craniocervical   junction and sella turcica are within normal limits. Right   cavernous/supraclinoid ICA pipeline stent identified.    Calvarium, paranasal sinuses, and orbits: The calvarium is intact.   Paranasal sinuses and mastoid air cells are clear. The orbits demonstrate   lens replacements.    IMPRESSION:  No large territory acute infarct, intracranial hemorrhage, or mass effect.    REVIEW OF SYSTEMS  Difficult to obtain      VITALS  T(C): 36.6 (02-22-23 @ 11:15), Max: 36.7 (02-22-23 @ 03:30)  HR: 125 (02-22-23 @ 07:00) (71 - 144)  BP: 125/99 (02-22-23 @ 07:00) (110/74 - 148/101)  RR: 36 (02-22-23 @ 07:00) (17 - 36)  SpO2: 99% (02-22-23 @ 07:00) (94% - 100%)  Wt(kg): --    PAST MEDICAL & SURGICAL HISTORY  High cholesterol    Hypothyroidism    H/O knee surgery    Brain aneurysm        SOCIAL HISTORY - as per documentation/history  Smoking - None  EtOH - None  Drugs - None    FUNCTIONAL HISTORY  Independent.  Lives with son.  No steps.    CURRENT FUNCTIONAL STATUS  Pending evaluation.    FAMILY HISTORY   No pertinent family history        RECENT LABS - Reviewed  CBC Full  -  ( 22 Feb 2023 01:50 )  WBC Count : 6.48 K/uL  RBC Count : 4.70 M/uL  Hemoglobin : 10.8 g/dL  Hematocrit : 33.6 %  Platelet Count - Automated : 427 K/uL  Mean Cell Volume : 71.5 fl  Mean Cell Hemoglobin : 23.0 pg  Mean Cell Hemoglobin Concentration : 32.1 gm/dL  Auto Neutrophil # : 4.30 K/uL  Auto Lymphocyte # : 1.52 K/uL  Auto Monocyte # : 0.57 K/uL  Auto Eosinophil # : 0.00 K/uL  Auto Basophil # : 0.00 K/uL  Auto Neutrophil % : 66.3 %  Auto Lymphocyte % : 23.5 %  Auto Monocyte % : 8.8 %  Auto Eosinophil % : 0.0 %  Auto Basophil % : 0.0 %    02-22    137  |  101  |  29.8<H>  ----------------------------<  153<H>  4.0   |  22.0  |  0.89    Ca    8.7      22 Feb 2023 01:50    TPro  7.2  /  Alb  3.3  /  TBili  0.2<L>  /  DBili  x   /  AST  18  /  ALT  27  /  AlkPhos  88  02-22        ALLERGIES  No Known Allergies      MEDICATIONS   atorvastatin 80 milliGRAM(s) Oral at bedtime  benzonatate 100 milliGRAM(s) Oral three times a day  calcium carbonate    500 mG (Tums) Chewable 1 Tablet(s) Chew once  colchicine 0.6 milliGRAM(s) Oral two times a day  dextrose 5% + sodium chloride 0.9%. 1000 milliLiter(s) IV Continuous <Continuous>  diltiazem    Tablet 90 milliGRAM(s) Oral every 6 hours  diltiazem    milliGRAM(s) Oral daily  enoxaparin Injectable 40 milliGRAM(s) SubCutaneous every 24 hours  guaiFENesin Oral Liquid (Sugar-Free) 100 milliGRAM(s) Oral every 6 hours PRN  levothyroxine 50 MICROGram(s) Oral daily  metoprolol succinate  milliGRAM(s) Oral daily  pantoprazole    Tablet 40 milliGRAM(s) Oral two times a day  polyethylene glycol 3350 17 Gram(s) Oral daily  predniSONE   Tablet 60 milliGRAM(s) Oral daily  senna 2 Tablet(s) Oral at bedtime      ----------------------------------------------------------------------------------------  PHYSICAL EXAM  Constitutional - NAD, Comfortable  HEENT - NCAT  Neck - Supple  Chest - Breathing comfortably, No wheezing  Cardiovascular - No cyanosis   Abdomen - Soft   Extremities - No C/C/E, No calf tenderness   Neurologic Exam -                    Cognitive - Awake, follows commands      Communication - expressive aphasia      Motor - anti-gravity        Sensory - Intact to LT     Reflexes - No clonus   Psychiatric - Mood stable, Affect WNL  ----------------------------------------------------------------------------------------  ASSESSMENT/PLAN  72yFemale with functional deficits after CVA  CVA -  atorvastatin, neurology following   Pain - Tylenol  DVT PPX - SCDs, Lovenox   Dysphagia -  SLP    Rehab - Consult PT/OT for evaluation and treatment when medically appropriate.  Discussed rehabilitation options with family at beside.  Can consider acute rehabilitation, rehabilitation team will follow as patient's condition progresses.      Will continue to follow. Functional progress will determine ongoing rehab dispo recommendations, which may change.    Continue bedside therapy as well as OOB throughout the day with mobilization by staff to maintain cardiopulmonary function and prevention of secondary complications related to debility.

## 2023-02-22 NOTE — PROGRESS NOTE ADULT - SUBJECTIVE AND OBJECTIVE BOX
[place] [facility] - [facility], THE HEART CENTER                                   [address] [place] [address]                                                      PHONE: [phone]                                                         FAX: [phone]  [email or web address]  ---------------------------------------------------------------------------------------------------------------------------------    Overnight events/patient complaints: Patient with acute CVA overnight, transferred to MICU.      No Known Allergies    MEDICATIONS  (STANDING):  atorvastatin 80 milliGRAM(s) Oral at bedtime  benzonatate 100 milliGRAM(s) Oral three times a day  calcium carbonate    500 mG (Tums) Chewable 1 Tablet(s) Chew once  colchicine 0.6 milliGRAM(s) Oral two times a day  diltiazem    Tablet 90 milliGRAM(s) Oral every 6 hours  diltiazem    milliGRAM(s) Oral daily  enoxaparin Injectable 40 milliGRAM(s) SubCutaneous every 24 hours  ibuprofen  Tablet. 800 milliGRAM(s) Oral three times a day  levothyroxine 50 MICROGram(s) Oral daily  metoprolol succinate  milliGRAM(s) Oral daily  pantoprazole    Tablet 40 milliGRAM(s) Oral two times a day  polyethylene glycol 3350 17 Gram(s) Oral daily  predniSONE   Tablet 60 milliGRAM(s) Oral daily  senna 2 Tablet(s) Oral at bedtime    MEDICATIONS  (PRN):  guaiFENesin Oral Liquid (Sugar-Free) 100 milliGRAM(s) Oral every 6 hours PRN Cough      Vital Signs Last 24 Hrs  T(C): 36.6 (22 Feb 2023 07:25), Max: 36.7 (22 Feb 2023 03:30)  T(F): 97.8 (22 Feb 2023 07:25), Max: 98 (22 Feb 2023 03:30)  HR: 125 (22 Feb 2023 07:00) (68 - 144)  BP: 125/99 (22 Feb 2023 07:00) (90/55 - 148/101)  BP(mean): 108 (22 Feb 2023 07:00) (92 - 133)  RR: 36 (22 Feb 2023 07:00) (17 - 36)  SpO2: 99% (22 Feb 2023 07:00) (94% - 100%)    Parameters below as of 22 Feb 2023 03:00  Patient On (Oxygen Delivery Method): nasal cannula  O2 Flow (L/min): 5    ICU Vital Signs Last 24 Hrs  AFAF ABIZEID  I&O's Detail    21 Feb 2023 07:01  -  22 Feb 2023 07:00  --------------------------------------------------------  IN:    Oral Fluid: 375 mL  Total IN: 375 mL    OUT:    Voided (mL): 850 mL  Total OUT: 850 mL    Total NET: -475 mL        Drug Dosing Weight  AFAF ABIZEID      PHYSICAL EXAM:  General: Lethargic  HEENT: Head; normocephalic, atraumatic.  Eyes: EOMI, conjunctiva normal  Neck: Supple, no JVD noted  CARDIOVASCULAR: Irregularly irregular, tachycardic  LUNGS: Clear to auscultation b/l, No rales, rhonchi or wheeze, normal inspiratory effort  ABDOMEN: Soft, nontender, non-distended, +bowel sounds  EXTREMITIES: No edema b/l, no cyanosis   SKIN: warm and dry  NEURO: Lethargic  PSYCH: normal affect.        LABS:                        10.8   6.48  )-----------( 427      ( 22 Feb 2023 01:50 )             33.6     02-22    137  |  101  |  29.8<H>  ----------------------------<  153<H>  4.0   |  22.0  |  0.89    Ca    8.7      22 Feb 2023 01:50    TPro  7.2  /  Alb  3.3  /  TBili  0.2<L>  /  DBili  x   /  AST  18  /  ALT  27  /  AlkPhos  88  02-22    AFAF ABIZEID  CARDIAC MARKERS ( 22 Feb 2023 01:50 )  x     / <0.01 ng/mL / 28 U/L / x     / x          PT/INR - ( 22 Feb 2023 01:50 )   PT: 12.1 sec;   INR: 1.04 ratio         PTT - ( 22 Feb 2023 01:50 )  PTT:33.9 sec      RADIOLOGY & ADDITIONAL STUDIES:    INTERPRETATION OF TELEMETRY (personally reviewed): Afib with HRs in the 140-150s      ASSESSMENT AND PLAN:  72y Female with past medical history significant for recent admission for chest pain in setting of coronavirus non-covid c/w pericarditis, normal coronary arteries, normal EF, no evidence of pericardial effusion, AF with RVR who presented with chest pain and palpitations. Patient now with acute CVA.    Pericarditis with pericardial effusion  - echo with small to moderate pericardial effusion  - If heparin were to be started (must be cleared by neurology prior), patient should have partial echo within 24 hours of reinitiation of anticoagulation to ensure pericardial effusion is not worsening in size  - would discontinue ibuprofen at this time  - would increase aspirin to 650mg TID if OK with neurology  - c/w colchicine     CVA  - BP management as per neurology  - pending brain MRI  - neurology following    Afib  - From a cardiac perspective patient should be placed on anticoagulation, however in the setting of acute stroke, will need clearance from neurology to start  - Patient with rates in the 140s-150s on tele but currently 110s-120s  - Unable to increase cardizem or metoprolol due to CVA and the need for higher blood pressure (permissive HTN)  - recommend starting digoxin load and maintenance therapy renally dosed for better rate control  - patient hemodynamically stable with SBP in the 120s      Thank you for letting [facility] to assist in the management of this patient. Please call with any questions.                 [place] [facility] - [facility], [facility]                                   [address] [place] [address]                                                      PHONE: [phone]                                                         FAX: [phone]  [email or web address]  ---------------------------------------------------------------------------------------------------------------------------------    Overnight events/patient complaints: Patient with acute CVA overnight, transferred to MICU.      No Known Allergies    MEDICATIONS  (STANDING):  atorvastatin 80 milliGRAM(s) Oral at bedtime  benzonatate 100 milliGRAM(s) Oral three times a day  calcium carbonate    500 mG (Tums) Chewable 1 Tablet(s) Chew once  colchicine 0.6 milliGRAM(s) Oral two times a day  diltiazem    Tablet 90 milliGRAM(s) Oral every 6 hours  diltiazem    milliGRAM(s) Oral daily  enoxaparin Injectable 40 milliGRAM(s) SubCutaneous every 24 hours  ibuprofen  Tablet. 800 milliGRAM(s) Oral three times a day  levothyroxine 50 MICROGram(s) Oral daily  metoprolol succinate  milliGRAM(s) Oral daily  pantoprazole    Tablet 40 milliGRAM(s) Oral two times a day  polyethylene glycol 3350 17 Gram(s) Oral daily  predniSONE   Tablet 60 milliGRAM(s) Oral daily  senna 2 Tablet(s) Oral at bedtime    MEDICATIONS  (PRN):  guaiFENesin Oral Liquid (Sugar-Free) 100 milliGRAM(s) Oral every 6 hours PRN Cough      Vital Signs Last 24 Hrs  T(C): 36.6 (22 Feb 2023 07:25), Max: 36.7 (22 Feb 2023 03:30)  T(F): 97.8 (22 Feb 2023 07:25), Max: 98 (22 Feb 2023 03:30)  HR: 125 (22 Feb 2023 07:00) (68 - 144)  BP: 125/99 (22 Feb 2023 07:00) (90/55 - 148/101)  BP(mean): 108 (22 Feb 2023 07:00) (92 - 133)  RR: 36 (22 Feb 2023 07:00) (17 - 36)  SpO2: 99% (22 Feb 2023 07:00) (94% - 100%)    Parameters below as of 22 Feb 2023 03:00  Patient On (Oxygen Delivery Method): nasal cannula  O2 Flow (L/min): 5    ICU Vital Signs Last 24 Hrs  AFAF ABIZEID  I&O's Detail    21 Feb 2023 07:01  -  22 Feb 2023 07:00  --------------------------------------------------------  IN:    Oral Fluid: 375 mL  Total IN: 375 mL    OUT:    Voided (mL): 850 mL  Total OUT: 850 mL    Total NET: -475 mL        Drug Dosing Weight  AFAF ABIZEID      PHYSICAL EXAM:  General: Lethargic  HEENT: Head; normocephalic, atraumatic.  Eyes: EOMI, conjunctiva normal  Neck: Supple, no JVD noted  CARDIOVASCULAR: Irregularly irregular, tachycardic  LUNGS: Clear to auscultation b/l, No rales, rhonchi or wheeze, normal inspiratory effort  ABDOMEN: Soft, nontender, non-distended, +bowel sounds  EXTREMITIES: No edema b/l, no cyanosis   SKIN: warm and dry  NEURO: Lethargic  PSYCH: normal affect.        LABS:                        10.8   6.48  )-----------( 427      ( 22 Feb 2023 01:50 )             33.6     02-22    137  |  101  |  29.8<H>  ----------------------------<  153<H>  4.0   |  22.0  |  0.89    Ca    8.7      22 Feb 2023 01:50    TPro  7.2  /  Alb  3.3  /  TBili  0.2<L>  /  DBili  x   /  AST  18  /  ALT  27  /  AlkPhos  88  02-22    AFAF ABIZEID  CARDIAC MARKERS ( 22 Feb 2023 01:50 )  x     / <0.01 ng/mL / 28 U/L / x     / x          PT/INR - ( 22 Feb 2023 01:50 )   PT: 12.1 sec;   INR: 1.04 ratio         PTT - ( 22 Feb 2023 01:50 )  PTT:33.9 sec      RADIOLOGY & ADDITIONAL STUDIES:    INTERPRETATION OF TELEMETRY (personally reviewed): Afib with HRs in the 140-150s      ASSESSMENT AND PLAN:  72y Female with past medical history significant for recent admission for chest pain in setting of coronavirus non-covid c/w pericarditis, normal coronary arteries, normal EF, no evidence of pericardial effusion, AF with RVR who presented with chest pain and palpitations. Patient now with acute CVA.    Pericarditis with pericardial effusion  - echo with small to moderate pericardial effusion  - If heparin were to be started (must be cleared by neurology prior), patient should have partial echo within 24 hours of reinitiation of anticoagulation to ensure pericardial effusion is not worsening in size  - would discontinue ibuprofen at this time  - would increase aspirin to 650mg TID if OK with neurology  - c/w colchicine     CVA  - BP management as per neurology  - pending brain MRI  - neurology following    Afib  - From a cardiac perspective patient should be placed on anticoagulation, however in the setting of acute stroke, will need clearance from neurology to start  - Patient with rates in the 140s-150s on tele but currently 110s-120s  - Unable to increase cardizem or metoprolol due to CVA and the need for higher blood pressure (permissive HTN)  - recommend starting digoxin load and maintenance therapy renally dosed for better rate control  - patient hemodynamically stable with SBP in the 120s    Plan discussed with Dr. Albert.      Thank you for letting [facility] to assist in the management of this patient. Please call with any questions.

## 2023-02-22 NOTE — CONSULT NOTE ADULT - SUBJECTIVE AND OBJECTIVE BOX
HPI:  72yr old Yakut speaking female with PMH hypothyroidism, cerebral aneurysm s/p coiling w/ post operative course complicated by Afib recently admitted 1/30/23-2/1/23 with pericarditis possibly related non-covid coronaviral illness, afib RVR. She presented with same symptoms of left sided chest pain that acutely started yesterday, her pain is exacerbated with couging, some mucoid sputum, denies any fever/chills. In ER,. she was found to be in Afib RVR with HR in 140s, trop - neg, Coivd - neg, RVP - neg she received iv and po lopressor, with some improvement in -->110s, she still c/o mild chest pain. She was seen by cardio and is recommended admission.        NIHSS  PRE-MRS  ICH Score     REVIEW OF SYSTEMS      General:	      Neurological:	    PAST MEDICAL & SURGICAL HISTORY:  High cholesterol  Hypothyroidism  H/O knee surgery  Brain aneurysm          Allergies  No Known Allergies      FAMILY HISTORY:  No pertinent family history    Social History:      MEDICATIONS  (STANDING):  atorvastatin 80 milliGRAM(s) Oral at bedtime  benzonatate 100 milliGRAM(s) Oral three times a day  calcium carbonate    500 mG (Tums) Chewable 1 Tablet(s) Chew once  colchicine 0.6 milliGRAM(s) Oral two times a day  diltiazem    Tablet 90 milliGRAM(s) Oral every 6 hours  diltiazem    milliGRAM(s) Oral daily  enoxaparin Injectable 40 milliGRAM(s) SubCutaneous every 24 hours  ibuprofen  Tablet. 800 milliGRAM(s) Oral three times a day  levothyroxine 50 MICROGram(s) Oral daily  metoprolol succinate  milliGRAM(s) Oral daily  pantoprazole    Tablet 40 milliGRAM(s) Oral two times a day  polyethylene glycol 3350 17 Gram(s) Oral daily  predniSONE   Tablet 60 milliGRAM(s) Oral daily  senna 2 Tablet(s) Oral at bedtime    MEDICATIONS  (PRN):  guaiFENesin Oral Liquid (Sugar-Free) 100 milliGRAM(s) Oral every 6 hours PRN Cough      CBC Full  -  ( 22 Feb 2023 01:50 )  WBC Count : 6.48 K/uL  RBC Count : 4.70 M/uL  Hemoglobin : 10.8 g/dL  Hematocrit : 33.6 %  Platelet Count - Automated : 427 K/uL  Mean Cell Volume : 71.5 fl  Mean Cell Hemoglobin : 23.0 pg  Mean Cell Hemoglobin Concentration : 32.1 gm/dL  Auto Neutrophil # : 4.30 K/uL  Auto Lymphocyte # : 1.52 K/uL  Auto Monocyte # : 0.57 K/uL  Auto Eosinophil # : 0.00 K/uL  Auto Basophil # : 0.00 K/uL  Auto Neutrophil % : 66.3 %  Auto Lymphocyte % : 23.5 %  Auto Monocyte % : 8.8 %  Auto Eosinophil % : 0.0 %  Auto Basophil % : 0.0 %    02-22    137  |  101  |  29.8<H>  ----------------------------<  153<H>  4.0   |  22.0  |  0.89    Ca    8.7      22 Feb 2023 01:50    TPro  7.2  /  Alb  3.3  /  TBili  0.2<L>  /  DBili  x   /  AST  18  /  ALT  27  /  AlkPhos  88  02-22        Vital Signs Last 24 Hrs  T(C): 36.7 (22 Feb 2023 06:30), Max: 36.7 (22 Feb 2023 03:30)  T(F): 98 (22 Feb 2023 06:30), Max: 98 (22 Feb 2023 03:30)  HR: 115 (22 Feb 2023 06:30) (68 - 144)  BP: 124/83 (22 Feb 2023 06:30) (90/55 - 148/101)  BP(mean): 95 (22 Feb 2023 06:30) (92 - 133)  RR: 19 (22 Feb 2023 06:30) (17 - 24)  SpO2: 98% (22 Feb 2023 06:30) (94% - 100%)    Parameters below as of 22 Feb 2023 03:00  Patient On (Oxygen Delivery Method): nasal cannula  O2 Flow (L/min): 5      NIH SS:  DATE: 2/22/23  TIME:0900  1A: Level of consciousness (0-3):   1B: Questions (0-2):   1C: Commands (0-2):   2: Gaze (0-2):   3: Visual fields (0-3):   4: Facial palsy (0-3):   MOTOR:  5A: Left arm motor drift (0-4):   5B: Right arm motor drift (0-4):   6A: Left leg motor drift (0-4):   6B: Right leg motor drift (0-4):   7: Limb ataxia (0-2):   SENSORY:  8: Sensation (0-2):   SPEECH:  9: Language (0-3):   10: Dysarthria (0-2):   EXTINCTION:  11: Extinction/inattention (0-2):     TOTAL SCORE:     General: No acute distress    NEUROLOGICAL EXAM: INCOMPLETE  Mental status: Awake, alert, oriented x3, speech fluent, follows commands, no neglect, normal memory   Cranial Nerves: No facial asymmetry, no nystagmus, no dysarthria,  tongue midline  Motor exam: Normal tone, no drift, 5/5 RUE, 5/5 RLE, 5/5 LUE, 5/5 LLE, normal fine finger movements.  Sensation: Intact to light touch   Coordination/ Gait: No dysmetria, gait not tested    Imaging (reviewed by me)   CT Angio Neck w/ IV Cont (02.22.23 @ 02:28)   IMPRESSION:    CTA HEAD/NECK:  Occlusion of a superiorly oriented left M3 subdivision identified.    Incidentally noted small right pleural effusion.    No proximal large vessel occlusion, aneurysm, or dissection present.    CT PERFUSION:  Motion degraded.    Findings compatible with focal acute ischemia in the left MCA territory,   with questionable small surrounding ischemic penumbra (17 mL).    Critical findings above related to suspected acute ischemia in the left   MCA territory were relayed by telephone by the ED radiologist on call,   Jone Alvarez MD, with the emergency Department physician's assistant   caring for the patient at the time of imaging, Esha ARTEAGA, at   2:35 AM on 2/22/2023.       CT Perfusion w/ Maps w/ IV Cont (02.22.23 @ 02:22)   CTA HEAD/NECK:  Occlusion of a superiorly oriented left M3 subdivision identified.    Incidentally noted small right pleural effusion.    No proximal large vessel occlusion, aneurysm, or dissection present.    CT PERFUSION:  Motion degraded.    Findings compatible with focal acute ischemia in the left MCA territory,   with questionable small surrounding ischemic penumbra (17 mL).    Critical findings above related to suspected acute ischemia in the left   MCA territory were relayed by telephone by the ED radiologist on call,   Jone Alvarez MD, with the emergency Department physician's assistant   caring for the patient at the time of imaging, Esha ARTEAGA, at   2:35 AM on 2/22/2023.      TTE Echo Complete w/o Contrast w/ Doppler (02.18.23 @ 12:04)     Summary:   1. Left ventricular ejection fraction, by visual estimation, is 50 to   55%.   2. Normal global left ventricular systolic function.   3. The mitral in-flow pattern reveals no discernable A-wave, therefore   no comment on diastolic function can be made.   4. Normal right ventricular size and function.   5. Estimated pulmonary artery systolic pressure is 39.1 mmHg assuming a   right atrial pressure of 8 mmHg, which is consistent with borderline   pulmonary hypertension.   6. Mildly enlarged right atrium.   7. Moderately enlarged left atrium.   8. Mild mitral annular calcification.   9. Trace mitral valve regurgitation.  10. Moderate tricuspid regurgitation.  11. Sclerotic aortic valvewith normal opening.  12. Small to moderate size circumferential pericardial effusion (measures   up to 1.3 cm) with fibrinous thickened layering over the RV. No   echocardiographic evidence of tamponade.  13. Compared to the prior TTE study from 1/30/23, new pericardial   effusion noted. Findings reported to Dr. Flynn.       US Duplex Venous Lower Ext Complete, Bilateral (01.30.23 @ 18:21)   RIGHT:  Normal compressibility of the RIGHT common femoral, femoral and popliteal   veins.  Doppler examination shows normal spontaneous and phasic flow.  No RIGHT calf vein thrombosis is detected.  LEFT:  Normal compressibility of the LEFT common femoral, femoral and popliteal   veins.  Doppler examination shows normal spontaneous and phasic flow.  No LEFT calf vein thrombosis is detected.  IMPRESSION:  No evidence of deep venous thrombosis in either lower extremity.             Translation by family at bedside as needed. Services deferred.  HPI:  72yr old Georgian speaking female with PMH hypothyroidism, cerebral aneurysm s/p coiling w/ post operative course atrial fibrillation, pericarditis possibly related non-covid coronaviral illness, afib RVR.  Stroke code 2/22/23 0405 patient found to be dysarthric, aphasic with CTA head showed nonhemorrhagic acute ischemia in the left MCA territory with questionable small surrounding ischemic pneumbra.     ROS: unable to be obtained from patient. Family notes shes upset at times.     PAST MEDICAL & SURGICAL HISTORY:  High cholesterol  Hypothyroidism  H/O knee surgery  Brain aneurysm    Allergies  No Known Allergies    FAMILY HISTORY:  No pertinent family history    Social History:    MEDICATIONS  (STANDING):  atorvastatin 80 milliGRAM(s) Oral at bedtime  benzonatate 100 milliGRAM(s) Oral three times a day  calcium carbonate    500 mG (Tums) Chewable 1 Tablet(s) Chew once  colchicine 0.6 milliGRAM(s) Oral two times a day  diltiazem    Tablet 90 milliGRAM(s) Oral every 6 hours  diltiazem    milliGRAM(s) Oral daily  enoxaparin Injectable 40 milliGRAM(s) SubCutaneous every 24 hours  ibuprofen  Tablet. 800 milliGRAM(s) Oral three times a day  levothyroxine 50 MICROGram(s) Oral daily  metoprolol succinate  milliGRAM(s) Oral daily  pantoprazole    Tablet 40 milliGRAM(s) Oral two times a day  polyethylene glycol 3350 17 Gram(s) Oral daily  predniSONE   Tablet 60 milliGRAM(s) Oral daily  senna 2 Tablet(s) Oral at bedtime    MEDICATIONS  (PRN):  guaiFENesin Oral Liquid (Sugar-Free) 100 milliGRAM(s) Oral every 6 hours PRN Cough      CBC Full  -  ( 22 Feb 2023 01:50 )  WBC Count : 6.48 K/uL  RBC Count : 4.70 M/uL  Hemoglobin : 10.8 g/dL  Hematocrit : 33.6 %  Platelet Count - Automated : 427 K/uL  Mean Cell Volume : 71.5 fl  Mean Cell Hemoglobin : 23.0 pg  Mean Cell Hemoglobin Concentration : 32.1 gm/dL  Auto Neutrophil # : 4.30 K/uL  Auto Lymphocyte # : 1.52 K/uL  Auto Monocyte # : 0.57 K/uL  Auto Eosinophil # : 0.00 K/uL  Auto Basophil # : 0.00 K/uL  Auto Neutrophil % : 66.3 %  Auto Lymphocyte % : 23.5 %  Auto Monocyte % : 8.8 %  Auto Eosinophil % : 0.0 %  Auto Basophil % : 0.0 %    02-22    137  |  101  |  29.8<H>  ----------------------------<  153<H>  4.0   |  22.0  |  0.89    Ca    8.7      22 Feb 2023 01:50    TPro  7.2  /  Alb  3.3  /  TBili  0.2<L>  /  DBili  x   /  AST  18  /  ALT  27  /  AlkPhos  88  02-22        Vital Signs Last 24 Hrs  T(C): 36.7 (22 Feb 2023 06:30), Max: 36.7 (22 Feb 2023 03:30)  T(F): 98 (22 Feb 2023 06:30), Max: 98 (22 Feb 2023 03:30)  HR: 115 (22 Feb 2023 06:30) (68 - 144)  BP: 124/83 (22 Feb 2023 06:30) (90/55 - 148/101)  BP(mean): 95 (22 Feb 2023 06:30) (92 - 133)  RR: 19 (22 Feb 2023 06:30) (17 - 24)  SpO2: 98% (22 Feb 2023 06:30) (94% - 100%)    Parameters below as of 22 Feb 2023 03:00  Patient On (Oxygen Delivery Method): nasal cannula  O2 Flow (L/min): 5      NIH SS:  DATE: 2/22/23  TIME: 1000  1A: Level of consciousness (0-3):   1B: Questions (0-2): 2  1C: Commands (0-2):   2: Gaze (0-2):   3: Visual fields (0-3):   4: Facial palsy (0-3):   MOTOR:  5A: Left arm motor drift (0-4):   5B: Right arm motor drift (0-4):   6A: Left leg motor drift (0-4):   6B: Right leg motor drift (0-4):   7: Limb ataxia (0-2):   SENSORY:  8: Sensation (0-2):   SPEECH:  9: Language (0-3): 3  10: Dysarthria (0-2): 2  EXTINCTION:  11: Extinction/inattention (0-2):     TOTAL SCORE: 7    General: patient sitting in a bed, appears upset, unable to speak    NEUROLOGICAL EXAM:   Mental status: Awake, alert, follows simple commands  Cranial Nerves:  dysarthria, no facial asymmetry, no nystagmus, tongue midline  Motor exam: Normal tone, no drift, 5/5 RUE, 5/5 RLE, 5/5 LUE, 5/5 LLE  Sensation: Intact to light touch   Coordination/ Gait: No dysmetria, gait not tested    Imaging (reviewed by me)    CT Brain Stroke Protocol (02.22.23 @ 02:14)   IMPRESSION:  No large territory acute infarct, intracranial hemorrhage, or mass effect.     (02.22.23 @ 02:28)  IMPRESSION:  CTA HEAD/NECK:  Occlusion of a superiorly oriented left M3 subdivision identified.  Incidentally noted small right pleural effusion.  No proximal large vessel occlusion, aneurysm, or dissection present.  CT PERFUSION:  Motion degraded.  Findings compatible with focal acute ischemia in the left MCA territory,   with questionable small surrounding ischemic penumbra (17 mL).       CT Perfusion w/ Maps w/ IV Cont (02.22.23 @ 02:22)   CTA HEAD/NECK:  Occlusion of a superiorly oriented left M3 subdivision identified.  Incidentally noted small right pleural effusion.  No proximal large vessel occlusion, aneurysm, or dissection present.    CT PERFUSION:  Motion degraded.  Findings compatible with focal acute ischemia in the left MCA territory,   with questionable small surrounding ischemic penumbra (17 mL).    TTE Echo Complete w/o Contrast w/ Doppler (02.18.23 @ 12:04)   Summary:   1. Left ventricular ejection fraction, by visual estimation, is 50 to   55%.   2. Normal global left ventricular systolic function.   3. The mitral in-flow pattern reveals no discernable A-wave, therefore   no comment on diastolic function can be made.   4. Normal right ventricular size and function.   5. Estimated pulmonary artery systolic pressure is 39.1 mmHg assuming a   right atrial pressure of 8 mmHg, which is consistent with borderline   pulmonary hypertension.   6. Mildly enlarged right atrium.   7. Moderately enlarged left atrium.   8. Mild mitral annular calcification.   9. Trace mitral valve regurgitation.  10. Moderate tricuspid regurgitation.  11. Sclerotic aortic valvewith normal opening.  12. Small to moderate size circumferential pericardial effusion (measures   up to 1.3 cm) with fibrinous thickened layering over the RV. No   echocardiographic evidence of tamponade.  13. Compared to the prior TTE study from 1/30/23, new pericardial   effusion noted. Findings reported to Dr. Flynn.       US Duplex Venous Lower Ext Complete, Bilateral (01.30.23 @ 18:21)   RIGHT:  Normal compressibility of the RIGHT common femoral, femoral and popliteal   veins.  Doppler examination shows normal spontaneous and phasic flow.  No RIGHT calf vein thrombosis is detected.  LEFT:  Normal compressibility of the LEFT common femoral, femoral and popliteal   veins.  Doppler examination shows normal spontaneous and phasic flow.  No LEFT calf vein thrombosis is detected.  IMPRESSION:  No evidence of deep venous thrombosis in either lower extremity.     TTE Echo Complete w/o Contrast w/ Doppler (02.18.23 @ 12:04)   Summary:   1. Left ventricular ejection fraction, by visual estimation, is 50 to   55%.   2. Normal global left ventricular systolic function.   3. The mitral in-flow pattern reveals no discernable A-wave, therefore   no comment on diastolic function can be made.   4. Normal right ventricular size and function.   5. Estimated pulmonary artery systolic pressure is 39.1 mmHg assuming a   right atrial pressure of 8 mmHg, which is consistent with borderline   pulmonary hypertension.   6. Mildly enlarged right atrium.   7. Moderately enlarged left atrium.   8. Mild mitral annular calcification.   9. Trace mitral valve regurgitation.  10. Moderate tricuspid regurgitation.  11. Sclerotic aortic valvewith normal opening.  12. Small to moderate size circumferential pericardial effusion (measures   up to 1.3 cm) with fibrinous thickened layering over the RV. No   echocardiographic evidence of tamponade.  13. Compared to the prior TTE study from 1/30/23, new pericardial   effusion noted. Findings reported to Dr. Flynn.             73 yo [place]ian speaking female with PMH hypothyroidism, cerebral aneurysm s/p coiling w/ post operative course atrial fibrillation, pericarditis possibly related corona viral illness, afib RVR.  Stroke code 2/22/23 0405 patient found to be aphasic and on imaging, CTA head showed nonhemorrhagic acute ischemia in the left MCA territory with questionable small surrounding ischemic penumbra.     ROS: unable to be obtained from patient. Family notes shes upset at times.     PAST MEDICAL & SURGICAL HISTORY:  High cholesterol  Hypothyroidism  H/O knee surgery  Brain aneurysm    Allergies  No Known Allergies    FAMILY HISTORY:  No pertinent family history    Social History:    MEDICATIONS  (STANDING):  atorvastatin 80 milliGRAM(s) Oral at bedtime  benzonatate 100 milliGRAM(s) Oral three times a day  calcium carbonate    500 mG (Tums) Chewable 1 Tablet(s) Chew once  colchicine 0.6 milliGRAM(s) Oral two times a day  diltiazem    Tablet 90 milliGRAM(s) Oral every 6 hours  diltiazem    milliGRAM(s) Oral daily  enoxaparin Injectable 40 milliGRAM(s) SubCutaneous every 24 hours  ibuprofen  Tablet. 800 milliGRAM(s) Oral three times a day  levothyroxine 50 MICROGram(s) Oral daily  metoprolol succinate  milliGRAM(s) Oral daily  pantoprazole    Tablet 40 milliGRAM(s) Oral two times a day  polyethylene glycol 3350 17 Gram(s) Oral daily  predniSONE   Tablet 60 milliGRAM(s) Oral daily  senna 2 Tablet(s) Oral at bedtime    MEDICATIONS  (PRN):  guaiFENesin Oral Liquid (Sugar-Free) 100 milliGRAM(s) Oral every 6 hours PRN Cough      CBC Full  -  ( 22 Feb 2023 01:50 )  WBC Count : 6.48 K/uL  RBC Count : 4.70 M/uL  Hemoglobin : 10.8 g/dL  Hematocrit : 33.6 %  Platelet Count - Automated : 427 K/uL  Mean Cell Volume : 71.5 fl  Mean Cell Hemoglobin : 23.0 pg  Mean Cell Hemoglobin Concentration : 32.1 gm/dL  Auto Neutrophil # : 4.30 K/uL  Auto Lymphocyte # : 1.52 K/uL  Auto Monocyte # : 0.57 K/uL  Auto Eosinophil # : 0.00 K/uL  Auto Basophil # : 0.00 K/uL  Auto Neutrophil % : 66.3 %  Auto Lymphocyte % : 23.5 %  Auto Monocyte % : 8.8 %  Auto Eosinophil % : 0.0 %  Auto Basophil % : 0.0 %    02-22    137  |  101  |  29.8<H>  ----------------------------<  153<H>  4.0   |  22.0  |  0.89    Ca    8.7      22 Feb 2023 01:50    TPro  7.2  /  Alb  3.3  /  TBili  0.2<L>  /  DBili  x   /  AST  18  /  ALT  27  /  AlkPhos  88  02-22        Vital Signs Last 24 Hrs  T(C): 36.7 (22 Feb 2023 06:30), Max: 36.7 (22 Feb 2023 03:30)  T(F): 98 (22 Feb 2023 06:30), Max: 98 (22 Feb 2023 03:30)  HR: 115 (22 Feb 2023 06:30) (68 - 144)  BP: 124/83 (22 Feb 2023 06:30) (90/55 - 148/101)  BP(mean): 95 (22 Feb 2023 06:30) (92 - 133)  RR: 19 (22 Feb 2023 06:30) (17 - 24)  SpO2: 98% (22 Feb 2023 06:30) (94% - 100%)    Parameters below as of 22 Feb 2023 03:00  Patient On (Oxygen Delivery Method): nasal cannula  O2 Flow (L/min): 5      NIH SS:  DATE: 2/22/23  TIME: 1000  1A: Level of consciousness (0-3):   1B: Questions (0-2): 2  1C: Commands (0-2):   2: Gaze (0-2):   3: Visual fields (0-3):   4: Facial palsy (0-3):   MOTOR:  5A: Left arm motor drift (0-4):   5B: Right arm motor drift (0-4):   6A: Left leg motor drift (0-4):   6B: Right leg motor drift (0-4):   7: Limb ataxia (0-2):   SENSORY:  8: Sensation (0-2):   SPEECH:  9: Language (0-3): 3  10: Dysarthria (0-2): 2  EXTINCTION:  11: Extinction/inattention (0-2):     TOTAL SCORE: 7    General: patient sitting in a bed, appears upset, unable to speak    NEUROLOGICAL EXAM:   Mental status: Awake, alert, follows simple commands, non verbal.   Cranial Nerves:  no facial asymmetry, no nystagmus, tongue midline  Motor exam: Normal tone, no drift, 5/5 RUE, 5/5 RLE, 5/5 LUE, 5/5 LLE  Sensation: Intact to light touch   Coordination/ Gait: No dysmetria, gait not tested    Imaging (reviewed by me)    CT Brain Stroke Protocol (02.22.23 @ 02:14)   IMPRESSION:  No large territory acute infarct, intracranial hemorrhage, or mass effect.     (02.22.23 @ 02:28)  IMPRESSION:  CTA HEAD/NECK:  Occlusion of a superiorly oriented left M3 subdivision identified.  Incidentally noted small right pleural effusion.  No proximal large vessel occlusion, aneurysm, or dissection present.  CT PERFUSION:  Motion degraded.  Findings compatible with focal acute ischemia in the left MCA territory,   with questionable small surrounding ischemic penumbra (17 mL).       CT Perfusion w/ Maps w/ IV Cont (02.22.23 @ 02:22)   CTA HEAD/NECK:  Occlusion of a superiorly oriented left M3 subdivision identified.  Incidentally noted small right pleural effusion.  No proximal large vessel occlusion, aneurysm, or dissection present.    CT PERFUSION:  Motion degraded.  Findings compatible with focal acute ischemia in the left MCA territory,   with questionable small surrounding ischemic penumbra (17 mL).    TTE Echo Complete w/o Contrast w/ Doppler (02.18.23 @ 12:04)   Summary:   1. Left ventricular ejection fraction, by visual estimation, is 50 to   55%.   2. Normal global left ventricular systolic function.   3. The mitral in-flow pattern reveals no discernable A-wave, therefore   no comment on diastolic function can be made.   4. Normal right ventricular size and function.   5. Estimated pulmonary artery systolic pressure is 39.1 mmHg assuming a   right atrial pressure of 8 mmHg, which is consistent with borderline   pulmonary hypertension.   6. Mildly enlarged right atrium.   7. Moderately enlarged left atrium.   8. Mild mitral annular calcification.   9. Trace mitral valve regurgitation.  10. Moderate tricuspid regurgitation.  11. Sclerotic aortic valvewith normal opening.  12. Small to moderate size circumferential pericardial effusion (measures   up to 1.3 cm) with fibrinous thickened layering over the RV. No   echocardiographic evidence of tamponade.  13. Compared to the prior TTE study from 1/30/23, new pericardial   effusion noted. Findings reported to Dr. Flynn.       US Duplex Venous Lower Ext Complete, Bilateral (01.30.23 @ 18:21)   RIGHT:  Normal compressibility of the RIGHT common femoral, femoral and popliteal   veins.  Doppler examination shows normal spontaneous and phasic flow.  No RIGHT calf vein thrombosis is detected.  LEFT:  Normal compressibility of the LEFT common femoral, femoral and popliteal   veins.  Doppler examination shows normal spontaneous and phasic flow.  No LEFT calf vein thrombosis is detected.  IMPRESSION:  No evidence of deep venous thrombosis in either lower extremity.     TTE Echo Complete w/o Contrast w/ Doppler (02.18.23 @ 12:04)   Summary:   1. Left ventricular ejection fraction, by visual estimation, is 50 to   55%.   2. Normal global left ventricular systolic function.   3. The mitral in-flow pattern reveals no discernable A-wave, therefore   no comment on diastolic function can be made.   4. Normal right ventricular size and function.   5. Estimated pulmonary artery systolic pressure is 39.1 mmHg assuming a   right atrial pressure of 8 mmHg, which is consistent with borderline   pulmonary hypertension.   6. Mildly enlarged right atrium.   7. Moderately enlarged left atrium.   8. Mild mitral annular calcification.   9. Trace mitral valve regurgitation.  10. Moderate tricuspid regurgitation.  11. Sclerotic aortic valvewith normal opening.  12. Small to moderate size circumferential pericardial effusion (measures   up to 1.3 cm) with fibrinous thickened layering over the RV. No   echocardiographic evidence of tamponade.  13. Compared to the prior TTE study from 1/30/23, new pericardial   effusion noted. Findings reported to Dr. Flynn.

## 2023-02-22 NOTE — PROGRESS NOTE ADULT - ASSESSMENT
72 yr old female with hypothyroidism, cerebral aneurysm s/p coiling, atrial fibrillation, recent pericarditis presented with complaints of left sided chest pain and cough. Noted to be in atrial fibrillation with RVR in ED. She was evaluated by cardiology in ED, rate control medications were adjusted. CT chest showed no pneumonia, small effusion, Pt worked up for chest pain and rapid atrial fibrillation. ACS was ruled out. Ibuprofen given in addition to Colchicine for pericarditis.       1- Left MCA infarct   pt is aphasic with right sided weakness   NPO failed swallow evaluation   s/p ASA per rectum in am after code stroke   stroke nbeuro consulte appreciated   rec MR of brain if neg can start iv heparin no bolus   keep ptt 50-70 range risk of hemorrhagic consversion      2-Atrial fibrillation with RVR   on diltiazem , metoprolol  off AC due to pericardial effusion per cardio and prior medical team   cardiology follow up   BP si soft may need to add dig for rate control   cont telemetry     3-Acute pericarditiswith associated pericardial effusion without tamponade physiology   repeat TTE 2/18 with increase pericardial effusion   Held Lovenox full dose and switched to prophylaxis dose per cardio   will get repeat TTE ,since may need to start AC   also on steroids, prednisone 60 mg for 3 days then taper over 4 weeks on DC   Pantoprazole for GI ppx     4-Hypothyroidism   Continue Synthroid     DVT ppx: Lovenox     Discussed with family at the bedside     acute due to stroke , rapid atrial fib , NPO  72 yr old female with hypothyroidism, cerebral aneurysm s/p coiling, atrial fibrillation, recent pericarditis presented with complaints of left sided chest pain and cough. Noted to be in atrial fibrillation with RVR in ED. She was evaluated by cardiology in ED, rate control medications were adjusted. CT chest showed no pneumonia, small effusion, Pt worked up for chest pain and rapid atrial fibrillation. ACS was ruled out. Ibuprofen given in addition to Colchicine for pericarditis.       1- Left MCA infarct   pt is aphasic with right sided weakness   NPO failed swallow evaluation   s/p ASA per rectum in am after code stroke   stroke nbeuro consulte appreciated   rec MR of brain if neg can start iv heparin no bolus   keep ptt 50-70 range risk of hemorrhagic consversion      2-Atrial fibrillation with RVR   on diltiazem , metoprolol  off AC due to pericardial effusion per cardio and prior medical team   cardiology follow up   BP si soft may need to add dig for rate control   cont telemetry     3-Acute pericarditiswith associated pericardial effusion without tamponade physiology   repeat TTE 2/18 with increase pericardial effusion   Held Lovenox full dose and switched to prophylaxis dose per cardio   will get repeat TTE ,since may need to start AC   also on steroids, prednisone 60 mg for 3 days then taper over 4 weeks on DC   Pantoprazole for GI ppx     4-Hypothyroidism   Continue Synthroid     DVT ppx: Lovenox     Discussed with family at the bedside     acute due to stroke , rapid atrial fib , NPO

## 2023-02-22 NOTE — CONSULT NOTE ADULT - ASSESSMENT
ASSESSMENT: 72 yr old female with PMX of hypothyroidism, cerebral aneurysm s/p coiling, atrial fibrillation, recent pericarditis complicated w/ pericardial effusion w/o tamponade presented w/ expressive aphasia at 1:30 am. NIH =7   CTA head/neck showed occlusion of a superiorly oriented left M3 subdivision. Findings compatible with focal acute ischemia in the left MCA territory, with questionable small surrounding ischemic penumbra (17 mL).      NEURO: Continue close monitoring for neurologic deterioration, permissive HTN with -150. Titrate statin to LDL goal less than 70, MRI Brain w/o, MRA Head w/o and Neck w/contrast. ECHO. Physical therapy/OT/Speech eval/treatment.     ANTITHROMBOTIC THERAPY: pending imaging results    PULMONARY:  protecting airway, saturating well on room air    CARDIOVASCULAR: check TTE, cardiac monitoring, cardiology consult appreciated                             GASTROINTESTINAL:  dysphagia screen pending     Diet: NPO    RENAL: BUN/Cr stable, monitor urine output, keep hydrated as tolerated     Na Goal:  135-145        HEMATOLOGY: H/H 10.8/33.6, Platelets 427, all age appropriate and malignancy screening     DVT ppx: Heparin s.c [] LMWH [x]     ID: afebrile, no leukocytosis, monitor for signs of infection      DISPOSITION: Rehab or home depending on PT eval once stable and workup is complete      CORE MEASURES:        Admission NIHSS: 7     TPA: [] YES [x] NO      LDL/HDL/A1C:     Depression Screen:      Statin Therapy: Atorvastatin 80 mg daily     Dysphagia Screen: [] PASS [] FAIL     Smoking [] YES [] NO      Afib [x] YES [] NO     Stroke Education [x] YES [] NO     ASSESSMENT: 72 yr old female with PMX of hypothyroidism, cerebral aneurysm s/p coiling, atrial fibrillation, recent pericarditis w/ pericardial effusion w/o tamponade subsequently found to have expressive aphasia in the early monring of 2/22/23.  CT head at the time demonstrated no acute infarction or hemorrhage.   CTA head/neck showed occlusion of a superiorly oriented left M3 subdivision. Findings compatible with focal acute ischemia in the left MCA territory, likely cardioembolic in setting of atrial fibrillation.      NEURO: Continue close monitoring for neurologic deterioration, q 2 stroke neuro checks, permissive HTN with SBP goal 130-160 mmHg, avoid hypotension and rapid fluctuations. Titrate statin to LDL goal less than 70, MRI Brain pending. Physical therapy/OT/Speech eval/treatment.     ANTITHROMBOTIC THERAPY: ASA 81mg daily or 300mg per rectum if NPO for now, pending findings of neuro-imaging (MRI pending) or CT head if unable to be completed will determine timeline for initiation of anticoagulation with Heparin gtt for now.  If there is a urgent indication (i.e. cardiac thrombus), would need to further discuss risks/benefits with HCP at that time , and if initiated avoid boluses and maintain likely PTT 50-70, again would need repeat neuro imaging prior for review by neurology.     PULMONARY:  protecting airway, saturating well on room air    CARDIOVASCULAR: TTE as noted, would repeat to ensure no new cardiac thrombus, cardiac monitoring, cardiology following along, ensure rate control.                          GASTROINTESTINAL:  dysphagia screen pending     Diet: NPO    RENAL: BUN elevated/Cr within range,   monitor urine output, keep hydrated as tolerated     Na Goal:  135-145        HEMATOLOGY: H/H 10.8/33.6, Platelets 427, close monitoring of thrombocytosis and anemia, patient should have all age and risk appropriate malignancy screenings with PCP or sooner if evidence or high risk for malignancy.      DVT ppx: Heparin s.c [] LMWH [x]     ID: afebrile, no leukocytosis, monitor for signs of infection    DISPOSITION: Rehab or home depending on PT eval once stable and workup is complete      CORE MEASURES:        Admission NIHSS: 7     TPA: [] YES [x] NO      LDL/HDL/A1C:     Depression Screen:      Statin Therapy: Atorvastatin 80 mg daily     Dysphagia Screen: [] PASS [] FAIL     Smoking [] YES [] NO      Afib [x] YES [] NO     Stroke Education [x] YES [] NO     ASSESSMENT: 72 yr old female with PMX of hypothyroidism, cerebral aneurysm s/p coiling, atrial fibrillation, recent pericarditis w/ pericardial effusion w/o tamponade subsequently found to have expressive aphasia in the early monring of 2/22/23.  CT head at the time demonstrated no acute infarction or hemorrhage.   CTA head/neck showed occlusion of a superiorly oriented left M3 subdivision. Findings compatible with focal acute ischemia in the left MCA territory, likely cardioembolic in setting of atrial fibrillation.  - Pt did not qualify for TNK due to last known well was over 4.5 hours. not thrombectomy candidate as pt's thrombus is in M3 branch which is too distal to intervene on    NEURO: Continue close monitoring for neurologic deterioration, q 2 stroke neuro checks, permissive HTN with SBP goal 130-160 mmHg, avoid hypotension and rapid fluctuations. Titrate statin to LDL goal less than 70, MRI Brain pending. Physical therapy/OT/Speech eval/treatment.     ANTITHROMBOTIC THERAPY: ASA 81mg daily or 300mg per rectum if NPO for now, pending findings of neuro-imaging (MRI pending) or CT head if unable to be completed will determine timeline for initiation of anticoagulation with Heparin gtt for now.  If there is a urgent indication (i.e. cardiac thrombus), would need to further discuss risks/benefits with HCP at that time , and if initiated avoid boluses and maintain likely PTT 50-70, again would need repeat neuro imaging prior for review by neurology.     PULMONARY:  protecting airway, saturating well on room air    CARDIOVASCULAR: TTE as noted, would repeat to ensure no new cardiac thrombus, cardiac monitoring, cardiology following along, ensure rate control.                          GASTROINTESTINAL:  dysphagia screen pending     Diet: NPO    RENAL: BUN elevated/Cr within range,   monitor urine output, keep hydrated as tolerated     Na Goal:  135-145        HEMATOLOGY: H/H 10.8/33.6, Platelets 427, close monitoring of thrombocytosis and anemia, patient should have all age and risk appropriate malignancy screenings with PCP or sooner if evidence or high risk for malignancy.      DVT ppx: Heparin s.c [] LMWH [x]     ID: afebrile, no leukocytosis, monitor for signs of infection    DISPOSITION: Rehab or home depending on PT eval once stable and workup is complete      CORE MEASURES:        Admission NIHSS: 7     TPA: [] YES [x] NO      LDL/HDL/A1C:     Depression Screen:      Statin Therapy: Atorvastatin 80 mg daily     Dysphagia Screen: [] PASS [] FAIL     Smoking [] YES [] NO      Afib [x] YES [] NO     Stroke Education [x] YES [] NO

## 2023-02-22 NOTE — DIETITIAN INITIAL EVALUATION ADULT - OTHER INFO
72yr old female with cerebral aneurysm s/p coiling, atrial fibrillation, recent pericarditis admitted with acute pericarditis with associated pericardial effusion, Atrial fibrillation with RVR, complicated with LMCA stroke.    Pt aphasic with right sided weakness. Unable to participate in interview at this time. Prior to NPO status, pt had been with good po intake per flowsheet. Pt recently admitted earlier this month and reported UBW ~146 lbs; current weight 146.8 lbs and appears stable. Aware seen by SLP today for swallow evaluation, however, unable to assess due to irregular heart rates & increased RR. RD to follow up.    72yr old female with cerebral aneurysm s/p coiling, atrial fibrillation, recent pericarditis admitted with acute pericarditis with associated pericardial effusion, Atrial fibrillation with RVR, complicated with LMCA stroke.    Pt aphasic with right sided weakness. Unable to participate in interview at this time. Prior to NPO status, pt had been with good po intake per flowsheet. Pt recently admitted earlier this month and reported UBW ~146 lbs; current weight 146.8 lbs and appears stable. Aware seen by SLP today for swallow evaluation, however, unable to assess due to irregular heart rates & increased RR. Palliative following for GOC. RD to follow up.

## 2023-02-23 LAB
A1C WITH ESTIMATED AVERAGE GLUCOSE RESULT: 6.1 % — HIGH (ref 4–5.6)
ANION GAP SERPL CALC-SCNC: 10 MMOL/L — SIGNIFICANT CHANGE UP (ref 5–17)
APTT BLD: 44.4 SEC — HIGH (ref 27.5–35.5)
APTT BLD: 55 SEC — HIGH (ref 27.5–35.5)
APTT BLD: 64.6 SEC — HIGH (ref 27.5–35.5)
BUN SERPL-MCNC: 11.9 MG/DL — SIGNIFICANT CHANGE UP (ref 8–20)
CALCIUM SERPL-MCNC: 8.3 MG/DL — LOW (ref 8.4–10.5)
CHLORIDE SERPL-SCNC: 106 MMOL/L — SIGNIFICANT CHANGE UP (ref 96–108)
CHOLEST SERPL-MCNC: 152 MG/DL — SIGNIFICANT CHANGE UP
CO2 SERPL-SCNC: 30 MMOL/L — HIGH (ref 22–29)
CREAT SERPL-MCNC: 0.49 MG/DL — LOW (ref 0.5–1.3)
EGFR: 101 ML/MIN/1.73M2 — SIGNIFICANT CHANGE UP
ESTIMATED AVERAGE GLUCOSE: 128 MG/DL — HIGH (ref 68–114)
GLUCOSE BLDC GLUCOMTR-MCNC: 108 MG/DL — HIGH (ref 70–99)
GLUCOSE BLDC GLUCOMTR-MCNC: 117 MG/DL — HIGH (ref 70–99)
GLUCOSE BLDC GLUCOMTR-MCNC: 117 MG/DL — HIGH (ref 70–99)
GLUCOSE SERPL-MCNC: 116 MG/DL — HIGH (ref 70–99)
HCT VFR BLD CALC: 36.1 % — SIGNIFICANT CHANGE UP (ref 34.5–45)
HCT VFR BLD CALC: 37 % — SIGNIFICANT CHANGE UP (ref 34.5–45)
HDLC SERPL-MCNC: 46 MG/DL — LOW
HGB BLD-MCNC: 10.9 G/DL — LOW (ref 11.5–15.5)
HGB BLD-MCNC: 11.4 G/DL — LOW (ref 11.5–15.5)
LIPID PNL WITH DIRECT LDL SERPL: 77 MG/DL — SIGNIFICANT CHANGE UP
MCHC RBC-ENTMCNC: 22.4 PG — LOW (ref 27–34)
MCHC RBC-ENTMCNC: 22.7 PG — LOW (ref 27–34)
MCHC RBC-ENTMCNC: 30.2 GM/DL — LOW (ref 32–36)
MCHC RBC-ENTMCNC: 30.8 GM/DL — LOW (ref 32–36)
MCV RBC AUTO: 73.7 FL — LOW (ref 80–100)
MCV RBC AUTO: 74.1 FL — LOW (ref 80–100)
NON HDL CHOLESTEROL: 106 MG/DL — SIGNIFICANT CHANGE UP
PLATELET # BLD AUTO: 412 K/UL — HIGH (ref 150–400)
PLATELET # BLD AUTO: 414 K/UL — HIGH (ref 150–400)
POTASSIUM SERPL-MCNC: 3.3 MMOL/L — LOW (ref 3.5–5.3)
POTASSIUM SERPL-SCNC: 3.3 MMOL/L — LOW (ref 3.5–5.3)
RBC # BLD: 4.87 M/UL — SIGNIFICANT CHANGE UP (ref 3.8–5.2)
RBC # BLD: 5.02 M/UL — SIGNIFICANT CHANGE UP (ref 3.8–5.2)
RBC # FLD: 16.3 % — HIGH (ref 10.3–14.5)
RBC # FLD: 16.3 % — HIGH (ref 10.3–14.5)
SODIUM SERPL-SCNC: 146 MMOL/L — HIGH (ref 135–145)
TRIGL SERPL-MCNC: 146 MG/DL — SIGNIFICANT CHANGE UP
WBC # BLD: 4.38 K/UL — SIGNIFICANT CHANGE UP (ref 3.8–10.5)
WBC # BLD: 4.62 K/UL — SIGNIFICANT CHANGE UP (ref 3.8–10.5)
WBC # FLD AUTO: 4.38 K/UL — SIGNIFICANT CHANGE UP (ref 3.8–10.5)
WBC # FLD AUTO: 4.62 K/UL — SIGNIFICANT CHANGE UP (ref 3.8–10.5)

## 2023-02-23 PROCEDURE — 99232 SBSQ HOSP IP/OBS MODERATE 35: CPT

## 2023-02-23 PROCEDURE — 99233 SBSQ HOSP IP/OBS HIGH 50: CPT

## 2023-02-23 PROCEDURE — 93308 TTE F-UP OR LMTD: CPT | Mod: 26

## 2023-02-23 RX ORDER — POTASSIUM CHLORIDE 20 MEQ
10 PACKET (EA) ORAL
Refills: 0 | Status: DISCONTINUED | OUTPATIENT
Start: 2023-02-23 | End: 2023-02-23

## 2023-02-23 RX ORDER — ALPRAZOLAM 0.25 MG
0.25 TABLET ORAL ONCE
Refills: 0 | Status: DISCONTINUED | OUTPATIENT
Start: 2023-02-23 | End: 2023-02-26

## 2023-02-23 RX ORDER — SODIUM CHLORIDE 9 MG/ML
500 INJECTION INTRAMUSCULAR; INTRAVENOUS; SUBCUTANEOUS ONCE
Refills: 0 | Status: COMPLETED | OUTPATIENT
Start: 2023-02-23 | End: 2023-02-23

## 2023-02-23 RX ORDER — DIGOXIN 250 MCG
125 TABLET ORAL DAILY
Refills: 0 | Status: DISCONTINUED | OUTPATIENT
Start: 2023-02-24 | End: 2023-02-24

## 2023-02-23 RX ORDER — SODIUM CHLORIDE 9 MG/ML
1000 INJECTION, SOLUTION INTRAVENOUS
Refills: 0 | Status: COMPLETED | OUTPATIENT
Start: 2023-02-23 | End: 2023-02-23

## 2023-02-23 RX ORDER — HEPARIN SODIUM 5000 [USP'U]/ML
750 INJECTION INTRAVENOUS; SUBCUTANEOUS
Qty: 25000 | Refills: 0 | Status: DISCONTINUED | OUTPATIENT
Start: 2023-02-23 | End: 2023-02-24

## 2023-02-23 RX ORDER — LEVOTHYROXINE SODIUM 125 MCG
50 TABLET ORAL DAILY
Refills: 0 | Status: DISCONTINUED | OUTPATIENT
Start: 2023-02-23 | End: 2023-02-26

## 2023-02-23 RX ORDER — POTASSIUM CHLORIDE 20 MEQ
40 PACKET (EA) ORAL ONCE
Refills: 0 | Status: COMPLETED | OUTPATIENT
Start: 2023-02-23 | End: 2023-02-23

## 2023-02-23 RX ORDER — ASPIRIN/CALCIUM CARB/MAGNESIUM 324 MG
81 TABLET ORAL DAILY
Refills: 0 | Status: DISCONTINUED | OUTPATIENT
Start: 2023-02-23 | End: 2023-02-26

## 2023-02-23 RX ORDER — DILTIAZEM HCL 120 MG
30 CAPSULE, EXT RELEASE 24 HR ORAL EVERY 6 HOURS
Refills: 0 | Status: DISCONTINUED | OUTPATIENT
Start: 2023-02-23 | End: 2023-02-26

## 2023-02-23 RX ADMIN — SODIUM CHLORIDE 75 MILLILITER(S): 9 INJECTION, SOLUTION INTRAVENOUS at 15:44

## 2023-02-23 RX ADMIN — Medication 0.6 MILLIGRAM(S): at 18:04

## 2023-02-23 RX ADMIN — Medication 125 MICROGRAM(S): at 10:02

## 2023-02-23 RX ADMIN — ATORVASTATIN CALCIUM 80 MILLIGRAM(S): 80 TABLET, FILM COATED ORAL at 21:11

## 2023-02-23 RX ADMIN — PANTOPRAZOLE SODIUM 40 MILLIGRAM(S): 20 TABLET, DELAYED RELEASE ORAL at 10:02

## 2023-02-23 RX ADMIN — HEPARIN SODIUM 750 UNIT(S)/HR: 5000 INJECTION INTRAVENOUS; SUBCUTANEOUS at 07:23

## 2023-02-23 RX ADMIN — HEPARIN SODIUM 7.5 UNIT(S)/HR: 5000 INJECTION INTRAVENOUS; SUBCUTANEOUS at 12:59

## 2023-02-23 RX ADMIN — SODIUM CHLORIDE 500 MILLILITER(S): 9 INJECTION INTRAMUSCULAR; INTRAVENOUS; SUBCUTANEOUS at 20:04

## 2023-02-23 RX ADMIN — HEPARIN SODIUM 750 UNIT(S)/HR: 5000 INJECTION INTRAVENOUS; SUBCUTANEOUS at 02:51

## 2023-02-23 RX ADMIN — Medication 40 MILLIGRAM(S): at 13:01

## 2023-02-23 RX ADMIN — SODIUM CHLORIDE 80 MILLILITER(S): 9 INJECTION, SOLUTION INTRAVENOUS at 18:39

## 2023-02-23 RX ADMIN — Medication 5 MILLIGRAM(S): at 02:46

## 2023-02-23 RX ADMIN — Medication 100 MILLIEQUIVALENT(S): at 13:00

## 2023-02-23 RX ADMIN — Medication 30 MILLIGRAM(S): at 18:03

## 2023-02-23 RX ADMIN — HEPARIN SODIUM 7.5 UNIT(S)/HR: 5000 INJECTION INTRAVENOUS; SUBCUTANEOUS at 19:17

## 2023-02-23 RX ADMIN — SODIUM CHLORIDE 75 MILLILITER(S): 9 INJECTION, SOLUTION INTRAVENOUS at 02:53

## 2023-02-23 RX ADMIN — Medication 81 MILLIGRAM(S): at 18:03

## 2023-02-23 RX ADMIN — Medication 5 MILLIGRAM(S): at 16:10

## 2023-02-23 RX ADMIN — Medication 40 MILLIEQUIVALENT(S): at 18:37

## 2023-02-23 NOTE — SWALLOW BEDSIDE ASSESSMENT ADULT - ORAL PHASE
Within functional limits Within functional limits/Delayed oral transit time Decreased anterior-posterior movement of the bolus/Delayed oral transit time

## 2023-02-23 NOTE — PHYSICAL THERAPY INITIAL EVALUATION ADULT - LIVES WITH, PROFILE
Pt lives in two family home with son and his family, no ELIUD, stairs to sons living space of home/children

## 2023-02-23 NOTE — PHYSICAL THERAPY INITIAL EVALUATION ADULT - LEVEL OF INDEPENDENCE: BED TO CHAIR, REHAB EVAL
Normal vision: sees adequately in most situations; can see medication labels, newsprint
minimum assist (75% patients effort)

## 2023-02-23 NOTE — PROGRESS NOTE ADULT - ASSESSMENT
72yr old woman with cerebral aneurysm s/p coiling, atrial fibrillation, recent pericarditis admitted  pericarditis complicated with LMCA stroke    CVA- LMCA  ASA, heparin started   neuro on board  MRI reviewed    Pericarditis  Afib   patient started on Heparin  Cardio following - rec repeat Echo  monitor HG    Aphasia  SLP eval - rec soft bite size with thin liquids  aspiration precautions  Speech therapy    Encounter for Palliative Care  Patient is Mongolian speaking. Family states she can understand English if spoken slowly  Patient is reported to be independent prior to this admission.  Patient appears cognitive, but limited GOC given aphasia. Able to shakes/nods head appropriately to simple questions and follows simple commands  Passed SLP eval. PT recommending Acute Rehab  Patient is a  with one son Saul. Saul is the main health care surrogate.  PC to monitor hospital course.   72yr old woman with cerebral aneurysm s/p coiling, atrial fibrillation, recent pericarditis admitted  pericarditis complicated with LMCA stroke    CVA- LMCA  ASA, heparin started   neuro on board  MRI reviewed    Pericarditis  Afib   patient started on Heparin  Cardio following - rec repeat Echo  monitor HG    Aphasia  SLP eval - rec soft bite size with thin liquids  aspiration precautions  Speech therapy    Encounter for Palliative Care  Patient is Japanese speaking. Family states she can understand English if spoken slowly  Patient is reported to be independent prior to this admission.  Patient appears cognitive, but limited GOC secondary to  aphasia. Able to shake/nods head appropriately to simple questions and follows simple commands  Just passed SLP eval. PT recommending Acute Rehab  Patient is a  with one son Saul. Saul is the main health care surrogate.  PC to monitor hospital course.

## 2023-02-23 NOTE — PROGRESS NOTE ADULT - SUBJECTIVE AND OBJECTIVE BOX
CC:  Follow up GOC , Symptoms    OVERNIGHT EVENTS:  passes SLP eval    Present Symptoms:   Dyspnea:  No   Nausea/Vomiting:  No  Anxiety:  No   Depression: Unable  Fatigue:  Yes     Loss of appetite:   NA - NPO  Constipation: Not Reported       Pain: No              Character-            Duration-            Location-            Severity-    Pain AD Score:  [email or web address] (press ctrl + left click to view)    Review of Systems: Reviewed as above  Further ROS unable to  obtain due to poor historian      MEDICATIONS  (STANDING):  aspirin Suppository 300 milliGRAM(s) Rectal daily  atorvastatin 80 milliGRAM(s) Oral at bedtime  colchicine 0.6 milliGRAM(s) Oral two times a day  dextrose 5% + sodium chloride 0.9%. 1000 milliLiter(s) (75 mL/Hr) IV Continuous <Continuous>  digoxin  Injectable 125 MICROGram(s) IV Push daily  heparin  Infusion 750 Unit(s)/Hr (7.5 mL/Hr) IV Continuous <Continuous>  levothyroxine Injectable 25 MICROGram(s) IV Push at bedtime  pantoprazole  Injectable 40 milliGRAM(s) IV Push daily  potassium chloride  10 mEq/100 mL IVPB 10 milliEquivalent(s) IV Intermittent every 1 hour    MEDICATIONS  (PRN):  metoprolol tartrate Injectable 5 milliGRAM(s) IV Push every 6 hours PRN if HR over 130 persistent      PHYSICAL EXAM:    Vital Signs Last 24 Hrs  T(C): 36.9 (23 Feb 2023 08:57), Max: 37.1 (22 Feb 2023 22:56)  T(F): 98.4 (23 Feb 2023 08:57), Max: 98.8 (22 Feb 2023 22:56)  HR: 130 (23 Feb 2023 09:57) (88 - 171)  BP: 110/86 (23 Feb 2023 09:57) (105/71 - 144/82)  BP(mean): 91 (23 Feb 2023 09:57) (83 - 109)  RR: 16 (23 Feb 2023 09:57) (14 - 33)  SpO2: 99% (23 Feb 2023 09:57) (92% - 99%)    Parameters below as of 23 Feb 2023 09:57  Patient On (Oxygen Delivery Method): room air    Karnofsky:  40 %  General:  F awake alert NAD      HEENT:  NCAT     Lungs: comfortable  non labored  CV:  RR  GI: soft NTND  MSK: moves all extremities  Skin:  warm/dry  Neuro  awake alert, cognitive - follows simple commands, aphasic  Psych  calm    LABS:                          10.9   4.38  )-----------( 414      ( 23 Feb 2023 06:01 )             36.1     02-23    146<H>  |  106  |  11.9  ----------------------------<  116<H>  3.3<L>   |  30.0<H>  |  0.49<L>    Ca    8.3<L>      23 Feb 2023 06:01    TPro  7.2  /  Alb  3.3  /  TBili  0.2<L>  /  DBili  x   /  AST  18  /  ALT  27  /  AlkPhos  88  02-22    PT/INR - ( 22 Feb 2023 01:50 )   PT: 12.1 sec;   INR: 1.04 ratio         PTT - ( 23 Feb 2023 09:07 )  PTT:55.0 sec    I&O's Summary    22 Feb 2023 07:01  -  23 Feb 2023 07:00  --------------------------------------------------------  IN: 1190 mL / OUT: 1600 mL / NET: -410 mL        RADIOLOGY & ADDITIONAL STUDIES:  < from: MR Head No Cont (02.22.23 @ 17:18) >  ACC: [id number] EXAM:  MR BRAIN   ORDERED BY: NEYMAR KUNZ     PROCEDURE DATE:  02/22/2023          INTERPRETATION:  INDICATION:    Stroke. TIA.  TECHNIQUE:  Multiplanar brain imaging was conducted. T1, T2 and FLAIR   imaging was performed.  Inaddition, diffusion imaging, diffusion   coefficient assessment (ADC) and T2* was incorporated . No contrast   administered.  COMPARISON EXAMINATION:  CT dated 2/22/2023    FINDINGS:    HEMISPHERES: There is diffusion restriction indicative of acute ischemia   in the left MCA territory, with involvement of the left inferior frontal   gyrus and frontal and temporal operculum. There is no evidence of   associated hemorrhage. This study is degraded by motion but is diagnostic.  VENTRICLES:  midline and normal in size  POSTERIOR FOSSA:  The brain stem and cerebellum are unremarkable in   appearance.  No CP angle abnormality is noted.  EXTRA-AXIAL:  No mass or collections are depicted.  VASCULATURE:  The major vessels and venous sinuses are grossly patent.  SINUSES AND MASTOIDS:  Clear.  MISCELLANEOUS:  No orbital or pituitary abnormality noted.  No skull base   lesion suggested.    IMPRESSION:    1)  The MR study confirms a small acute left MCA territory infarct in the   left frontal and temporal opercular region. No associated hemorrhage.  2)  otherwise unremarkable MRI appearance of the brain.    Further clinical correlation and follow-up recommended.    --- End of Report ---    < end of copied text >        ADVANCE DIRECTIVE PREFERENCES   CC:  Follow up GOC , Symptoms    OVERNIGHT EVENTS:  passes SLP eval  family members at bedside    Present Symptoms:   Dyspnea:  No   Nausea/Vomiting:  No  Anxiety:  No   Depression: Unable  Fatigue:  Yes     Loss of appetite:   NA - NPO  Constipation: Not Reported       Pain: No              Character-            Duration-            Location-            Severity-    Pain AD Score:  [email or web address] (press ctrl + left click to view)    Review of Systems: Reviewed as above  Further ROS unable to  obtain due to poor historian      MEDICATIONS  (STANDING):  aspirin Suppository 300 milliGRAM(s) Rectal daily  atorvastatin 80 milliGRAM(s) Oral at bedtime  colchicine 0.6 milliGRAM(s) Oral two times a day  dextrose 5% + sodium chloride 0.9%. 1000 milliLiter(s) (75 mL/Hr) IV Continuous <Continuous>  digoxin  Injectable 125 MICROGram(s) IV Push daily  heparin  Infusion 750 Unit(s)/Hr (7.5 mL/Hr) IV Continuous <Continuous>  levothyroxine Injectable 25 MICROGram(s) IV Push at bedtime  pantoprazole  Injectable 40 milliGRAM(s) IV Push daily  potassium chloride  10 mEq/100 mL IVPB 10 milliEquivalent(s) IV Intermittent every 1 hour    MEDICATIONS  (PRN):  metoprolol tartrate Injectable 5 milliGRAM(s) IV Push every 6 hours PRN if HR over 130 persistent      PHYSICAL EXAM:    Vital Signs Last 24 Hrs  T(C): 36.9 (23 Feb 2023 08:57), Max: 37.1 (22 Feb 2023 22:56)  T(F): 98.4 (23 Feb 2023 08:57), Max: 98.8 (22 Feb 2023 22:56)  HR: 130 (23 Feb 2023 09:57) (88 - 171)  BP: 110/86 (23 Feb 2023 09:57) (105/71 - 144/82)  BP(mean): 91 (23 Feb 2023 09:57) (83 - 109)  RR: 16 (23 Feb 2023 09:57) (14 - 33)  SpO2: 99% (23 Feb 2023 09:57) (92% - 99%)    Parameters below as of 23 Feb 2023 09:57  Patient On (Oxygen Delivery Method): room air    Karnofsky:  40 %  General:  F awake alert NAD      HEENT:  NCAT     Lungs: comfortable  non labored  CV:  RR  GI: soft NTND  MSK: moves all extremities  Skin:  warm/dry  Neuro  awake alert, cognitive - follows simple commands, aphasic  Psych  calm    LABS:                          10.9   4.38  )-----------( 414      ( 23 Feb 2023 06:01 )             36.1     02-23    146<H>  |  106  |  11.9  ----------------------------<  116<H>  3.3<L>   |  30.0<H>  |  0.49<L>    Ca    8.3<L>      23 Feb 2023 06:01    TPro  7.2  /  Alb  3.3  /  TBili  0.2<L>  /  DBili  x   /  AST  18  /  ALT  27  /  AlkPhos  88  02-22    PT/INR - ( 22 Feb 2023 01:50 )   PT: 12.1 sec;   INR: 1.04 ratio         PTT - ( 23 Feb 2023 09:07 )  PTT:55.0 sec    I&O's Summary    22 Feb 2023 07:01  -  23 Feb 2023 07:00  --------------------------------------------------------  IN: 1190 mL / OUT: 1600 mL / NET: -410 mL        RADIOLOGY & ADDITIONAL STUDIES:  < from: MR Head No Cont (02.22.23 @ 17:18) >  ACC: [id number] EXAM:  MR BRAIN   ORDERED BY: NEYMAR KUNZ     PROCEDURE DATE:  02/22/2023          INTERPRETATION:  INDICATION:    Stroke. TIA.  TECHNIQUE:  Multiplanar brain imaging was conducted. T1, T2 and FLAIR   imaging was performed.  Inaddition, diffusion imaging, diffusion   coefficient assessment (ADC) and T2* was incorporated . No contrast   administered.  COMPARISON EXAMINATION:  CT dated 2/22/2023    FINDINGS:    HEMISPHERES: There is diffusion restriction indicative of acute ischemia   in the left MCA territory, with involvement of the left inferior frontal   gyrus and frontal and temporal operculum. There is no evidence of   associated hemorrhage. This study is degraded by motion but is diagnostic.  VENTRICLES:  midline and normal in size  POSTERIOR FOSSA:  The brain stem and cerebellum are unremarkable in   appearance.  No CP angle abnormality is noted.  EXTRA-AXIAL:  No mass or collections are depicted.  VASCULATURE:  The major vessels and venous sinuses are grossly patent.  SINUSES AND MASTOIDS:  Clear.  MISCELLANEOUS:  No orbital or pituitary abnormality noted.  No skull base   lesion suggested.    IMPRESSION:    1)  The MR study confirms a small acute left MCA territory infarct in the   left frontal and temporal opercular region. No associated hemorrhage.  2)  otherwise unremarkable MRI appearance of the brain.    Further clinical correlation and follow-up recommended.    --- End of Report ---    < end of copied text >        ADVANCE DIRECTIVE PREFERENCES

## 2023-02-23 NOTE — PROGRESS NOTE ADULT - SUBJECTIVE AND OBJECTIVE BOX
71 yo Bulgarian speaking female with PMH hypothyroidism, cerebral aneurysm s/p coiling w/ post operative course atrial fibrillation, pericarditis possibly related corona viral illness, afib RVR.  Stroke code 2/22/23 0405 patient found to be aphasic and on imaging, CTA head showed nonhemorrhagic acute ischemia in the left MCA territory with questionable small surrounding ischemic penumbra.     ROS: unable to be obtained from patient. Family notes shes upset at times.     PAST MEDICAL & SURGICAL HISTORY:  High cholesterol  Hypothyroidism  H/O knee surgery  Brain aneurysm    Allergies  No Known Allergies    FAMILY HISTORY:  No pertinent family history    Social History:    MEDICATIONS  (STANDING):  atorvastatin 80 milliGRAM(s) Oral at bedtime  benzonatate 100 milliGRAM(s) Oral three times a day  calcium carbonate    500 mG (Tums) Chewable 1 Tablet(s) Chew once  colchicine 0.6 milliGRAM(s) Oral two times a day  diltiazem    Tablet 90 milliGRAM(s) Oral every 6 hours  diltiazem    milliGRAM(s) Oral daily  enoxaparin Injectable 40 milliGRAM(s) SubCutaneous every 24 hours  ibuprofen  Tablet. 800 milliGRAM(s) Oral three times a day  levothyroxine 50 MICROGram(s) Oral daily  metoprolol succinate  milliGRAM(s) Oral daily  pantoprazole    Tablet 40 milliGRAM(s) Oral two times a day  polyethylene glycol 3350 17 Gram(s) Oral daily  predniSONE   Tablet 60 milliGRAM(s) Oral daily  senna 2 Tablet(s) Oral at bedtime    MEDICATIONS  (PRN):  guaiFENesin Oral Liquid (Sugar-Free) 100 milliGRAM(s) Oral every 6 hours PRN Cough      CBC Full  -  ( 22 Feb 2023 01:50 )  WBC Count : 6.48 K/uL  RBC Count : 4.70 M/uL  Hemoglobin : 10.8 g/dL  Hematocrit : 33.6 %  Platelet Count - Automated : 427 K/uL  Mean Cell Volume : 71.5 fl  Mean Cell Hemoglobin : 23.0 pg  Mean Cell Hemoglobin Concentration : 32.1 gm/dL  Auto Neutrophil # : 4.30 K/uL  Auto Lymphocyte # : 1.52 K/uL  Auto Monocyte # : 0.57 K/uL  Auto Eosinophil # : 0.00 K/uL  Auto Basophil # : 0.00 K/uL  Auto Neutrophil % : 66.3 %  Auto Lymphocyte % : 23.5 %  Auto Monocyte % : 8.8 %  Auto Eosinophil % : 0.0 %  Auto Basophil % : 0.0 %    02-22    137  |  101  |  29.8<H>  ----------------------------<  153<H>  4.0   |  22.0  |  0.89    Ca    8.7      22 Feb 2023 01:50    TPro  7.2  /  Alb  3.3  /  TBili  0.2<L>  /  DBili  x   /  AST  18  /  ALT  27  /  AlkPhos  88  02-22        Vital Signs Last 24 Hrs  T(C): 36.7 (22 Feb 2023 06:30), Max: 36.7 (22 Feb 2023 03:30)  T(F): 98 (22 Feb 2023 06:30), Max: 98 (22 Feb 2023 03:30)  HR: 115 (22 Feb 2023 06:30) (68 - 144)  BP: 124/83 (22 Feb 2023 06:30) (90/55 - 148/101)  BP(mean): 95 (22 Feb 2023 06:30) (92 - 133)  RR: 19 (22 Feb 2023 06:30) (17 - 24)  SpO2: 98% (22 Feb 2023 06:30) (94% - 100%)    Parameters below as of 22 Feb 2023 03:00  Patient On (Oxygen Delivery Method): nasal cannula  O2 Flow (L/min): 5      NIH SS:  DATE: 2/22/23  TIME: 1000  1A: Level of consciousness (0-3):   1B: Questions (0-2): 2  1C: Commands (0-2):   2: Gaze (0-2):   3: Visual fields (0-3):   4: Facial palsy (0-3):   MOTOR:  5A: Left arm motor drift (0-4):   5B: Right arm motor drift (0-4):   6A: Left leg motor drift (0-4):   6B: Right leg motor drift (0-4):   7: Limb ataxia (0-2):   SENSORY:  8: Sensation (0-2):   SPEECH:  9: Language (0-3): 3  10: Dysarthria (0-2): 2  EXTINCTION:  11: Extinction/inattention (0-2):     TOTAL SCORE: 7    General: patient sitting in a bed, appears upset, unable to speak    NEUROLOGICAL EXAM: INCOMPLETE  Mental status: Awake, alert, follows simple commands, non verbal.   Cranial Nerves:  no facial asymmetry, no nystagmus, tongue midline  Motor exam: Normal tone, no drift, 5/5 RUE, 5/5 RLE, 5/5 LUE, 5/5 LLE  Sensation: Intact to light touch   Coordination/ Gait: No dysmetria, gait not tested    Imaging (reviewed by me)    TTE Echo Complete w/ Contrast w/ Doppler (02.22.23 @ 11:48)    1. Technically difficult and limited follow up study.   2. Left ventricular ejection fraction, by visual estimation, is 50 to   55%.   3. Normal global left ventricular systolic function.   4. The mitral in-flow pattern reveals no discernable A-wave, therefore   no comment on diastolic function can be made.   5. Normal right ventricular size and function.   6. Estimated pulmonary artery systolic pressure is 36.7 mmHg assuming a   right atrial pressure of 15 mmHg, which is consistent with borderline   pulmonary hypertension.   7. Mild mitral valve regurgitation.   8. Moderate tricuspid regurgitation.   9. There is no evidence of pericardial effusion. Thickening over the RV   noted.  10. Compared to the prior TTE study from 2/18/23, interval resolution of   pericardial effusion, again noted some thickening over the RV.    MR Head No Cont (02.22.23 @ 17:18)   1)  The MR study confirms a small acute left MCA territory infarct in the   left frontal and temporal opercular region. No associated hemorrhage.  2)  otherwise unremarkable MRI appearance of the brain.    CT Brain Stroke Protocol (02.22.23 @ 02:14)   IMPRESSION:  No large territory acute infarct, intracranial hemorrhage, or mass effect.     (02.22.23 @ 02:28)  IMPRESSION:  CTA HEAD/NECK:  Occlusion of a superiorly oriented left M3 subdivision identified.  Incidentally noted small right pleural effusion.  No proximal large vessel occlusion, aneurysm, or dissection present.  CT PERFUSION:  Motion degraded.  Findings compatible with focal acute ischemia in the left MCA territory,   with questionable small surrounding ischemic penumbra (17 mL).       CT Perfusion w/ Maps w/ IV Cont (02.22.23 @ 02:22)   CTA HEAD/NECK:  Occlusion of a superiorly oriented left M3 subdivision identified.  Incidentally noted small right pleural effusion.  No proximal large vessel occlusion, aneurysm, or dissection present.    CT PERFUSION:  Motion degraded.  Findings compatible with focal acute ischemia in the left MCA territory,   with questionable small surrounding ischemic penumbra (17 mL).    TTE Echo Complete w/o Contrast w/ Doppler (02.18.23 @ 12:04)   Summary:   1. Left ventricular ejection fraction, by visual estimation, is 50 to   55%.   2. Normal global left ventricular systolic function.   3. The mitral in-flow pattern reveals no discernable A-wave, therefore   no comment on diastolic function can be made.   4. Normal right ventricular size and function.   5. Estimated pulmonary artery systolic pressure is 39.1 mmHg assuming a   right atrial pressure of 8 mmHg, which is consistent with borderline   pulmonary hypertension.   6. Mildly enlarged right atrium.   7. Moderately enlarged left atrium.   8. Mild mitral annular calcification.   9. Trace mitral valve regurgitation.  10. Moderate tricuspid regurgitation.  11. Sclerotic aortic valvewith normal opening.  12. Small to moderate size circumferential pericardial effusion (measures   up to 1.3 cm) with fibrinous thickened layering over the RV. No   echocardiographic evidence of tamponade.  13. Compared to the prior TTE study from 1/30/23, new pericardial   effusion noted. Findings reported to Dr. Flynn.       US Duplex Venous Lower Ext Complete, Bilateral (01.30.23 @ 18:21)   RIGHT:  Normal compressibility of the RIGHT common femoral, femoral and popliteal   veins.  Doppler examination shows normal spontaneous and phasic flow.  No RIGHT calf vein thrombosis is detected.  LEFT:  Normal compressibility of the LEFT common femoral, femoral and popliteal   veins.  Doppler examination shows normal spontaneous and phasic flow.  No LEFT calf vein thrombosis is detected.  IMPRESSION:  No evidence of deep venous thrombosis in either lower extremity.     TTE Echo Complete w/o Contrast w/ Doppler (02.18.23 @ 12:04)    1. Left ventricular ejection fraction, by visual estimation, is 50 to   55%.   2. Normal global left ventricular systolic function.   3. The mitral in-flow pattern reveals no discernable A-wave, therefore   no comment on diastolic function can be made.   4. Normal right ventricular size and function.   5. Estimated pulmonary artery systolic pressure is 39.1 mmHg assuming a   right atrial pressure of 8 mmHg, which is consistent with borderline   pulmonary hypertension.   6. Mildly enlarged right atrium.   7. Moderately enlarged left atrium.   8. Mild mitral annular calcification.   9. Trace mitral valve regurgitation.  10. Moderate tricuspid regurgitation.  11. Sclerotic aortic valvewith normal opening.  12. Small to moderate size circumferential pericardial effusion (measures   up to 1.3 cm) with fibrinous thickened layering over the RV. No   echocardiographic evidence of tamponade.  13. Compared to the prior TTE study from 1/30/23, new pericardial   effusion noted. Findings reported to Dr. Flynn.             73 yo Lao speaking female with PMH hypothyroidism, cerebral aneurysm s/p coiling w/ post operative course atrial fibrillation, pericarditis possibly related corona viral illness, afib RVR.  Stroke code 2/22/23 0405 patient found to be aphasic and on imaging, CTA head showed nonhemorrhagic acute ischemia in the left MCA territory with questionable small surrounding ischemic penumbra.     ROS:  No overnight events, no new neurological symptoms noted.    PAST MEDICAL & SURGICAL HISTORY:  High cholesterol  Hypothyroidism  H/O knee surgery  Brain aneurysm    Allergies  No Known Allergies    FAMILY HISTORY:  No pertinent family history    MEDICATIONS  (STANDING):  atorvastatin 80 milliGRAM(s) Oral at bedtime  benzonatate 100 milliGRAM(s) Oral three times a day  calcium carbonate    500 mG (Tums) Chewable 1 Tablet(s) Chew once  colchicine 0.6 milliGRAM(s) Oral two times a day  diltiazem    Tablet 90 milliGRAM(s) Oral every 6 hours  diltiazem    milliGRAM(s) Oral daily  enoxaparin Injectable 40 milliGRAM(s) SubCutaneous every 24 hours  ibuprofen  Tablet. 800 milliGRAM(s) Oral three times a day  levothyroxine 50 MICROGram(s) Oral daily  metoprolol succinate  milliGRAM(s) Oral daily  pantoprazole    Tablet 40 milliGRAM(s) Oral two times a day  polyethylene glycol 3350 17 Gram(s) Oral daily  predniSONE   Tablet 60 milliGRAM(s) Oral daily  senna 2 Tablet(s) Oral at bedtime    MEDICATIONS  (PRN):  guaiFENesin Oral Liquid (Sugar-Free) 100 milliGRAM(s) Oral every 6 hours PRN Cough      CBC Full  -  ( 22 Feb 2023 01:50 )  WBC Count : 6.48 K/uL  RBC Count : 4.70 M/uL  Hemoglobin : 10.8 g/dL  Hematocrit : 33.6 %  Platelet Count - Automated : 427 K/uL  Mean Cell Volume : 71.5 fl  Mean Cell Hemoglobin : 23.0 pg  Mean Cell Hemoglobin Concentration : 32.1 gm/dL  Auto Neutrophil # : 4.30 K/uL  Auto Lymphocyte # : 1.52 K/uL  Auto Monocyte # : 0.57 K/uL  Auto Eosinophil # : 0.00 K/uL  Auto Basophil # : 0.00 K/uL  Auto Neutrophil % : 66.3 %  Auto Lymphocyte % : 23.5 %  Auto Monocyte % : 8.8 %  Auto Eosinophil % : 0.0 %  Auto Basophil % : 0.0 %    02-22    137  |  101  |  29.8<H>  ----------------------------<  153<H>  4.0   |  22.0  |  0.89    Ca    8.7      22 Feb 2023 01:50    TPro  7.2  /  Alb  3.3  /  TBili  0.2<L>  /  DBili  x   /  AST  18  /  ALT  27  /  AlkPhos  88  02-22        Vital Signs Last 24 Hrs  T(C): 36.7 (22 Feb 2023 06:30), Max: 36.7 (22 Feb 2023 03:30)  T(F): 98 (22 Feb 2023 06:30), Max: 98 (22 Feb 2023 03:30)  HR: 115 (22 Feb 2023 06:30) (68 - 144)  BP: 124/83 (22 Feb 2023 06:30) (90/55 - 148/101)  BP(mean): 95 (22 Feb 2023 06:30) (92 - 133)  RR: 19 (22 Feb 2023 06:30) (17 - 24)  SpO2: 98% (22 Feb 2023 06:30) (94% - 100%)    Parameters below as of 22 Feb 2023 03:00  Patient On (Oxygen Delivery Method): nasal cannula  O2 Flow (L/min): 5    General: patient appears anxious, able to participate in exam     NEUROLOGICAL EXAM:   Mental status: Awake, alert, follows simple commands, non verbal  Cranial Nerves: no facial asymmetry, no nystagmus, tongue midline  Motor exam: Normal tone, no drift, 5/5 RUE, 5/5 RLE, 5/5 LUE, 5/5 LLE  Sensation: Intact to light touch   Coordination/ Gait: No dysmetria, gait not tested    Imaging (reviewed by me)    TTE Echo Complete w/ Contrast w/ Doppler (02.22.23 @ 11:48)    1. Technically difficult and limited follow up study.   2. Left ventricular ejection fraction, by visual estimation, is 50 to   55%.   3. Normal global left ventricular systolic function.   4. The mitral in-flow pattern reveals no discernable A-wave, therefore   no comment on diastolic function can be made.   5. Normal right ventricular size and function.   6. Estimated pulmonary artery systolic pressure is 36.7 mmHg assuming a   right atrial pressure of 15 mmHg, which is consistent with borderline   pulmonary hypertension.   7. Mild mitral valve regurgitation.   8. Moderate tricuspid regurgitation.   9. There is no evidence of pericardial effusion. Thickening over the RV   noted.  10. Compared to the prior TTE study from 2/18/23, interval resolution of   pericardial effusion, again noted some thickening over the RV.    MR Head No Cont (02.22.23 @ 17:18)   1)  The MR study confirms a small acute left MCA territory infarct in the   left frontal and temporal opercular region. No associated hemorrhage.  2)  otherwise unremarkable MRI appearance of the brain.    CT Brain Stroke Protocol (02.22.23 @ 02:14)   IMPRESSION:  No large territory acute infarct, intracranial hemorrhage, or mass effect.     (02.22.23 @ 02:28)  IMPRESSION:  CTA HEAD/NECK:  Occlusion of a superiorly oriented left M3 subdivision identified.  Incidentally noted small right pleural effusion.  No proximal large vessel occlusion, aneurysm, or dissection present.  CT PERFUSION:  Motion degraded.  Findings compatible with focal acute ischemia in the left MCA territory,   with questionable small surrounding ischemic penumbra (17 mL).    CT Perfusion w/ Maps w/ IV Cont (02.22.23 @ 02:22)   CTA HEAD/NECK:  Occlusion of a superiorly oriented left M3 subdivision identified.  Incidentally noted small right pleural effusion.  No proximal large vessel occlusion, aneurysm, or dissection present.    CT PERFUSION:  Motion degraded.  Findings compatible with focal acute ischemia in the left MCA territory,   with questionable small surrounding ischemic penumbra (17 mL).    TTE Echo Complete w/o Contrast w/ Doppler (02.18.23 @ 12:04)   Summary:   1. Left ventricular ejection fraction, by visual estimation, is 50 to   55%.   2. Normal global left ventricular systolic function.   3. The mitral in-flow pattern reveals no discernable A-wave, therefore   no comment on diastolic function can be made.   4. Normal right ventricular size and function.   5. Estimated pulmonary artery systolic pressure is 39.1 mmHg assuming a   right atrial pressure of 8 mmHg, which is consistent with borderline   pulmonary hypertension.   6. Mildly enlarged right atrium.   7. Moderately enlarged left atrium.   8. Mild mitral annular calcification.   9. Trace mitral valve regurgitation.  10. Moderate tricuspid regurgitation.  11. Sclerotic aortic valvewith normal opening.  12. Small to moderate size circumferential pericardial effusion (measures   up to 1.3 cm) with fibrinous thickened layering over the RV. No   echocardiographic evidence of tamponade.  13. Compared to the prior TTE study from 1/30/23, new pericardial   effusion noted. Findings reported to Dr. Flynn.    US Duplex Venous Lower Ext Complete, Bilateral (01.30.23 @ 18:21)   RIGHT:  Normal compressibility of the RIGHT common femoral, femoral and popliteal   veins.  Doppler examination shows normal spontaneous and phasic flow.  No RIGHT calf vein thrombosis is detected.  LEFT:  Normal compressibility of the LEFT common femoral, femoral and popliteal   veins.  Doppler examination shows normal spontaneous and phasic flow.  No LEFT calf vein thrombosis is detected.  IMPRESSION:  No evidence of deep venous thrombosis in either lower extremity.    TTE Echo Complete w/o Contrast w/ Doppler (02.18.23 @ 12:04)    1. Left ventricular ejection fraction, by visual estimation, is 50 to   55%.   2. Normal global left ventricular systolic function.   3. The mitral in-flow pattern reveals no discernable A-wave, therefore   no comment on diastolic function can be made.   4. Normal right ventricular size and function.   5. Estimated pulmonary artery systolic pressure is 39.1 mmHg assuming a   right atrial pressure of 8 mmHg, which is consistent with borderline   pulmonary hypertension.   6. Mildly enlarged right atrium.   7. Moderately enlarged left atrium.   8. Mild mitral annular calcification.   9. Trace mitral valve regurgitation.  10. Moderate tricuspid regurgitation.  11. Sclerotic aortic valvewith normal opening.  12. Small to moderate size circumferential pericardial effusion (measures   up to 1.3 cm) with fibrinous thickened layering over the RV. No   echocardiographic evidence of tamponade.  13. Compared to the prior TTE study from 1/30/23, new pericardial   effusion noted. Findings reported to Dr. Flynn.

## 2023-02-23 NOTE — OCCUPATIONAL THERAPY INITIAL EVALUATION ADULT - PERTINENT HX OF CURRENT PROBLEM, REHAB EVAL
72 yr old female with PMX of hypothyroidism, cerebral aneurysm s/p coiling, atrial fibrillation, recent pericarditis w/ pericardial effusion w/o tamponade subsequently found to have expressive aphasia in the early monring of 2/22/23.  CT head at the time demonstrated no acute infarction or hemorrhage.   CTA head/neck showed occlusion of a superiorly oriented left M3 subdivision. Findings compatible with focal acute ischemia in the left MCA territory, likely cardioembolic in setting of atrial fibrillation.  - Pt did not qualify for TNK due to last known well was over 4.5 hours. not thrombectomy candidate as pt's thrombus is in M3 branch which is too distal to intervene on

## 2023-02-23 NOTE — CHART NOTE - NSCHARTNOTEFT_GEN_A_CORE
Notified by RN, pt HR 130s-170s AFIB.   Manual pulse done and 148.  Pt asymptomatic, other vitals, /98 Notified by RN, pt HR 130s-170s sustained AFIB on monitor.   Pt is asymptomatic.   Manual pulse 148, other vitals, /98, RR18, 99% room air, T: 98.6  Pt with goal SBP 130s-160s per neuro.  Lopressor held, given 500cc bolus over an hour with HR improvement to 110s-120s.   Per RN, likely an anxiety component because HR <100 when sleeping.   Ordered 0.25 mg PO xanax x1    ADDENDUM: 2350  HR improved, now in 70s while sleeping.   Will continue to monitor.

## 2023-02-23 NOTE — PHYSICAL THERAPY INITIAL EVALUATION ADULT - ADDITIONAL COMMENTS
Pt is aphasic, social history from brother and sister in law at bedside. Pt lives with son in two family home, no ELIUD, stairs to sons living space which pt does not need to negotiate. Pt was independent PTA, owns no DME. Pt does not drive, family assists with transport to appts and grocery shopping. Sister in law reports supportive family who can assist as needed.

## 2023-02-23 NOTE — CHART NOTE - NSCHARTNOTESELECT_GEN_ALL_CORE
2 Hr f/u Code stroke/Event Note
Event Note
Rapid Response 2 Hour Follow-up/Event Note

## 2023-02-23 NOTE — SWALLOW BEDSIDE ASSESSMENT ADULT - SWALLOW EVAL: DIAGNOSIS
Oral phase of swallow marked by reduced mastication and delayed oral transit for easy to chew solids. Suspect pharyngeal dysphagia w/ easy to chew solids marked by delayed throat clear & cough. No overt s/s of penetration/aspiration w/ liquid trials, puree or soft/bite sized trials.

## 2023-02-23 NOTE — SWALLOW BEDSIDE ASSESSMENT ADULT - SLP PERTINENT HISTORY OF CURRENT PROBLEM
As per MD note: "72 yr old female with hypothyroidism, cerebral aneurysm s/p coiling, atrial fibrillation, recent pericarditis presented with complaints of left sided chest pain and cough. Noted to be in atrial fibrillation with RVR in ED. She was evaluated by cardiology in ED, rate control medications were adjusted. CT chest showed no pneumonia, small effusion, Pt worked up for chest pain and rapid atrial fibrillation. ACS was ruled out. Ibuprofen given in addition to Colchicine for pericarditis. "

## 2023-02-23 NOTE — PROVIDER CONTACT NOTE (OTHER) - ACTION/TREATMENT ORDERED:
Cardizem will be given as scheduled.
keep heparin drip rate at 7.5ml/hr
Metoprolol IV for heart rate. PA to fully review chart prior to prescribing other meds.

## 2023-02-23 NOTE — PHYSICAL THERAPY INITIAL EVALUATION ADULT - PERTINENT HX OF CURRENT PROBLEM, REHAB EVAL
Pt is 71 yo Icelandic speaking female with PMH hypothyroidism, cerebral aneurysm s/p coiling w/ post operative course atrial fibrillation, pericarditis possibly related corona viral illness, afib RVR.  Stroke code 2/22/23 0405 patient found to be aphasic and on imaging, CTA head showed nonhemorrhagic acute ischemia in the left MCA territory with questionable small surrounding ischemic penumbra.

## 2023-02-23 NOTE — PROGRESS NOTE ADULT - SUBJECTIVE AND OBJECTIVE BOX
[facility], [facility]                              [address] [place] [address]                                                 PHONE: [phone]                                                 FAX: [phone]  -----------------------------------------------------------------------------------------------  Pt seen and examined. FU for AF    Overnight events/Complaints: Pt with expressive aphasia. Family at bedside. AF rates remain elevated. Now on iv heparin. BP low. Pt awaiting swallow evaluation.     Vital Signs Last 24 Hrs  T(C): 36.9 (23 Feb 2023 08:57), Max: 37.1 (22 Feb 2023 22:56)  T(F): 98.4 (23 Feb 2023 08:57), Max: 98.8 (22 Feb 2023 22:56)  HR: 102 (23 Feb 2023 06:00) (88 - 171)  BP: 133/80 (23 Feb 2023 04:34) (105/71 - 144/82)  BP(mean): 83 (22 Feb 2023 22:00) (83 - 109)  RR: 17 (23 Feb 2023 06:00) (14 - 38)  SpO2: 99% (23 Feb 2023 06:00) (92% - 100%)    Parameters below as of 23 Feb 2023 06:00  Patient On (Oxygen Delivery Method): room air      I&O's Summary    22 Feb 2023 07:01  -  23 Feb 2023 07:00  --------------------------------------------------------  IN: 1190 mL / OUT: 1600 mL / NET: -410 mL    PHYSICAL EXAM:  Constitutional: Appears well; alert and co-operative  Eyes: Conjunctiva normal, No scleral icterus  Neck: Supple, No JVD  Cardiovascular: irregular S1, S2  Respiratory: Lungs clear to auscultation; no crepitations, no wheeze  Gastrointestinal:  Soft, Non-tender, + BS	  Musculoskeletal: No edema        LABS:                        10.9   4.38  )-----------( 414      ( 23 Feb 2023 06:01 )             36.1     02-23    146<H>  |  106  |  11.9  ----------------------------<  116<H>  3.3<L>   |  30.0<H>  |  0.49<L>    Ca    8.3<L>      23 Feb 2023 06:01    TPro  7.2  /  Alb  3.3  /  TBili  0.2<L>  /  DBili  x   /  AST  18  /  ALT  27  /  AlkPhos  88  02-22    CARDIAC MARKERS ( 22 Feb 2023 01:50 )  x     / <0.01 ng/mL / 28 U/L / x     / x          PT/INR - ( 22 Feb 2023 01:50 )   PT: 12.1 sec;   INR: 1.04 ratio         PTT - ( 23 Feb 2023 02:14 )  PTT:44.4 sec    RADIOLOGY & ADDITIONAL STUDIES: (reviewed)  CXR was independently visualized/reviewed and demonstrated: small effusion    CARDIOLOGY TESTING:(reviewed)     ECG (Independent visualization): AF with RVR    ECHOCARDIOGRAM :  Summary:   1. Left ventricular ejection fraction, by visual estimation, is 50 to   55%.   2. Normal global left ventricular systolic function.   3. The mitral in-flow pattern reveals no discernable A-wave, therefore   no comment on diastolic function can be made.   4. Normal right ventricular size and function.   5. Estimated pulmonary artery systolic pressure is 39.1 mmHg assuming a   right atrial pressure of 8 mmHg, which is consistent with borderline   pulmonary hypertension.   6. Mildly enlarged right atrium.   7. Moderately enlarged left atrium.   8. Mild mitral annular calcification.   9. Trace mitral valve regurgitation.  10. Moderate tricuspid regurgitation.  11. Sclerotic aortic valvewith normal opening.  12. Small to moderate size circumferential pericardial effusion (measures   up to 1.3 cm) with fibrinous thickened layering over the RV. No   echocardiographic evidence of tamponade.  13. Compared to the prior TTE study from 1/30/23, new pericardial   effusion noted. Findings reported to Dr. Flynn.      Echo (Independent visualization):   Summary:   1. Technically difficult and limited follow up study.   2. Left ventricular ejection fraction, by visual estimation, is 50 to   55%.   3. Normal global left ventricular systolic function.   4. The mitral in-flow pattern reveals no discernable A-wave, therefore   no comment on diastolic function can be made.   5. Normal right ventricular size and function.   6. Estimated pulmonary artery systolic pressure is 36.7 mmHg assuming a   right atrial pressure of 15 mmHg, which is consistent with borderline   pulmonary hypertension.   7. Mild mitral valve regurgitation.   8. Moderate tricuspid regurgitation.   9. There is no evidence of pericardial effusion. Thickening over the RV   noted.  10. Compared to the prior TTE study from 2/18/23, interval resolution of   pericardial effusion, again noted some thickening over the RV.    Maynor Sal DO Electronically signed on 2/22/2023 at 4:19:58 PM      CARDIAC CATHETERIZATION:  Normal coronary arteries   Normal LV EDP   Moderately depressed LVEF with basal to mid inferior hypokinesis.   Her presentation is more likely percarditis in the setting of COVID  infection, and/or takasubu in setting of rapid Afib    MEDICATIONS:(reviewed)  MEDICATIONS  (STANDING):  aspirin Suppository 300 milliGRAM(s) Rectal daily  atorvastatin 80 milliGRAM(s) Oral at bedtime  colchicine 0.6 milliGRAM(s) Oral two times a day  dextrose 5% + sodium chloride 0.9%. 1000 milliLiter(s) (75 mL/Hr) IV Continuous <Continuous>  digoxin  Injectable 125 MICROGram(s) IV Push daily  heparin  Infusion.  Unit(s)/Hr (7.5 mL/Hr) IV Continuous <Continuous>  levothyroxine Injectable 25 MICROGram(s) IV Push at bedtime  pantoprazole  Injectable 40 milliGRAM(s) IV Push daily  predniSONE   Tablet 60 milliGRAM(s) Oral daily    ASSESSMENT AND PLAN:    72y Female with past medical history significant for recent admission for chest pain in setting of coronavirus non-covid c/w pericarditis, normal coronary arteries, normal EF, no evidence of pericardial effusion, AF with RVR who presented with chest pain and palpitations. Patient now with acute CVA.    Pericarditis with pericardial effusion  - repeat Echo with resolution of pericardial effusion  - Pt now on iv heparin  - on prednisone for pericarditis  - Repeat Echo in AM to assess pericardial effusion  - ASA suppository as pt remains NPO    CVA  - BP management as per neurology  - neurology following    Afib  - Patient with rates in the 140s-150s on tele but currently 110s-120s  - receiving iv digoxin  - once pt clear swallow evaluation, will start metoprolol 50 mg BID and Cardizem 60 mg q6 for rate control. HR was well controlled on this regimen.    Plan discussed with Medicine team    Additional history obtained and plan discussed with family at bedside    50 minutes of total time dedicated to this patient visit today including preparing to see the patient (eg. Review of tests), obtaining and/or reviewing separately obtained history, obtaining/ reviewing vitals, performing a medically appropriate examination, and/or evaluation, counseling and educating the patient/family/caregiver, ordering medications, reviewing previous notes and test results, and procedures, communicating with other health professionals (when not separately reported), documenting clinical information, in the electronic or other health records, independently interpreting results (not separately reported) and communicating results to the patient/family/caregiver.

## 2023-02-23 NOTE — PROVIDER CONTACT NOTE (OTHER) - REASON
APTT result 64.4 for pt specific nomogram
5 tower called abnout HR up to 140s, request for nausea medication.
BRYNN Robert
5 [place] monitor tech called stating patient converted to PVCs.

## 2023-02-23 NOTE — OCCUPATIONAL THERAPY INITIAL EVALUATION ADULT - GENERAL OBSERVATIONS, REHAB EVAL
pt received in bed and left as found, +tele and  in place and active IVx2, pt following all commands, aphasic, 0/10 pain, responding with gesturing and head nod yes/no

## 2023-02-23 NOTE — PROGRESS NOTE ADULT - SUBJECTIVE AND OBJECTIVE BOX
Patient is a 72y old  Female who presents with a chief complaint of chest pain (22 Feb 2023 15:53)    full note to follow       ALLERGIES:  No Known Allergies    MEDICATIONS  (STANDING):  aspirin Suppository 300 milliGRAM(s) Rectal daily  atorvastatin 80 milliGRAM(s) Oral at bedtime  colchicine 0.6 milliGRAM(s) Oral two times a day  dextrose 5% + sodium chloride 0.9%. 1000 milliLiter(s) (75 mL/Hr) IV Continuous <Continuous>  digoxin  Injectable 125 MICROGram(s) IV Push daily  heparin  Infusion.  Unit(s)/Hr (7.5 mL/Hr) IV Continuous <Continuous>  levothyroxine Injectable 25 MICROGram(s) IV Push at bedtime  pantoprazole  Injectable 40 milliGRAM(s) IV Push daily  predniSONE   Tablet 60 milliGRAM(s) Oral daily    MEDICATIONS  (PRN):  metoprolol tartrate Injectable 5 milliGRAM(s) IV Push every 6 hours PRN if HR over 130 persistent    Vital Signs Last 24 Hrs  T(F): 98.1 (23 Feb 2023 04:34), Max: 98.8 (22 Feb 2023 22:56)  HR: 102 (23 Feb 2023 06:00) (88 - 171)  BP: 133/80 (23 Feb 2023 04:34) (105/71 - 144/82)  RR: 17 (23 Feb 2023 06:00) (14 - 38)  SpO2: 99% (23 Feb 2023 06:00) (92% - 100%)  I&O's Summary    22 Feb 2023 07:01  -  23 Feb 2023 07:00  --------------------------------------------------------  IN: 1190 mL / OUT: 1600 mL / NET: -410 mL        PHYSICAL EXAM:  General:   ENT:   Neck:   Lungs:   Cardio: RRR, S1/S2, No murmur  Abdomen: Soft, Nontender, Nondistended; Bowel sounds present  Extremities: No calf tenderness, No pitting edema    LABS:                        10.9   4.38  )-----------( 414      ( 23 Feb 2023 06:01 )             36.1     02-23    146  |  106  |  11.9  ----------------------------<  116  3.3   |  30.0  |  0.49    Ca    8.3      23 Feb 2023 06:01    TPro  7.2  /  Alb  3.3  /  TBili  0.2  /  DBili  x   /  AST  18  /  ALT  27  /  AlkPhos  88  02-22          PT/INR - ( 22 Feb 2023 01:50 )   PT: 12.1 sec;   INR: 1.04 ratio         PTT - ( 23 Feb 2023 02:14 )  PTT:44.4 sec      CARDIAC MARKERS ( 22 Feb 2023 01:50 )  x     / x     / 28 U/L / x     / x          02-23 Chol 152 mg/dL LDL -- HDL 46 mg/dL Trig 146 mg/dL      ABG - ( 22 Feb 2023 02:51 )  pH, Arterial: 7.530 pH, Blood: x     /  pCO2: 33    /  pO2: 113   / HCO3: 27    / Base Excess: x     /  SaO2: 98.0                    POCT Blood Glucose.: 108 mg/dL (23 Feb 2023 05:57)  POCT Blood Glucose.: 117 mg/dL (23 Feb 2023 00:10)  POCT Blood Glucose.: 103 mg/dL (22 Feb 2023 18:05)  POCT Blood Glucose.: 87 mg/dL (22 Feb 2023 12:42)          Culture - Urine (collected 16 Feb 2023 09:35)  Source: Clean Catch Clean Catch (Midstream)  Final Report (18 Feb 2023 08:33):    >=3 organisms. Probable collection contamination.        RADIOLOGY & ADDITIONAL TESTS:       Patient is a 72y old  Female seen for acute stroke with aphasia , Atrial fib     pt is seen around 8 am , son at the bedside prefers translating     pt is awake alert , calmer today   she is answering her son with hands , head shakes   no pain   she wants to eat     speech to be contcated to reevaluate   cardiac monitor reviewed atrial fib with intermittent RVR overnight         ALLERGIES:  No Known Allergies    MEDICATIONS  (STANDING):  aspirin Suppository 300 milliGRAM(s) Rectal daily  atorvastatin 80 milliGRAM(s) Oral at bedtime  colchicine 0.6 milliGRAM(s) Oral two times a day  dextrose 5% + sodium chloride 0.9%. 1000 milliLiter(s) (75 mL/Hr) IV Continuous <Continuous>  digoxin  Injectable 125 MICROGram(s) IV Push daily  heparin  Infusion.  Unit(s)/Hr (7.5 mL/Hr) IV Continuous <Continuous>  levothyroxine Injectable 25 MICROGram(s) IV Push at bedtime  pantoprazole  Injectable 40 milliGRAM(s) IV Push daily  predniSONE   Tablet 60 milliGRAM(s) Oral daily    MEDICATIONS  (PRN):  metoprolol tartrate Injectable 5 milliGRAM(s) IV Push every 6 hours PRN if HR over 130 persistent  Vital Signs Last 24 Hrs  T(C): 36.9 (23 Feb 2023 08:57), Max: 37.1 (22 Feb 2023 22:56)  T(F): 98.4 (23 Feb 2023 08:57), Max: 98.8 (22 Feb 2023 22:56)  HR: 130 (23 Feb 2023 09:57) (88 - 171)  BP: 110/86 (23 Feb 2023 09:57) (105/71 - 144/82)  BP(mean): 91 (23 Feb 2023 09:57) (83 - 109)  RR: 16 (23 Feb 2023 09:57) (14 - 33)  SpO2: 99% (23 Feb 2023 09:57) (92% - 100%)    Parameters below as of 23 Feb 2023 09:57  Patient On (Oxygen Delivery Method): room air          PHYSICAL EXAM:  General: awake , aphasic , no distress  ENT: mouth moist mucosa   Neck: supple no JVD   Lungs: CTA bilateral   Cardio: RRR, S1/S2, No murmur  Abdomen: Soft, Nontender, Nondistended; Bowel sounds present  Extremities: No calf tenderness, No pitting edema  Neuro : Awake alert , aphasic , follows commands , moves upper lower ext   LABS:                        10.9   4.38  )-----------( 414      ( 23 Feb 2023 06:01 )             36.1     02-23    146  |  106  |  11.9  ----------------------------<  116  3.3   |  30.0  |  0.49    Ca    8.3      23 Feb 2023 06:01    TPro  7.2  /  Alb  3.3  /  TBili  0.2  /  DBili  x   /  AST  18  /  ALT  27  /  AlkPhos  88  02-22          PT/INR - ( 22 Feb 2023 01:50 )   PT: 12.1 sec;   INR: 1.04 ratio         PTT - ( 23 Feb 2023 02:14 )  PTT:44.4 sec      CARDIAC MARKERS ( 22 Feb 2023 01:50 )  x     / x     / 28 U/L / x     / x          02-23 Chol 152 mg/dL LDL -- HDL 46 mg/dL Trig 146 mg/dL      ABG - ( 22 Feb 2023 02:51 )  pH, Arterial: 7.530 pH, Blood: x     /  pCO2: 33    /  pO2: 113   / HCO3: 27    / Base Excess: x     /  SaO2: 98.0                    POCT Blood Glucose.: 108 mg/dL (23 Feb 2023 05:57)  POCT Blood Glucose.: 117 mg/dL (23 Feb 2023 00:10)  POCT Blood Glucose.: 103 mg/dL (22 Feb 2023 18:05)  POCT Blood Glucose.: 87 mg/dL (22 Feb 2023 12:42)          Culture - Urine (collected 16 Feb 2023 09:35)  Source: Clean Catch Clean Catch (Midstream)  Final Report (18 Feb 2023 08:33):    >=3 organisms. Probable collection contamination.        RADIOLOGY & ADDITIONAL TESTS:      < from: MR Head No Cont (02.22.23 @ 17:18) >  IMPRESSION:    1)  The MR study confirms a small acute left MCA territory infarct in the   left frontal and temporal opercular region. No associated hemorrhage.  2)  otherwise unremarkable MRI appearance of the brain.    Further clinical correlation and follow-up recommended.    --- End of Report ---          < end of copied text >    tte< from: TTE Echo Complete w/ Contrast w/ Doppler (02.22.23 @ 11:48) >  Summary:   1. Technically difficult and limited follow up study.   2. Left ventricular ejection fraction, by visual estimation, is 50 to   55%.   3. Normal global left ventricular systolic function.   4. The mitral in-flow pattern reveals no discernable A-wave, therefore   no comment on diastolic function can be made.   5. Normal right ventricular size and function.   6. Estimated pulmonary artery systolic pressure is 36.7 mmHg assuming a   right atrial pressure of 15 mmHg, which is consistent with borderline   pulmonary hypertension.   7. Mild mitral valve regurgitation.   8. Moderate tricuspid regurgitation.   9. There is no evidence of pericardial effusion. Thickening over the RV   noted.  10. Compared to the prior TTE study from 2/18/23, interval resolution of   pericardial effusion, again noted some thickening over the RV.    Maynor Sal DO Electronically signed on 2/22/2023 at 4:19:58 PM        *** Final ***    < end of copied text >

## 2023-02-23 NOTE — SWALLOW BEDSIDE ASSESSMENT ADULT - SLP GENERAL OBSERVATIONS
Pt recd a&a, communicating w/ gestures, minimal verbalizations, tolerating RA, NAD, 0/10 pain scale pre/post, family at the bedside declining Armenian translation & providing t/o the evaluation.

## 2023-02-23 NOTE — PROGRESS NOTE ADULT - ASSESSMENT
72 yr old female with hypothyroidism, cerebral aneurysm s/p coiling, atrial fibrillation, recent pericarditis presented with complaints of left sided chest pain and cough. Noted to be in atrial fibrillation with RVR in ED. She was evaluated by cardiology in ED, rate control medications were adjusted. CT chest showed no pneumonia, small effusion, Pt worked up for chest pain and rapid atrial fibrillation. ACS was ruled out. Ibuprofen given in addition to Colchicine for pericarditis.       1- Left MCA infarct   pt is aphasic with right sided weakness   NPO failed swallow evaluation   s/p ASA per rectum in am after code stroke   stroke nbeuro consulte appreciated   rec MR of brain if neg can start iv heparin no bolus   keep ptt 50-70 range risk of hemorrhagic consversion      2-Atrial fibrillation with RVR   on diltiazem , metoprolol  off AC due to pericardial effusion per cardio and prior medical team   cardiology follow up   BP si soft may need to add dig for rate control   cont telemetry     3-Acute pericarditiswith associated pericardial effusion without tamponade physiology   repeat TTE 2/18 with increase pericardial effusion   Held Lovenox full dose and switched to prophylaxis dose per cardio   will get repeat TTE ,since may need to start AC   also on steroids, prednisone 60 mg for 3 days then taper over 4 weeks on DC   Pantoprazole for GI ppx     4-Hypothyroidism   Continue Synthroid     DVT ppx: Lovenox     Discussed with family at the bedside     acute due to stroke , rapid atrial fib , NPO        72 yr old female with hypothyroidism, cerebral aneurysm s/p coiling, atrial fibrillation, recent pericarditis presented with complaints of left sided chest pain and cough. Noted to be in atrial fibrillation with RVR in ED. She was evaluated by cardiology in ED, rate control medications were adjusted. CT chest showed no pneumonia, small effusion, Pt worked up for chest pain and rapid atrial fibrillation. ACS was ruled out. Ibuprofen given in addition to Colchicine for pericarditis.       1- Left MCA infarct   pt is aphasic   cont iv hep gtt   neuro stroke follow up  permissive HTN   NPO   will get repeat swallow evaluation   asa per rectum       2-Atrial fibrillation with RVR   due to NPO can not give po cardizem metoprolol yesterday   cont iv lopressor prn to avoid hypotension due to stroke   cont dig iv   cardiology follow up appreciated   on iv heparin   change to eliquis if stable and jitendra by neurology team    cont telemetry     3- Hypokalemia   replace iv k prdered   monitor check mg     4- hypernatremia   NPO   on IVF cont to monitor     5-Acute pericarditis with pericardial effusion without tamponade physiology   repeat TTE 2/18 with increase pericardial effusion   TTE 2/22 no effusion resolved   son is updated at the bedside   also on steroids, prednisone 60 mg for 3 days then taper over 4 weeks on DC , colchicine on hold due to NPO   once able to take po will do start   guve iv steroid today   Pantoprazole for GI ppx     6-Hypothyroidism   Continue Synthroid     DVT ppx: Lovenox     Discussed son     acute due to stroke , rapid atrial fib , NPO

## 2023-02-23 NOTE — PROGRESS NOTE ADULT - NSPROGADDITIONALINFOA_GEN_ALL_CORE
Time spent with review of chart documents, labs, imaging. Direct patient assessment,  formulation of care plan. Discussion with  Interdisciplinary  team

## 2023-02-23 NOTE — PROGRESS NOTE ADULT - ASSESSMENT
ASSESSMENT: 72 yr old female with PMX of hypothyroidism, cerebral aneurysm s/p coiling, atrial fibrillation, recent pericarditis w/ pericardial effusion w/o tamponade subsequently found to have expressive aphasia in the early monring of 2/22/23.  CT head at the time demonstrated no acute infarction or hemorrhage.   CTA head/neck showed occlusion of a superiorly oriented left M3 subdivision. Findings compatible with focal acute ischemia in the left MCA territory, likely cardioembolic in setting of atrial fibrillation.  - Pt did not qualify for TNK due to last known well was over 4.5 hours. not thrombectomy candidate as pt's thrombus is in M3 branch which is too distal to intervene on    NEURO: Continue close monitoring for neurologic deterioration, q 2 stroke neuro checks, permissive HTN with SBP goal 130-160 mmHg, avoid hypotension and rapid fluctuations. Titrate statin to LDL goal less than 70, MRI Brain pending. Physical therapy/OT/Speech eval/treatment.     ANTITHROMBOTIC THERAPY: Pending discussion with attending. ASA 81mg daily or 300mg per rectum if NPO for now. The MR study confirms a small acute left MCA territory infarct in the left frontal and temporal opercular region. No associated hemorrhage. Anticoagulation with Heparin gtt for now, Therapeutic. If there is an urgent indication (i.e. cardiac thrombus), would need to further discuss risks/benefits with HCP at that time, avoid boluses and maintain likely PTT 50-70, again would need repeat neuro imaging prior for review by neurology.     PULMONARY:  protecting airway, saturating well on room air    CARDIOVASCULAR: TTE as noted, cardiac monitoring, cardiology following along, ensure rate control.                          GASTROINTESTINAL:  dysphagia screen pending     Diet: NPO    RENAL: BUN/Cr: 11.9/0.49, monitor urine output, keep hydrated as tolerated     Na Goal:  135-145        HEMATOLOGY: H/H 10.9/36.1. Platelets 414, close monitoring of thrombocytosis and anemia, patient should have all age and risk appropriate malignancy screenings with PCP or sooner if evidence or high risk for malignancy.      DVT ppx: Heparin s.c [] LMWH [x]     ID: afebrile, no leukocytosis, monitor for signs of infection    DISPOSITION: Rehab or home depending on PT eval once stable and workup is complete      CORE MEASURES:        Admission NIHSS: 7     TPA: [] YES [x] NO      LDL/HDL/A1C:     Depression Screen:      Statin Therapy: Atorvastatin 80 mg daily     Dysphagia Screen: [] PASS [] FAIL     Smoking [] YES [] NO      Afib [x] YES [] NO     Stroke Education [x] YES [] NO     ASSESSMENT: 72 yr old female with PMX of hypothyroidism, cerebral aneurysm s/p coiling, atrial fibrillation, recent pericarditis w/ pericardial effusion w/o tamponade subsequently found to have expressive aphasia in the early monring of 2/22/23.  CT head at the time demonstrated no acute infarction or hemorrhage.   CTA head/neck showed occlusion of a superiorly oriented left M3 subdivision. Findings compatible with focal acute ischemia in the left MCA territory, likely cardioembolic in setting of atrial fibrillation.  - Pt did not qualify for TNK due to last known well was over 4.5 hours. not thrombectomy candidate as pt's thrombus is in M3 branch which is too distal to intervene on    NEURO: Continue close monitoring for neurologic deterioration, q 2 stroke neuro checks, permissive HTN with SBP goal 130-160 mmHg, avoid hypotension and rapid fluctuations. Titrate statin to LDL goal less than 70, the MR study confirms a small acute left MCA territory infarct in the left frontal and temporal opercular region. No associated hemorrhage.  Physical therapy/OT/Speech eval/treatment.     ANTITHROMBOTIC THERAPY: ASA 81mg daily or 300mg per rectum while NPO. The MR study confirms a small acute left MCA territory infarct in the left frontal and temporal opercular region. No associated hemorrhage. Anticoagulation with Heparin gtt for now, PTT goal 50-70, further titration and transition to PO  based on clinical and radiological stability and course.  Will need follow up imaging prior to or with any change.    PULMONARY:  protecting airway, saturating well on room air    CARDIOVASCULAR: TTE as noted, cardiac monitoring, cardiology following along, ensure rate control.                          GASTROINTESTINAL: dysphagia screen failed, SLP evaluation prior to PO .      Diet: NPO    RENAL: BUN/Cr: 11.9/0.49, monitor urine output, keep hydrated as tolerated     Na Goal:  135-145      HEMATOLOGY: H/H 10.9/36.1. Platelets 414, close monitoring of thrombocytosis and anemia, patient should have all age and risk appropriate malignancy screenings with PCP or sooner if evidence or high risk for malignancy.    DVT ppx Heparin gtt with goal PTT 50-70 (therapeutic)    ID: afebrile, no leukocytosis, monitor for signs of infection    DISPOSITION: Rehab or home depending on PT eval once stable and workup is complete      CORE MEASURES:        Admission NIHSS: 7     TPA: [] YES [x] NO      LDL/HDL/A1C: 77/46/6.1     Depression Screen: P     Statin Therapy: Atorvastatin 80 mg daily     Dysphagia Screen: [] PASS [x] FAIL     Smoking [] YES [x] NO      Afib [x] YES [] NO     Stroke Education [x] YES [] NO

## 2023-02-23 NOTE — PROGRESS NOTE ADULT - NS ATTEND AMEND GEN_ALL_CORE FT
Agree with PA's assessment and plan.  71 YO F w/left fronto-temporal stroke in the setting of Afib and being off AC.  - recommend restarting AC - on Heparin drip - transition to oral AC when possible  - Speech eval and treatment  - rehab consideration  Discussed above with pt and her sister-in-law

## 2023-02-23 NOTE — PROVIDER CONTACT NOTE (OTHER) - SITUATION
NP Jakob aware ptt 55. per stroke nomogram decrease by 1ml/hr. NP wants rate to continue at 7.5ml/hr for heparin drip until neuro rounds on patient.
Pt admitted with A-fib RVR
Keep heparin at the rate of 7.5
Patient admitted for A-fib with RVR.

## 2023-02-24 ENCOUNTER — TRANSCRIPTION ENCOUNTER (OUTPATIENT)
Age: 73
End: 2023-02-24

## 2023-02-24 LAB
ANION GAP SERPL CALC-SCNC: 10 MMOL/L — SIGNIFICANT CHANGE UP (ref 5–17)
APTT BLD: 64.7 SEC — HIGH (ref 27.5–35.5)
APTT BLD: 67.7 SEC — HIGH (ref 27.5–35.5)
BUN SERPL-MCNC: 12 MG/DL — SIGNIFICANT CHANGE UP (ref 8–20)
CALCIUM SERPL-MCNC: 7.6 MG/DL — LOW (ref 8.4–10.5)
CHLORIDE SERPL-SCNC: 105 MMOL/L — SIGNIFICANT CHANGE UP (ref 96–108)
CO2 SERPL-SCNC: 28 MMOL/L — SIGNIFICANT CHANGE UP (ref 22–29)
CREAT SERPL-MCNC: 0.48 MG/DL — LOW (ref 0.5–1.3)
EGFR: 101 ML/MIN/1.73M2 — SIGNIFICANT CHANGE UP
GLUCOSE SERPL-MCNC: 106 MG/DL — HIGH (ref 70–99)
HCT VFR BLD CALC: 34.7 % — SIGNIFICANT CHANGE UP (ref 34.5–45)
HGB BLD-MCNC: 10.6 G/DL — LOW (ref 11.5–15.5)
MAGNESIUM SERPL-MCNC: 1.6 MG/DL — SIGNIFICANT CHANGE UP (ref 1.6–2.6)
MCHC RBC-ENTMCNC: 22.7 PG — LOW (ref 27–34)
MCHC RBC-ENTMCNC: 30.5 GM/DL — LOW (ref 32–36)
MCV RBC AUTO: 74.3 FL — LOW (ref 80–100)
PHOSPHATE SERPL-MCNC: 3 MG/DL — SIGNIFICANT CHANGE UP (ref 2.4–4.7)
PLATELET # BLD AUTO: 417 K/UL — HIGH (ref 150–400)
POTASSIUM SERPL-MCNC: 4.6 MMOL/L — SIGNIFICANT CHANGE UP (ref 3.5–5.3)
POTASSIUM SERPL-SCNC: 4.6 MMOL/L — SIGNIFICANT CHANGE UP (ref 3.5–5.3)
RBC # BLD: 4.67 M/UL — SIGNIFICANT CHANGE UP (ref 3.8–5.2)
RBC # FLD: 16.2 % — HIGH (ref 10.3–14.5)
SARS-COV-2 RNA SPEC QL NAA+PROBE: SIGNIFICANT CHANGE UP
SODIUM SERPL-SCNC: 143 MMOL/L — SIGNIFICANT CHANGE UP (ref 135–145)
WBC # BLD: 6.36 K/UL — SIGNIFICANT CHANGE UP (ref 3.8–10.5)
WBC # FLD AUTO: 6.36 K/UL — SIGNIFICANT CHANGE UP (ref 3.8–10.5)

## 2023-02-24 PROCEDURE — 99233 SBSQ HOSP IP/OBS HIGH 50: CPT

## 2023-02-24 PROCEDURE — 99232 SBSQ HOSP IP/OBS MODERATE 35: CPT

## 2023-02-24 RX ORDER — COLCHICINE 0.6 MG
1 TABLET ORAL
Qty: 0 | Refills: 0 | DISCHARGE
Start: 2023-02-24

## 2023-02-24 RX ORDER — ASPIRIN/CALCIUM CARB/MAGNESIUM 324 MG
1 TABLET ORAL
Qty: 0 | Refills: 0 | DISCHARGE
Start: 2023-02-24

## 2023-02-24 RX ORDER — ATORVASTATIN CALCIUM 80 MG/1
1 TABLET, FILM COATED ORAL
Qty: 0 | Refills: 0 | DISCHARGE
Start: 2023-02-24

## 2023-02-24 RX ORDER — DILTIAZEM HCL 120 MG
1 CAPSULE, EXT RELEASE 24 HR ORAL
Qty: 0 | Refills: 0 | DISCHARGE
Start: 2023-02-24

## 2023-02-24 RX ORDER — APIXABAN 2.5 MG/1
1 TABLET, FILM COATED ORAL
Qty: 0 | Refills: 0 | DISCHARGE
Start: 2023-02-24

## 2023-02-24 RX ORDER — METOPROLOL TARTRATE 50 MG
1 TABLET ORAL
Qty: 0 | Refills: 0 | DISCHARGE
Start: 2023-02-24

## 2023-02-24 RX ORDER — APIXABAN 2.5 MG/1
5 TABLET, FILM COATED ORAL EVERY 12 HOURS
Refills: 0 | Status: DISCONTINUED | OUTPATIENT
Start: 2023-02-24 | End: 2023-02-26

## 2023-02-24 RX ORDER — MEMANTINE HYDROCHLORIDE 10 MG/1
5 TABLET ORAL DAILY
Refills: 0 | Status: DISCONTINUED | OUTPATIENT
Start: 2023-02-24 | End: 2023-02-26

## 2023-02-24 RX ORDER — METOPROLOL TARTRATE 50 MG
50 TABLET ORAL
Refills: 0 | Status: DISCONTINUED | OUTPATIENT
Start: 2023-02-24 | End: 2023-02-26

## 2023-02-24 RX ADMIN — Medication 81 MILLIGRAM(S): at 12:02

## 2023-02-24 RX ADMIN — Medication 50 MICROGRAM(S): at 05:09

## 2023-02-24 RX ADMIN — Medication 30 MILLIGRAM(S): at 05:10

## 2023-02-24 RX ADMIN — ATORVASTATIN CALCIUM 80 MILLIGRAM(S): 80 TABLET, FILM COATED ORAL at 20:52

## 2023-02-24 RX ADMIN — APIXABAN 5 MILLIGRAM(S): 2.5 TABLET, FILM COATED ORAL at 12:02

## 2023-02-24 RX ADMIN — Medication 125 MICROGRAM(S): at 05:09

## 2023-02-24 RX ADMIN — Medication 40 MILLIGRAM(S): at 05:09

## 2023-02-24 RX ADMIN — APIXABAN 5 MILLIGRAM(S): 2.5 TABLET, FILM COATED ORAL at 17:32

## 2023-02-24 RX ADMIN — Medication 30 MILLIGRAM(S): at 23:38

## 2023-02-24 RX ADMIN — Medication 50 MILLIGRAM(S): at 17:32

## 2023-02-24 RX ADMIN — Medication 30 MILLIGRAM(S): at 12:02

## 2023-02-24 RX ADMIN — Medication 0.6 MILLIGRAM(S): at 05:08

## 2023-02-24 RX ADMIN — Medication 30 MILLIGRAM(S): at 17:32

## 2023-02-24 RX ADMIN — PANTOPRAZOLE SODIUM 40 MILLIGRAM(S): 20 TABLET, DELAYED RELEASE ORAL at 12:02

## 2023-02-24 RX ADMIN — Medication 0.6 MILLIGRAM(S): at 17:32

## 2023-02-24 NOTE — PROGRESS NOTE ADULT - SUBJECTIVE AND OBJECTIVE BOX
Patient is a 72y old  Female who presents with a chief complaint of chest pain (24 Feb 2023 10:34)    Patient seen and examined at bedside.     ALLERGIES:  No Known Allergies    MEDICATIONS  (STANDING):  apixaban 5 milliGRAM(s) Oral every 12 hours  aspirin  chewable 81 milliGRAM(s) Oral daily  atorvastatin 80 milliGRAM(s) Oral at bedtime  colchicine 0.6 milliGRAM(s) Oral two times a day  diltiazem    Tablet 30 milliGRAM(s) Oral every 6 hours  levothyroxine 50 MICROGram(s) Oral daily  metoprolol tartrate 50 milliGRAM(s) Oral two times a day  pantoprazole  Injectable 40 milliGRAM(s) IV Push daily  predniSONE   Tablet 40 milliGRAM(s) Oral daily    MEDICATIONS  (PRN):  ALPRAZolam 0.25 milliGRAM(s) Oral once PRN at bedtime, for anxiety    Vital Signs Last 24 Hrs  T(F): 97.7 (24 Feb 2023 07:58), Max: 98.6 (23 Feb 2023 19:42)  HR: 75 (24 Feb 2023 08:00) (64 - 180)  BP: 137/65 (24 Feb 2023 08:00) (106/79 - 138/107)  RR: 16 (24 Feb 2023 08:00) (13 - 18)  SpO2: 99% (24 Feb 2023 08:00) (93% - 99%)  I&O's Summary    23 Feb 2023 07:01  -  24 Feb 2023 07:00  --------------------------------------------------------  IN: 905 mL / OUT: 600 mL / NET: 305 mL      PHYSICAL EXAM:  General: NAD, Alert  ENT: MMM, no thrush  Neck: Supple, No JVD  Lungs: good air entry, non-labored breathing  Cardio: +s1/s2  Abdomen: Soft, Nontender, Nondistended; Bowel sounds present  Extremities: No calf tenderness     LABS:                        10.6   6.36  )-----------( 417      ( 24 Feb 2023 02:50 )             34.7     02-24    143  |  105  |  12.0  ----------------------------<  106  4.6   |  28.0  |  0.48    Ca    7.6      24 Feb 2023 02:50  Phos  3.0     02-24  Mg     1.6     02-24    TPro  7.2  /  Alb  3.3  /  TBili  0.2  /  DBili  x   /  AST  18  /  ALT  27  /  AlkPhos  88  02-22    PT/INR - ( 22 Feb 2023 01:50 )   PT: 12.1 sec;   INR: 1.04 ratio    PTT - ( 24 Feb 2023 08:45 )  PTT:64.7 sec    CARDIAC MARKERS ( 22 Feb 2023 01:50 )  x     / x     / 28 U/L / x     / x        02-23 Chol 152 mg/dL LDL -- HDL 46 mg/dL Trig 146 mg/dL      ABG - ( 22 Feb 2023 02:51 )  pH, Arterial: 7.530 pH, Blood: x     /  pCO2: 33    /  pO2: 113   / HCO3: 27    / Base Excess: x     /  SaO2: 98.0      Glucose  POCT Blood Glucose.: 117 mg/dL (23 Feb 2023 13:08)    RADIOLOGY & ADDITIONAL TESTS:  - no new tests    Care Discussed with Consultants/Other Providers:   neurology  cardiology

## 2023-02-24 NOTE — PROGRESS NOTE ADULT - ASSESSMENT
72 yr old female with hypothyroidism, cerebral aneurysm s/p coiling, atrial fibrillation, recent pericarditis presented with complaints of left sided chest pain and cough. Noted to be in atrial fibrillation with RVR in ED. She was evaluated by cardiology in ED, rate control medications were adjusted. CT chest showed no pneumonia, small effusion, Pt worked up for chest pain and rapid atrial fibrillation. ACS was ruled out. Ibuprofen given in addition to Colchicine for pericarditis.     #Left MCA infarct   - change heparin drip to eliquis  - neuro consult appreciated  - cardio consult appreciated  - aspirin   - PT - Acute Rehab    #Atrial fibrillation with RVR   - digoxin (stop), cardizem (30 q6h), metoprolol (50 bid)-> d/w cardio Dr Yun  - cardio consult appreciated  - eliquis    #Acute pericarditis with pericardial effusion without tamponade physiology   - prednisone taper-> decrease 10mg a week over 4 weeks     #Hypothyroidism   - Synthroid     #DVT Prophylaxis  - eliquis    spoke to patient sister in law bedside. all questions answered ; pending d/c to acute rehab vs home (family bedside strongly encouraged to consider acute rehab)    son [phone] not answering

## 2023-02-24 NOTE — PROGRESS NOTE ADULT - ATTENDING COMMENTS
72yFemale with functional deficits after CVA  Continue PT/OT/SLP.  Discussed with son at bedside this morning. Recommend acute rehabilitation for further rehabilitation.

## 2023-02-24 NOTE — PROGRESS NOTE ADULT - SUBJECTIVE AND OBJECTIVE BOX
SUBJECTIVE/OBJECTIVE- Patient seen and examined at bedside with sister in law present. No issues with sleep. Aphasic but attempts to verbalize, slow output.     REVIEW OF SYSTEMS  Constitutional - No fever, + fatigue  Neurological - No headaches, +loss of strength  Musculoskeletal - No joint pain  Denies CP, SOB    FUNCTIONAL PROGRESS  2/23 PT    Muscle Tone Assessment:   · Muscle Tone Assessment	normal    Bed Mobility: Rolling/Turning:     · Level of [place]	unable to perform    Bed Mobility: Sit to Supine:     · Level of [place]	minimum assist (75% patients effort)  · Physical Assist/Nonphysical Assist	1 person assist; verbal cues    Bed Mobility: Supine to Sit:     · Level of [place]	minimum assist (75% patients effort)  · Physical Assist/Nonphysical Assist	1 person assist; verbal cues    Transfer: Bed to Chair:     Transfer Skill: Bed to Chair   · Level of [place]	minimum assist (75% patients effort)  · Physical Assist/Nonphysical Assist	1 person assist; verbal cues  · Weight-Bearing Restrictions	full weight-bearing  · Assistive Device	rolling walker    Transfer: Chair to Bed:     · Level of [place]	minimum assist (75% patients effort)  · Physical Assist/Nonphysical Assist	1 person assist; verbal cues  · Weight-Bearing Restrictions	full weight-bearing  · Assistive Device	rolling walker    Transfer: Sit to Stand:     · Level of [place]	minimum assist (75% patients effort)  · Physical Assist/Nonphysical Assist	1 person assist; verbal cues  · Weight-Bearing Restrictions	full weight-bearing  · Assistive Device	rolling walker    Transfer: Stand to Sit:     · Level of [place]	minimum assist (75% patients effort)  · Physical Assist/Nonphysical Assist	1 person assist; verbal cues  · Weight-Bearing Restrictions	full weight-bearing  · Assistive Device	rolling walker    Gait Skills:     · Level of [place]	minimum assist (75% patients effort)  · Physical Assist/Nonphysical Assist	1 person assist; verbal cues  · Weight-Bearing Restrictions	full weight-bearing  · Assistive Device	rolling walker  · Gait Distance	5 feet; Distance limited by increased HR    Gait Analysis:     · Gait Pattern Used	2-point gait  · Gait Deviations Noted	decreased neftali; decreased step length  · Impairments Contributing to Gait Deviations	impaired balance; impaired coordination; decreased flexibility; decreased strength    2/23 OT    Fine Motor Coordination:     Grossly Intact:   · Grossly Intact	Left UE; Right UE      Fine Motor Coordination:   · Left Hand, Finger to Nose	mild impairment  · Right Hand, Finger to Nose	normal performance  · Left Hand Thumb/Finger Opposition Skills	normal performance  · Right Hand Thumb/Finger Opposition Skills	normal performance  · Left Hand, Manipulation of Objects	mild impairment  · Right Hand, Manipulation of Objects	normal performance    Bathing Training:     · Level of [place]	minimum assist (75% patients effort)    Upper Body Dressing Training:     · Level of [place]	supervision    Lower Body Dressing Training:     · Level of [place]	minimum assist (75% patients effort)    Toilet Hygiene Training:     · Level of [place]	not observed    Grooming Training:     · Level of [place]	minimum assist (75% patients effort)    Eating/Self-Feeding Training:     · Level of [place]	not observed      VITALS  T(C): 36.5 (02-24-23 @ 07:58), Max: 37 (02-23-23 @ 19:42)  HR: 95 (02-24-23 @ 12:00) (64 - 180)  BP: 127/110 (02-24-23 @ 12:00) (106/79 - 138/107)  RR: 16 (02-24-23 @ 12:00) (13 - 18)  SpO2: 99% (02-24-23 @ 12:00) (93% - 99%)    MEDICATIONS   ALPRAZolam 0.25 milliGRAM(s) once PRN  apixaban 5 milliGRAM(s) every 12 hours  aspirin  chewable 81 milliGRAM(s) daily  atorvastatin 80 milliGRAM(s) at bedtime  colchicine 0.6 milliGRAM(s) two times a day  diltiazem    Tablet 30 milliGRAM(s) every 6 hours  levothyroxine 50 MICROGram(s) daily  metoprolol tartrate 50 milliGRAM(s) two times a day  pantoprazole  Injectable 40 milliGRAM(s) daily  predniSONE   Tablet 40 milliGRAM(s) daily      RECENT LABS/IMAGING                          10.6   6.36  )-----------( 417      ( 24 Feb 2023 02:50 )             34.7     02-24    143  |  105  |  12.0  ----------------------------<  106<H>  4.6   |  28.0  |  0.48<L>    Ca    7.6<L>      24 Feb 2023 02:50  Phos  3.0     02-24  Mg     1.6     02-24      PTT - ( 24 Feb 2023 08:45 )  PTT:64.7 sec    CT head 2/22- No large territory acute infarct, intracranial hemorrhage, or mass effect.    CTA head/neck/ perfusion 2/22- Occlusion of a superiorly oriented left M3 subdivision identified. Incidentally noted small right pleural effusion. No proximal large vessel occlusion, aneurysm, or dissection present.  CT PERFUSION: Motion degraded. Findings compatible with focal acute ischemia in the left MCA territory, with questionable small surrounding ischemic penumbra (17 mL).    MRI head/neck 2/22- The MR study confirms a small acute left MCA territory infarct in the left frontal and temporal opercular region. No associated hemorrhage, otherwise unremarkable MRI appearance of the brain.    Echo 2/23- Summary:   1. Technically limited study, for assessment of pericardial effusion.   2. Left ventricular ejection fraction, by visual estimation, is 50 to   55%.   3. Normal global left ventricular systolic function.   4. Normal right ventricular size and function.   5. Mild tricuspid regurgitation.   6. Sclerotic aortic valve with normal opening.   7. There is no evidence of pericardial effusion.   8. Compared to prior study on 2/22/2023, there is no change in left   ventricular function and no evidence of pericardial effusion.    ----------------------------------------------------------------------------------------  PHYSICAL EXAM  Constitutional - NAD, Comfortable  HEENT - NCAT  Neck - Supple  Chest - Breathing comfortably, No wheezing  Cardiovascular - R1, R2  Abdomen - Soft   Extremities - bilateral lower extremity edema   Neurologic Exam -              Cognitive - Awake, Alert, follows commands      Communication - Severe expressive aphasia      Motor -                     LEFT    UE - ShAB 5/5, EF 5/5, EE 5/5, WE 5/5,  5/5                    RIGHT UE - ShAB 5/5, EF 5/5, EE 5/5, WE 5/5,  5/5                    LEFT    LE - HF 4/5, KE 5/5, DF 5/5, PF 5/5                    RIGHT LE - HF 4/5, KE 5/5, DF 5/5, PF 5/5        Sensory - Intact to LT  Psychiatric - Mood stable, Affect WNL  ----------------------------------------------------------------------------------------  ASSESSMENT/PLAN  72yFemale with functional deficits after CVA  L MCA infarct- ASA, Lipitor   Afib- Cardizem, Eliquis  Acute Pericarditis- Prednisone taper  Aphasia- Namenda 5 mg po daily (2/24)  Hypothyroid- Synthroid   Pain - Tylenol  DVT PPX - SCDs, Eliquis   Rehab -  Continue with Speech/ PT/OT. Recommend ACUTE inpatient rehabilitation for the functional deficits consisting of 3 hours of therapy/day & 24 hour RN/daily PMR physician for comorbid medical management.  Patient will be able to tolerate 3 hours a day. Will continue to follow. Functional progress will determine ongoing rehab dispo recommendations, which may change. Continue bedside therapy as well as OOB throughout the day with mobilization by staff to maintain cardiopulmonary function and prevention of secondary complications related to debility.        SUBJECTIVE/OBJECTIVE- Patient seen and examined at bedside with sister in law present. No issues with sleep. Aphasic but attempts to verbalize, slow output.     REVIEW OF SYSTEMS  Constitutional - No fever, + fatigue  Neurological - No headaches, +loss of strength  Musculoskeletal - No joint pain  Denies CP, SOB    FUNCTIONAL PROGRESS  2/23 PT    Muscle Tone Assessment:   · Muscle Tone Assessment	normal    Bed Mobility: Rolling/Turning:     · Level of [place]	unable to perform    Bed Mobility: Sit to Supine:     · Level of [place]	minimum assist (75% patients effort)  · Physical Assist/Nonphysical Assist	1 person assist; verbal cues    Bed Mobility: Supine to Sit:     · Level of [place]	minimum assist (75% patients effort)  · Physical Assist/Nonphysical Assist	1 person assist; verbal cues    Transfer: Bed to Chair:     Transfer Skill: Bed to Chair   · Level of [place]	minimum assist (75% patients effort)  · Physical Assist/Nonphysical Assist	1 person assist; verbal cues  · Weight-Bearing Restrictions	full weight-bearing  · Assistive Device	rolling walker    Transfer: Chair to Bed:     · Level of [place]	minimum assist (75% patients effort)  · Physical Assist/Nonphysical Assist	1 person assist; verbal cues  · Weight-Bearing Restrictions	full weight-bearing  · Assistive Device	rolling walker    Transfer: Sit to Stand:     · Level of [place]	minimum assist (75% patients effort)  · Physical Assist/Nonphysical Assist	1 person assist; verbal cues  · Weight-Bearing Restrictions	full weight-bearing  · Assistive Device	rolling walker    Transfer: Stand to Sit:     · Level of [place]	minimum assist (75% patients effort)  · Physical Assist/Nonphysical Assist	1 person assist; verbal cues  · Weight-Bearing Restrictions	full weight-bearing  · Assistive Device	rolling walker    Gait Skills:     · Level of [place]	minimum assist (75% patients effort)  · Physical Assist/Nonphysical Assist	1 person assist; verbal cues  · Weight-Bearing Restrictions	full weight-bearing  · Assistive Device	rolling walker  · Gait Distance	5 feet; Distance limited by increased HR    Gait Analysis:     · Gait Pattern Used	2-point gait  · Gait Deviations Noted	decreased neftali; decreased step length  · Impairments Contributing to Gait Deviations	impaired balance; impaired coordination; decreased flexibility; decreased strength    2/23 OT    Fine Motor Coordination:     Grossly Intact:   · Grossly Intact	Left UE; Right UE      Fine Motor Coordination:   · Left Hand, Finger to Nose	mild impairment  · Right Hand, Finger to Nose	normal performance  · Left Hand Thumb/Finger Opposition Skills	normal performance  · Right Hand Thumb/Finger Opposition Skills	normal performance  · Left Hand, Manipulation of Objects	mild impairment  · Right Hand, Manipulation of Objects	normal performance    Bathing Training:     · Level of [place]	minimum assist (75% patients effort)    Upper Body Dressing Training:     · Level of [place]	supervision    Lower Body Dressing Training:     · Level of [place]	minimum assist (75% patients effort)    Toilet Hygiene Training:     · Level of [place]	not observed    Grooming Training:     · Level of [place]	minimum assist (75% patients effort)    Eating/Self-Feeding Training:     · Level of [place]	not observed      VITALS  T(C): 36.5 (02-24-23 @ 07:58), Max: 37 (02-23-23 @ 19:42)  HR: 95 (02-24-23 @ 12:00) (64 - 180)  BP: 127/110 (02-24-23 @ 12:00) (106/79 - 138/107)  RR: 16 (02-24-23 @ 12:00) (13 - 18)  SpO2: 99% (02-24-23 @ 12:00) (93% - 99%)    MEDICATIONS   ALPRAZolam 0.25 milliGRAM(s) once PRN  apixaban 5 milliGRAM(s) every 12 hours  aspirin  chewable 81 milliGRAM(s) daily  atorvastatin 80 milliGRAM(s) at bedtime  colchicine 0.6 milliGRAM(s) two times a day  diltiazem    Tablet 30 milliGRAM(s) every 6 hours  levothyroxine 50 MICROGram(s) daily  metoprolol tartrate 50 milliGRAM(s) two times a day  pantoprazole  Injectable 40 milliGRAM(s) daily  predniSONE   Tablet 40 milliGRAM(s) daily      RECENT LABS/IMAGING                          10.6   6.36  )-----------( 417      ( 24 Feb 2023 02:50 )             34.7     02-24    143  |  105  |  12.0  ----------------------------<  106<H>  4.6   |  28.0  |  0.48<L>    Ca    7.6<L>      24 Feb 2023 02:50  Phos  3.0     02-24  Mg     1.6     02-24      PTT - ( 24 Feb 2023 08:45 )  PTT:64.7 sec    CT head 2/22- No large territory acute infarct, intracranial hemorrhage, or mass effect.    CTA head/neck/ perfusion 2/22- Occlusion of a superiorly oriented left M3 subdivision identified. Incidentally noted small right pleural effusion. No proximal large vessel occlusion, aneurysm, or dissection present.  CT PERFUSION: Motion degraded. Findings compatible with focal acute ischemia in the left MCA territory, with questionable small surrounding ischemic penumbra (17 mL).    MRI head/neck 2/22- The MR study confirms a small acute left MCA territory infarct in the left frontal and temporal opercular region. No associated hemorrhage, otherwise unremarkable MRI appearance of the brain.    Echo 2/23- Summary:   1. Technically limited study, for assessment of pericardial effusion.   2. Left ventricular ejection fraction, by visual estimation, is 50 to   55%.   3. Normal global left ventricular systolic function.   4. Normal right ventricular size and function.   5. Mild tricuspid regurgitation.   6. Sclerotic aortic valve with normal opening.   7. There is no evidence of pericardial effusion.   8. Compared to prior study on 2/22/2023, there is no change in left   ventricular function and no evidence of pericardial effusion.    ----------------------------------------------------------------------------------------  PHYSICAL EXAM  Constitutional - NAD, Comfortable  HEENT - NCAT  Neck - Supple  Chest - Breathing comfortably, No wheezing  Cardiovascular - No cyanosis   Abdomen - Soft   Extremities - bilateral lower extremity edema   Neurologic Exam -              Cognitive - Awake, Alert, follows commands      Communication - Severe expressive aphasia      Motor -                     LEFT    UE - ShAB 5/5, EF 5/5, EE 5/5, WE 5/5,  5/5                    RIGHT UE - ShAB 5/5, EF 5/5, EE 5/5, WE 5/5,  5/5                    LEFT    LE - HF 4/5, KE 5/5, DF 5/5, PF 5/5                    RIGHT LE - HF 4/5, KE 5/5, DF 5/5, PF 5/5        Sensory - Intact to LT  Psychiatric - Mood stable, Affect WNL  ----------------------------------------------------------------------------------------  ASSESSMENT/PLAN  72yFemale with functional deficits after CVA  L MCA infarct- ASA, Lipitor   Afib- Cardizem, Eliquis  Acute Pericarditis- Prednisone taper  Aphasia- Consider Namenda 5 mg po daily (2/24)  Hypothyroid- Synthroid   Pain - Tylenol  DVT PPX - SCDs, Eliquis   Rehab -  Continue with Speech/ PT/OT. Recommend ACUTE inpatient rehabilitation for the functional deficits consisting of 3 hours of therapy/day & 24 hour RN/daily PMR physician for comorbid medical management.  Patient will be able to tolerate 3 hours a day. Will continue to follow. Functional progress will determine ongoing rehab dispo recommendations, which may change. Continue bedside therapy as well as OOB throughout the day with mobilization by staff to maintain cardiopulmonary function and prevention of secondary complications related to debility.

## 2023-02-24 NOTE — DISCHARGE NOTE PROVIDER - HOSPITAL COURSE
72yr old Occitan speaking female with PMH hypothyroidism, cerebral aneurysm s/p coiling w/ post operative course complicated by Afib recently admitted 1/30/23-2/1/23 with pericarditis possibly related non-covid coronaviral illness, afib RVR. was also found to have cva. was seen by neurology and cardiology and medications adjusted. now being d/c in stable condition to acute rehab

## 2023-02-24 NOTE — DISCHARGE NOTE PROVIDER - NSDCCPCAREPLAN_GEN_ALL_CORE_FT
PRINCIPAL DISCHARGE DIAGNOSIS  Diagnosis: CVA (cerebrovascular accident)  Assessment and Plan of Treatment: - take medications as rx  - follow up with neurology and cardiology  - final d/c meds per acute rehab

## 2023-02-24 NOTE — PROGRESS NOTE ADULT - SUBJECTIVE AND OBJECTIVE BOX
[place] [facility]                                      [facility]                              [address] [place] [address]                                                 PHONE: [phone]                                                 FAX: [phone]  -----------------------------------------------------------------------------------------------  Pt seen and examined. FU for AF    Overnight events/Complaints: Pt back in NSR. Improving  expressive aphasia. Family at bedside. Passed swallow evaluation.    Vital Signs Last 24 Hrs  T(C): 36.5 (24 Feb 2023 07:58), Max: 37 (23 Feb 2023 19:42)  T(F): 97.7 (24 Feb 2023 07:58), Max: 98.6 (23 Feb 2023 19:42)  HR: 75 (24 Feb 2023 08:00) (64 - 180)  BP: 137/65 (24 Feb 2023 08:00) (106/79 - 138/107)  BP(mean): 82 (24 Feb 2023 08:00) (82 - 115)  RR: 16 (24 Feb 2023 08:00) (13 - 18)  SpO2: 99% (24 Feb 2023 08:00) (93% - 99%)    Parameters below as of 24 Feb 2023 08:00  Patient On (Oxygen Delivery Method): room air      I&O's Summary    23 Feb 2023 07:01  -  24 Feb 2023 07:00  --------------------------------------------------------  IN: 905 mL / OUT: 600 mL / NET: 305 mL    PHYSICAL EXAM:  Constitutional: Appears well; alert and co-operative  Eyes: Conjunctiva normal, No scleral icterus  Neck: Supple, No JVD  Cardiovascular: regular S1, S2  Respiratory: Lungs clear to auscultation; no crepitations, no wheeze  Gastrointestinal:  Soft, Non-tender, + BS	  Musculoskeletal: No edema        LABS:                        10.6   6.36  )-----------( 417      ( 24 Feb 2023 02:50 )             34.7     02-24    143  |  105  |  12.0  ----------------------------<  106<H>  4.6   |  28.0  |  0.48<L>    Ca    7.6<L>      24 Feb 2023 02:50  Phos  3.0     02-24  Mg     1.6     02-24          PTT - ( 24 Feb 2023 08:45 )  PTT:64.7 sec        RADIOLOGY & ADDITIONAL STUDIES: (reviewed)  CXR was independently visualized/reviewed and demonstrated: small effusion    CARDIOLOGY TESTING:(reviewed)     ECG (Independent visualization): NSR    ECHOCARDIOGRAM :  Summary:   1. Left ventricular ejection fraction, by visual estimation, is 50 to   55%.   2. Normal global left ventricular systolic function.   3. The mitral in-flow pattern reveals no discernable A-wave, therefore   no comment on diastolic function can be made.   4. Normal right ventricular size and function.   5. Estimated pulmonary artery systolic pressure is 39.1 mmHg assuming a   right atrial pressure of 8 mmHg, which is consistent with borderline   pulmonary hypertension.   6. Mildly enlarged right atrium.   7. Moderately enlarged left atrium.   8. Mild mitral annular calcification.   9. Trace mitral valve regurgitation.  10. Moderate tricuspid regurgitation.  11. Sclerotic aortic valvewith normal opening.  12. Small to moderate size circumferential pericardial effusion (measures   up to 1.3 cm) with fibrinous thickened layering over the RV. No   echocardiographic evidence of tamponade.  13. Compared to the prior TTE study from 1/30/23, new pericardial   effusion noted. Findings reported to Dr. Flynn.      Echo (Independent visualization):   Summary:   1. Technically difficult and limited follow up study.   2. Left ventricular ejection fraction, by visual estimation, is 50 to   55%.   3. Normal global left ventricular systolic function.   4. The mitral in-flow pattern reveals no discernable A-wave, therefore   no comment on diastolic function can be made.   5. Normal right ventricular size and function.   6. Estimated pulmonary artery systolic pressure is 36.7 mmHg assuming a   right atrial pressure of 15 mmHg, which is consistent with borderline   pulmonary hypertension.   7. Mild mitral valve regurgitation.   8. Moderate tricuspid regurgitation.   9. There is no evidence of pericardial effusion. Thickening over the RV   noted.  10. Compared to the prior TTE study from 2/18/23, interval resolution of   pericardial effusion, again noted some thickening over the RV.    Maynor Sal DO Electronically signed on 2/22/2023 at 4:19:58 PM      Summary:   1. Technically limited study, for assessment of pericardial effusion.   2. Left ventricular ejection fraction, by visual estimation, is 50 to   55%.   3. Normal global left ventricular systolic function.   4. Normal right ventricular size and function.   5. Mild tricuspid regurgitation.   6. Sclerotic aortic valve with normal opening.   7. There is no evidence of pericardial effusion.   8. Compared to prior study on 2/22/2023, there is no change in left   ventricular function and no evidence of pericardial effusion.    Camryn Mustafa MD Electronically signed on 2/23/2023 at 11:41:29 PM      CARDIAC CATHETERIZATION:  Normal coronary arteries   Normal LV EDP   Moderately depressed LVEF with basal to mid inferior hypokinesis.   Her presentation is more likely percarditis in the setting of COVID  infection, and/or takasubu in setting of rapid Afib    MEDICATIONS:(reviewed)  MEDICATIONS  (STANDING):  aspirin  chewable 81 milliGRAM(s) Oral daily  atorvastatin 80 milliGRAM(s) Oral at bedtime  colchicine 0.6 milliGRAM(s) Oral two times a day  digoxin     Tablet 125 MICROGram(s) Oral daily  diltiazem    Tablet 30 milliGRAM(s) Oral every 6 hours  heparin  Infusion 750 Unit(s)/Hr (7.5 mL/Hr) IV Continuous <Continuous>  levothyroxine 50 MICROGram(s) Oral daily  pantoprazole  Injectable 40 milliGRAM(s) IV Push daily  predniSONE   Tablet 40 milliGRAM(s) Oral daily    ASSESSMENT AND PLAN:    72y Female with past medical history significant for recent admission for chest pain in setting of coronavirus non-covid c/w pericarditis, normal coronary arteries, normal EF, no evidence of pericardial effusion, AF with RVR who presented with chest pain and palpitations. Patient now with acute CVA.    Pericarditis with pericardial effusion  - repeat Echo with resolution of pericardial effusion  - Pt now on iv heparin  - on prednisone for pericarditis  - Repeat Echo without pericardial effusion  - Po meds resumed    CVA  - BP stable  - neurology following    Afib  - can be switched to po Eliquis if OK from neurology standpoint    Additional history obtained and plan discussed with family at bedside

## 2023-02-24 NOTE — DISCHARGE NOTE PROVIDER - NSDCMRMEDTOKEN_GEN_ALL_CORE_FT
apixaban 5 mg oral tablet: 1 tab(s) orally every 12 hours  aspirin 81 mg oral tablet, chewable: 1 tab(s) orally once a day  atorvastatin 80 mg oral tablet: 1 tab(s) orally once a day (at bedtime)  colchicine 0.6 mg oral tablet: 1 tab(s) orally 2 times a day  dilTIAZem 30 mg oral tablet: 1 tab(s) orally every 6 hours  levothyroxine 50 mcg (0.05 mg) oral tablet: 1 tab(s) orally once a day  metoprolol tartrate 50 mg oral tablet: 1 tab(s) orally 2 times a day  omeprazole 40 mg oral delayed release capsule: 1 cap(s) orally once a day  predniSONE 20 mg oral tablet: 40mg x 1 week (last dose 3/2) -&gt; 30mg x 1 week (last dose 3/9) -&gt; 20mg x 1 week (last dose 3/16)-&gt; 10mg x 1 week (last dose 3/23)

## 2023-02-24 NOTE — PROGRESS NOTE ADULT - NS ATTEND AMEND GEN_ALL_CORE FT
Agree with NP's assessment and plan Agree with NP's assessment and plan  - pt is making neurological recovery - today produced a few words  - continue AC  - Speech follow up  - outpatient rehab consideration for speech pathology follow up  discussed above with pt, her sister in law at bedside and pt's primary hospitalist. please call with questions

## 2023-02-24 NOTE — DISCHARGE NOTE PROVIDER - NSDCFUSCHEDAPPT_GEN_ALL_CORE_FT
Aaliyah Meek  [facility]  [address]  Scheduled Appointment: 03/06/2023

## 2023-02-24 NOTE — DISCHARGE NOTE PROVIDER - CARE PROVIDER_API CALL
Gavino Yun; MPH)  Cardiology; Internal Medicine  [address] [place] [id number]  Phone: [phone]  Fax: [phone]  Follow Up Time:     Ethan Barnes)  Neurology; Vascular Neurology  [address] [place] [id number]  Phone: [phone]  Fax: [phone]  Follow Up Time:

## 2023-02-24 NOTE — DISCHARGE NOTE PROVIDER - ATTENDING DISCHARGE PHYSICAL EXAMINATION:
PHYSICAL EXAM:  General: NAD, Alert  ENT: MMM, no thrush  Neck: Supple, No JVD  Lungs: good air entry, non-labored breathing  Cardio: +s1/s2  Abdomen: Soft, Nontender, Nondistended; Bowel sounds present  Extremities: No calf tenderness

## 2023-02-24 NOTE — PROGRESS NOTE ADULT - SUBJECTIVE AND OBJECTIVE BOX
Preliminary note, offical recommendations pending attending review/signature   HPI:  73 yo [place] speaking female with PMH hypothyroidism, cerebral aneurysm s/p coiling w/ post operative course atrial fibrillation, pericarditis possibly related corona viral illness, afib RVR.  Stroke code 2/22/23 0405 patient found to be aphasic and on imaging, CTA head showed nonhemorrhagic acute ischemia in the left MCA territory with questionable small surrounding ischemic penumbra.     ROS:  No overnight events, no new neurological symptoms noted.    PAST MEDICAL & SURGICAL HISTORY:  High cholesterol  Hypothyroidism  H/O knee surgery  Brain aneurysm    Allergies  No Known Allergies    FAMILY HISTORY:  No pertinent family history      ALPRAZolam 0.25 milliGRAM(s) Oral once PRN  aspirin  chewable 81 milliGRAM(s) Oral daily  atorvastatin 80 milliGRAM(s) Oral at bedtime  colchicine 0.6 milliGRAM(s) Oral two times a day  digoxin     Tablet 125 MICROGram(s) Oral daily  diltiazem    Tablet 30 milliGRAM(s) Oral every 6 hours  heparin  Infusion 750 Unit(s)/Hr IV Continuous <Continuous>  levothyroxine 50 MICROGram(s) Oral daily  metoprolol tartrate Injectable 5 milliGRAM(s) IV Push every 6 hours PRN  pantoprazole  Injectable 40 milliGRAM(s) IV Push daily  predniSONE   Tablet 40 milliGRAM(s) Oral daily      PHYSICAL EXAM:   Vital Signs Last 24 Hrs  T(C): 36.5 (24 Feb 2023 07:58), Max: 37 (23 Feb 2023 19:42)  T(F): 97.7 (24 Feb 2023 07:58), Max: 98.6 (23 Feb 2023 19:42)  HR: 76 (24 Feb 2023 06:00) (64 - 180)  BP: 126/89 (24 Feb 2023 06:00) (106/79 - 138/107)  BP(mean): 115 (23 Feb 2023 18:00) (89 - 115)  RR: 13 (24 Feb 2023 06:00) (13 - 18)  SpO2: 98% (24 Feb 2023 06:00) (93% - 99%)    Parameters below as of 24 Feb 2023 06:00  Patient On (Oxygen Delivery Method): room air      General: patient appears anxious, able to participate in exam     NEUROLOGICAL EXAM: INCOMPLETE  Mental status: Awake, alert, follows simple commands, non verbal  Cranial Nerves: no facial asymmetry, no nystagmus, tongue midline  Motor exam: Normal tone, no drift, 5/5 RUE, 5/5 RLE, 5/5 LUE, 5/5 LLE  Sensation: Intact to light touch   Coordination/ Gait: No dysmetria, gait not tested          LABS:                        10.6   6.36  )-----------( 417      ( 24 Feb 2023 02:50 )             34.7    02-24    143  |  105  |  12.0  ----------------------------<  106<H>  4.6   |  28.0  |  0.48<L>    Ca    7.6<L>      24 Feb 2023 02:50  Phos  3.0     02-24  Mg     1.6     02-24    PTT - ( 24 Feb 2023 03:13 )  PTT:67.7 sec      IMAGING: Reviewed by me.     TTE Echo Complete w/ Contrast w/ Doppler (02.22.23 @ 11:48)    1. Technically difficult and limited follow up study.   2. Left ventricular ejection fraction, by visual estimation, is 50 to   55%.   3. Normal global left ventricular systolic function.   4. The mitral in-flow pattern reveals no discernable A-wave, therefore   no comment on diastolic function can be made.   5. Normal right ventricular size and function.   6. Estimated pulmonary artery systolic pressure is 36.7 mmHg assuming a   right atrial pressure of 15 mmHg, which is consistent with borderline   pulmonary hypertension.   7. Mild mitral valve regurgitation.   8. Moderate tricuspid regurgitation.   9. There is no evidence of pericardial effusion. Thickening over the RV   noted.  10. Compared to the prior TTE study from 2/18/23, interval resolution of   pericardial effusion, again noted some thickening over the RV.    MR Head No Cont (02.22.23 @ 17:18)   1)  The MR study confirms a small acute left MCA territory infarct in the   left frontal and temporal opercular region. No associated hemorrhage.  2)  otherwise unremarkable MRI appearance of the brain.    CT Brain Stroke Protocol (02.22.23 @ 02:14)   IMPRESSION:  No large territory acute infarct, intracranial hemorrhage, or mass effect.     (02.22.23 @ 02:28)  IMPRESSION:  CTA HEAD/NECK:  Occlusion of a superiorly oriented left M3 subdivision identified.  Incidentally noted small right pleural effusion.  No proximal large vessel occlusion, aneurysm, or dissection present.  CT PERFUSION:  Motion degraded.  Findings compatible with focal acute ischemia in the left MCA territory,   with questionable small surrounding ischemic penumbra (17 mL).    CT Perfusion w/ Maps w/ IV Cont (02.22.23 @ 02:22)   CTA HEAD/NECK:  Occlusion of a superiorly oriented left M3 subdivision identified.  Incidentally noted small right pleural effusion.  No proximal large vessel occlusion, aneurysm, or dissection present.    CT PERFUSION:  Motion degraded.  Findings compatible with focal acute ischemia in the left MCA territory,   with questionable small surrounding ischemic penumbra (17 mL).    TTE Echo Complete w/o Contrast w/ Doppler (02.18.23 @ 12:04)   Summary:   1. Left ventricular ejection fraction, by visual estimation, is 50 to   55%.   2. Normal global left ventricular systolic function.   3. The mitral in-flow pattern reveals no discernable A-wave, therefore   no comment on diastolic function can be made.   4. Normal right ventricular size and function.   5. Estimated pulmonary artery systolic pressure is 39.1 mmHg assuming a   right atrial pressure of 8 mmHg, which is consistent with borderline   pulmonary hypertension.   6. Mildly enlarged right atrium.   7. Moderately enlarged left atrium.   8. Mild mitral annular calcification.   9. Trace mitral valve regurgitation.  10. Moderate tricuspid regurgitation.  11. Sclerotic aortic valvewith normal opening.  12. Small to moderate size circumferential pericardial effusion (measures   up to 1.3 cm) with fibrinous thickened layering over the RV. No   echocardiographic evidence of tamponade.  13. Compared to the prior TTE study from 1/30/23, new pericardial   effusion noted. Findings reported to Dr. Flynn.    US Duplex Venous Lower Ext Complete, Bilateral (01.30.23 @ 18:21)   RIGHT:  Normal compressibility of the RIGHT common femoral, femoral and popliteal   veins.  Doppler examination shows normal spontaneous and phasic flow.  No RIGHT calf vein thrombosis is detected.  LEFT:  Normal compressibility of the LEFT common femoral, femoral and popliteal   veins.  Doppler examination shows normal spontaneous and phasic flow.  No LEFT calf vein thrombosis is detected.  IMPRESSION:  No evidence of deep venous thrombosis in either lower extremity.    TTE Echo Complete w/o Contrast w/ Doppler (02.18.23 @ 12:04)    1. Left ventricular ejection fraction, by visual estimation, is 50 to   55%.   2. Normal global left ventricular systolic function.   3. The mitral in-flow pattern reveals no discernable A-wave, therefore   no comment on diastolic function can be made.   4. Normal right ventricular size and function.   5. Estimated pulmonary artery systolic pressure is 39.1 mmHg assuming a   right atrial pressure of 8 mmHg, which is consistent with borderline   pulmonary hypertension.   6. Mildly enlarged right atrium.   7. Moderately enlarged left atrium.   8. Mild mitral annular calcification.   9. Trace mitral valve regurgitation.  10. Moderate tricuspid regurgitation.  11. Sclerotic aortic valvewith normal opening.  12. Small to moderate size circumferential pericardial effusion (measures   up to 1.3 cm) with fibrinous thickened layering over the RV. No   echocardiographic evidence of tamponade.  13. Compared to the prior TTE study from 1/30/23, new pericardial   effusion noted. Findings reported to Dr. Flynn.   73 yo Faroese speaking female with PMH hypothyroidism, cerebral aneurysm s/p coiling w/ post operative course atrial fibrillation, pericarditis possibly related corona viral illness, afib RVR.  Stroke code 2/22/23 0405 patient found to be aphasic and on imaging, CTA head showed nonhemorrhagic acute ischemia in the left MCA territory with questionable small surrounding ischemic penumbra.     ROS:  No overnight events, no new neurological symptoms noted.    PAST MEDICAL & SURGICAL HISTORY:  High cholesterol  Hypothyroidism  H/O knee surgery  Brain aneurysm    Allergies  No Known Allergies    FAMILY HISTORY:  No pertinent family history      ALPRAZolam 0.25 milliGRAM(s) Oral once PRN  aspirin  chewable 81 milliGRAM(s) Oral daily  atorvastatin 80 milliGRAM(s) Oral at bedtime  colchicine 0.6 milliGRAM(s) Oral two times a day  digoxin     Tablet 125 MICROGram(s) Oral daily  diltiazem    Tablet 30 milliGRAM(s) Oral every 6 hours  heparin  Infusion 750 Unit(s)/Hr IV Continuous <Continuous>  levothyroxine 50 MICROGram(s) Oral daily  metoprolol tartrate Injectable 5 milliGRAM(s) IV Push every 6 hours PRN  pantoprazole  Injectable 40 milliGRAM(s) IV Push daily  predniSONE   Tablet 40 milliGRAM(s) Oral daily      PHYSICAL EXAM:   Vital Signs Last 24 Hrs  T(C): 36.5 (24 Feb 2023 07:58), Max: 37 (23 Feb 2023 19:42)  T(F): 97.7 (24 Feb 2023 07:58), Max: 98.6 (23 Feb 2023 19:42)  HR: 76 (24 Feb 2023 06:00) (64 - 180)  BP: 126/89 (24 Feb 2023 06:00) (106/79 - 138/107)  BP(mean): 115 (23 Feb 2023 18:00) (89 - 115)  RR: 13 (24 Feb 2023 06:00) (13 - 18)  SpO2: 98% (24 Feb 2023 06:00) (93% - 99%)    Parameters below as of 24 Feb 2023 06:00  Patient On (Oxygen Delivery Method): room air      General: patient appears anxious, able to participate in exam     NEUROLOGICAL EXAM: INCOMPLETE  Mental status: Awake, alert, follows simple commands, non verbal  Cranial Nerves: no facial asymmetry, no nystagmus, tongue midline  Motor exam: Normal tone, no drift, 5/5 RUE, 5/5 RLE, 5/5 LUE, 5/5 LLE  Sensation: Intact to light touch   Coordination/ Gait: No dysmetria, gait not tested          LABS:                        10.6   6.36  )-----------( 417      ( 24 Feb 2023 02:50 )             34.7    02-24    143  |  105  |  12.0  ----------------------------<  106<H>  4.6   |  28.0  |  0.48<L>    Ca    7.6<L>      24 Feb 2023 02:50  Phos  3.0     02-24  Mg     1.6     02-24    PTT - ( 24 Feb 2023 03:13 )  PTT:67.7 sec      IMAGING: Reviewed by me.     TTE Echo Complete w/ Contrast w/ Doppler (02.22.23 @ 11:48)    1. Technically difficult and limited follow up study.   2. Left ventricular ejection fraction, by visual estimation, is 50 to   55%.   3. Normal global left ventricular systolic function.   4. The mitral in-flow pattern reveals no discernable A-wave, therefore   no comment on diastolic function can be made.   5. Normal right ventricular size and function.   6. Estimated pulmonary artery systolic pressure is 36.7 mmHg assuming a   right atrial pressure of 15 mmHg, which is consistent with borderline   pulmonary hypertension.   7. Mild mitral valve regurgitation.   8. Moderate tricuspid regurgitation.   9. There is no evidence of pericardial effusion. Thickening over the RV   noted.  10. Compared to the prior TTE study from 2/18/23, interval resolution of   pericardial effusion, again noted some thickening over the RV.    MR Head No Cont (02.22.23 @ 17:18)   1)  The MR study confirms a small acute left MCA territory infarct in the   left frontal and temporal opercular region. No associated hemorrhage.  2)  otherwise unremarkable MRI appearance of the brain.    CT Brain Stroke Protocol (02.22.23 @ 02:14)   IMPRESSION:  No large territory acute infarct, intracranial hemorrhage, or mass effect.     (02.22.23 @ 02:28)  IMPRESSION:  CTA HEAD/NECK:  Occlusion of a superiorly oriented left M3 subdivision identified.  Incidentally noted small right pleural effusion.  No proximal large vessel occlusion, aneurysm, or dissection present.  CT PERFUSION:  Motion degraded.  Findings compatible with focal acute ischemia in the left MCA territory,   with questionable small surrounding ischemic penumbra (17 mL).    CT Perfusion w/ Maps w/ IV Cont (02.22.23 @ 02:22)   CTA HEAD/NECK:  Occlusion of a superiorly oriented left M3 subdivision identified.  Incidentally noted small right pleural effusion.  No proximal large vessel occlusion, aneurysm, or dissection present.    CT PERFUSION:  Motion degraded.  Findings compatible with focal acute ischemia in the left MCA territory,   with questionable small surrounding ischemic penumbra (17 mL).    TTE Echo Complete w/o Contrast w/ Doppler (02.18.23 @ 12:04)   Summary:   1. Left ventricular ejection fraction, by visual estimation, is 50 to   55%.   2. Normal global left ventricular systolic function.   3. The mitral in-flow pattern reveals no discernable A-wave, therefore   no comment on diastolic function can be made.   4. Normal right ventricular size and function.   5. Estimated pulmonary artery systolic pressure is 39.1 mmHg assuming a   right atrial pressure of 8 mmHg, which is consistent with borderline   pulmonary hypertension.   6. Mildly enlarged right atrium.   7. Moderately enlarged left atrium.   8. Mild mitral annular calcification.   9. Trace mitral valve regurgitation.  10. Moderate tricuspid regurgitation.  11. Sclerotic aortic valvewith normal opening.  12. Small to moderate size circumferential pericardial effusion (measures   up to 1.3 cm) with fibrinous thickened layering over the RV. No   echocardiographic evidence of tamponade.  13. Compared to the prior TTE study from 1/30/23, new pericardial   effusion noted. Findings reported to Dr. Flynn.    US Duplex Venous Lower Ext Complete, Bilateral (01.30.23 @ 18:21)   RIGHT:  Normal compressibility of the RIGHT common femoral, femoral and popliteal   veins.  Doppler examination shows normal spontaneous and phasic flow.  No RIGHT calf vein thrombosis is detected.  LEFT:  Normal compressibility of the LEFT common femoral, femoral and popliteal   veins.  Doppler examination shows normal spontaneous and phasic flow.  No LEFT calf vein thrombosis is detected.  IMPRESSION:  No evidence of deep venous thrombosis in either lower extremity.    TTE Echo Complete w/o Contrast w/ Doppler (02.18.23 @ 12:04)    1. Left ventricular ejection fraction, by visual estimation, is 50 to   55%.   2. Normal global left ventricular systolic function.   3. The mitral in-flow pattern reveals no discernable A-wave, therefore   no comment on diastolic function can be made.   4. Normal right ventricular size and function.   5. Estimated pulmonary artery systolic pressure is 39.1 mmHg assuming a   right atrial pressure of 8 mmHg, which is consistent with borderline   pulmonary hypertension.   6. Mildly enlarged right atrium.   7. Moderately enlarged left atrium.   8. Mild mitral annular calcification.   9. Trace mitral valve regurgitation.  10. Moderate tricuspid regurgitation.  11. Sclerotic aortic valvewith normal opening.  12. Small to moderate size circumferential pericardial effusion (measures   up to 1.3 cm) with fibrinous thickened layering over the RV. No   echocardiographic evidence of tamponade.  13. Compared to the prior TTE study from 1/30/23, new pericardial   effusion noted. Findings reported to Dr. Flynn.   71 yo [place] speaking female with PMH hypothyroidism, cerebral aneurysm s/p coiling w/ post operative course atrial fibrillation, pericarditis possibly related corona viral illness, afib RVR.  Stroke code 2/22/23 0405 patient found to be aphasic and on imaging, CTA head showed nonhemorrhagic acute ischemia in the left MCA territory with questionable small surrounding ischemic penumbra.     ROS:  No overnight events, no new neurological symptoms noted.    PAST MEDICAL & SURGICAL HISTORY:  High cholesterol  Hypothyroidism  H/O knee surgery  Brain aneurysm    Allergies  No Known Allergies    FAMILY HISTORY:  No pertinent family history      ALPRAZolam 0.25 milliGRAM(s) Oral once PRN  aspirin  chewable 81 milliGRAM(s) Oral daily  atorvastatin 80 milliGRAM(s) Oral at bedtime  colchicine 0.6 milliGRAM(s) Oral two times a day  digoxin     Tablet 125 MICROGram(s) Oral daily  diltiazem    Tablet 30 milliGRAM(s) Oral every 6 hours  heparin  Infusion 750 Unit(s)/Hr IV Continuous <Continuous>  levothyroxine 50 MICROGram(s) Oral daily  metoprolol tartrate Injectable 5 milliGRAM(s) IV Push every 6 hours PRN  pantoprazole  Injectable 40 milliGRAM(s) IV Push daily  predniSONE   Tablet 40 milliGRAM(s) Oral daily      PHYSICAL EXAM:   Vital Signs Last 24 Hrs  T(C): 36.5 (24 Feb 2023 07:58), Max: 37 (23 Feb 2023 19:42)  T(F): 97.7 (24 Feb 2023 07:58), Max: 98.6 (23 Feb 2023 19:42)  HR: 76 (24 Feb 2023 06:00) (64 - 180)  BP: 126/89 (24 Feb 2023 06:00) (106/79 - 138/107)  BP(mean): 115 (23 Feb 2023 18:00) (89 - 115)  RR: 13 (24 Feb 2023 06:00) (13 - 18)  SpO2: 98% (24 Feb 2023 06:00) (93% - 99%)    Parameters below as of 24 Feb 2023 06:00  Patient On (Oxygen Delivery Method): room air      General: calm and cooperative, participates in exam    NEUROLOGICAL EXAM: INCOMPLETE  Mental status: Awake, alert, follows simple commands  Cranial Nerves: no facial asymmetry, no nystagmus, tongue midline  Motor exam: Normal tone, no drift, 5/5 RUE, 5/5 RLE, 5/5 LUE, 5/5 LLE  Sensation: Intact to light touch   Coordination/ Gait: No dysmetria, gait not tested          LABS:                        10.6   6.36  )-----------( 417      ( 24 Feb 2023 02:50 )             34.7    02-24    143  |  105  |  12.0  ----------------------------<  106<H>  4.6   |  28.0  |  0.48<L>    Ca    7.6<L>      24 Feb 2023 02:50  Phos  3.0     02-24  Mg     1.6     02-24    PTT - ( 24 Feb 2023 03:13 )  PTT:67.7 sec      IMAGING: Reviewed by me.     TTE Echo Limited or F/U (02.23.23 @ 21:46)   Summary:   1. Technically limited study, for assessment of pericardial effusion.   2. Left ventricular ejection fraction, by visual estimation, is 50 to   55%.   3. Normal global left ventricular systolic function.   4. Normal right ventricular size and function.   5. Mild tricuspid regurgitation.   6. Sclerotic aortic valve with normal opening.   7. There is no evidence of pericardial effusion.   8. Compared to prior study on 2/22/2023, there is no change in left   ventricular function and no evidence of pericardial effusion.      TTE Echo Complete w/ Contrast w/ Doppler (02.22.23 @ 11:48)    1. Technically difficult and limited follow up study.   2. Left ventricular ejection fraction, by visual estimation, is 50 to   55%.   3. Normal global left ventricular systolic function.   4. The mitral in-flow pattern reveals no discernable A-wave, therefore   no comment on diastolic function can be made.   5. Normal right ventricular size and function.   6. Estimated pulmonary artery systolic pressure is 36.7 mmHg assuming a   right atrial pressure of 15 mmHg, which is consistent with borderline   pulmonary hypertension.   7. Mild mitral valve regurgitation.   8. Moderate tricuspid regurgitation.   9. There is no evidence of pericardial effusion. Thickening over the RV   noted.  10. Compared to the prior TTE study from 2/18/23, interval resolution of   pericardial effusion, again noted some thickening over the RV.    MR Head No Cont (02.22.23 @ 17:18)   1)  The MR study confirms a small acute left MCA territory infarct in the   left frontal and temporal opercular region. No associated hemorrhage.  2)  otherwise unremarkable MRI appearance of the brain.    CT Brain Stroke Protocol (02.22.23 @ 02:14)   IMPRESSION:  No large territory acute infarct, intracranial hemorrhage, or mass effect.     (02.22.23 @ 02:28)  IMPRESSION:  CTA HEAD/NECK:  Occlusion of a superiorly oriented left M3 subdivision identified.  Incidentally noted small right pleural effusion.  No proximal large vessel occlusion, aneurysm, or dissection present.  CT PERFUSION:  Motion degraded.  Findings compatible with focal acute ischemia in the left MCA territory,   with questionable small surrounding ischemic penumbra (17 mL).    CT Perfusion w/ Maps w/ IV Cont (02.22.23 @ 02:22)   CTA HEAD/NECK:  Occlusion of a superiorly oriented left M3 subdivision identified.  Incidentally noted small right pleural effusion.  No proximal large vessel occlusion, aneurysm, or dissection present.    CT PERFUSION:  Motion degraded.  Findings compatible with focal acute ischemia in the left MCA territory,   with questionable small surrounding ischemic penumbra (17 mL).    TTE Echo Complete w/o Contrast w/ Doppler (02.18.23 @ 12:04)   Summary:   1. Left ventricular ejection fraction, by visual estimation, is 50 to   55%.   2. Normal global left ventricular systolic function.   3. The mitral in-flow pattern reveals no discernable A-wave, therefore   no comment on diastolic function can be made.   4. Normal right ventricular size and function.   5. Estimated pulmonary artery systolic pressure is 39.1 mmHg assuming a   right atrial pressure of 8 mmHg, which is consistent with borderline   pulmonary hypertension.   6. Mildly enlarged right atrium.   7. Moderately enlarged left atrium.   8. Mild mitral annular calcification.   9. Trace mitral valve regurgitation.  10. Moderate tricuspid regurgitation.  11. Sclerotic aortic valvewith normal opening.  12. Small to moderate size circumferential pericardial effusion (measures   up to 1.3 cm) with fibrinous thickened layering over the RV. No   echocardiographic evidence of tamponade.  13. Compared to the prior TTE study from 1/30/23, new pericardial   effusion noted. Findings reported to Dr. Flynn.    US Duplex Venous Lower Ext Complete, Bilateral (01.30.23 @ 18:21)   RIGHT:  Normal compressibility of the RIGHT common femoral, femoral and popliteal   veins.  Doppler examination shows normal spontaneous and phasic flow.  No RIGHT calf vein thrombosis is detected.  LEFT:  Normal compressibility of the LEFT common femoral, femoral and popliteal   veins.  Doppler examination shows normal spontaneous and phasic flow.  No LEFT calf vein thrombosis is detected.  IMPRESSION:  No evidence of deep venous thrombosis in either lower extremity.    TTE Echo Complete w/o Contrast w/ Doppler (02.18.23 @ 12:04)    1. Left ventricular ejection fraction, by visual estimation, is 50 to   55%.   2. Normal global left ventricular systolic function.   3. The mitral in-flow pattern reveals no discernable A-wave, therefore   no comment on diastolic function can be made.   4. Normal right ventricular size and function.   5. Estimated pulmonary artery systolic pressure is 39.1 mmHg assuming a   right atrial pressure of 8 mmHg, which is consistent with borderline   pulmonary hypertension.   6. Mildly enlarged right atrium.   7. Moderately enlarged left atrium.   8. Mild mitral annular calcification.   9. Trace mitral valve regurgitation.  10. Moderate tricuspid regurgitation.  11. Sclerotic aortic valvewith normal opening.  12. Small to moderate size circumferential pericardial effusion (measures   up to 1.3 cm) with fibrinous thickened layering over the RV. No   echocardiographic evidence of tamponade.  13. Compared to the prior TTE study from 1/30/23, new pericardial   effusion noted. Findings reported to Dr. Flynn.   71 yo [place]ian speaking female with PMH hypothyroidism, cerebral aneurysm s/p coiling w/ post operative course atrial fibrillation, pericarditis possibly related corona viral illness, afib RVR.  Stroke code 2/22/23 0405 patient found to be aphasic and on imaging, CTA head showed nonhemorrhagic acute ischemia in the left MCA territory with questionable small surrounding ischemic penumbra.     S: no events over night, pt is starting to produce    ALPRAZolam 0.25 milliGRAM(s) Oral once PRN  aspirin  chewable 81 milliGRAM(s) Oral daily  atorvastatin 80 milliGRAM(s) Oral at bedtime  colchicine 0.6 milliGRAM(s) Oral two times a day  digoxin     Tablet 125 MICROGram(s) Oral daily  diltiazem    Tablet 30 milliGRAM(s) Oral every 6 hours  heparin  Infusion 750 Unit(s)/Hr IV Continuous <Continuous>  levothyroxine 50 MICROGram(s) Oral daily  metoprolol tartrate Injectable 5 milliGRAM(s) IV Push every 6 hours PRN  pantoprazole  Injectable 40 milliGRAM(s) IV Push daily  predniSONE   Tablet 40 milliGRAM(s) Oral daily      PHYSICAL EXAM:   Vital Signs Last 24 Hrs  T(C): 36.5 (24 Feb 2023 07:58), Max: 37 (23 Feb 2023 19:42)  T(F): 97.7 (24 Feb 2023 07:58), Max: 98.6 (23 Feb 2023 19:42)  HR: 76 (24 Feb 2023 06:00) (64 - 180)  BP: 126/89 (24 Feb 2023 06:00) (106/79 - 138/107)  BP(mean): 115 (23 Feb 2023 18:00) (89 - 115)  RR: 13 (24 Feb 2023 06:00) (13 - 18)  SpO2: 98% (24 Feb 2023 06:00) (93% - 99%)    Parameters below as of 24 Feb 2023 06:00  Patient On (Oxygen Delivery Method): room air      General: calm and cooperative, participates in exam    NEUROLOGICAL EXAM:   Mental status: Awake, alert, follows simple commands, said her name, and the word "beautiful"  Cranial Nerves: no facial asymmetry, no nystagmus, tongue midline  Motor exam: Normal tone, no drift, 5/5 RUE, 5/5 RLE, 5/5 LUE, 5/5 LLE  Sensation: Intact to light touch   Coordination/ Gait: No dysmetria, gait not tested      LABS:                        10.6   6.36  )-----------( 417      ( 24 Feb 2023 02:50 )             34.7    02-24    143  |  105  |  12.0  ----------------------------<  106<H>  4.6   |  28.0  |  0.48<L>    Ca    7.6<L>      24 Feb 2023 02:50  Phos  3.0     02-24  Mg     1.6     02-24    PTT - ( 24 Feb 2023 03:13 )  PTT:67.7 sec      IMAGING: Reviewed by me.     TTE Echo Limited or F/U (02.23.23 @ 21:46)   Summary:   1. Technically limited study, for assessment of pericardial effusion.   2. Left ventricular ejection fraction, by visual estimation, is 50 to   55%.   3. Normal global left ventricular systolic function.   4. Normal right ventricular size and function.   5. Mild tricuspid regurgitation.   6. Sclerotic aortic valve with normal opening.   7. There is no evidence of pericardial effusion.   8. Compared to prior study on 2/22/2023, there is no change in left   ventricular function and no evidence of pericardial effusion.      TTE Echo Complete w/ Contrast w/ Doppler (02.22.23 @ 11:48)    1. Technically difficult and limited follow up study.   2. Left ventricular ejection fraction, by visual estimation, is 50 to   55%.   3. Normal global left ventricular systolic function.   4. The mitral in-flow pattern reveals no discernable A-wave, therefore   no comment on diastolic function can be made.   5. Normal right ventricular size and function.   6. Estimated pulmonary artery systolic pressure is 36.7 mmHg assuming a   right atrial pressure of 15 mmHg, which is consistent with borderline   pulmonary hypertension.   7. Mild mitral valve regurgitation.   8. Moderate tricuspid regurgitation.   9. There is no evidence of pericardial effusion. Thickening over the RV   noted.  10. Compared to the prior TTE study from 2/18/23, interval resolution of   pericardial effusion, again noted some thickening over the RV.    MR Head No Cont (02.22.23 @ 17:18)   1)  The MR study confirms a small acute left MCA territory infarct in the   left frontal and temporal opercular region. No associated hemorrhage.  2)  otherwise unremarkable MRI appearance of the brain.    CT Brain Stroke Protocol (02.22.23 @ 02:14)   IMPRESSION:  No large territory acute infarct, intracranial hemorrhage, or mass effect.     (02.22.23 @ 02:28)  IMPRESSION:  CTA HEAD/NECK:  Occlusion of a superiorly oriented left M3 subdivision identified.  Incidentally noted small right pleural effusion.  No proximal large vessel occlusion, aneurysm, or dissection present.  CT PERFUSION:  Motion degraded.  Findings compatible with focal acute ischemia in the left MCA territory,   with questionable small surrounding ischemic penumbra (17 mL).    CT Perfusion w/ Maps w/ IV Cont (02.22.23 @ 02:22)   CTA HEAD/NECK:  Occlusion of a superiorly oriented left M3 subdivision identified.  Incidentally noted small right pleural effusion.  No proximal large vessel occlusion, aneurysm, or dissection present.    CT PERFUSION:  Motion degraded.  Findings compatible with focal acute ischemia in the left MCA territory,   with questionable small surrounding ischemic penumbra (17 mL).    TTE Echo Complete w/o Contrast w/ Doppler (02.18.23 @ 12:04)   Summary:   1. Left ventricular ejection fraction, by visual estimation, is 50 to   55%.   2. Normal global left ventricular systolic function.   3. The mitral in-flow pattern reveals no discernable A-wave, therefore   no comment on diastolic function can be made.   4. Normal right ventricular size and function.   5. Estimated pulmonary artery systolic pressure is 39.1 mmHg assuming a   right atrial pressure of 8 mmHg, which is consistent with borderline   pulmonary hypertension.   6. Mildly enlarged right atrium.   7. Moderately enlarged left atrium.   8. Mild mitral annular calcification.   9. Trace mitral valve regurgitation.  10. Moderate tricuspid regurgitation.  11. Sclerotic aortic valvewith normal opening.  12. Small to moderate size circumferential pericardial effusion (measures   up to 1.3 cm) with fibrinous thickened layering over the RV. No   echocardiographic evidence of tamponade.  13. Compared to the prior TTE study from 1/30/23, new pericardial   effusion noted. Findings reported to Dr. Flynn.    US Duplex Venous Lower Ext Complete, Bilateral (01.30.23 @ 18:21)   RIGHT:  Normal compressibility of the RIGHT common femoral, femoral and popliteal   veins.  Doppler examination shows normal spontaneous and phasic flow.  No RIGHT calf vein thrombosis is detected.  LEFT:  Normal compressibility of the LEFT common femoral, femoral and popliteal   veins.  Doppler examination shows normal spontaneous and phasic flow.  No LEFT calf vein thrombosis is detected.  IMPRESSION:  No evidence of deep venous thrombosis in either lower extremity.    TTE Echo Complete w/o Contrast w/ Doppler (02.18.23 @ 12:04)    1. Left ventricular ejection fraction, by visual estimation, is 50 to   55%.   2. Normal global left ventricular systolic function.   3. The mitral in-flow pattern reveals no discernable A-wave, therefore   no comment on diastolic function can be made.   4. Normal right ventricular size and function.   5. Estimated pulmonary artery systolic pressure is 39.1 mmHg assuming a   right atrial pressure of 8 mmHg, which is consistent with borderline   pulmonary hypertension.   6. Mildly enlarged right atrium.   7. Moderately enlarged left atrium.   8. Mild mitral annular calcification.   9. Trace mitral valve regurgitation.  10. Moderate tricuspid regurgitation.  11. Sclerotic aortic valvewith normal opening.  12. Small to moderate size circumferential pericardial effusion (measures   up to 1.3 cm) with fibrinous thickened layering over the RV. No   echocardiographic evidence of tamponade.  13. Compared to the prior TTE study from 1/30/23, new pericardial   effusion noted. Findings reported to Dr. Flynn.

## 2023-02-25 PROCEDURE — 99232 SBSQ HOSP IP/OBS MODERATE 35: CPT

## 2023-02-25 RX ORDER — MAGNESIUM SULFATE 500 MG/ML
1 VIAL (ML) INJECTION ONCE
Refills: 0 | Status: COMPLETED | OUTPATIENT
Start: 2023-02-25 | End: 2023-02-25

## 2023-02-25 RX ORDER — ONDANSETRON 8 MG/1
4 TABLET, FILM COATED ORAL EVERY 8 HOURS
Refills: 0 | Status: DISCONTINUED | OUTPATIENT
Start: 2023-02-25 | End: 2023-02-26

## 2023-02-25 RX ADMIN — Medication 0.6 MILLIGRAM(S): at 17:29

## 2023-02-25 RX ADMIN — Medication 100 GRAM(S): at 11:32

## 2023-02-25 RX ADMIN — Medication 50 MILLIGRAM(S): at 05:11

## 2023-02-25 RX ADMIN — Medication 50 MILLIGRAM(S): at 17:29

## 2023-02-25 RX ADMIN — APIXABAN 5 MILLIGRAM(S): 2.5 TABLET, FILM COATED ORAL at 17:29

## 2023-02-25 RX ADMIN — Medication 40 MILLIGRAM(S): at 05:11

## 2023-02-25 RX ADMIN — ATORVASTATIN CALCIUM 80 MILLIGRAM(S): 80 TABLET, FILM COATED ORAL at 20:49

## 2023-02-25 RX ADMIN — PANTOPRAZOLE SODIUM 40 MILLIGRAM(S): 20 TABLET, DELAYED RELEASE ORAL at 11:32

## 2023-02-25 RX ADMIN — ONDANSETRON 4 MILLIGRAM(S): 8 TABLET, FILM COATED ORAL at 05:14

## 2023-02-25 RX ADMIN — Medication 0.6 MILLIGRAM(S): at 05:12

## 2023-02-25 RX ADMIN — APIXABAN 5 MILLIGRAM(S): 2.5 TABLET, FILM COATED ORAL at 05:11

## 2023-02-25 RX ADMIN — Medication 81 MILLIGRAM(S): at 11:31

## 2023-02-25 RX ADMIN — Medication 50 MICROGRAM(S): at 05:11

## 2023-02-25 RX ADMIN — ONDANSETRON 4 MILLIGRAM(S): 8 TABLET, FILM COATED ORAL at 20:50

## 2023-02-25 RX ADMIN — Medication 30 MILLIGRAM(S): at 05:11

## 2023-02-25 RX ADMIN — MEMANTINE HYDROCHLORIDE 5 MILLIGRAM(S): 10 TABLET ORAL at 11:32

## 2023-02-25 NOTE — PROGRESS NOTE ADULT - ASSESSMENT
72 yr old female with hypothyroidism, cerebral aneurysm s/p coiling, atrial fibrillation, recent pericarditis presented with complaints of left sided chest pain and cough. Noted to be in atrial fibrillation with RVR in ED. She was evaluated by cardiology in ED, rate control medications were adjusted. CT chest showed no pneumonia, small effusion, Pt worked up for chest pain and rapid atrial fibrillation. ACS was ruled out. Ibuprofen given in addition to Colchicine for pericarditis.     #Left MCA infarct   - eliquis  - neuro consult appreciated  - cardio consult appreciated  - aspirin   - PT - Acute Rehab    #Atrial fibrillation with RVR   - s/p digoxin  - cardizem and metoprolol  - cardio consult appreciated  - eliquis    #Acute pericarditis with pericardial effusion without tamponade physiology   - prednisone taper-> decrease 10mg a week over 4 weeks     #Hypothyroidism   - Synthroid     #DVT Prophylaxis  - eliquis    pending acute rehab 2/26 [facility]     spoke to patient son on patient phone Yovany (all questions answered)

## 2023-02-25 NOTE — PROGRESS NOTE ADULT - SUBJECTIVE AND OBJECTIVE BOX
[facility]                                      [facility]                              [address] [place] [address]                                                 PHONE: [phone]                                                 FAX: [phone]  -----------------------------------------------------------------------------------------------  Pt seen and examined. FU for AF    Overnight events/Complaints: Pt remains in NSR. Improving expressive aphasia. Planned for rehab. Tolerating po diet.    Vital Signs Last 24 Hrs  T(C): 37.1 (25 Feb 2023 08:02), Max: 37.1 (25 Feb 2023 08:02)  T(F): 98.8 (25 Feb 2023 08:02), Max: 98.8 (25 Feb 2023 08:02)  HR: 66 (25 Feb 2023 08:02) (66 - 95)  BP: 119/70 (25 Feb 2023 08:02) (119/70 - 145/81)  BP(mean): 114 (24 Feb 2023 12:00) (114 - 114)  RR: 18 (25 Feb 2023 08:02) (16 - 18)  SpO2: 98% (25 Feb 2023 05:00) (98% - 99%)    Parameters below as of 25 Feb 2023 05:00  Patient On (Oxygen Delivery Method): room air      I&O's Summary    24 Feb 2023 07:01  -  25 Feb 2023 07:00  --------------------------------------------------------  IN: 240 mL / OUT: 600 mL / NET: -360 mL    PHYSICAL EXAM:  Constitutional: Appears well; alert and co-operative  Eyes: Conjunctiva normal, No scleral icterus  Neck: Supple, No JVD  Cardiovascular: regular S1, S2  Respiratory: Lungs clear to auscultation; no crepitations, no wheeze  Gastrointestinal:  Soft, Non-tender, + BS	  Musculoskeletal: No edema        LABS:                        10.6   6.36  )-----------( 417      ( 24 Feb 2023 02:50 )             34.7     02-24    143  |  105  |  12.0  ----------------------------<  106<H>  4.6   |  28.0  |  0.48<L>    Ca    7.6<L>      24 Feb 2023 02:50  Phos  3.0     02-24  Mg     1.6     02-24          PTT - ( 24 Feb 2023 08:45 )  PTT:64.7 sec      RADIOLOGY & ADDITIONAL STUDIES: (reviewed)  CXR was independently visualized/reviewed and demonstrated: small effusion    CARDIOLOGY TESTING:(reviewed)     ECG (Independent visualization): NSR    ECHOCARDIOGRAM :  Summary:   1. Left ventricular ejection fraction, by visual estimation, is 50 to   55%.   2. Normal global left ventricular systolic function.   3. The mitral in-flow pattern reveals no discernable A-wave, therefore   no comment on diastolic function can be made.   4. Normal right ventricular size and function.   5. Estimated pulmonary artery systolic pressure is 39.1 mmHg assuming a   right atrial pressure of 8 mmHg, which is consistent with borderline   pulmonary hypertension.   6. Mildly enlarged right atrium.   7. Moderately enlarged left atrium.   8. Mild mitral annular calcification.   9. Trace mitral valve regurgitation.  10. Moderate tricuspid regurgitation.  11. Sclerotic aortic valvewith normal opening.  12. Small to moderate size circumferential pericardial effusion (measures   up to 1.3 cm) with fibrinous thickened layering over the RV. No   echocardiographic evidence of tamponade.  13. Compared to the prior TTE study from 1/30/23, new pericardial   effusion noted. Findings reported to Dr. Flynn.      Echo (Independent visualization):   Summary:   1. Technically difficult and limited follow up study.   2. Left ventricular ejection fraction, by visual estimation, is 50 to   55%.   3. Normal global left ventricular systolic function.   4. The mitral in-flow pattern reveals no discernable A-wave, therefore   no comment on diastolic function can be made.   5. Normal right ventricular size and function.   6. Estimated pulmonary artery systolic pressure is 36.7 mmHg assuming a   right atrial pressure of 15 mmHg, which is consistent with borderline   pulmonary hypertension.   7. Mild mitral valve regurgitation.   8. Moderate tricuspid regurgitation.   9. There is no evidence of pericardial effusion. Thickening over the RV   noted.  10. Compared to the prior TTE study from 2/18/23, interval resolution of   pericardial effusion, again noted some thickening over the RV.    Maynor Sal DO Electronically signed on 2/22/2023 at 4:19:58 PM      Summary:   1. Technically limited study, for assessment of pericardial effusion.   2. Left ventricular ejection fraction, by visual estimation, is 50 to   55%.   3. Normal global left ventricular systolic function.   4. Normal right ventricular size and function.   5. Mild tricuspid regurgitation.   6. Sclerotic aortic valve with normal opening.   7. There is no evidence of pericardial effusion.   8. Compared to prior study on 2/22/2023, there is no change in left   ventricular function and no evidence of pericardial effusion.    Camryn Mustafa MD Electronically signed on 2/23/2023 at 11:41:29 PM      CARDIAC CATHETERIZATION:  Normal coronary arteries   Normal LV EDP   Moderately depressed LVEF with basal to mid inferior hypokinesis.   Her presentation is more likely percarditis in the setting of COVID  infection, and/or takasubu in setting of rapid Afib    MEDICATIONS:(reviewed)  MEDICATIONS  (STANDING):  apixaban 5 milliGRAM(s) Oral every 12 hours  aspirin  chewable 81 milliGRAM(s) Oral daily  atorvastatin 80 milliGRAM(s) Oral at bedtime  colchicine 0.6 milliGRAM(s) Oral two times a day  diltiazem    Tablet 30 milliGRAM(s) Oral every 6 hours  levothyroxine 50 MICROGram(s) Oral daily  memantine 5 milliGRAM(s) Oral daily  metoprolol tartrate 50 milliGRAM(s) Oral two times a day  pantoprazole  Injectable 40 milliGRAM(s) IV Push daily  predniSONE   Tablet 40 milliGRAM(s) Oral daily    ASSESSMENT AND PLAN:    72y Female with past medical history significant for recent admission for chest pain in setting of coronavirus non-covid c/w pericarditis, normal coronary arteries, normal EF, no evidence of pericardial effusion, AF with RVR who presented with chest pain and palpitations. Patient now with acute CVA.    Pericarditis with pericardial effusion  - repeat Echo with resolution of pericardial effusion  - On Eliquis  - on prednisone for pericarditis- slow taper by 10 mg every week    CVA  - BP stable    PAF  - in NSR    spoke with son via phone    No further inpt cardiac work-up needed. Pls recall if any qns/concerns. Will arrange outpt FU post discharge.

## 2023-02-25 NOTE — PROGRESS NOTE ADULT - SUBJECTIVE AND OBJECTIVE BOX
Patient is a 72y old  Female who presents with a chief complaint of chest pain (25 Feb 2023 10:18)    Patient seen and examined at bedside.      ALLERGIES:  No Known Allergies    MEDICATIONS  (STANDING):  apixaban 5 milliGRAM(s) Oral every 12 hours  aspirin  chewable 81 milliGRAM(s) Oral daily  atorvastatin 80 milliGRAM(s) Oral at bedtime  colchicine 0.6 milliGRAM(s) Oral two times a day  diltiazem    Tablet 30 milliGRAM(s) Oral every 6 hours  levothyroxine 50 MICROGram(s) Oral daily  memantine 5 milliGRAM(s) Oral daily  metoprolol tartrate 50 milliGRAM(s) Oral two times a day  pantoprazole  Injectable 40 milliGRAM(s) IV Push daily  predniSONE   Tablet 40 milliGRAM(s) Oral daily    MEDICATIONS  (PRN):  ALPRAZolam 0.25 milliGRAM(s) Oral once PRN at bedtime, for anxiety  ondansetron    Tablet 4 milliGRAM(s) Oral every 8 hours PRN Nausea and/or Vomiting    Vital Signs Last 24 Hrs  T(F): 98.8 (25 Feb 2023 08:02), Max: 98.8 (25 Feb 2023 08:02)  HR: 66 (25 Feb 2023 08:02) (66 - 95)  BP: 119/70 (25 Feb 2023 08:02) (119/70 - 145/81)  RR: 18 (25 Feb 2023 08:02) (16 - 18)  SpO2: 98% (25 Feb 2023 05:00) (98% - 99%)  I&O's Summary    24 Feb 2023 07:01  -  25 Feb 2023 07:00  --------------------------------------------------------  IN: 240 mL / OUT: 600 mL / NET: -360 mL      PHYSICAL EXAM:  General: NAD, Alert  ENT: MMM, no thrush  Neck: Supple, No JVD  Lungs: good air entry, non-labored breathing  Cardio: +s1/s2  Abdomen: Soft, Nontender, Nondistended; Bowel sounds present  Extremities: No calf tenderness       LABS:                        10.6   6.36  )-----------( 417      ( 24 Feb 2023 02:50 )             34.7     02-24    143  |  105  |  12.0  ----------------------------<  106  4.6   |  28.0  |  0.48    Ca    7.6      24 Feb 2023 02:50  Phos  3.0     02-24  Mg     1.6     02-24    PTT - ( 24 Feb 2023 08:45 )  PTT:64.7 sec    02-23 Chol 152 mg/dL LDL -- HDL 46 mg/dL Trig 146 mg/dL    COVID-19 PCR: NotDetec (02-24-23 @ 13:00)    RADIOLOGY & ADDITIONAL TESTS:  - no new tests

## 2023-02-26 ENCOUNTER — INPATIENT (INPATIENT)
Facility: HOSPITAL | Age: 73
LOS: 8 days | Discharge: ANOTHER HOSPITAL | DRG: 949 | End: 2023-03-07
Attending: PSYCHIATRY & NEUROLOGY | Admitting: PSYCHIATRY & NEUROLOGY
Payer: MEDICARE

## 2023-02-26 VITALS
OXYGEN SATURATION: 98 % | SYSTOLIC BLOOD PRESSURE: 114 MMHG | TEMPERATURE: 98 F | DIASTOLIC BLOOD PRESSURE: 74 MMHG | RESPIRATION RATE: 18 BRPM | HEART RATE: 84 BPM

## 2023-02-26 VITALS
HEIGHT: 64 IN | DIASTOLIC BLOOD PRESSURE: 64 MMHG | TEMPERATURE: 98 F | RESPIRATION RATE: 16 BRPM | WEIGHT: 142.2 LBS | HEART RATE: 81 BPM | SYSTOLIC BLOOD PRESSURE: 118 MMHG | OXYGEN SATURATION: 95 %

## 2023-02-26 DIAGNOSIS — Z98.890 OTHER SPECIFIED POSTPROCEDURAL STATES: Chronic | ICD-10-CM

## 2023-02-26 DIAGNOSIS — R19.7 DIARRHEA, UNSPECIFIED: ICD-10-CM

## 2023-02-26 DIAGNOSIS — E87.6 HYPOKALEMIA: ICD-10-CM

## 2023-02-26 DIAGNOSIS — I63.9 CEREBRAL INFARCTION, UNSPECIFIED: ICD-10-CM

## 2023-02-26 DIAGNOSIS — E03.9 HYPOTHYROIDISM, UNSPECIFIED: ICD-10-CM

## 2023-02-26 DIAGNOSIS — R07.9 CHEST PAIN, UNSPECIFIED: ICD-10-CM

## 2023-02-26 DIAGNOSIS — I48.91 UNSPECIFIED ATRIAL FIBRILLATION: ICD-10-CM

## 2023-02-26 DIAGNOSIS — I67.1 CEREBRAL ANEURYSM, NONRUPTURED: Chronic | ICD-10-CM

## 2023-02-26 DIAGNOSIS — I69.320 APHASIA FOLLOWING CEREBRAL INFARCTION: ICD-10-CM

## 2023-02-26 DIAGNOSIS — I69.392 FACIAL WEAKNESS FOLLOWING CEREBRAL INFARCTION: ICD-10-CM

## 2023-02-26 DIAGNOSIS — I30.9 ACUTE PERICARDITIS, UNSPECIFIED: ICD-10-CM

## 2023-02-26 DIAGNOSIS — E78.5 HYPERLIPIDEMIA, UNSPECIFIED: ICD-10-CM

## 2023-02-26 DIAGNOSIS — K59.00 CONSTIPATION, UNSPECIFIED: ICD-10-CM

## 2023-02-26 DIAGNOSIS — R10.9 UNSPECIFIED ABDOMINAL PAIN: ICD-10-CM

## 2023-02-26 DIAGNOSIS — I63.512 CEREBRAL INFARCTION DUE TO UNSPECIFIED OCCLUSION OR STENOSIS OF LEFT MIDDLE CEREBRAL ARTERY: ICD-10-CM

## 2023-02-26 DIAGNOSIS — E83.42 HYPOMAGNESEMIA: ICD-10-CM

## 2023-02-26 DIAGNOSIS — Z51.89 ENCOUNTER FOR OTHER SPECIFIED AFTERCARE: ICD-10-CM

## 2023-02-26 DIAGNOSIS — J98.11 ATELECTASIS: ICD-10-CM

## 2023-02-26 DIAGNOSIS — I95.9 HYPOTENSION, UNSPECIFIED: ICD-10-CM

## 2023-02-26 DIAGNOSIS — R00.0 TACHYCARDIA, UNSPECIFIED: ICD-10-CM

## 2023-02-26 DIAGNOSIS — R26.9 UNSPECIFIED ABNORMALITIES OF GAIT AND MOBILITY: ICD-10-CM

## 2023-02-26 DIAGNOSIS — I48.92 UNSPECIFIED ATRIAL FLUTTER: ICD-10-CM

## 2023-02-26 LAB
ANION GAP SERPL CALC-SCNC: 12 MMOL/L — SIGNIFICANT CHANGE UP (ref 5–17)
BUN SERPL-MCNC: 14.4 MG/DL — SIGNIFICANT CHANGE UP (ref 8–20)
CALCIUM SERPL-MCNC: 8.1 MG/DL — LOW (ref 8.4–10.5)
CHLORIDE SERPL-SCNC: 100 MMOL/L — SIGNIFICANT CHANGE UP (ref 96–108)
CO2 SERPL-SCNC: 25 MMOL/L — SIGNIFICANT CHANGE UP (ref 22–29)
CREAT SERPL-MCNC: 0.63 MG/DL — SIGNIFICANT CHANGE UP (ref 0.5–1.3)
EGFR: 95 ML/MIN/1.73M2 — SIGNIFICANT CHANGE UP
GLUCOSE SERPL-MCNC: 96 MG/DL — SIGNIFICANT CHANGE UP (ref 70–99)
HCT VFR BLD CALC: 40.6 % — SIGNIFICANT CHANGE UP (ref 34.5–45)
HGB BLD-MCNC: 12.4 G/DL — SIGNIFICANT CHANGE UP (ref 11.5–15.5)
MAGNESIUM SERPL-MCNC: 2 MG/DL — SIGNIFICANT CHANGE UP (ref 1.6–2.6)
MCHC RBC-ENTMCNC: 22.5 PG — LOW (ref 27–34)
MCHC RBC-ENTMCNC: 30.5 GM/DL — LOW (ref 32–36)
MCV RBC AUTO: 73.8 FL — LOW (ref 80–100)
PHOSPHATE SERPL-MCNC: 3.5 MG/DL — SIGNIFICANT CHANGE UP (ref 2.4–4.7)
PLATELET # BLD AUTO: 532 K/UL — HIGH (ref 150–400)
POTASSIUM SERPL-MCNC: 3.2 MMOL/L — LOW (ref 3.5–5.3)
POTASSIUM SERPL-SCNC: 3.2 MMOL/L — LOW (ref 3.5–5.3)
RBC # BLD: 5.5 M/UL — HIGH (ref 3.8–5.2)
RBC # FLD: 16.2 % — HIGH (ref 10.3–14.5)
SARS-COV-2 RNA SPEC QL NAA+PROBE: SIGNIFICANT CHANGE UP
SODIUM SERPL-SCNC: 137 MMOL/L — SIGNIFICANT CHANGE UP (ref 135–145)
WBC # BLD: 7.96 K/UL — SIGNIFICANT CHANGE UP (ref 3.8–10.5)
WBC # FLD AUTO: 7.96 K/UL — SIGNIFICANT CHANGE UP (ref 3.8–10.5)

## 2023-02-26 PROCEDURE — 87040 BLOOD CULTURE FOR BACTERIA: CPT

## 2023-02-26 PROCEDURE — U0003: CPT

## 2023-02-26 PROCEDURE — 83036 HEMOGLOBIN GLYCOSYLATED A1C: CPT

## 2023-02-26 PROCEDURE — 85027 COMPLETE CBC AUTOMATED: CPT

## 2023-02-26 PROCEDURE — 82803 BLOOD GASES ANY COMBINATION: CPT

## 2023-02-26 PROCEDURE — 82962 GLUCOSE BLOOD TEST: CPT

## 2023-02-26 PROCEDURE — 84132 ASSAY OF SERUM POTASSIUM: CPT

## 2023-02-26 PROCEDURE — 83735 ASSAY OF MAGNESIUM: CPT

## 2023-02-26 PROCEDURE — 85610 PROTHROMBIN TIME: CPT

## 2023-02-26 PROCEDURE — 85018 HEMOGLOBIN: CPT

## 2023-02-26 PROCEDURE — 80061 LIPID PANEL: CPT

## 2023-02-26 PROCEDURE — 80048 BASIC METABOLIC PNL TOTAL CA: CPT

## 2023-02-26 PROCEDURE — 99285 EMERGENCY DEPT VISIT HI MDM: CPT

## 2023-02-26 PROCEDURE — 83605 ASSAY OF LACTIC ACID: CPT

## 2023-02-26 PROCEDURE — 82947 ASSAY GLUCOSE BLOOD QUANT: CPT

## 2023-02-26 PROCEDURE — U0005: CPT

## 2023-02-26 PROCEDURE — 81001 URINALYSIS AUTO W/SCOPE: CPT

## 2023-02-26 PROCEDURE — 70450 CT HEAD/BRAIN W/O DYE: CPT

## 2023-02-26 PROCEDURE — 92610 EVALUATE SWALLOWING FUNCTION: CPT

## 2023-02-26 PROCEDURE — 96374 THER/PROPH/DIAG INJ IV PUSH: CPT

## 2023-02-26 PROCEDURE — 93005 ELECTROCARDIOGRAM TRACING: CPT

## 2023-02-26 PROCEDURE — 85025 COMPLETE CBC W/AUTO DIFF WBC: CPT

## 2023-02-26 PROCEDURE — 82550 ASSAY OF CK (CPK): CPT

## 2023-02-26 PROCEDURE — 82330 ASSAY OF CALCIUM: CPT

## 2023-02-26 PROCEDURE — 82435 ASSAY OF BLOOD CHLORIDE: CPT

## 2023-02-26 PROCEDURE — 70551 MRI BRAIN STEM W/O DYE: CPT

## 2023-02-26 PROCEDURE — 70498 CT ANGIOGRAPHY NECK: CPT

## 2023-02-26 PROCEDURE — 87086 URINE CULTURE/COLONY COUNT: CPT

## 2023-02-26 PROCEDURE — 85730 THROMBOPLASTIN TIME PARTIAL: CPT

## 2023-02-26 PROCEDURE — 0225U NFCT DS DNA&RNA 21 SARSCOV2: CPT

## 2023-02-26 PROCEDURE — 96375 TX/PRO/DX INJ NEW DRUG ADDON: CPT

## 2023-02-26 PROCEDURE — 71045 X-RAY EXAM CHEST 1 VIEW: CPT

## 2023-02-26 PROCEDURE — 84295 ASSAY OF SERUM SODIUM: CPT

## 2023-02-26 PROCEDURE — 93308 TTE F-UP OR LMTD: CPT

## 2023-02-26 PROCEDURE — 84484 ASSAY OF TROPONIN QUANT: CPT

## 2023-02-26 PROCEDURE — C8929: CPT

## 2023-02-26 PROCEDURE — 84443 ASSAY THYROID STIM HORMONE: CPT

## 2023-02-26 PROCEDURE — 70496 CT ANGIOGRAPHY HEAD: CPT

## 2023-02-26 PROCEDURE — 99239 HOSP IP/OBS DSCHRG MGMT >30: CPT

## 2023-02-26 PROCEDURE — 93306 TTE W/DOPPLER COMPLETE: CPT

## 2023-02-26 PROCEDURE — 99223 1ST HOSP IP/OBS HIGH 75: CPT

## 2023-02-26 PROCEDURE — 85014 HEMATOCRIT: CPT

## 2023-02-26 PROCEDURE — 36415 COLL VENOUS BLD VENIPUNCTURE: CPT

## 2023-02-26 PROCEDURE — 84100 ASSAY OF PHOSPHORUS: CPT

## 2023-02-26 PROCEDURE — 0042T: CPT

## 2023-02-26 PROCEDURE — 80053 COMPREHEN METABOLIC PANEL: CPT

## 2023-02-26 RX ORDER — ASPIRIN/CALCIUM CARB/MAGNESIUM 324 MG
81 TABLET ORAL DAILY
Refills: 0 | Status: DISCONTINUED | OUTPATIENT
Start: 2023-02-27 | End: 2023-03-07

## 2023-02-26 RX ORDER — MEMANTINE HYDROCHLORIDE 10 MG/1
5 TABLET ORAL DAILY
Refills: 0 | Status: DISCONTINUED | OUTPATIENT
Start: 2023-02-27 | End: 2023-03-07

## 2023-02-26 RX ORDER — APIXABAN 2.5 MG/1
5 TABLET, FILM COATED ORAL EVERY 12 HOURS
Refills: 0 | Status: DISCONTINUED | OUTPATIENT
Start: 2023-02-26 | End: 2023-03-07

## 2023-02-26 RX ORDER — SENNA PLUS 8.6 MG/1
2 TABLET ORAL AT BEDTIME
Refills: 0 | Status: DISCONTINUED | OUTPATIENT
Start: 2023-02-26 | End: 2023-02-27

## 2023-02-26 RX ORDER — DILTIAZEM HCL 120 MG
30 CAPSULE, EXT RELEASE 24 HR ORAL EVERY 6 HOURS
Refills: 0 | Status: COMPLETED | OUTPATIENT
Start: 2023-02-26 | End: 2023-03-01

## 2023-02-26 RX ORDER — MEMANTINE HYDROCHLORIDE 10 MG/1
1 TABLET ORAL
Qty: 0 | Refills: 0 | DISCHARGE
Start: 2023-02-26

## 2023-02-26 RX ORDER — METOPROLOL TARTRATE 50 MG
50 TABLET ORAL
Refills: 0 | Status: DISCONTINUED | OUTPATIENT
Start: 2023-02-26 | End: 2023-03-05

## 2023-02-26 RX ORDER — PANTOPRAZOLE SODIUM 20 MG/1
40 TABLET, DELAYED RELEASE ORAL
Refills: 0 | Status: DISCONTINUED | OUTPATIENT
Start: 2023-02-27 | End: 2023-03-07

## 2023-02-26 RX ORDER — LEVOTHYROXINE SODIUM 125 MCG
50 TABLET ORAL DAILY
Refills: 0 | Status: DISCONTINUED | OUTPATIENT
Start: 2023-02-27 | End: 2023-03-07

## 2023-02-26 RX ORDER — ATORVASTATIN CALCIUM 80 MG/1
80 TABLET, FILM COATED ORAL AT BEDTIME
Refills: 0 | Status: DISCONTINUED | OUTPATIENT
Start: 2023-02-26 | End: 2023-03-07

## 2023-02-26 RX ORDER — POTASSIUM CHLORIDE 20 MEQ
40 PACKET (EA) ORAL ONCE
Refills: 0 | Status: COMPLETED | OUTPATIENT
Start: 2023-02-26 | End: 2023-02-26

## 2023-02-26 RX ORDER — COLCHICINE 0.6 MG
0.6 TABLET ORAL
Refills: 0 | Status: DISCONTINUED | OUTPATIENT
Start: 2023-02-26 | End: 2023-03-07

## 2023-02-26 RX ORDER — ONDANSETRON 8 MG/1
4 TABLET, FILM COATED ORAL EVERY 8 HOURS
Refills: 0 | Status: DISCONTINUED | OUTPATIENT
Start: 2023-02-26 | End: 2023-02-28

## 2023-02-26 RX ADMIN — Medication 50 MILLIGRAM(S): at 17:29

## 2023-02-26 RX ADMIN — MEMANTINE HYDROCHLORIDE 5 MILLIGRAM(S): 10 TABLET ORAL at 11:57

## 2023-02-26 RX ADMIN — Medication 0.6 MILLIGRAM(S): at 04:51

## 2023-02-26 RX ADMIN — ATORVASTATIN CALCIUM 80 MILLIGRAM(S): 80 TABLET, FILM COATED ORAL at 21:56

## 2023-02-26 RX ADMIN — APIXABAN 5 MILLIGRAM(S): 2.5 TABLET, FILM COATED ORAL at 17:28

## 2023-02-26 RX ADMIN — Medication 50 MICROGRAM(S): at 04:50

## 2023-02-26 RX ADMIN — Medication 0.6 MILLIGRAM(S): at 17:55

## 2023-02-26 RX ADMIN — Medication 40 MILLIEQUIVALENT(S): at 13:04

## 2023-02-26 RX ADMIN — PANTOPRAZOLE SODIUM 40 MILLIGRAM(S): 20 TABLET, DELAYED RELEASE ORAL at 11:59

## 2023-02-26 RX ADMIN — Medication 40 MILLIGRAM(S): at 04:50

## 2023-02-26 RX ADMIN — APIXABAN 5 MILLIGRAM(S): 2.5 TABLET, FILM COATED ORAL at 04:51

## 2023-02-26 RX ADMIN — Medication 81 MILLIGRAM(S): at 11:57

## 2023-02-26 NOTE — PATIENT PROFILE ADULT - FALL HARM RISK - HARM RISK INTERVENTIONS

## 2023-02-26 NOTE — H&P ADULT - HISTORY OF PRESENT ILLNESS
Ms. Keller is a 72yr old female with PMH hypothyroidism, cerebral aneurysm s/p coiling w/ post operative course complicated by Afib recently admitted 1/30/23-2/1/23 with pericarditis possibly related non-covid coronaviral illness, afib RVR. She presents with same symptoms of left sided chest pain. In ER, she was found to be in Afib RVR. Stroke code 2/22/23 0405 patient found to be aphasic and on imaging, CTA head showed nonhemorrhagic acute ischemia in the left MCA territory with questionable small surrounding ischemic penumbra. Patient did not qualify for TNK due to last known well was over 4.5 hours. not thrombectomy candidate as pt's thrombus is in M3 branch which is too distal to intervene on Ms. Keller is a 72yr old female with PMH hypothyroidism, cerebral aneurysm s/p coiling w/ post operative course complicated by Afib recently admitted 1/30/23-2/1/23 with pericarditis possibly related non-covid coronaviral illness, afib RVR. She presents with same symptoms of left sided chest pain. In ER, she was found to be in Afib RVR. Stroke code 2/22/23 0405 patient found to be aphasic and on imaging, CTA head showed nonhemorrhagic acute ischemia in the left MCA territory with questionable small surrounding ischemic penumbra. Patient did not qualify for TNK due to last known well was over 4.5 hours. not thrombectomy candidate as pt's thrombus is in M3 branch which is too distal to intervene on. Patient is currenlty on an acute prednisone taper for pericarditis and is on Eliquis and cardizem for Afib management.       Patient was evaluated by PM&R and therapy for functional deficits and gait/ ADL impairments and recommended acute rehabilitation. Patient was medically optimized for discharge to [facility] on 2/26/23     Ms. Keller is a 72yr old female with PMH hypothyroidism, cerebral aneurysm s/p coiling,  pericarditis possibly related non-covid coronaviral illness (recently admitted 1/30/23-2/1/23), afib RVR presents with similar symptoms of left sided chest pain on 2/16/23. In ER, she was found to be in Afib RVR. Stroke code was called on 2/22/23 where patient found to be aphasic and on imaging, CTA head showed nonhemorrhagic acute ischemia in the left MCA territory. Patient did not qualify for TNK due to last known sx was well over 4.5 hours. Patient was not a thrombectomy candidate as pt's thrombus is in M3 branch which is too distal to intervene on. Patient is currently on an acute prednisone taper for pericarditis and is on Eliquis and cardizem for Afib management.       Patient was evaluated by PM&R and therapy for functional deficits and gait/ ADL impairments and recommended acute rehabilitation. Patient was medically optimized for discharge to [facility] on 2/26/23

## 2023-02-26 NOTE — PATIENT PROFILE ADULT - IS THERE A SUSPICION OF ABUSE/NEGLIGENCE?
Venipuncture performed in the second finger by Karen Camejo with good hemostasis. Patient tolerated well. 4/25/2019    
no

## 2023-02-26 NOTE — H&P ADULT - ATTENDING COMMENTS
I have personally seen and examined the patient with the resident . Medical records reviewed. I have made amendments to the documentation where necessary and adjusted the history, physical examination, and plan as documented by the resident.    Left embolic MCA infarct with mixed aphasia, minimal right sided weakness and gait impairment    Afib/ Pericarditis - full dose AC/ASA/ steroid taper  GI ppx  Aspiration and fall precaution  HLD on statin with goal LDL <70  Admit to neurorehabilitation  Admit labs  Medicine evaluation

## 2023-02-26 NOTE — PROGRESS NOTE ADULT - ASSESSMENT
72 yr old female with hypothyroidism, cerebral aneurysm s/p coiling, atrial fibrillation, recent pericarditis presented with complaints of left sided chest pain and cough. Noted to be in atrial fibrillation with RVR in ED. She was evaluated by cardiology in ED, rate control medications were adjusted. CT chest showed no pneumonia, small effusion, Pt worked up for chest pain and rapid atrial fibrillation. ACS was ruled out. Ibuprofen given in addition to Colchicine for pericarditis.     #Left MCA infarct   - eliquis  - neuro consult appreciated  - cardio consult appreciated  - aspirin   - PT - Acute Rehab    #Atrial fibrillation with RVR   - s/p digoxin  - cardizem and metoprolol  - cardio consult appreciated  - eliquis    #Acute pericarditis with pericardial effusion without tamponade physiology   - prednisone taper-> decrease 10mg a week over 4 weeks     #Hypothyroidism   - Synthroid     #DVT Prophylaxis  - eliquis    pending acute rehab - [facility]   spoke to patient family bedside

## 2023-02-26 NOTE — H&P ADULT - NSHPREVIEWOFSYSTEMS_GEN_ALL_CORE
REVIEW OF SYSTEMS  Constitutional: No fever, No Chills, No fatigue  HEENT: No eye pain, No visual disturbances, No difficulty hearing, No Dysphagia   Pulm: slight cough,  No shortness of breath  Cardio: No chest pain, No palpitations  GI:  slight diarrhea yesterday, No abdominal pain, No nausea, No vomiting, No constipation, No incontinence, Last Bowel Movement yesterday   : No dysuria, No frequency, No hematuria, No incontinence  Neuro: No headaches, No memory loss, No loss of strength, No numbness, No tremors  Skin: No itching, No rashes, No lesions   Endo: No temperature intolerance  MSK: No joint pain, No joint swelling, No muscle pain, No Neck or back pain  Psych:  No depression, No anxiety

## 2023-02-26 NOTE — H&P ADULT - NSHPLABSRESULTS_GEN_ALL_CORE
RECENT LABS    Vital Signs Last 24 Hrs  T(C): 36.9 (26 Feb 2023 07:45), Max: 37.2 (25 Feb 2023 20:00)  T(F): 98.5 (26 Feb 2023 07:45), Max: 98.9 (25 Feb 2023 20:00)  HR: 108 (26 Feb 2023 07:45) (58 - 108)  BP: 96/69 (26 Feb 2023 07:45) (96/69 - 117/87)  BP(mean): --  RR: 18 (26 Feb 2023 07:45) (18 - 18)  SpO2: 96% (26 Feb 2023 07:45) (96% - 100%)                              12.4   7.96  )-----------( 532      ( 26 Feb 2023 05:15 )             40.6     02-26    137  |  100  |  14.4  ----------------------------<  96  3.2<L>   |  25.0  |  0.63    Ca    8.1<L>      26 Feb 2023 05:15  Phos  3.5     02-26  Mg     2.0     02-26          CAPILLARY BLOOD GLUCOSE    < from: MR Head No Cont (02.22.23 @ 17:18) >    ACC: [id number] EXAM:  MR BRAIN   ORDERED BY: NEYMAR KUNZ     PROCEDURE DATE:  02/22/2023          INTERPRETATION:  INDICATION:    Stroke. TIA.  TECHNIQUE:  Multiplanar brain imaging was conducted. T1, T2 and FLAIR   imaging was performed.  Inaddition, diffusion imaging, diffusion   coefficient assessment (ADC) and T2* was incorporated . No contrast   administered.  COMPARISON EXAMINATION:  CT dated 2/22/2023    FINDINGS:    HEMISPHERES: There is diffusion restriction indicative of acute ischemia   in the left MCA territory, with involvement of the left inferior frontal   gyrus and frontal and temporal operculum. There is no evidence of   associated hemorrhage. This study is degraded by motion but is diagnostic.  VENTRICLES:  midline and normal in size  POSTERIOR FOSSA:  The brain stem and cerebellum are unremarkable in   appearance.  No CP angle abnormality is noted.  EXTRA-AXIAL:  No mass or collections are depicted.  VASCULATURE:  The major vessels and venous sinuses are grossly patent.  SINUSES AND MASTOIDS:  Clear.  MISCELLANEOUS:  No orbital or pituitary abnormality noted.  No skull base   lesion suggested.    IMPRESSION:    1)  The MR study confirms a small acute left MCA territory infarct in the   left frontal and temporal opercular region. No associated hemorrhage.  2)  otherwise unremarkable MRI appearance of the brain.    Further clinical correlation and follow-up recommended.    --- End of Report ---    < end of copied text >

## 2023-02-26 NOTE — H&P ADULT - NSHPSOCIALHISTORY_GEN_ALL_CORE
Smoking - None  EtOH - None  Drugs - None    FUNCTIONAL HISTORY  Independent.  Lives with son.  No steps.      Current Functional Status:  Bed mobility: Min Assist x1  (sit to supine, supine to sit)   Transfers: Min Assist x 1 (bed to chair, sit to stand)   Gait / ambulation: 5ft with RW, distance limited by increased HR, full weight-bearing  ADL's: Bathing, LBD, Grooming - Min-A and UBD - Supervision   Speech: Aphasic on Namenda , DASH Diet

## 2023-02-26 NOTE — H&P ADULT - NSHPPHYSICALEXAM_GEN_ALL_CORE
Constitutional - NAD, Comfortable  HEENT - NCAT, EOMI  Neck - Supple, No limited ROM  Chest - good chest expansion, good respiratory effort, CTAB   Cardio - warm and well perfused, RRR, no murmur  Abdomen -  Soft, NTND  Extremities - slight calf tenderness, No peripheral edema  Neurologic Exam:                    Cognitive -             Orientation: Awake, Alert, AAO to self, place, date, year, situation            Attention:  Days of week, recall 3 objects without cuing            Memory: Recent/ remote memory intact            Thought: process, content appropriate     Speech - Slight broca's aphasia     Cranial Nerves - No facial asymmetry, Tongue midline, EOMI, Shoulder shrug intact     Motor -                      LEFT    UE - ShAB 5/5, EF 5/5, EE 5/5, WE 5/5,  WNL                    RIGHT UE - ShAB 5/5, EF 5/5, EE 5/5, WE 5/5,  WNL                    LEFT    LE - HF 5/5, KE 5/5, DF 5/5, PF 5/5                    RIGHT LE - HF 5/5, KE 5/5, DF 5/5, PF 5/5        Sensory - Intact to LT bilateral     Reflexes - DTR +2 /intact      Coordination - FTN intact     OculoVestibular -  No nystagmus  Psychiatric - Mood stable, Affect WNL  Skin on admission: Constitutional - NAD, Comfortable  HEENT - NCAT, EOMI  Neck - Supple, No limited ROM  Chest - good chest expansion, good respiratory effort, CTAB   Cardio - warm and well perfused, RRR, no murmur  Abdomen -  Soft, NTND  Extremities - slight calf tenderness, No peripheral edema  Neurologic Exam:                    Cognitive -             Orientation: Awake, Alert, AAO to self, place, exam is complicated by aphasia            Attention:  exam is complicated by aphasia            Memory: exam is complicated by aphasia               Speech -  mixed aphasia with impaired naming and repartition, hesitancy, difficulty with comprehension ( 2 step commands)      Cranial Nerves - VFF, +LR=, EOMI, mild right facial weakness, no dysarthria, soft palate elevates bilaterally      Motor -  mild right weakness with pronator drift - 5-/5                    LEFT    UE - ShAB 5/5, EF 5/5, EE 5/5, WE 5/5,  WNL                    LEFT    LE - HF 5/5, KE 5/5, DF 5/5, PF 5/5                 Sensory - Intact to LT bilateral     Reflexes - DTR +2 /intact      Coordination - FTN intact     Gait is unsteady   Psychiatric - Mood stable, Affect WNL  Skin on admission:

## 2023-02-26 NOTE — H&P ADULT - ASSESSMENT
(age and gender) with hx of (pertinent PMH) who presented to (acute hospital) on (date of acute admission) with (presenting sx) found to have (rehab diagnosis). Hospital Course complicated by ___. Admitted for multidisciplinary rehab program    #Comprehensive Multidisciplinary Rehab Program:  - Gait, ADL, Functional impairments  - PT/OT/ SLP 3 hours a day 5 days a week    #    #Mood / Cognition:  - Neuropsych evaluation     #Sleep:  - Maintain quiet hours and low stim environment  - Melatonin PRN    #Pain:  - Tylenol PRN  - avoid sedating meds that may affect cognitive recovery    #GI/Bowel:  - Senna 2 tabs daily  - GI ppx:     #/Bladder:  - Monitor PVR if no void in 8h; SC for >400 cc  - Toileting schedule q4h    #Diet / Dysphagia:    - Diet:   - ongoing SLP assessment  - Nutrition to follow    #Skin/ Pressure Injury Prevention:  - assessment on admission   - Incisions:  - Turn Q2hrs in bed while awake, OOB to Chair, PT/OT/SLP     #DVT prophylaxis:  -   - SCDs  - Last doppler on ___    #Precautions/ Restrictions  - Falls, Cardiac, Seizures, Spine, Hip  - Ortho:      Weight bearing status:     ROM restrictions:   - Lungs: Aspiration, Incentive Spirometer    - COVID PCR:     --------------------------------------------  Outpatient Follow up:    --------------------------------------------      MEDICAL PROGNOSIS: GOOD            REHAB POTENTIAL: GOOD             ESTIMATED DISPOSITION: HOME WITH HOME CARE            ELOS: 10-14 Days   EXPECTED THERAPY:     P.T. 1hr/day       O.T. 1hr/day      S.L.P. 1hr/day     P&O Unnecessary     EXP FREQUENCY: 5 days per 7 day period     PRESCREEN COMPARISON:   I have reviewed the prescreen information and I have found no relevant changes between the preadmission screening and my post admission evaluation     RATIONALE FOR INPATIENT ADMISSION - Patient demonstrates the following: (check all that apply)  [X] Medically appropriate for rehabilitation admission  [X] Has attainable rehab goals with an appropriate initial discharge plan  [X] Has rehabilitation potential (expected to make a significant improvement within a reasonable period of time)   [X] Requires close medical management by a rehab physician, rehab nursing care, Hospitalist and comprehensive interdisciplinary team (including PT, OT, & or SLP, Prosthetics and Orthotics)   Ms. Keller is a 72yr old female with PMH hypothyroidism, cerebral aneurysm s/p coiling,  pericarditis possibly related to non-covid coronaviral illness (recently admitted 1/30/23-2/1/23), afib RVR presents with similar symptoms of left sided chest pain on 2/16/23 at [facility]. Stroke code was called on 2/22/23 where patient found to be aphasic and on imaging, CTA head showed nonhemorrhagic acute ischemia in the left MCA territory. Patient is currently on an acute prednisone taper for pericarditis and is on Eliquis and cardizem for Afib management. Admitted for multidisciplinary rehab program.     #Left MCA infarct   - c/w eliquis 5mg PO BID   - c/w ASA 81mg PO QD   - c/w atorvastatin 80 mg PO @bedtime   - c/w namenda 5mg PO QD   #Comprehensive Multidisciplinary Rehab Program:  - Gait, ADL, Functional impairments  - PT/OT/ SLP 3 hours a day 5 days a week    #Atrial fibrillation with RVR  -c/w cardizem  30mg PO Q6 and metoprolol 50mg PO BID     - c/q eliquis     #Acute pericarditis with pericardial effusion without tamponade physiology   -c/w prednisone 40mg PO QD until 3/2  - prednisone taper-> decrease 10mg a week over 4 weeks   40mg x 1week (last dose on 3/2)  30mg x 1week (last dose on 3/9)  10mg x 1week (last dose on 3/23)  -c/w colchicine 0.6mg PO BID    #Hypothyroidism   - c/w synthroid 50mcg PO QD     #Mood / Cognition   - Neuropsych evaluation     #Sleep:  - Maintain quiet hours and low stim environment  - Melatonin PRN    #Pain:  - colchicine 0.6mg PO BID  - avoid sedating meds that may affect cognitive recovery    #GI/Bowel:  - GI ppx: pantoprazole 40mg PO QD     #/Bladder:  - Monitor PVR if no void in 8h; SC for >400 cc  - Toileting schedule q4h    #Diet / Dysphagia:    - Diet: DASH/TLC  - ongoing SLP assessment  - Nutrition to follow    #Skin/ Pressure Injury Prevention:  - assessment on admission   - Incisions:  - Turn Q2hrs in bed while awake, OOB to Chair, PT/OT/SLP     #DVT prophylaxis:  - c/w Eliquis 5mg PO BID   - c/w ASA 81mg PO QD  - Last doppler on 1/30/23 - No evidence of DVT     #Precautions/ Restrictions  - Falls, Cardiac, Seizures   - Lungs: Aspiration, Incentive Spirometer    - COVID PCR: Neg 2/24    --------------------------------------------  Outpatient Follow up:    Gavino Yun (MD; MPH)  Cardiology; Internal Medicine  [address] [place] [address]  Phone: [phone]  Fax: [phone]  Follow Up Time:     Ethan Barnes)  Neurology; Vascular Neurology  [address] [place] [id number]  Phone: [phone]  Fax: [phone]  Follow Up Time:    --------------------------------------------      MEDICAL PROGNOSIS: GOOD            REHAB POTENTIAL: GOOD             ESTIMATED DISPOSITION: HOME WITH HOME CARE            ELOS: 10-14 Days   EXPECTED THERAPY:     P.T. 1hr/day       O.T. 1hr/day      S.L.P. 1hr/day     P&O Unnecessary     EXP FREQUENCY: 5 days per 7 day period     PRESCREEN COMPARISON:   I have reviewed the prescreen information and I have found no relevant changes between the preadmission screening and my post admission evaluation     RATIONALE FOR INPATIENT ADMISSION - Patient demonstrates the following: (check all that apply)  [X] Medically appropriate for rehabilitation admission  [X] Has attainable rehab goals with an appropriate initial discharge plan  [X] Has rehabilitation potential (expected to make a significant improvement within a reasonable period of time)   [X] Requires close medical management by a rehab physician, rehab nursing care, Hospitalist and comprehensive interdisciplinary team (including PT, OT, & or SLP, Prosthetics and Orthotics)   Ms. eKller is a 72yr old female with PMH hypothyroidism, cerebral aneurysm s/p coiling,  pericarditis possibly related to non-covid coronaviral illness (recently admitted 1/30/23-2/1/23), afib RVR presents with similar symptoms of left sided chest pain on 2/16/23 at [facility]. Stroke code was called on 2/22/23 where patient found to be aphasic and on imaging, CTA head showed nonhemorrhagic acute ischemia in the left MCA territory. Patient is currently on an acute prednisone taper for pericarditis and is on Eliquis and cardizem for Afib management. Admitted for multidisciplinary rehab program.     #Left MCA infarct   - c/w eliquis 5mg PO BID   - c/w ASA 81mg PO QD   - c/w atorvastatin 80 mg PO @bedtime   - c/w namenda 5mg PO QD   #Comprehensive Multidisciplinary Rehab Program:  - Gait, ADL, Functional impairments  - PT/OT/ SLP 3 hours a day 5 days a week    #Atrial fibrillation with RVR  -c/w cardizem  30mg PO Q6 and metoprolol 50mg PO BID     - c/w eliquis     #Acute pericarditis with pericardial effusion without tamponade physiology   -c/w prednisone 40mg PO QD until 3/2  - prednisone taper-> decrease 10mg a week over 4 weeks   40mg x 1week (last dose on 3/2)  30mg x 1week (last dose on 3/9)  10mg x 1week (last dose on 3/23)  -c/w colchicine 0.6mg PO BID    #Hypothyroidism   - c/w synthroid 50mcg PO QD     #Mood / Cognition   - Neuropsych evaluation     #Sleep:  - Maintain quiet hours and low stim environment  - Melatonin PRN    #Pain:  - c/w colchicine 0.6mg PO BID  - avoid sedating meds that may affect cognitive recovery    #GI/Bowel:  - c/w zofran PRN  - c/w senna PRN constipation   - GI ppx: pantoprazole 40mg PO QD     #/Bladder:  - Monitor PVR if no void in 8h; SC for >400 cc  - Toileting schedule q4h    #Diet / Dysphagia:    - Diet: DASH/TLC  - ongoing SLP assessment  - Nutrition to follow    #Skin/ Pressure Injury Prevention:  - assessment on admission   - Incisions:  - Turn Q2hrs in bed while awake, OOB to Chair, PT/OT/SLP     #DVT prophylaxis:  - c/w Eliquis 5mg PO BID   - c/w ASA 81mg PO QD  - Last doppler on 1/30/23 - No evidence of DVT     #Precautions/ Restrictions  - Falls, Cardiac, Seizures   - Lungs: Aspiration, Incentive Spirometer    - COVID PCR: Neg 2/24    --------------------------------------------  Outpatient Follow up:    Gavino Yun; MPH)  Cardiology; Internal Medicine  [address] [place] [address]  Phone: [phone]  Fax: [phone]  Follow Up Time:     Ethan Barnes)  Neurology; Vascular Neurology  [address] [place] [id number]  Phone: [phone]  Fax: [phone]  Follow Up Time:    --------------------------------------------      MEDICAL PROGNOSIS: GOOD            REHAB POTENTIAL: GOOD             ESTIMATED DISPOSITION: HOME WITH HOME CARE            ELOS: 10-14 Days   EXPECTED THERAPY:     P.T. 1hr/day       O.T. 1hr/day      S.L.P. 1hr/day     P&O Unnecessary     EXP FREQUENCY: 5 days per 7 day period     PRESCREEN COMPARISON:   I have reviewed the prescreen information and I have found no relevant changes between the preadmission screening and my post admission evaluation     RATIONALE FOR INPATIENT ADMISSION - Patient demonstrates the following: (check all that apply)  [X] Medically appropriate for rehabilitation admission  [X] Has attainable rehab goals with an appropriate initial discharge plan  [X] Has rehabilitation potential (expected to make a significant improvement within a reasonable period of time)   [X] Requires close medical management by a rehab physician, rehab nursing care, Hospitalist and comprehensive interdisciplinary team (including PT, OT, & or SLP, Prosthetics and Orthotics)   Ms. Keller is a 72yr old female with PMH hypothyroidism, cerebral aneurysm s/p coiling,  pericarditis possibly related to non-covid coronaviral illness (recently admitted 1/30/23-2/1/23), afib RVR presents with similar symptoms of left sided chest pain on 2/16/23 at [facility]. Stroke code was called on 2/22/23 where patient found to be aphasic and on imaging, CTA head showed nonhemorrhagic acute ischemia in the left MCA territory. Patient is currently on an acute prednisone taper for pericarditis and is on Eliquis and cardizem for Afib management. Admitted for multidisciplinary rehab program.     #Left MCA infarct   - c/w eliquis 5mg PO BID   - c/w ASA 81mg PO QD   - c/w atorvastatin 80 mg PO @bedtime   - c/w namenda 5mg PO QD   #Comprehensive Multidisciplinary Rehab Program:  - Gait, ADL, Functional impairments  - PT/OT/ SLP 3 hours a day 5 days a week    #Atrial fibrillation with RVR  -c/w cardizem  30mg PO Q6 and metoprolol 50mg PO BID     - c/w eliquis     #Acute pericarditis with pericardial effusion without tamponade physiology   -c/w prednisone 40mg PO QD until 3/2  - prednisone taper-> decrease 10mg a week over 4 weeks   40mg x 1week (last dose on 3/2)  30mg x 1week (last dose on 3/9)  10mg x 1week (last dose on 3/23)  -c/w colchicine 0.6mg PO BID    #Hypothyroidism   - c/w synthroid 50mcg PO QD     #Mood / Cognition   - Neuropsych evaluation     #Sleep:  - Maintain quiet hours and low stim environment  - Melatonin PRN    #Pain:  - c/w colchicine 0.6mg PO BID  - avoid sedating meds that may affect cognitive recovery    #GI/Bowel:  - c/w zofran PRN  - c/w senna PRN constipation   - GI ppx: pantoprazole 40mg PO QD     #/Bladder:  - Monitor PVR if no void in 8h; SC for >400 cc  - Toileting schedule q4h    #Diet / Dysphagia:    - Diet: DASH/TLC  - ongoing SLP assessment  - Nutrition to follow    #Skin/ Pressure Injury Prevention:  - assessment on admission   - Incisions: none  - Turn Q2hrs in bed while awake, OOB to Chair, PT/OT/SLP     #DVT prophylaxis:  - c/w Eliquis 5mg PO BID   - c/w ASA 81mg PO QD  - Last doppler on 1/30/23 - No evidence of DVT     #Precautions/ Restrictions  - Falls, Cardiac, Seizures   - Lungs: Aspiration, Incentive Spirometer    - COVID PCR: Neg 2/24    --------------------------------------------  Outpatient Follow up:    Gavino Yun; MPH)  Cardiology; Internal Medicine  [address] [place] [address]  Phone: [phone]  Fax: [phone]  Follow Up Time:     Ethan Barnes)  Neurology; Vascular Neurology  [address] [place] [id number]  Phone: [phone]  Fax: [phone]  Follow Up Time:    --------------------------------------------      MEDICAL PROGNOSIS: GOOD            REHAB POTENTIAL: GOOD             ESTIMATED DISPOSITION: HOME WITH HOME CARE            ELOS: 10-14 Days   EXPECTED THERAPY:     P.T. 1hr/day       O.T. 1hr/day      S.L.P. 1hr/day     P&O Unnecessary     EXP FREQUENCY: 5 days per 7 day period     PRESCREEN COMPARISON:   I have reviewed the prescreen information and I have found no relevant changes between the preadmission screening and my post admission evaluation     RATIONALE FOR INPATIENT ADMISSION - Patient demonstrates the following: (check all that apply)  [X] Medically appropriate for rehabilitation admission  [X] Has attainable rehab goals with an appropriate initial discharge plan  [X] Has rehabilitation potential (expected to make a significant improvement within a reasonable period of time)   [X] Requires close medical management by a rehab physician, rehab nursing care, Hospitalist and comprehensive interdisciplinary team (including PT, OT, & or SLP, Prosthetics and Orthotics)   Ms. Keller is a 72yr old female with PMH hypothyroidism, cerebral aneurysm s/p coiling,  pericarditis possibly related to non-covid coronaviral illness (recently admitted 1/30/23-2/1/23), afib RVR presents with similar symptoms of left sided chest pain on 2/16/23 at [facility]. Stroke code was called on 2/22/23 where patient found to be aphasic and on imaging, CTA head showed nonhemorrhagic acute ischemia in the left MCA territory. Patient is currently on an acute prednisone taper for pericarditis and is on Eliquis and cardizem for Afib management. Admitted for multidisciplinary rehab program.     #Left MCA infarct   - c/w eliquis 5mg PO BID   - c/w ASA 81mg PO QD   - c/w atorvastatin 80 mg PO @bedtime   - c/w namenda 5mg PO QD   - Comprehensive Multidisciplinary Rehab Program:  - Gait, ADL, Functional impairments  - PT/OT/ SLP 3 hours a day 5 days a week    #Atrial fibrillation with RVR  -c/w cardizem  30mg PO Q6 and metoprolol 50mg PO BID     - c/w eliquis     #Acute pericarditis with pericardial effusion without tamponade physiology   -c/w prednisone 40mg PO QD until 3/2  - prednisone taper-> decrease 10mg a week over 4 weeks   40mg x 1week (last dose on 3/2)  30mg x 1week (last dose on 3/9)  10mg x 1week (last dose on 3/23)  -c/w colchicine 0.6mg PO BID    #Hypothyroidism   - c/w synthroid 50mcg PO QD     #Mood / Cognition   - Neuropsych evaluation     #Sleep:  - Maintain quiet hours and low stim environment  - Melatonin PRN    #Pain:  - c/w colchicine 0.6mg PO BID  - avoid sedating meds that may affect cognitive recovery    #GI/Bowel:  - c/w zofran PRN  - c/w senna PRN constipation   - GI ppx: pantoprazole 40mg PO QD     #/Bladder:  - Monitor PVR if no void in 8h; SC for >400 cc  - Toileting schedule q4h    #Diet / Dysphagia:    - Diet: DASH/TLC  - ongoing SLP assessment  - Nutrition to follow    #Skin/ Pressure Injury Prevention:  - assessment on admission   - Incisions: none  - Turn Q2hrs in bed while awake, OOB to Chair, PT/OT/SLP     #DVT prophylaxis:  - c/w Eliquis 5mg PO BID   - c/w ASA 81mg PO QD  - Last doppler on 1/30/23 - No evidence of DVT     #Precautions/ Restrictions  - Falls, Cardiac, Seizures   - Lungs: Aspiration, Incentive Spirometer    - COVID PCR: Neg 2/24    --------------------------------------------  Outpatient Follow up:    Gavino Yun; MPH)  Cardiology; Internal Medicine  [address] [place] [address]  Phone: [phone]  Fax: [phone]  Follow Up Time:     Ethan Barnes)  Neurology; Vascular Neurology  [address] [place] [id number]  Phone: [phone]  Fax: [phone]  Follow Up Time:    --------------------------------------------      MEDICAL PROGNOSIS: GOOD            REHAB POTENTIAL: GOOD             ESTIMATED DISPOSITION: HOME WITH HOME CARE            ELOS: 10-14 Days   EXPECTED THERAPY:     P.T. 1hr/day       O.T. 1hr/day      S.L.P. 1hr/day     P&O Unnecessary     EXP FREQUENCY: 5 days per 7 day period     PRESCREEN COMPARISON:   I have reviewed the prescreen information and I have found no relevant changes between the preadmission screening and my post admission evaluation     RATIONALE FOR INPATIENT ADMISSION - Patient demonstrates the following: (check all that apply)  [X] Medically appropriate for rehabilitation admission  [X] Has attainable rehab goals with an appropriate initial discharge plan  [X] Has rehabilitation potential (expected to make a significant improvement within a reasonable period of time)   [X] Requires close medical management by a rehab physician, rehab nursing care, Hospitalist and comprehensive interdisciplinary team (including PT, OT, & or SLP, Prosthetics and Orthotics)

## 2023-02-26 NOTE — PROGRESS NOTE ADULT - REASON FOR ADMISSION
chest pain

## 2023-02-26 NOTE — PROGRESS NOTE ADULT - PROVIDER SPECIALTY LIST ADULT
Cardiology
Hospitalist
Hospitalist
Neurology
Rehab Medicine
Cardiology
Cardiology
Hospitalist
Neurology
Hospitalist
Palliative Care

## 2023-02-26 NOTE — PROGRESS NOTE ADULT - SUBJECTIVE AND OBJECTIVE BOX
Patient is a 72y old  Female who presents with a chief complaint of chest pain (25 Feb 2023 10:46)    Patient seen and examined at bedside.      ALLERGIES:  No Known Allergies    MEDICATIONS  (STANDING):  apixaban 5 milliGRAM(s) Oral every 12 hours  aspirin  chewable 81 milliGRAM(s) Oral daily  atorvastatin 80 milliGRAM(s) Oral at bedtime  colchicine 0.6 milliGRAM(s) Oral two times a day  diltiazem    Tablet 30 milliGRAM(s) Oral every 6 hours  levothyroxine 50 MICROGram(s) Oral daily  memantine 5 milliGRAM(s) Oral daily  metoprolol tartrate 50 milliGRAM(s) Oral two times a day  pantoprazole  Injectable 40 milliGRAM(s) IV Push daily  predniSONE   Tablet 40 milliGRAM(s) Oral daily    MEDICATIONS  (PRN):  ALPRAZolam 0.25 milliGRAM(s) Oral once PRN at bedtime, for anxiety  ondansetron    Tablet 4 milliGRAM(s) Oral every 8 hours PRN Nausea and/or Vomiting    Vital Signs Last 24 Hrs  T(F): 98.5 (26 Feb 2023 07:45), Max: 98.9 (25 Feb 2023 20:00)  HR: 108 (26 Feb 2023 07:45) (58 - 108)  BP: 96/69 (26 Feb 2023 07:45) (96/69 - 117/87)  RR: 18 (26 Feb 2023 07:45) (18 - 18)  SpO2: 96% (26 Feb 2023 07:45) (96% - 100%)  I&O's Summary    25 Feb 2023 07:01  -  26 Feb 2023 07:00  --------------------------------------------------------  IN: 360 mL / OUT: 0 mL / NET: 360 mL    26 Feb 2023 07:01  -  26 Feb 2023 11:56  --------------------------------------------------------  IN: 360 mL / OUT: 0 mL / NET: 360 mL      PHYSICAL EXAM:  General: NAD, Alert  ENT: MMM, no thrush  Neck: Supple, No JVD  Lungs: good air entry, non-labored breathing  Cardio: +s1/s2  Abdomen: Soft, Nontender, Nondistended; Bowel sounds present  Extremities: No calf tenderness       LABS:                        12.4   7.96  )-----------( 532      ( 26 Feb 2023 05:15 )             40.6     02-26    137  |  100  |  14.4  ----------------------------<  96  3.2   |  25.0  |  0.63    Ca    8.1      26 Feb 2023 05:15  Phos  3.5     02-26  Mg     2.0     02-26    PTT - ( 24 Feb 2023 08:45 )  PTT:64.7 sec    02-23 Chol 152 mg/dL LDL -- HDL 46 mg/dL Trig 146 mg/dL    COVID-19 PCR: NotDetec (02-24-23 @ 13:00)    RADIOLOGY & ADDITIONAL TESTS:  - no new tests

## 2023-02-27 LAB
ALBUMIN SERPL ELPH-MCNC: 2.7 G/DL — LOW (ref 3.3–5)
ALP SERPL-CCNC: 74 U/L — SIGNIFICANT CHANGE UP (ref 40–120)
ALT FLD-CCNC: 32 U/L — SIGNIFICANT CHANGE UP (ref 10–45)
ANION GAP SERPL CALC-SCNC: 8 MMOL/L — SIGNIFICANT CHANGE UP (ref 5–17)
AST SERPL-CCNC: 25 U/L — SIGNIFICANT CHANGE UP (ref 10–40)
BASOPHILS # BLD AUTO: 0.02 K/UL — SIGNIFICANT CHANGE UP (ref 0–0.2)
BASOPHILS NFR BLD AUTO: 0.2 % — SIGNIFICANT CHANGE UP (ref 0–2)
BILIRUB SERPL-MCNC: 0.5 MG/DL — SIGNIFICANT CHANGE UP (ref 0.2–1.2)
BUN SERPL-MCNC: 17 MG/DL — SIGNIFICANT CHANGE UP (ref 7–23)
CALCIUM SERPL-MCNC: 8.3 MG/DL — LOW (ref 8.4–10.5)
CHLORIDE SERPL-SCNC: 102 MMOL/L — SIGNIFICANT CHANGE UP (ref 96–108)
CO2 SERPL-SCNC: 30 MMOL/L — SIGNIFICANT CHANGE UP (ref 22–31)
CREAT SERPL-MCNC: 0.66 MG/DL — SIGNIFICANT CHANGE UP (ref 0.5–1.3)
EGFR: 94 ML/MIN/1.73M2 — SIGNIFICANT CHANGE UP
EOSINOPHIL # BLD AUTO: 0.11 K/UL — SIGNIFICANT CHANGE UP (ref 0–0.5)
EOSINOPHIL NFR BLD AUTO: 1.2 % — SIGNIFICANT CHANGE UP (ref 0–6)
GLUCOSE SERPL-MCNC: 85 MG/DL — SIGNIFICANT CHANGE UP (ref 70–99)
HCT VFR BLD CALC: 37.3 % — SIGNIFICANT CHANGE UP (ref 34.5–45)
HGB BLD-MCNC: 11.3 G/DL — LOW (ref 11.5–15.5)
IMM GRANULOCYTES NFR BLD AUTO: 1 % — HIGH (ref 0–0.9)
LYMPHOCYTES # BLD AUTO: 1.21 K/UL — SIGNIFICANT CHANGE UP (ref 1–3.3)
LYMPHOCYTES # BLD AUTO: 13.7 % — SIGNIFICANT CHANGE UP (ref 13–44)
MCHC RBC-ENTMCNC: 23 PG — LOW (ref 27–34)
MCHC RBC-ENTMCNC: 30.3 GM/DL — LOW (ref 32–36)
MCV RBC AUTO: 76 FL — LOW (ref 80–100)
MONOCYTES # BLD AUTO: 0.28 K/UL — SIGNIFICANT CHANGE UP (ref 0–0.9)
MONOCYTES NFR BLD AUTO: 3.2 % — SIGNIFICANT CHANGE UP (ref 2–14)
NEUTROPHILS # BLD AUTO: 7.11 K/UL — SIGNIFICANT CHANGE UP (ref 1.8–7.4)
NEUTROPHILS NFR BLD AUTO: 80.7 % — HIGH (ref 43–77)
NRBC # BLD: 0 /100 WBCS — SIGNIFICANT CHANGE UP (ref 0–0)
PLATELET # BLD AUTO: 427 K/UL — HIGH (ref 150–400)
POTASSIUM SERPL-MCNC: 3.6 MMOL/L — SIGNIFICANT CHANGE UP (ref 3.5–5.3)
POTASSIUM SERPL-SCNC: 3.6 MMOL/L — SIGNIFICANT CHANGE UP (ref 3.5–5.3)
PROT SERPL-MCNC: 6.7 G/DL — SIGNIFICANT CHANGE UP (ref 6–8.3)
RBC # BLD: 4.91 M/UL — SIGNIFICANT CHANGE UP (ref 3.8–5.2)
RBC # FLD: 16.5 % — HIGH (ref 10.3–14.5)
SODIUM SERPL-SCNC: 140 MMOL/L — SIGNIFICANT CHANGE UP (ref 135–145)
WBC # BLD: 8.82 K/UL — SIGNIFICANT CHANGE UP (ref 3.8–10.5)
WBC # FLD AUTO: 8.82 K/UL — SIGNIFICANT CHANGE UP (ref 3.8–10.5)

## 2023-02-27 PROCEDURE — 93970 EXTREMITY STUDY: CPT | Mod: 26

## 2023-02-27 PROCEDURE — 99232 SBSQ HOSP IP/OBS MODERATE 35: CPT

## 2023-02-27 PROCEDURE — 99233 SBSQ HOSP IP/OBS HIGH 50: CPT

## 2023-02-27 PROCEDURE — 74018 RADEX ABDOMEN 1 VIEW: CPT | Mod: 26

## 2023-02-27 RX ORDER — BENZOCAINE AND MENTHOL 5; 1 G/100ML; G/100ML
1 LIQUID ORAL THREE TIMES A DAY
Refills: 0 | Status: DISCONTINUED | OUTPATIENT
Start: 2023-02-27 | End: 2023-03-07

## 2023-02-27 RX ORDER — SENNA PLUS 8.6 MG/1
2 TABLET ORAL AT BEDTIME
Refills: 0 | Status: DISCONTINUED | OUTPATIENT
Start: 2023-02-27 | End: 2023-03-07

## 2023-02-27 RX ADMIN — APIXABAN 5 MILLIGRAM(S): 2.5 TABLET, FILM COATED ORAL at 05:00

## 2023-02-27 RX ADMIN — APIXABAN 5 MILLIGRAM(S): 2.5 TABLET, FILM COATED ORAL at 17:22

## 2023-02-27 RX ADMIN — PANTOPRAZOLE SODIUM 40 MILLIGRAM(S): 20 TABLET, DELAYED RELEASE ORAL at 05:03

## 2023-02-27 RX ADMIN — Medication 50 MICROGRAM(S): at 05:02

## 2023-02-27 RX ADMIN — Medication 30 MILLIGRAM(S): at 22:40

## 2023-02-27 RX ADMIN — Medication 81 MILLIGRAM(S): at 12:54

## 2023-02-27 RX ADMIN — Medication 30 MILLIGRAM(S): at 17:22

## 2023-02-27 RX ADMIN — SENNA PLUS 2 TABLET(S): 8.6 TABLET ORAL at 22:40

## 2023-02-27 RX ADMIN — ATORVASTATIN CALCIUM 80 MILLIGRAM(S): 80 TABLET, FILM COATED ORAL at 22:40

## 2023-02-27 RX ADMIN — Medication 0.6 MILLIGRAM(S): at 17:22

## 2023-02-27 RX ADMIN — MEMANTINE HYDROCHLORIDE 5 MILLIGRAM(S): 10 TABLET ORAL at 12:55

## 2023-02-27 RX ADMIN — Medication 40 MILLIGRAM(S): at 05:04

## 2023-02-27 RX ADMIN — Medication 50 MILLIGRAM(S): at 05:02

## 2023-02-27 RX ADMIN — Medication 0.6 MILLIGRAM(S): at 05:03

## 2023-02-27 RX ADMIN — Medication 50 MILLIGRAM(S): at 17:22

## 2023-02-27 NOTE — DIETITIAN INITIAL EVALUATION ADULT - ORAL INTAKE PTA/DIET HISTORY
PTA patient w/ decreased appetite/intake x 4 days 2/2 abdominal pain. Prior patient consumed 3 balanced meals a day , prepares [employer]'s food at home. NKFA nor food intolerances reported.

## 2023-02-27 NOTE — CONSULT NOTE ADULT - SUBJECTIVE AND OBJECTIVE BOX
Patient is a 72y old  Female who presents with a chief complaint of Cerebral infarction     (27 Feb 2023 11:31)    HPI:  Ms. Keller is a 72yr old female with PMH hypothyroidism, cerebral aneurysm s/p coiling,  pericarditis possibly related non-covid coronaviral illness (recently admitted 1/30/23-2/1/23), afib RVR presents with similar symptoms of left sided chest pain on 2/16/23. In ER, she was found to be in Afib RVR. Stroke code was called on 2/22/23 where patient found to be aphasic and on imaging, CTA head showed nonhemorrhagic acute ischemia in the left MCA territory. Patient did not qualify for TNK due to last known sx was well over 4.5 hours. Patient was not a thrombectomy candidate as pt's thrombus is in M3 branch which is too distal to intervene on. Patient is currently on an acute prednisone taper for pericarditis and is on Eliquis and cardizem for Afib management.       Patient was evaluated by PM&R and therapy for functional deficits and gait/ ADL impairments and recommended acute rehabilitation. Patient was medically optimized for discharge to [facility] on 2/26/23     (26 Feb 2023 10:00)      Subjective History:  Patient seen and examined at bedside. No events overnight. Patient complains of abd pain, present after she eats but improves after BM. Denies abd pain at this time, chest pain, sob. Otherwise doing well and without complaints, sitting in chair and looking forward to PT, wants to be back to baseline again    PAST MEDICAL & SURGICAL HISTORY:  High cholesterol  Hypothyroidism  H/O knee surgery  Brain aneurysm    SOCIAL HISTORY:  Lives at home with son  Denies EtOH, smoking, illicit drug use  Ambulates and performs ADLs independently    FAMILY HISTORY:  no family history of CAD    ALLERGIES:  No Known Allergies    MEDICATIONS  (STANDING):  apixaban 5 milliGRAM(s) Oral every 12 hours  aspirin  chewable 81 milliGRAM(s) Oral daily  atorvastatin 80 milliGRAM(s) Oral at bedtime  colchicine 0.6 milliGRAM(s) Oral two times a day  diltiazem    Tablet 30 milliGRAM(s) Oral every 6 hours  levothyroxine 50 MICROGram(s) Oral daily  memantine 5 milliGRAM(s) Oral daily  metoprolol tartrate 50 milliGRAM(s) Oral two times a day  pantoprazole    Tablet 40 milliGRAM(s) Oral before breakfast  predniSONE   Tablet 40 milliGRAM(s) Oral daily    MEDICATIONS  (PRN):  ondansetron    Tablet 4 milliGRAM(s) Oral every 8 hours PRN Nausea and/or Vomiting  senna 2 Tablet(s) Oral at bedtime PRN Constipation    Review of Systems:   CONSTITUTIONAL: denies fever, chills, fatigue, weakness  HEENT: denies blurred vision, sore throat  CARDIOVASCULAR: denies chest pain, chest pressure, palpitations  RESPIRATORY: denies shortness of breath, sputum production  GASTROINTESTINAL: denies nausea, vomiting, diarrhea, admits to some abdominal pain after eating  GENITOURINARY: denies dysuria, discharge  NEUROLOGICAL: denies numbness, headache  MUSCULOSKELETAL: denies new joint pain, muscle aches  LYMPHATICS: denies enlarged lymph nodes, extremity swelling  PSYCHIATRIC: denies recent changes in anxiety, depression  ENDOCRINOLOGIC: denies sweating, cold or heat intolerance    Vital Signs Last 24 Hrs  T(F): 98.1 (27 Feb 2023 09:12), Max: 98.4 (26 Feb 2023 14:08)  HR: 68 (27 Feb 2023 09:12) (68 - 86)  BP: 99/69 (27 Feb 2023 09:12) (99/69 - 118/64)  RR: 15 (27 Feb 2023 09:12) (15 - 18)  SpO2: 95% (27 Feb 2023 09:12) (95% - 98%)  I&O's Summary    PHYSICAL EXAM:  GENERAL: NAD, sitting in chair, slight aphasia but comprehensible   HEAD:  Atraumatic, Normocephalic  EYES: PERRL, conjunctiva and sclera clear  ENMT: Moist mucous membranes, Good dentition  NECK: Supple, No JVD  CHEST/LUNG: Clear to auscultation bilaterally, non-labored breathing, good air entry  HEART: RRR; S1/S2, No murmur  ABDOMEN: Soft, Nontender, Nondistended; Bowel sounds present  VASCULAR: Normal pulses, Normal capillary refill  EXTREMITIES: No cyanosis, No edema, 5/5 strength in all extremities   SKIN: Warm, perfused  PSYCH: Normal mood and affect    LABS:                        11.3   8.82  )-----------( 427      ( 27 Feb 2023 07:50 )             37.3     02-27    140  |  102  |  17  ----------------------------<  85  3.6   |  30  |  0.66    Ca    8.3      27 Feb 2023 07:50  Phos  3.5     02-26  Mg     2.0     02-26    TPro  6.7  /  Alb  2.7  /  TBili  0.5  /  DBili  x   /  AST  25  /  ALT  32  /  AlkPhos  74  02-27                                      COVID-19 PCR: NotDetec (02-26-23 @ 17:49)  COVID-19 PCR: NotDetec (02-24-23 @ 13:00)    RADIOLOGY & ADDITIONAL TESTS:    Care Discussed with Consultants/Other Providers:

## 2023-02-27 NOTE — CONSULT NOTE ADULT - ASSESSMENT
72 year old F PMH hypothyroidism, cerebral aneurysm s/p coiling, pericarditis possibly related to non-covid coronaviral illness (recently admitted 1/30/23-2/1/23), afib RVR presented to [facility] on 2/16 for afib with RVR, stroke called on 2/22 and found to have nonhemorrhagic acute ischemia in the left MCA territory now admitted to  rehab for ADL impairment.    #Left MCA infarct   - continue with eliquis 5mg PO BID and ASA 81mg PO QD   - not TKN candidate due to out of window, could not receive thrombectomy as lesion was too far to intervene   - continue atorvastatin 80mg qhs  - start comprehensive rehab program    #abdominal pain  - possible ulcer vs IBS related issue given symptoms  - will get abd xray to r/o stool burden, low suspicion for obstruction  - if no improvement, would consider CT ab/pelvis    #Atrial fibrillation  - continue with cardizem  30mg q6hrs and metoprolol tartrate 50mg BID     - continue with eliquis     #Acute pericarditis with pericardial effusion without tamponade physiology   - continue with prednisone 40mg QD until 3/2  - prednisone taper-> decrease 10mg a week over 4 weeks   - continue with colchicine 0.6mg BID    #Hypothyroidism   - continue synthroid 50mcg QD     #DVT prophylaxis:  - on eliquis  - Last doppler on 1/30/23 - No evidence of DVT

## 2023-02-27 NOTE — PROGRESS NOTE ADULT - ASSESSMENT
Ms. Keller is a 72yr old female with PMH hypothyroidism, cerebral aneurysm s/p coiling,  pericarditis possibly related to non-covid coronaviral illness (recently admitted 1/30/23-2/1/23), afib RVR presents with similar symptoms of left sided chest pain on 2/16/23 at [facility]. Stroke code was called on 2/22/23 where patient found to be aphasic and on imaging, CTA head showed nonhemorrhagic acute ischemia in the left MCA territory. Patient is currently on an acute prednisone taper for pericarditis and is on Eliquis and cardizem for Afib management. Admitted for multidisciplinary rehab program.     #Left MCA infarct with residual dysarthria, Gait, ADL, Functional impairments  - c/w eliquis 5mg PO BID   - c/w ASA 81mg PO QD   - c/w atorvastatin 80 mg PO @bedtime   - c/w namenda 5mg PO QD   -Continue comprehensive Multidisciplinary Rehab Program PT/OT/ SLP     #Atrial fibrillation with RVR  -c/w cardizem  30mg PO Q6 and metoprolol 50mg PO BID     - c/w eliquis     #Acute pericarditis with pericardial effusion without tamponade physiology   -c/w prednisone 40mg PO QD until 3/2  - prednisone taper-> decrease 10mg a week over 4 weeks   40mg x 1week (last dose on 3/2)  30mg x 1week (last dose on 3/9)  10mg x 1week (last dose on 3/23)  -c/w colchicine 0.6mg PO BID    #Hypothyroidism   - c/w synthroid 50mcg PO QD     #Mood / Cognition   - Neuropsych evaluation     #GI/Bowel/abdominal discomfort  - c/w zofran PRN  - c/w senna PRN constipation   - GI ppx: pantoprazole 40mg PO QD   -KUB pending to rule out obstruction and eval stool burden 2/27    #/Bladder:  - Monitor PVR if no void in 8h; SC for >400 cc  - Toileting schedule q4h    #Diet / Dysphagia:    - Diet: DASH/TLC  - ongoing SLP assessment  - Nutrition to follow    #Skin/ Pressure Injury Prevention:  - assessment on admission   - Incisions: none  - Turn Q2hrs in bed while awake, OOB to Chair, PT/OT/SLP     #DVT prophylaxis:  - c/w Eliquis 5mg PO BID   - Last doppler on 1/30/23 - No evidence of DVT - repeat pending due to LE discomfort     #Precautions/ Restrictions  - Falls, Cardiac, Seizures   - Lungs: Aspiration, Incentive Spirometer    - COVID PCR: Neg 2/24    --------------------------------------------  Outpatient Follow up:    Gavino Yun; MPH)  Cardiology; Internal Medicine  [address] [place] [address]  Phone: [phone]  Fax: [phone]  Follow Up Time:     Ethan Barnes)  Neurology; Vascular Neurology  [address] [place] [id number]  Phone: [phone]  Fax: [phone]  Follow Up Time:

## 2023-02-27 NOTE — DIETITIAN INITIAL EVALUATION ADULT - PERTINENT MEDS FT
MEDICATIONS  (STANDING):  apixaban 5 milliGRAM(s) Oral every 12 hours  aspirin  chewable 81 milliGRAM(s) Oral daily  atorvastatin 80 milliGRAM(s) Oral at bedtime  colchicine 0.6 milliGRAM(s) Oral two times a day  diltiazem    Tablet 30 milliGRAM(s) Oral every 6 hours  levothyroxine 50 MICROGram(s) Oral daily  memantine 5 milliGRAM(s) Oral daily  metoprolol tartrate 50 milliGRAM(s) Oral two times a day  pantoprazole    Tablet 40 milliGRAM(s) Oral before breakfast  predniSONE   Tablet 40 milliGRAM(s) Oral daily    MEDICATIONS  (PRN):  ondansetron    Tablet 4 milliGRAM(s) Oral every 8 hours PRN Nausea and/or Vomiting  senna 2 Tablet(s) Oral at bedtime PRN Constipation

## 2023-02-27 NOTE — DIETITIAN INITIAL EVALUATION ADULT - ADD RECOMMEND
1. Continue Soft and Bite Sized, DASH/TLC diet.   2. Recommend Ensure Plus High Protein Daily 8 oz (Provides 350 kcal, 20 grams of protein) to assist pt in meeting estimated protein energy needs.  3. Monitor PO intake, GI tolerance, skin integrity, labs, weight, and bowel movement regularity.   4. Honor food preferences as feasible. Assist with meals PRN and encourage PO intake.  5. Follow SLP recommendations  6. Provide ongoing diet education as needed  7. RD remains available upon request and will follow-up per protocol

## 2023-02-27 NOTE — DIETITIAN INITIAL EVALUATION ADULT - OTHER INFO
Reason for Admission: CVA  History of Present Illness:   Ms. Keller is a 72yr old female with PMH hypothyroidism, cerebral aneurysm s/p coiling,  pericarditis possibly related non-covid coronaviral illness (recently admitted 1/30/23-2/1/23), afib RVR presents with similar symptoms of left sided chest pain on 2/16/23. In ER, she was found to be in Afib RVR. Stroke code was called on 2/22/23 where patient found to be aphasic and on imaging, CTA head showed nonhemorrhagic acute ischemia in the left MCA territory. Patient did not qualify for TNK due to last known sx was well over 4.5 hours. Patient was not a thrombectomy candidate as pt's thrombus is in M3 branch which is too distal to intervene on. Patient is currently on an acute prednisone taper for pericarditis and is on Eliquis and cardizem for Afib management.       Patient was evaluated by PM&R and therapy for functional deficits and gait/ ADL impairments and recommended acute rehabilitation. Patient was medically optimized for discharge to [facility] on 2/26/23    At this time patient w/ poor appetite/intake consuming 0-50% of meals. Reviewed preferences and menu alternatives; likes fruit, soup, cold cereal, yogurt, jello dislikes hot cereal, noted in Kardex. Patient agreeable to trial Ensure Plus High Protein Daily 8 oz (Provides 350 kcal, 20 grams of protein) QD. No issues w/ chewing and swallowing current diet texture. Denies N/V/C/D, last BM 2/27 per patient report. .8 Per previous RD note 2/22,  Lb. Provided Heart-Healthy DASH/TLC nutrition education.

## 2023-02-27 NOTE — PROGRESS NOTE ADULT - NS ATTEND AMEND GEN_ALL_CORE FT
I have personally seen and examined the patient with the NP. Medical records reviewed. I have made amendments to the documentation where necessary and adjusted the history, physical examination, and plan as documented by the NP.    Left MCA infarct - Apixaban, ASA, statin  Pericarditis - Steroid taper  GI ppx - Protonix  Bowel regimen - Abdominal x-ray, bowel regimen  Labs reviewed  Medicine is following, case reviewed

## 2023-02-27 NOTE — DIETITIAN INITIAL EVALUATION ADULT - PERTINENT LABORATORY DATA
02-27    140  |  102  |  17  ----------------------------<  85  3.6   |  30  |  0.66    Ca    8.3<L>      27 Feb 2023 07:50  Phos  3.5     02-26  Mg     2.0     02-26    TPro  6.7  /  Alb  2.7<L>  /  TBili  0.5  /  DBili  x   /  AST  25  /  ALT  32  /  AlkPhos  74  02-27  A1C with Estimated Average Glucose Result: 6.1 % (02-23-23 @ 06:01)

## 2023-02-27 NOTE — DIETITIAN INITIAL EVALUATION ADULT - PERSON TAUGHT/METHOD
Encouraged patient to focus on high-protein, high-calorie foods during meals. Heart-Healthy DASH/TLC/verbal instruction/teach back - (Patient repeats in own words)/patient instructed

## 2023-02-27 NOTE — PROGRESS NOTE ADULT - SUBJECTIVE AND OBJECTIVE BOX
SUBJECTIVE/ROS: patient evaluated while in the chair. She states that she has some abdominal discomfort despite having regular BMs. She also noted that she has a sore throat at times overnight and requesting lozenges. She denies chest pain, fever, chills, nausea, vomiting, headache, or BLE pain.     HPI:  Ms. Keller is a 72yr old female with PMH hypothyroidism, cerebral aneurysm s/p coiling,  pericarditis possibly related non-covid coronaviral illness (recently admitted 23-23), afib RVR presents with similar symptoms of left sided chest pain on 23. In ER, she was found to be in Afib RVR. Stroke code was called on 23 where patient found to be aphasic and on imaging, CTA head showed nonhemorrhagic acute ischemia in the left MCA territory. Patient did not qualify for TNK due to last known sx was well over 4.5 hours. Patient was not a thrombectomy candidate as pt's thrombus is in M3 branch which is too distal to intervene on. Patient is currently on an acute prednisone taper for pericarditis and is on Eliquis and cardizem for Afib management.       Patient was evaluated by PM&R and therapy for functional deficits and gait/ ADL impairments and recommended acute rehabilitation. Patient was medically optimized for discharge to [facility] on 23      Vital Signs Last 24 Hrs  T(C): 36.7 (2023 09:12), Max: 36.9 (2023 14:08)  T(F): 98.1 (2023 09:12), Max: 98.4 (2023 14:08)  HR: 68 (2023 09:12) (68 - 86)  BP: 99/69 (2023 09:12) (99/69 - 118/64)  BP(mean): --  RR: 15 (2023 09:12) (15 - 18)  SpO2: 95% (2023 09:12) (95% - 98%)    Parameters below as of 2023 09:12  Patient On (Oxygen Delivery Method): room air      Daily Height in cm: 162.56 (2023 15:37)    Daily Weight in k.5 (2023 15:37)      PHYSICAL EXAM  Constitutional - NAD, Comfortable  HEENT - NCAT, EOMI  Neck - Supple, No limited ROM  Chest - CTA bilaterally  Cardiovascular - RRR, S1S2  Abdomen -BS+, Soft, NTND  Extremities - No C/C/E, No calf tenderness   Neurologic Exam - aox3, dysarthric, naranjo 5/5       RECENT LABS:                          11.3   8.82  )-----------( 427      ( 2023 07:50 )             37.3     02-    140  |  102  |  17  ----------------------------<  85  3.6   |  30  |  0.66    Ca    8.3<L>      2023 07:50  Phos  3.5     02-  Mg     2.0     02-    TPro  6.7  /  Alb  2.7<L>  /  TBili  0.5  /  DBili  x   /  AST  25  /  ALT  32  /  AlkPhos  74  02-27    LIVER FUNCTIONS - ( 2023 07:50 )  Alb: 2.7 g/dL / Pro: 6.7 g/dL / ALK PHOS: 74 U/L / ALT: 32 U/L / AST: 25 U/L / GGT: x                   MEDICATIONS  (STANDING):  apixaban 5 milliGRAM(s) Oral every 12 hours  aspirin  chewable 81 milliGRAM(s) Oral daily  atorvastatin 80 milliGRAM(s) Oral at bedtime  colchicine 0.6 milliGRAM(s) Oral two times a day  diltiazem    Tablet 30 milliGRAM(s) Oral every 6 hours  levothyroxine 50 MICROGram(s) Oral daily  memantine 5 milliGRAM(s) Oral daily  metoprolol tartrate 50 milliGRAM(s) Oral two times a day  pantoprazole    Tablet 40 milliGRAM(s) Oral before breakfast  predniSONE   Tablet 40 milliGRAM(s) Oral daily    MEDICATIONS  (PRN):  benzocaine/menthol Lozenge 1 Lozenge Oral three times a day PRN Sore Throat  ondansetron    Tablet 4 milliGRAM(s) Oral every 8 hours PRN Nausea and/or Vomiting  senna 2 Tablet(s) Oral at bedtime PRN Constipation

## 2023-02-27 NOTE — DIETITIAN INITIAL EVALUATION ADULT - NS FNS DIET ORDER
Diet, DASH/TLC:   Sodium & Cholesterol Restricted  Soft and Bite Sized ([id number]) (02-26-23 @ 12:23)

## 2023-02-28 PROCEDURE — 99233 SBSQ HOSP IP/OBS HIGH 50: CPT

## 2023-02-28 PROCEDURE — 93010 ELECTROCARDIOGRAM REPORT: CPT

## 2023-02-28 PROCEDURE — 71045 X-RAY EXAM CHEST 1 VIEW: CPT | Mod: 26

## 2023-02-28 PROCEDURE — 99232 SBSQ HOSP IP/OBS MODERATE 35: CPT

## 2023-02-28 RX ORDER — SODIUM CHLORIDE 9 MG/ML
1000 INJECTION INTRAMUSCULAR; INTRAVENOUS; SUBCUTANEOUS ONCE
Refills: 0 | Status: COMPLETED | OUTPATIENT
Start: 2023-02-28 | End: 2023-02-28

## 2023-02-28 RX ORDER — ONDANSETRON 8 MG/1
4 TABLET, FILM COATED ORAL EVERY 6 HOURS
Refills: 0 | Status: DISCONTINUED | OUTPATIENT
Start: 2023-02-28 | End: 2023-03-07

## 2023-02-28 RX ORDER — LACTULOSE 10 G/15ML
20 SOLUTION ORAL ONCE
Refills: 0 | Status: COMPLETED | OUTPATIENT
Start: 2023-02-28 | End: 2023-02-28

## 2023-02-28 RX ORDER — POLYETHYLENE GLYCOL 3350 17 G/17G
17 POWDER, FOR SOLUTION ORAL DAILY
Refills: 0 | Status: DISCONTINUED | OUTPATIENT
Start: 2023-02-28 | End: 2023-03-07

## 2023-02-28 RX ADMIN — APIXABAN 5 MILLIGRAM(S): 2.5 TABLET, FILM COATED ORAL at 08:20

## 2023-02-28 RX ADMIN — Medication 50 MICROGRAM(S): at 08:20

## 2023-02-28 RX ADMIN — Medication 40 MILLIGRAM(S): at 08:20

## 2023-02-28 RX ADMIN — MEMANTINE HYDROCHLORIDE 5 MILLIGRAM(S): 10 TABLET ORAL at 11:23

## 2023-02-28 RX ADMIN — Medication 50 MILLIGRAM(S): at 17:17

## 2023-02-28 RX ADMIN — Medication 0.6 MILLIGRAM(S): at 08:20

## 2023-02-28 RX ADMIN — Medication 30 MILLIGRAM(S): at 11:23

## 2023-02-28 RX ADMIN — Medication 0.6 MILLIGRAM(S): at 17:17

## 2023-02-28 RX ADMIN — Medication 81 MILLIGRAM(S): at 11:23

## 2023-02-28 RX ADMIN — Medication 30 MILLIGRAM(S): at 05:45

## 2023-02-28 RX ADMIN — APIXABAN 5 MILLIGRAM(S): 2.5 TABLET, FILM COATED ORAL at 17:16

## 2023-02-28 RX ADMIN — Medication 50 MILLIGRAM(S): at 05:46

## 2023-02-28 RX ADMIN — SODIUM CHLORIDE 1000 MILLILITER(S): 9 INJECTION INTRAMUSCULAR; INTRAVENOUS; SUBCUTANEOUS at 09:54

## 2023-02-28 RX ADMIN — ONDANSETRON 4 MILLIGRAM(S): 8 TABLET, FILM COATED ORAL at 06:16

## 2023-02-28 RX ADMIN — POLYETHYLENE GLYCOL 3350 17 GRAM(S): 17 POWDER, FOR SOLUTION ORAL at 11:23

## 2023-02-28 RX ADMIN — PANTOPRAZOLE SODIUM 40 MILLIGRAM(S): 20 TABLET, DELAYED RELEASE ORAL at 07:00

## 2023-02-28 RX ADMIN — LACTULOSE 20 GRAM(S): 10 SOLUTION ORAL at 13:49

## 2023-02-28 NOTE — PROGRESS NOTE ADULT - SUBJECTIVE AND OBJECTIVE BOX
SUBJECTIVE/ROS: She was evaluated while in the chair. She states she is still having abdominal discomfort and low appetitie despite having 2-3 BMs since yesterday. Discussed with son over the phone, that this is most likely due to constipation and would recommend aggressive bowel regimen. Her tachycardia is improved but due to poor intake encouraged PO fluids. She denies chest pain, fever, chills, nausea, vomiting, headache, or BLE pain.     HPI:  Ms. Keller is a 72yr old female with PMH hypothyroidism, cerebral aneurysm s/p coiling,  pericarditis possibly related non-covid coronaviral illness (recently admitted 1/30/23-2/1/23), afib RVR presents with similar symptoms of left sided chest pain on 2/16/23. In ER, she was found to be in Afib RVR. Stroke code was called on 2/22/23 where patient found to be aphasic and on imaging, CTA head showed nonhemorrhagic acute ischemia in the left MCA territory. Patient did not qualify for TNK due to last known sx was well over 4.5 hours. Patient was not a thrombectomy candidate as pt's thrombus is in M3 branch which is too distal to intervene on. Patient is currently on an acute prednisone taper for pericarditis and is on Eliquis and cardizem for Afib management.       Patient was evaluated by PM&R and therapy for functional deficits and gait/ ADL impairments and recommended acute rehabilitation. Patient was medically optimized for discharge to [facility] on 2/26/23      Vital Signs Last 24 Hrs  T(C): 36.6 (28 Feb 2023 07:42), Max: 36.6 (27 Feb 2023 22:43)  T(F): 97.8 (28 Feb 2023 07:42), Max: 97.9 (27 Feb 2023 22:43)  HR: 66 (28 Feb 2023 07:42) (66 - 141)  BP: 99/75 (28 Feb 2023 07:42) (99/75 - 135/70)  BP(mean): --  RR: 16 (28 Feb 2023 07:42) (16 - 18)  SpO2: 92% (28 Feb 2023 07:42) (92% - 97%)    Parameters below as of 28 Feb 2023 07:42  Patient On (Oxygen Delivery Method): room air      PHYSICAL EXAM  Constitutional - NAD, Comfortable  HEENT - NCAT, EOMI  Neck - Supple, No limited ROM  Chest - CTA bilaterally  Cardiovascular - RRR, S1S2  Abdomen -BS+, Soft, NTND  Extremities - No C/C/E, No calf tenderness   Neurologic Exam - aox3, dysarthric, naranjo 5/5       RECENT LABS:                          11.3   8.82  )-----------( 427      ( 27 Feb 2023 07:50 )             37.3     02-27    140  |  102  |  17  ----------------------------<  85  3.6   |  30  |  0.66    Ca    8.3<L>      27 Feb 2023 07:50  Phos  3.5     02-26  Mg     2.0     02-26    TPro  6.7  /  Alb  2.7<L>  /  TBili  0.5  /  DBili  x   /  AST  25  /  ALT  32  /  AlkPhos  74  02-27    LIVER FUNCTIONS - ( 27 Feb 2023 07:50 )  Alb: 2.7 g/dL / Pro: 6.7 g/dL / ALK PHOS: 74 U/L / ALT: 32 U/L / AST: 25 U/L / GGT: x               MEDICATIONS  (STANDING):  apixaban 5 milliGRAM(s) Oral every 12 hours  aspirin  chewable 81 milliGRAM(s) Oral daily  atorvastatin 80 milliGRAM(s) Oral at bedtime  bisacodyl Suppository 10 milliGRAM(s) Rectal once  colchicine 0.6 milliGRAM(s) Oral two times a day  diltiazem    Tablet 30 milliGRAM(s) Oral every 6 hours  lactulose Syrup 20 Gram(s) Oral once  levothyroxine 50 MICROGram(s) Oral daily  memantine 5 milliGRAM(s) Oral daily  metoprolol tartrate 50 milliGRAM(s) Oral two times a day  pantoprazole    Tablet 40 milliGRAM(s) Oral before breakfast  predniSONE   Tablet 40 milliGRAM(s) Oral daily  saline laxative (FLEET) Rectal Enema 1 Enema Rectal once  senna 2 Tablet(s) Oral at bedtime    MEDICATIONS  (PRN):  benzocaine/menthol Lozenge 1 Lozenge Oral three times a day PRN Sore Throat  ondansetron   Disintegrating Tablet 4 milliGRAM(s) Oral every 6 hours PRN Nausea and/or Vomiting

## 2023-02-28 NOTE — PROGRESS NOTE ADULT - ASSESSMENT
Ms. Keller is a 72yr old female with PMH hypothyroidism, cerebral aneurysm s/p coiling,  pericarditis possibly related to non-covid coronaviral illness (recently admitted 1/30/23-2/1/23), afib RVR presents with similar symptoms of left sided chest pain on 2/16/23 at [facility]. Stroke code was called on 2/22/23 where patient found to be aphasic and on imaging, CTA head showed nonhemorrhagic acute ischemia in the left MCA territory. Patient is currently on an acute prednisone taper for pericarditis and is on Eliquis and cardizem for Afib management. Admitted for multidisciplinary rehab program.     #Left MCA infarct with residual dysarthria, Gait, ADL, Functional impairments  - c/w eliquis 5mg PO BID   - c/w ASA 81mg PO QD   - c/w atorvastatin 80 mg PO @bedtime   - c/w namenda 5mg PO QD   -Continue comprehensive Multidisciplinary Rehab Program PT/OT/ SLP     #Atrial fibrillation with RVR  -c/w cardizem  30mg PO Q6 and metoprolol 50mg PO BID     - c/w eliquis   -Episode of tachycardia 2/28 in am - most likley due to poor PO intake  -IVF bolus given x 1 for dehydration     #Acute pericarditis with pericardial effusion without tamponade physiology   -c/w prednisone 40mg PO QD until 3/2  - prednisone taper-> decrease 10mg a week over 4 weeks   40mg x 1week (last dose on 3/2)  30mg x 1week (last dose on 3/9)  10mg x 1week (last dose on 3/23)  -c/w colchicine 0.6mg PO BID    #Hypothyroidism   - c/w synthroid 50mcg PO QD     #Mood / Cognition   - Neuropsych evaluation     #GI/Bowel/abdominal discomfort  - c/w zofran PRN  - Senna changed to standing  -Start miralax  - GI ppx: pantoprazole 40mg PO QD   -KUB shows increased stool burden  -Lactulose and enema 2/28    #/Bladder:  - Monitor PVR if no void in 8h; SC for >400 cc  - Toileting schedule q4h    #Diet / Dysphagia:    - Diet: DASH/TLC  - ongoing SLP assessment  - Nutrition to follow    #Skin/ Pressure Injury Prevention:  - assessment on admission   - Incisions: none  - Turn Q2hrs in bed while awake, OOB to Chair, PT/OT/SLP     #DVT prophylaxis:  - c/w Eliquis 5mg PO BID   - Last doppler on 1/30/23 - No evidence of DVT - repeat pending due to LE discomfort -negative 2/27    #Precautions/ Restrictions  - Falls, Cardiac, Seizures   - Lungs: Aspiration, Incentive Spirometer    - COVID PCR: Neg 2/24    --------------------------------------------  Outpatient Follow up:    Gavino Yun; MPH)  Cardiology; Internal Medicine  [address] [place] [address]  Phone: [phone]  Fax: [phone]  Follow Up Time:     Ethan Barnes)  Neurology; Vascular Neurology  [address] [place] [address] A  [place], [place] [id number]  Phone: [phone]  Fax: [phone]  Follow Up Time:

## 2023-02-28 NOTE — PROGRESS NOTE ADULT - SUBJECTIVE AND OBJECTIVE BOX
Patient is a 73y old  Female who presents with a chief complaint of CVA (28 Feb 2023 10:50)      Patient seen and examined at bedside.  No overnight events  has an uneasy stomach    ALLERGIES:  No Known Allergies    MEDICATIONS  (STANDING):  apixaban 5 milliGRAM(s) Oral every 12 hours  aspirin  chewable 81 milliGRAM(s) Oral daily  atorvastatin 80 milliGRAM(s) Oral at bedtime  bisacodyl Suppository 10 milliGRAM(s) Rectal once  colchicine 0.6 milliGRAM(s) Oral two times a day  diltiazem    Tablet 30 milliGRAM(s) Oral every 6 hours  lactulose Syrup 20 Gram(s) Oral once  levothyroxine 50 MICROGram(s) Oral daily  memantine 5 milliGRAM(s) Oral daily  metoprolol tartrate 50 milliGRAM(s) Oral two times a day  pantoprazole    Tablet 40 milliGRAM(s) Oral before breakfast  polyethylene glycol 3350 17 Gram(s) Oral daily  predniSONE   Tablet 40 milliGRAM(s) Oral daily  saline laxative (FLEET) Rectal Enema 1 Enema Rectal once  senna 2 Tablet(s) Oral at bedtime    MEDICATIONS  (PRN):  benzocaine/menthol Lozenge 1 Lozenge Oral three times a day PRN Sore Throat  ondansetron   Disintegrating Tablet 4 milliGRAM(s) Oral every 6 hours PRN Nausea and/or Vomiting    Vital Signs Last 24 Hrs  T(F): 97.8 (28 Feb 2023 07:42), Max: 97.9 (27 Feb 2023 22:43)  HR: 66 (28 Feb 2023 07:42) (66 - 141)  BP: 99/75 (28 Feb 2023 07:42) (99/75 - 135/70)  RR: 16 (28 Feb 2023 07:42) (16 - 18)  SpO2: 92% (28 Feb 2023 07:42) (92% - 97%)  I&O's Summary    BMI (kg/m2): 24.4 (02-26-23 @ 15:37)    PHYSICAL EXAM:  GENERAL: NAD  HENT:  Atraumatic, Normocephalic; No tonsillar erythema, exudates, or enlargement; Moist mucous membranes;   EYES: EOMI, PERRLA, conjunctiva and sclera clear, no lid-lag  NECK: Supple, No JVD, Normal thyroid  CHEST/LUNG: Clear to percussion bilaterally; No rales, rhonchi, wheezing, or rubs; normal respiratory effort, no intercostal retractions  HEART: Regular rate and rhythm; No murmurs, rubs, or gallops; No pitting edema  ABDOMEN: Soft, Nontender, Nondistended; Bowel sounds present; No HSM  MUSCULOSKELETAL/EXTREMITIES:  2+ Peripheral Pulses, No clubbing or digital cyanosis  PSYCH: Appropriate affect, Alert & Awake    LABS:                        11.3   8.82  )-----------( 427      ( 27 Feb 2023 07:50 )             37.3       02-27    140  |  102  |  17  ----------------------------<  85  3.6   |  30  |  0.66    Ca    8.3      27 Feb 2023 07:50  Phos  3.5     02-26  Mg     2.0     02-26    TPro  6.7  /  Alb  2.7  /  TBili  0.5  /  DBili  x   /  AST  25  /  ALT  32  /  AlkPhos  74  02-27 02-23 Chol 152 mg/dL LDL -- HDL 46 mg/dL Trig 146 mg/dL    COVID-19 PCR: [facility] (02-26-23 @ 17:49)  COVID-19 PCR: [facility] (02-24-23 @ 13:00)    Care Discussed with Rehab Attending and Other Providers

## 2023-02-28 NOTE — PROVIDER CONTACT NOTE (OTHER) - ASSESSMENT
ID=611/60, HR = 125, RR = 18, O2=93%, c/o nausea and left chest pain under the armpit. TP=442/60, HR = 125, RR = 18, O2=93%, c/o nausea and left mid-axillary chest pain.

## 2023-02-28 NOTE — PROGRESS NOTE ADULT - NS ATTEND AMEND GEN_ALL_CORE FT
I have personally seen and examined the patient with the NP. Medical records reviewed. I have made amendments to the documentation where necessary and adjusted the history, physical examination, and plan as documented by the NP.    CVA ppx - ASA, Apixaban, statin  Tachycardia resold after IVF bolus   Constipation - bowel regimen enhanced  Spoke with son - detailed update given, all questions answered I have personally seen and examined the patient with the NP. Medical records reviewed. I have made amendments to the documentation where necessary and adjusted the history, physical examination, and plan as documented by the NP.    CVA ppx - ASA, Apixaban, statin  Tachycardia resold after IVF bolus   Constipation - bowel regimen enhanced  Spoke with son - detailed update given, all questions answered  Hospitalist is following, case discussed     42 min spent

## 2023-02-28 NOTE — PROVIDER CONTACT NOTE (OTHER) - BACKGROUND
72 years old female who admitted for cerebral infarction and had PMH afib, hypothyroidism, cerebral aneurysm s/p coiling,  pericarditis

## 2023-02-28 NOTE — PROVIDER CONTACT NOTE (OTHER) - SITUATION
Pt had liquid BM and c/o abdominal pain. IK=707/73, HR = 141, RR = 18, O2=93% @Denies shortness of breath, chest discomfort. Metoprolol and ditiazem given. 30 mins later, c/o nausea and chest pain.

## 2023-02-28 NOTE — PROGRESS NOTE ADULT - ASSESSMENT
72 year old F PMH hypothyroidism, cerebral aneurysm s/p coiling, pericarditis possibly related to non-covid coronaviral illness (recently admitted 1/30/23-2/1/23), afib RVR presented to [facility] on 2/16 for afib with RVR, stroke called on 2/22 and found to have nonhemorrhagic acute ischemia in the left MCA territory now admitted to  rehab for ADL impairment.    #Left MCA infarct   - continue with eliquis 5mg PO BID and ASA 81mg PO QD   - not TKN candidate due to out of window, could not receive thrombectomy as lesion was too far to intervene   - continue atorvastatin 80mg qhs  - continue comprehensive rehab program    #abdominal pain likely from constipation  - possible ulcer vs IBS related issue given symptoms  - AXR reviewed  - if no improvement with bowel regimen, would consider CT ab/pelvis    #Atrial fibrillation  - give 1NS x 1  - continue with cardizem  30mg q6hrs and metoprolol tartrate 50mg BID     - continue with eliquis     #Acute pericarditis with pericardial effusion without tamponade physiology   - continue with prednisone 40mg QD until 3/2  - prednisone taper-> decrease 10mg a week over 4 weeks   - continue with colchicine 0.6mg BID    #Hypothyroidism   - continue synthroid 50mcg QD     #DVT prophylaxis:  - on eliquis  - Last doppler on 1/30/23 - No evidence of DVT

## 2023-03-01 PROCEDURE — 99232 SBSQ HOSP IP/OBS MODERATE 35: CPT

## 2023-03-01 RX ORDER — DILTIAZEM HCL 120 MG
120 CAPSULE, EXT RELEASE 24 HR ORAL DAILY
Refills: 0 | Status: DISCONTINUED | OUTPATIENT
Start: 2023-03-02 | End: 2023-03-05

## 2023-03-01 RX ORDER — LACTULOSE 10 G/15ML
20 SOLUTION ORAL ONCE
Refills: 0 | Status: COMPLETED | OUTPATIENT
Start: 2023-03-01 | End: 2023-03-01

## 2023-03-01 RX ORDER — MIRTAZAPINE 45 MG/1
7.5 TABLET, ORALLY DISINTEGRATING ORAL AT BEDTIME
Refills: 0 | Status: DISCONTINUED | OUTPATIENT
Start: 2023-03-01 | End: 2023-03-07

## 2023-03-01 RX ADMIN — Medication 81 MILLIGRAM(S): at 11:26

## 2023-03-01 RX ADMIN — Medication 50 MILLIGRAM(S): at 05:34

## 2023-03-01 RX ADMIN — Medication 50 MILLIGRAM(S): at 17:09

## 2023-03-01 RX ADMIN — Medication 0.6 MILLIGRAM(S): at 05:34

## 2023-03-01 RX ADMIN — POLYETHYLENE GLYCOL 3350 17 GRAM(S): 17 POWDER, FOR SOLUTION ORAL at 11:26

## 2023-03-01 RX ADMIN — Medication 0.6 MILLIGRAM(S): at 17:09

## 2023-03-01 RX ADMIN — Medication 50 MICROGRAM(S): at 05:34

## 2023-03-01 RX ADMIN — MIRTAZAPINE 7.5 MILLIGRAM(S): 45 TABLET, ORALLY DISINTEGRATING ORAL at 21:37

## 2023-03-01 RX ADMIN — PANTOPRAZOLE SODIUM 40 MILLIGRAM(S): 20 TABLET, DELAYED RELEASE ORAL at 05:34

## 2023-03-01 RX ADMIN — Medication 40 MILLIGRAM(S): at 05:33

## 2023-03-01 RX ADMIN — APIXABAN 5 MILLIGRAM(S): 2.5 TABLET, FILM COATED ORAL at 05:34

## 2023-03-01 RX ADMIN — ATORVASTATIN CALCIUM 80 MILLIGRAM(S): 80 TABLET, FILM COATED ORAL at 21:06

## 2023-03-01 RX ADMIN — APIXABAN 5 MILLIGRAM(S): 2.5 TABLET, FILM COATED ORAL at 17:09

## 2023-03-01 RX ADMIN — SENNA PLUS 2 TABLET(S): 8.6 TABLET ORAL at 21:06

## 2023-03-01 RX ADMIN — Medication 30 MILLIGRAM(S): at 17:09

## 2023-03-01 RX ADMIN — MEMANTINE HYDROCHLORIDE 5 MILLIGRAM(S): 10 TABLET ORAL at 11:26

## 2023-03-01 RX ADMIN — LACTULOSE 20 GRAM(S): 10 SOLUTION ORAL at 11:27

## 2023-03-01 NOTE — PROGRESS NOTE ADULT - SUBJECTIVE AND OBJECTIVE BOX
SUBJECTIVE/ROS: She was evaluated while in the chair. She states she is feeling better and had 2 BMs yesterday. She noted pleuritic pain on the right side that is intermittent. She denies chest pain, fever, chills, nausea, vomiting, headache, or BLE pain.     HPI:  Ms. Keller is a 72yr old female with PMH hypothyroidism, cerebral aneurysm s/p coiling,  pericarditis possibly related non-covid coronaviral illness (recently admitted 1/30/23-2/1/23), afib RVR presents with similar symptoms of left sided chest pain on 2/16/23. In ER, she was found to be in Afib RVR. Stroke code was called on 2/22/23 where patient found to be aphasic and on imaging, CTA head showed nonhemorrhagic acute ischemia in the left MCA territory. Patient did not qualify for TNK due to last known sx was well over 4.5 hours. Patient was not a thrombectomy candidate as pt's thrombus is in M3 branch which is too distal to intervene on. Patient is currently on an acute prednisone taper for pericarditis and is on Eliquis and cardizem for Afib management.       Patient was evaluated by PM&R and therapy for functional deficits and gait/ ADL impairments and recommended acute rehabilitation. Patient was medically optimized for discharge to [facility] on 2/26/23      Vital Signs Last 24 Hrs  T(C): 36.5 (01 Mar 2023 07:46), Max: 36.6 (28 Feb 2023 20:00)  T(F): 97.7 (01 Mar 2023 07:46), Max: 97.9 (28 Feb 2023 20:00)  HR: 72 (01 Mar 2023 11:23) (64 - 83)  BP: 115/75 (01 Mar 2023 11:23) (107/64 - 127/79)  BP(mean): --  RR: 16 (01 Mar 2023 11:23) (16 - 16)  SpO2: 96% (01 Mar 2023 11:23) (95% - 97%)    Parameters below as of 01 Mar 2023 11:23  Patient On (Oxygen Delivery Method): room air        PHYSICAL EXAM  Constitutional - NAD, Comfortable  HEENT - NCAT, EOMI  Neck - Supple, No limited ROM  Chest - CTA bilaterally  Cardiovascular - RRR, S1S2  Abdomen -BS+, Soft, NTND  Extremities - No C/C/E, No calf tenderness   Neurologic Exam - aox3, dysarthric, naranjo 5/5       RECENT LABS:                          11.3   8.82  )-----------( 427      ( 27 Feb 2023 07:50 )             37.3     02-27    140  |  102  |  17  ----------------------------<  85  3.6   |  30  |  0.66    Ca    8.3<L>      27 Feb 2023 07:50  Phos  3.5     02-26  Mg     2.0     02-26    TPro  6.7  /  Alb  2.7<L>  /  TBili  0.5  /  DBili  x   /  AST  25  /  ALT  32  /  AlkPhos  74  02-27    LIVER FUNCTIONS - ( 27 Feb 2023 07:50 )  Alb: 2.7 g/dL / Pro: 6.7 g/dL / ALK PHOS: 74 U/L / ALT: 32 U/L / AST: 25 U/L / GGT: x             MEDICATIONS  (STANDING):  apixaban 5 milliGRAM(s) Oral every 12 hours  aspirin  chewable 81 milliGRAM(s) Oral daily  atorvastatin 80 milliGRAM(s) Oral at bedtime  bisacodyl Suppository 10 milliGRAM(s) Rectal once  colchicine 0.6 milliGRAM(s) Oral two times a day  diltiazem    Tablet 30 milliGRAM(s) Oral every 6 hours  levothyroxine 50 MICROGram(s) Oral daily  memantine 5 milliGRAM(s) Oral daily  metoprolol tartrate 50 milliGRAM(s) Oral two times a day  mirtazapine 7.5 milliGRAM(s) Oral at bedtime  pantoprazole    Tablet 40 milliGRAM(s) Oral before breakfast  polyethylene glycol 3350 17 Gram(s) Oral daily  predniSONE   Tablet 40 milliGRAM(s) Oral daily  saline laxative (FLEET) Rectal Enema 1 Enema Rectal once  senna 2 Tablet(s) Oral at bedtime    MEDICATIONS  (PRN):  benzocaine/menthol Lozenge 1 Lozenge Oral three times a day PRN Sore Throat  ondansetron   Disintegrating Tablet 4 milliGRAM(s) Oral every 6 hours PRN Nausea and/or Vomiting

## 2023-03-01 NOTE — PROGRESS NOTE ADULT - ASSESSMENT
Ms. Keller is a 72yr old female with PMH hypothyroidism, cerebral aneurysm s/p coiling,  pericarditis possibly related to non-covid coronaviral illness (recently admitted 1/30/23-2/1/23), afib RVR presents with similar symptoms of left sided chest pain on 2/16/23 at [facility]. Stroke code was called on 2/22/23 where patient found to be aphasic and on imaging, CTA head showed nonhemorrhagic acute ischemia in the left MCA territory. Patient is currently on an acute prednisone taper for pericarditis and is on Eliquis and cardizem for Afib management. Admitted for multidisciplinary rehab program.     #Left MCA infarct with residual dysarthria, Gait, ADL, Functional impairments  - c/w eliquis 5mg PO BID   - c/w ASA 81mg PO QD   - c/w atorvastatin 80 mg PO @bedtime   - c/w namenda 5mg PO QD   -Continue comprehensive Multidisciplinary Rehab Program PT/OT/ SLP     #Atrial fibrillation with RVR  -c/w cardizem  30mg PO Q6 and metoprolol 50mg PO BID     - c/w eliquis   -Episode of tachycardia 2/28 in am - most likley due to poor PO intake  -IVF bolus given x 1 for dehydration     #Acute pericarditis with pericardial effusion without tamponade physiology   -c/w prednisone 40mg PO QD until 3/2  - prednisone taper-> decrease 10mg a week over 4 weeks   40mg x 1week (last dose on 3/2)  30mg x 1week (last dose on 3/9)  10mg x 1week (last dose on 3/23)  -c/w colchicine 0.6mg PO BID  -Encourage incentive spirometery for pleuritic pain on right     #Hypothyroidism   - c/w synthroid 50mcg PO QD     #Mood / Cognition   - Neuropsych evaluation     #GI/Bowel/abdominal discomfort  - c/w zofran PRN  - Senna changed to standing  -Start miralax  - GI ppx: pantoprazole 40mg PO QD   -KUB shows increased stool burden  -Lactulose and enema 2/28 and 3/1    #/Bladder:  - Monitor PVR if no void in 8h; SC for >400 cc  - Toileting schedule q4h    #Diet / Dysphagia:    - Diet: DASH/TLC  - ongoing SLP assessment  - Nutrition to follow    #Skin/ Pressure Injury Prevention:  - assessment on admission   - Incisions: none  - Turn Q2hrs in bed while awake, OOB to Chair, PT/OT/SLP     #DVT prophylaxis:  - c/w Eliquis 5mg PO BID   - Last doppler on 1/30/23 - No evidence of DVT - repeat pending due to LE discomfort -negative 2/27    #Precautions/ Restrictions  - Falls, Cardiac, Seizures   - Lungs: Aspiration, Incentive Spirometer    - COVID PCR: Neg 2/24    --------------------------------------------  Outpatient Follow up:    Gavino Yun; MPH)  Cardiology; Internal Medicine  [address] [place] [address]  Phone: [phone]  Fax: [phone]  Follow Up Time:     Ethan Barnes)  Neurology; Vascular Neurology  [address] [place] [address] A  [place], [place] [id number]  Phone: [phone]  Fax: [phone]  Follow Up Time:

## 2023-03-01 NOTE — PROGRESS NOTE ADULT - NS ATTEND AMEND GEN_ALL_CORE FT
I have personally seen and examined the patient with the NP. Medical records reviewed. I have made amendments to the documentation where necessary and adjusted the history, physical examination, and plan as documented by the NP.  CVA ppx - ASA, Apixaban, statin  Tachycardia resold after IVF bolus - no recurrent symptoms  Constipation - bowel regimen enhanced  Spoke with son - detailed update given, all questions answered  Hospitalist is following, case discussed, notes reviewed

## 2023-03-01 NOTE — PROGRESS NOTE ADULT - ASSESSMENT
72 year old F PMH hypothyroidism, cerebral aneurysm s/p coiling, pericarditis possibly related to non-covid coronaviral illness (recently admitted 1/30/23-2/1/23), afib RVR presented to [facility] on 2/16 for afib with RVR, stroke called on 2/22 and found to have nonhemorrhagic acute ischemia in the left MCA territory now admitted to  rehab for ADL impairment.    #Left MCA infarct   - continue with eliquis 5mg PO BID and ASA 81mg PO QD   - not TKN candidate due to out of window, could not receive thrombectomy as lesion was too far to intervene   - continue atorvastatin 80mg qhs  - continue comprehensive rehab program    #abdominal pain likely from constipation - resolving  - possible ulcer vs IBS related issue given symptoms  - AXR reviewed  - if no improvement with bowel regimen, would consider CT ab/pelvis    #Atrial fibrillation - stabke  - given 1NS 2/28  - continue with cardizem 30mg q6hrs and metoprolol tartrate 50mg BID     - continue with eliquis     #Acute pericarditis with pericardial effusion without tamponade physiology   - continue with prednisone 40mg QD until 3/2  - prednisone taper-> decrease 10mg a week over 4 weeks   - continue with colchicine 0.6mg BID    #Hypothyroidism   - continue synthroid 50mcg QD     #DVT prophylaxis:  - on eliquis  - Last doppler on 1/30/23 - No evidence of DVT  72 year old F PMH hypothyroidism, cerebral aneurysm s/p coiling, pericarditis possibly related to non-covid coronaviral illness (recently admitted 1/30/23-2/1/23), afib RVR presented to [facility] on 2/16 for afib with RVR, stroke called on 2/22 and found to have nonhemorrhagic acute ischemia in the left MCA territory now admitted to  rehab for ADL impairment.    #Left MCA infarct   - continue with eliquis 5mg PO BID and ASA 81mg PO QD   - not TKN candidate due to out of window, could not receive thrombectomy as lesion was too far to intervene   - continue atorvastatin 80mg qhs  - continue comprehensive rehab program    #abdominal pain likely from constipation - resolving  - possible ulcer vs IBS related issue given symptoms  - AXR reviewed    #Atrial fibrillation - stabke  - given 1NS 2/28  - continue with cardizem 30mg q6hrs and metoprolol tartrate 50mg BID   - Will start patient on long acting cardizem tomorrow    - continue with eliquis     #Acute pericarditis with pericardial effusion without tamponade physiology   - continue with prednisone 40mg QD until 3/2  - prednisone taper-> decrease 10mg a week over 4 weeks   - continue with colchicine 0.6mg BID    #Hypothyroidism   - continue synthroid 50mcg QD     #DVT prophylaxis:  - on eliquis  - Last doppler on 1/30/23 - No evidence of DVT

## 2023-03-01 NOTE — PROGRESS NOTE ADULT - SUBJECTIVE AND OBJECTIVE BOX
Patient is a 73y old  Female who presents with a chief complaint of CVA (28 Feb 2023 13:03)      Patient seen and examined at bedside.  No overnight events  Reports some pain she had earlier on right outer side of chest. Review of CXR shows atelectasis at the same location. ordering incentive spirometer.      ALLERGIES:  No Known Allergies    MEDICATIONS  (STANDING):  apixaban 5 milliGRAM(s) Oral every 12 hours  aspirin  chewable 81 milliGRAM(s) Oral daily  atorvastatin 80 milliGRAM(s) Oral at bedtime  bisacodyl Suppository 10 milliGRAM(s) Rectal once  colchicine 0.6 milliGRAM(s) Oral two times a day  diltiazem    Tablet 30 milliGRAM(s) Oral every 6 hours  levothyroxine 50 MICROGram(s) Oral daily  memantine 5 milliGRAM(s) Oral daily  metoprolol tartrate 50 milliGRAM(s) Oral two times a day  mirtazapine 7.5 milliGRAM(s) Oral at bedtime  pantoprazole    Tablet 40 milliGRAM(s) Oral before breakfast  polyethylene glycol 3350 17 Gram(s) Oral daily  predniSONE   Tablet 40 milliGRAM(s) Oral daily  saline laxative (FLEET) Rectal Enema 1 Enema Rectal once  senna 2 Tablet(s) Oral at bedtime    MEDICATIONS  (PRN):  benzocaine/menthol Lozenge 1 Lozenge Oral three times a day PRN Sore Throat  ondansetron   Disintegrating Tablet 4 milliGRAM(s) Oral every 6 hours PRN Nausea and/or Vomiting    Vital Signs Last 24 Hrs  T(F): 97.7 (01 Mar 2023 07:46), Max: 97.9 (28 Feb 2023 20:00)  HR: 72 (01 Mar 2023 11:23) (64 - 83)  BP: 115/75 (01 Mar 2023 11:23) (107/64 - 127/79)  RR: 16 (01 Mar 2023 11:23) (16 - 16)  SpO2: 96% (01 Mar 2023 11:23) (95% - 97%)  I&O's Summary    BMI (kg/m2): 24.4 (02-26-23 @ 15:37)    PHYSICAL EXAM:  GENERAL: NAD  HENT:  Atraumatic, Normocephalic; No tonsillar erythema, exudates, or enlargement; Moist mucous membranes;   EYES: EOMI, PERRLA, conjunctiva and sclera clear, no lid-lag  NECK: Supple, No JVD, Normal thyroid  CHEST/LUNG: Clear to percussion bilaterally; No rales, rhonchi, wheezing, or rubs; normal respiratory effort, no intercostal retractions  HEART: Regular rate and rhythm; No murmurs, rubs, or gallops; No pitting edema  ABDOMEN: Soft, Nontender, Nondistended; Bowel sounds present; No HSM  MUSCULOSKELETAL/EXTREMITIES:  2+ Peripheral Pulses, No clubbing or digital cyanosis  PSYCH: Appropriate affect, Alert & Awake; Good judgement    LABS:                        11.3   8.82  )-----------( 427      ( 27 Feb 2023 07:50 )             37.3       02-27    140  |  102  |  17  ----------------------------<  85  3.6   |  30  |  0.66    Ca    8.3      27 Feb 2023 07:50    TPro  6.7  /  Alb  2.7  /  TBili  0.5  /  DBili  x   /  AST  25  /  ALT  32  /  AlkPhos  74  02-27 02-23 Chol 152 mg/dL LDL -- HDL 46 mg/dL Trig 146 mg/dL    COVID-19 PCR: [facility] (02-26-23 @ 17:49)  COVID-19 PCR: [facility] (02-24-23 @ 13:00)    Care Discussed with Rehab Attending and Other Providers

## 2023-03-02 LAB
ALBUMIN SERPL ELPH-MCNC: 2.8 G/DL — LOW (ref 3.3–5)
ALBUMIN SERPL ELPH-MCNC: 2.8 G/DL — LOW (ref 3.3–5)
ALP SERPL-CCNC: 75 U/L — SIGNIFICANT CHANGE UP (ref 40–120)
ALP SERPL-CCNC: 80 U/L — SIGNIFICANT CHANGE UP (ref 40–120)
ALT FLD-CCNC: 28 U/L — SIGNIFICANT CHANGE UP (ref 10–45)
ALT FLD-CCNC: 29 U/L — SIGNIFICANT CHANGE UP (ref 10–45)
ANION GAP SERPL CALC-SCNC: 11 MMOL/L — SIGNIFICANT CHANGE UP (ref 5–17)
ANION GAP SERPL CALC-SCNC: 11 MMOL/L — SIGNIFICANT CHANGE UP (ref 5–17)
AST SERPL-CCNC: 21 U/L — SIGNIFICANT CHANGE UP (ref 10–40)
AST SERPL-CCNC: 24 U/L — SIGNIFICANT CHANGE UP (ref 10–40)
BASOPHILS # BLD AUTO: 0.01 K/UL — SIGNIFICANT CHANGE UP (ref 0–0.2)
BASOPHILS NFR BLD AUTO: 0.1 % — SIGNIFICANT CHANGE UP (ref 0–2)
BILIRUB SERPL-MCNC: 0.4 MG/DL — SIGNIFICANT CHANGE UP (ref 0.2–1.2)
BILIRUB SERPL-MCNC: 0.6 MG/DL — SIGNIFICANT CHANGE UP (ref 0.2–1.2)
BUN SERPL-MCNC: 11 MG/DL — SIGNIFICANT CHANGE UP (ref 7–23)
BUN SERPL-MCNC: 13 MG/DL — SIGNIFICANT CHANGE UP (ref 7–23)
CALCIUM SERPL-MCNC: 8.4 MG/DL — SIGNIFICANT CHANGE UP (ref 8.4–10.5)
CALCIUM SERPL-MCNC: 8.9 MG/DL — SIGNIFICANT CHANGE UP (ref 8.4–10.5)
CHLORIDE SERPL-SCNC: 101 MMOL/L — SIGNIFICANT CHANGE UP (ref 96–108)
CHLORIDE SERPL-SCNC: 102 MMOL/L — SIGNIFICANT CHANGE UP (ref 96–108)
CO2 SERPL-SCNC: 26 MMOL/L — SIGNIFICANT CHANGE UP (ref 22–31)
CO2 SERPL-SCNC: 27 MMOL/L — SIGNIFICANT CHANGE UP (ref 22–31)
CREAT SERPL-MCNC: 0.54 MG/DL — SIGNIFICANT CHANGE UP (ref 0.5–1.3)
CREAT SERPL-MCNC: 0.66 MG/DL — SIGNIFICANT CHANGE UP (ref 0.5–1.3)
EGFR: 93 ML/MIN/1.73M2 — SIGNIFICANT CHANGE UP
EGFR: 98 ML/MIN/1.73M2 — SIGNIFICANT CHANGE UP
EOSINOPHIL # BLD AUTO: 0.04 K/UL — SIGNIFICANT CHANGE UP (ref 0–0.5)
EOSINOPHIL NFR BLD AUTO: 0.6 % — SIGNIFICANT CHANGE UP (ref 0–6)
GLUCOSE SERPL-MCNC: 126 MG/DL — HIGH (ref 70–99)
GLUCOSE SERPL-MCNC: 194 MG/DL — HIGH (ref 70–99)
HCT VFR BLD CALC: 39.2 % — SIGNIFICANT CHANGE UP (ref 34.5–45)
HGB BLD-MCNC: 12.2 G/DL — SIGNIFICANT CHANGE UP (ref 11.5–15.5)
IMM GRANULOCYTES NFR BLD AUTO: 0.4 % — SIGNIFICANT CHANGE UP (ref 0–0.9)
LYMPHOCYTES # BLD AUTO: 0.69 K/UL — LOW (ref 1–3.3)
LYMPHOCYTES # BLD AUTO: 9.8 % — LOW (ref 13–44)
MAGNESIUM SERPL-MCNC: 1.4 MG/DL — LOW (ref 1.6–2.6)
MCHC RBC-ENTMCNC: 22.8 PG — LOW (ref 27–34)
MCHC RBC-ENTMCNC: 31.1 GM/DL — LOW (ref 32–36)
MCV RBC AUTO: 73.1 FL — LOW (ref 80–100)
MONOCYTES # BLD AUTO: 0.25 K/UL — SIGNIFICANT CHANGE UP (ref 0–0.9)
MONOCYTES NFR BLD AUTO: 3.5 % — SIGNIFICANT CHANGE UP (ref 2–14)
NEUTROPHILS # BLD AUTO: 6.05 K/UL — SIGNIFICANT CHANGE UP (ref 1.8–7.4)
NEUTROPHILS NFR BLD AUTO: 85.6 % — HIGH (ref 43–77)
NRBC # BLD: 0 /100 WBCS — SIGNIFICANT CHANGE UP (ref 0–0)
PLATELET # BLD AUTO: 407 K/UL — HIGH (ref 150–400)
POTASSIUM SERPL-MCNC: 2.9 MMOL/L — CRITICAL LOW (ref 3.5–5.3)
POTASSIUM SERPL-MCNC: 3.5 MMOL/L — SIGNIFICANT CHANGE UP (ref 3.5–5.3)
POTASSIUM SERPL-SCNC: 2.9 MMOL/L — CRITICAL LOW (ref 3.5–5.3)
POTASSIUM SERPL-SCNC: 3.5 MMOL/L — SIGNIFICANT CHANGE UP (ref 3.5–5.3)
PROT SERPL-MCNC: 6.9 G/DL — SIGNIFICANT CHANGE UP (ref 6–8.3)
PROT SERPL-MCNC: 6.9 G/DL — SIGNIFICANT CHANGE UP (ref 6–8.3)
RBC # BLD: 5.36 M/UL — HIGH (ref 3.8–5.2)
RBC # FLD: 16.5 % — HIGH (ref 10.3–14.5)
SODIUM SERPL-SCNC: 139 MMOL/L — SIGNIFICANT CHANGE UP (ref 135–145)
SODIUM SERPL-SCNC: 139 MMOL/L — SIGNIFICANT CHANGE UP (ref 135–145)
WBC # BLD: 7.07 K/UL — SIGNIFICANT CHANGE UP (ref 3.8–10.5)
WBC # FLD AUTO: 7.07 K/UL — SIGNIFICANT CHANGE UP (ref 3.8–10.5)

## 2023-03-02 PROCEDURE — 99232 SBSQ HOSP IP/OBS MODERATE 35: CPT

## 2023-03-02 RX ORDER — POTASSIUM CHLORIDE 20 MEQ
40 PACKET (EA) ORAL EVERY 4 HOURS
Refills: 0 | Status: ACTIVE | OUTPATIENT
Start: 2023-03-02 | End: 2023-03-02

## 2023-03-02 RX ORDER — MAGNESIUM OXIDE 400 MG ORAL TABLET 241.3 MG
400 TABLET ORAL
Refills: 0 | Status: DISCONTINUED | OUTPATIENT
Start: 2023-03-02 | End: 2023-03-07

## 2023-03-02 RX ORDER — POTASSIUM CHLORIDE 20 MEQ
40 PACKET (EA) ORAL ONCE
Refills: 0 | Status: DISCONTINUED | OUTPATIENT
Start: 2023-03-02 | End: 2023-03-02

## 2023-03-02 RX ADMIN — Medication 40 MILLIGRAM(S): at 05:49

## 2023-03-02 RX ADMIN — Medication 50 MILLIGRAM(S): at 17:26

## 2023-03-02 RX ADMIN — POLYETHYLENE GLYCOL 3350 17 GRAM(S): 17 POWDER, FOR SOLUTION ORAL at 12:02

## 2023-03-02 RX ADMIN — APIXABAN 5 MILLIGRAM(S): 2.5 TABLET, FILM COATED ORAL at 17:21

## 2023-03-02 RX ADMIN — Medication 120 MILLIGRAM(S): at 05:49

## 2023-03-02 RX ADMIN — APIXABAN 5 MILLIGRAM(S): 2.5 TABLET, FILM COATED ORAL at 05:48

## 2023-03-02 RX ADMIN — MEMANTINE HYDROCHLORIDE 5 MILLIGRAM(S): 10 TABLET ORAL at 12:02

## 2023-03-02 RX ADMIN — Medication 40 MILLIEQUIVALENT(S): at 17:20

## 2023-03-02 RX ADMIN — Medication 0.6 MILLIGRAM(S): at 17:21

## 2023-03-02 RX ADMIN — Medication 81 MILLIGRAM(S): at 12:02

## 2023-03-02 RX ADMIN — Medication 0.6 MILLIGRAM(S): at 05:49

## 2023-03-02 RX ADMIN — MIRTAZAPINE 7.5 MILLIGRAM(S): 45 TABLET, ORALLY DISINTEGRATING ORAL at 20:23

## 2023-03-02 RX ADMIN — Medication 40 MILLIEQUIVALENT(S): at 14:20

## 2023-03-02 RX ADMIN — Medication 50 MILLIGRAM(S): at 05:48

## 2023-03-02 RX ADMIN — PANTOPRAZOLE SODIUM 40 MILLIGRAM(S): 20 TABLET, DELAYED RELEASE ORAL at 05:49

## 2023-03-02 RX ADMIN — Medication 50 MICROGRAM(S): at 05:50

## 2023-03-02 RX ADMIN — ATORVASTATIN CALCIUM 80 MILLIGRAM(S): 80 TABLET, FILM COATED ORAL at 20:23

## 2023-03-02 NOTE — PROGRESS NOTE ADULT - ASSESSMENT
72 year old F PMH hypothyroidism, cerebral aneurysm s/p coiling, pericarditis possibly related to non-covid coronaviral illness (recently admitted 1/30/23-2/1/23), afib RVR presented to [facility] on 2/16 for afib with RVR, stroke called on 2/22 and found to have nonhemorrhagic acute ischemia in the left MCA territory now admitted to  rehab for ADL impairment.    #Left MCA infarct   - continue with eliquis 5mg PO BID and ASA 81mg PO QD   - not TKN candidate due to out of window, could not receive thrombectomy as lesion was too far to intervene   - continue atorvastatin 80mg qhs  - continue comprehensive rehab program    #abdominal pain likely from constipation - resolving  - possible ulcer vs IBS related issue given symptoms  - AXR reviewed    #Atrial fibrillation - stabke  - given 1NS 2/28  - continue with cardizem 30mg q6hrs and metoprolol tartrate 50mg BID   - Will start patient on long acting cardizem tomorrow    - continue with eliquis     #Acute pericarditis with pericardial effusion without tamponade physiology   - continue with prednisone 40mg QD until 3/2  - prednisone taper-> decrease 10mg a week over 4 weeks   - continue with colchicine 0.6mg BID    #Hypothyroidism   - continue synthroid 50mcg QD     #DVT prophylaxis:  - on eliquis  - Last doppler on 1/30/23 - No evidence of DVT  72 year old F PMH hypothyroidism, cerebral aneurysm s/p coiling, pericarditis possibly related to non-covid coronaviral illness (recently admitted 1/30/23-2/1/23), afib RVR presented to [facility] on 2/16 for afib with RVR, stroke called on 2/22 and found to have nonhemorrhagic acute ischemia in the left MCA territory now admitted to  rehab for ADL impairment.    #Left MCA infarct   - continue with eliquis 5mg PO BID and ASA 81mg PO QD   - not TKN candidate due to out of window, could not receive thrombectomy as lesion was too far to intervene   - continue atorvastatin 80mg qhs  - continue comprehensive rehab program  - fall, aspiration precaution    #abdominal pain likely from constipation - resolved  - possible ulcer vs IBS related issue given symptoms  - AXR reviewed    #Atrial fibrillation - stable  - given 1NS 2/28  - c/w metoprolol tartrate 50mg BID   - started patient on long acting Cardizem CD today. off short acting cardizem.   - continue with eliquis     #Acute pericarditis with pericardial effusion without tamponade physiology   - continue with prednisone 40mg QD until 3/2  - prednisone taper-> decrease 10mg a week over 4 weeks   - continue with colchicine 0.6mg BID  - cardio f/u post-dc    #Hypothyroidism   - continue synthroid 50mcg QD     # HypoK  - K 2.9  - replaced.  - repeat BMP and Mg in am  - added Mg wo am lab  - keep K.4 and Mg.2 to avoid arrythmia    #DVT prophylaxis:  - on eliquis  - Last doppler on 1/30/23 - No evidence of DVT     d/w Deepika SWAIN

## 2023-03-02 NOTE — PROGRESS NOTE ADULT - NS ATTEND AMEND GEN_ALL_CORE FT
I have personally seen and examined the patient with the NP. Medical records reviewed. I have made amendments to the documentation where necessary and adjusted the history, physical examination, and plan as documented by the NP.    CVA ppx - ASA, Apixaban, statin  Tachycardia resolved after IVF bolus - no recurrent symptoms  Constipation - bowel regimen enhanced - 2 large BM today  Spoke with son - detailed update given, all questions answered  Hospitalist is following, case discussed, notes reviewed.  Labs today - reviewed   Discharge plan discussed with SW, team

## 2023-03-02 NOTE — PROGRESS NOTE ADULT - SUBJECTIVE AND OBJECTIVE BOX
Medicine Progress Note    Patient is a 73y old  Female who presents with a chief complaint of CVA (01 Mar 2023 12:53)      SUBJECTIVE / OVERNIGHT EVENTS:    ADDITIONAL REVIEW OF SYSTEMS:    MEDICATIONS  (STANDING):  apixaban 5 milliGRAM(s) Oral every 12 hours  aspirin  chewable 81 milliGRAM(s) Oral daily  atorvastatin 80 milliGRAM(s) Oral at bedtime  bisacodyl Suppository 10 milliGRAM(s) Rectal once  colchicine 0.6 milliGRAM(s) Oral two times a day  diltiazem    milliGRAM(s) Oral daily  levothyroxine 50 MICROGram(s) Oral daily  memantine 5 milliGRAM(s) Oral daily  metoprolol tartrate 50 milliGRAM(s) Oral two times a day  mirtazapine 7.5 milliGRAM(s) Oral at bedtime  pantoprazole    Tablet 40 milliGRAM(s) Oral before breakfast  polyethylene glycol 3350 17 Gram(s) Oral daily  predniSONE   Tablet 40 milliGRAM(s) Oral daily  saline laxative (FLEET) Rectal Enema 1 Enema Rectal once  senna 2 Tablet(s) Oral at bedtime    MEDICATIONS  (PRN):  benzocaine/menthol Lozenge 1 Lozenge Oral three times a day PRN Sore Throat  ondansetron   Disintegrating Tablet 4 milliGRAM(s) Oral every 6 hours PRN Nausea and/or Vomiting    CAPILLARY BLOOD GLUCOSE        I&O's Summary      PHYSICAL EXAM:  Vital Signs Last 24 Hrs  T(C): 36.4 (01 Mar 2023 20:03), Max: 36.5 (01 Mar 2023 07:46)  T(F): 97.6 (01 Mar 2023 20:03), Max: 97.7 (01 Mar 2023 07:46)  HR: 81 (02 Mar 2023 05:52) (70 - 81)  BP: 136/81 (02 Mar 2023 05:52) (115/75 - 136/81)  BP(mean): --  RR: 16 (01 Mar 2023 20:03) (16 - 16)  SpO2: 95% (01 Mar 2023 20:03) (94% - 96%)    Parameters below as of 01 Mar 2023 20:03  Patient On (Oxygen Delivery Method): room air          LABS:                    COVID-19 PCR: NotDetec (26 Feb 2023 17:49)  COVID-19 PCR: NotDetec (24 Feb 2023 13:00)  SARS-CoV-2: NotDetec (16 Feb 2023 05:40)  SARS-CoV-2: NotDetec (30 Jan 2023 00:30)      RADIOLOGY & ADDITIONAL TESTS:  Imaging from Last 24 Hours:    Electrocardiogram/QTc Interval:    COORDINATION OF CARE:  Care Discussed with Consultants/Other Providers:   Medicine Progress Note    Patient is a 73y old  Female who presents with a chief complaint of CVA (01 Mar 2023 12:53)      SUBJECTIVE / OVERNIGHT EVENTS:  seen and examined  Chart reviewed  No overnight events  Limb weakness improving with therapy  BM+  No pain  No complaints    ADDITIONAL REVIEW OF SYSTEMS:  denied fever/chills/CP/SOB/cough/palpitation/dizziness/abdominal pian/nausea/vomiting/diarrhoea/constipation/dysuria/leg or calf pain/headaches.all other ROS neg    MEDICATIONS  (STANDING):  apixaban 5 milliGRAM(s) Oral every 12 hours  aspirin  chewable 81 milliGRAM(s) Oral daily  atorvastatin 80 milliGRAM(s) Oral at bedtime  bisacodyl Suppository 10 milliGRAM(s) Rectal once  colchicine 0.6 milliGRAM(s) Oral two times a day  diltiazem    milliGRAM(s) Oral daily  levothyroxine 50 MICROGram(s) Oral daily  memantine 5 milliGRAM(s) Oral daily  metoprolol tartrate 50 milliGRAM(s) Oral two times a day  mirtazapine 7.5 milliGRAM(s) Oral at bedtime  pantoprazole    Tablet 40 milliGRAM(s) Oral before breakfast  polyethylene glycol 3350 17 Gram(s) Oral daily  predniSONE   Tablet 40 milliGRAM(s) Oral daily  saline laxative (FLEET) Rectal Enema 1 Enema Rectal once  senna 2 Tablet(s) Oral at bedtime    MEDICATIONS  (PRN):  benzocaine/menthol Lozenge 1 Lozenge Oral three times a day PRN Sore Throat  ondansetron   Disintegrating Tablet 4 milliGRAM(s) Oral every 6 hours PRN Nausea and/or Vomiting    CAPILLARY BLOOD GLUCOSE        I&O's Summary      PHYSICAL EXAM:  Vital Signs Last 24 Hrs  T(C): 36.4 (01 Mar 2023 20:03), Max: 36.5 (01 Mar 2023 07:46)  T(F): 97.6 (01 Mar 2023 20:03), Max: 97.7 (01 Mar 2023 07:46)  HR: 81 (02 Mar 2023 05:52) (70 - 81)  BP: 136/81 (02 Mar 2023 05:52) (115/75 - 136/81)  BP(mean): --  RR: 16 (01 Mar 2023 20:03) (16 - 16)  SpO2: 95% (01 Mar 2023 20:03) (94% - 96%)    Parameters below as of 01 Mar 2023 20:03  Patient On (Oxygen Delivery Method): room air    GENERAL: Not in distress. Alert    HEENT: clear conjuctiva, MMM. no pallor or icterus  CARDIOVASCULAR: irregular S1, S2. No murmur/rubs/gallop  LUNGS: BLAE+, no rales, no wheezing, no rhonchi.    ABDOMEN: ND. Soft,  NT, no guarding / rebound / rigidity. BS normoactive  BACK: No spine tenderness.  EXTREMITIES: no edema. no leg or calf TP.  SKIN: warm and dry  PSYCHIATRIC: Calm.  No agitation.  CNS:AAO*3. moving limbs      LABS:                          12.2   7.07  )-----------( 407      ( 02 Mar 2023 09:35 )             39.2       03-02    139  |  102  |  11  ----------------------------<  126<H>  2.9<LL>   |  26  |  0.54    Ca    8.4      02 Mar 2023 09:35    TPro  6.9  /  Alb  2.8<L>  /  TBili  0.6  /  DBili  x   /  AST  24  /  ALT  29  /  AlkPhos  75  03-02                  COVID-19 PCR: NotDetec (26 Feb 2023 17:49)  COVID-19 PCR: NotDetec (24 Feb 2023 13:00)  SARS-CoV-2: NotDetec (16 Feb 2023 05:40)  SARS-CoV-2: NotDetec (30 Jan 2023 00:30)      RADIOLOGY & ADDITIONAL TESTS:  Imaging from Last 24 Hours:    Electrocardiogram/QTc Interval:    COORDINATION OF CARE:  Care Discussed with Consultants/Other Providers:

## 2023-03-02 NOTE — PROGRESS NOTE ADULT - SUBJECTIVE AND OBJECTIVE BOX
SUBJECTIVE/ROS: She was evaluated while in the chair. She states her abdominal pain and her right sided pleuritic pain is much improved today. She slept well last night. She denies chest pain, fever, chills, nausea, vomiting, headache, or BLE pain.     HPI:  Ms. Keller is a 72yr old female with PMH hypothyroidism, cerebral aneurysm s/p coiling,  pericarditis possibly related non-covid coronaviral illness (recently admitted 1/30/23-2/1/23), afib RVR presents with similar symptoms of left sided chest pain on 2/16/23. In ER, she was found to be in Afib RVR. Stroke code was called on 2/22/23 where patient found to be aphasic and on imaging, CTA head showed nonhemorrhagic acute ischemia in the left MCA territory. Patient did not qualify for TNK due to last known sx was well over 4.5 hours. Patient was not a thrombectomy candidate as pt's thrombus is in M3 branch which is too distal to intervene on. Patient is currently on an acute prednisone taper for pericarditis and is on Eliquis and cardizem for Afib management.       Patient was evaluated by PM&R and therapy for functional deficits and gait/ ADL impairments and recommended acute rehabilitation. Patient was medically optimized for discharge to [facility] on 2/26/23      Vital Signs Last 24 Hrs  T(C): 36.7 (02 Mar 2023 08:25), Max: 36.7 (02 Mar 2023 08:25)  T(F): 98 (02 Mar 2023 08:25), Max: 98 (02 Mar 2023 08:25)  HR: 69 (02 Mar 2023 08:25) (69 - 81)  BP: 107/72 (02 Mar 2023 08:25) (107/72 - 136/81)  BP(mean): --  RR: 16 (02 Mar 2023 08:25) (16 - 16)  SpO2: 95% (02 Mar 2023 08:25) (94% - 96%)    Parameters below as of 02 Mar 2023 08:25  Patient On (Oxygen Delivery Method): room air            PHYSICAL EXAM  Constitutional - NAD, Comfortable  HEENT - NCAT, EOMI  Neck - Supple, No limited ROM  Chest - CTA bilaterally  Cardiovascular - RRR, S1S2  Abdomen -BS+, Soft, NTND  Extremities - No C/C/E, No calf tenderness   Neurologic Exam - aox3, dysarthric, naranjo 5/5       RECENT LABS:                          11.3   8.82  )-----------( 427      ( 27 Feb 2023 07:50 )             37.3     02-27    140  |  102  |  17  ----------------------------<  85  3.6   |  30  |  0.66    Ca    8.3<L>      27 Feb 2023 07:50  Phos  3.5     02-26  Mg     2.0     02-26    TPro  6.7  /  Alb  2.7<L>  /  TBili  0.5  /  DBili  x   /  AST  25  /  ALT  32  /  AlkPhos  74  02-27    LIVER FUNCTIONS - ( 27 Feb 2023 07:50 )  Alb: 2.7 g/dL / Pro: 6.7 g/dL / ALK PHOS: 74 U/L / ALT: 32 U/L / AST: 25 U/L / GGT: x               MEDICATIONS  (STANDING):  apixaban 5 milliGRAM(s) Oral every 12 hours  aspirin  chewable 81 milliGRAM(s) Oral daily  atorvastatin 80 milliGRAM(s) Oral at bedtime  bisacodyl Suppository 10 milliGRAM(s) Rectal once  colchicine 0.6 milliGRAM(s) Oral two times a day  diltiazem    milliGRAM(s) Oral daily  levothyroxine 50 MICROGram(s) Oral daily  memantine 5 milliGRAM(s) Oral daily  metoprolol tartrate 50 milliGRAM(s) Oral two times a day  mirtazapine 7.5 milliGRAM(s) Oral at bedtime  pantoprazole    Tablet 40 milliGRAM(s) Oral before breakfast  polyethylene glycol 3350 17 Gram(s) Oral daily  predniSONE   Tablet 40 milliGRAM(s) Oral daily  saline laxative (FLEET) Rectal Enema 1 Enema Rectal once  senna 2 Tablet(s) Oral at bedtime    MEDICATIONS  (PRN):  benzocaine/menthol Lozenge 1 Lozenge Oral three times a day PRN Sore Throat  ondansetron   Disintegrating Tablet 4 milliGRAM(s) Oral every 6 hours PRN Nausea and/or Vomiting

## 2023-03-02 NOTE — PROGRESS NOTE ADULT - ASSESSMENT
Ms. Keller is a 72yr old female with PMH hypothyroidism, cerebral aneurysm s/p coiling,  pericarditis possibly related to non-covid coronaviral illness (recently admitted 1/30/23-2/1/23), afib RVR presents with similar symptoms of left sided chest pain on 2/16/23 at [facility]. Stroke code was called on 2/22/23 where patient found to be aphasic and on imaging, CTA head showed nonhemorrhagic acute ischemia in the left MCA territory. Patient is currently on an acute prednisone taper for pericarditis and is on Eliquis and cardizem for Afib management. Admitted for multidisciplinary rehab program.     #Left MCA infarct with residual dysarthria, Gait, ADL, Functional impairments  - c/w eliquis 5mg PO BID   - c/w ASA 81mg PO QD   - c/w atorvastatin 80 mg PO @bedtime   - c/w namenda 5mg PO QD   -Continue comprehensive Multidisciplinary Rehab Program PT/OT/ SLP     #Atrial fibrillation with RVR  -c/w cardizem  30mg PO Q6 and metoprolol 50mg PO BID     - c/w eliquis   -Episode of tachycardia 2/28 in am - most likley due to poor PO intake  -IVF bolus given x 1 for dehydration     #Acute pericarditis with pericardial effusion without tamponade physiology   -c/w prednisone 40mg PO QD until 3/2  - prednisone taper-> decrease 10mg a week over 4 weeks   40mg x 1week (last dose on 3/2)  30mg x 1week (last dose on 3/9)  10mg x 1week (last dose on 3/23)  -c/w colchicine 0.6mg PO BID  -Encourage incentive spirometery for pleuritic pain on right -improving     #Hypothyroidism   - c/w synthroid 50mcg PO QD     #Mood / Cognition   - Neuropsych evaluation   -Continue Mirtazipine 7.5mg at bedtime for mood, sleep and appetite     #GI/Bowel/abdominal discomfort  - c/w zofran PRN  - Senna changed to standing  -Start miralax  - GI ppx: pantoprazole 40mg PO QD   -KUB shows increased stool burden  -Lactulose and enema 2/28 and 3/1 - with good BM    #/Bladder:  - Monitor PVR if no void in 8h; SC for >400 cc  - Toileting schedule q4h    #Diet / Dysphagia:    - Diet: DASH/TLC  - ongoing SLP assessment  - Nutrition to follow    #Skin/ Pressure Injury Prevention:  - assessment on admission   - Incisions: none  - Turn Q2hrs in bed while awake, OOB to Chair, PT/OT/SLP     #DVT prophylaxis:  - c/w Eliquis 5mg PO BID   - Last doppler on 1/30/23 - No evidence of DVT - repeat pending due to LE discomfort -negative 2/27    #Precautions/ Restrictions  - Falls, Cardiac, Seizures   - Lungs: Aspiration, Incentive Spirometer    - COVID PCR: Neg 2/24    --------------------------------------------  Outpatient Follow up:    Gavino Yun; MPH)  Cardiology; Internal Medicine  [address] [place] [address]  Phone: [phone]  Fax: [phone]  Follow Up Time:     Ethan Barnes)  Neurology; Vascular Neurology  [address] [place] [address] A  [place], [place] [id number]  Phone: [phone]  Fax: [phone]  Follow Up Time:

## 2023-03-03 LAB
ANION GAP SERPL CALC-SCNC: 9 MMOL/L — SIGNIFICANT CHANGE UP (ref 5–17)
BUN SERPL-MCNC: 15 MG/DL — SIGNIFICANT CHANGE UP (ref 7–23)
CALCIUM SERPL-MCNC: 9.3 MG/DL — SIGNIFICANT CHANGE UP (ref 8.4–10.5)
CHLORIDE SERPL-SCNC: 106 MMOL/L — SIGNIFICANT CHANGE UP (ref 96–108)
CO2 SERPL-SCNC: 27 MMOL/L — SIGNIFICANT CHANGE UP (ref 22–31)
CREAT SERPL-MCNC: 0.69 MG/DL — SIGNIFICANT CHANGE UP (ref 0.5–1.3)
EGFR: 92 ML/MIN/1.73M2 — SIGNIFICANT CHANGE UP
GLUCOSE SERPL-MCNC: 92 MG/DL — SIGNIFICANT CHANGE UP (ref 70–99)
MAGNESIUM SERPL-MCNC: 1.3 MG/DL — LOW (ref 1.6–2.6)
POTASSIUM SERPL-MCNC: 4.3 MMOL/L — SIGNIFICANT CHANGE UP (ref 3.5–5.3)
POTASSIUM SERPL-SCNC: 4.3 MMOL/L — SIGNIFICANT CHANGE UP (ref 3.5–5.3)
SODIUM SERPL-SCNC: 142 MMOL/L — SIGNIFICANT CHANGE UP (ref 135–145)

## 2023-03-03 PROCEDURE — 99232 SBSQ HOSP IP/OBS MODERATE 35: CPT

## 2023-03-03 RX ORDER — MAGNESIUM SULFATE 500 MG/ML
2 VIAL (ML) INJECTION ONCE
Refills: 0 | Status: COMPLETED | OUTPATIENT
Start: 2023-03-03 | End: 2023-03-03

## 2023-03-03 RX ADMIN — Medication 10 MILLIGRAM(S): at 15:17

## 2023-03-03 RX ADMIN — Medication 25 GRAM(S): at 09:22

## 2023-03-03 RX ADMIN — MAGNESIUM OXIDE 400 MG ORAL TABLET 400 MILLIGRAM(S): 241.3 TABLET ORAL at 08:54

## 2023-03-03 RX ADMIN — APIXABAN 5 MILLIGRAM(S): 2.5 TABLET, FILM COATED ORAL at 17:14

## 2023-03-03 RX ADMIN — Medication 40 MILLIGRAM(S): at 05:14

## 2023-03-03 RX ADMIN — Medication 0.6 MILLIGRAM(S): at 17:15

## 2023-03-03 RX ADMIN — Medication 1 ENEMA: at 18:53

## 2023-03-03 RX ADMIN — Medication 0.6 MILLIGRAM(S): at 05:15

## 2023-03-03 RX ADMIN — MIRTAZAPINE 7.5 MILLIGRAM(S): 45 TABLET, ORALLY DISINTEGRATING ORAL at 21:23

## 2023-03-03 RX ADMIN — ATORVASTATIN CALCIUM 80 MILLIGRAM(S): 80 TABLET, FILM COATED ORAL at 21:23

## 2023-03-03 RX ADMIN — Medication 50 MILLIGRAM(S): at 17:15

## 2023-03-03 RX ADMIN — PANTOPRAZOLE SODIUM 40 MILLIGRAM(S): 20 TABLET, DELAYED RELEASE ORAL at 05:15

## 2023-03-03 RX ADMIN — Medication 50 MICROGRAM(S): at 05:14

## 2023-03-03 RX ADMIN — POLYETHYLENE GLYCOL 3350 17 GRAM(S): 17 POWDER, FOR SOLUTION ORAL at 12:14

## 2023-03-03 RX ADMIN — APIXABAN 5 MILLIGRAM(S): 2.5 TABLET, FILM COATED ORAL at 05:15

## 2023-03-03 RX ADMIN — MAGNESIUM OXIDE 400 MG ORAL TABLET 400 MILLIGRAM(S): 241.3 TABLET ORAL at 12:18

## 2023-03-03 RX ADMIN — Medication 81 MILLIGRAM(S): at 12:15

## 2023-03-03 RX ADMIN — MEMANTINE HYDROCHLORIDE 5 MILLIGRAM(S): 10 TABLET ORAL at 12:15

## 2023-03-03 RX ADMIN — MAGNESIUM OXIDE 400 MG ORAL TABLET 400 MILLIGRAM(S): 241.3 TABLET ORAL at 17:15

## 2023-03-03 RX ADMIN — Medication 50 MILLIGRAM(S): at 05:15

## 2023-03-03 NOTE — PROGRESS NOTE ADULT - SUBJECTIVE AND OBJECTIVE BOX
Medicine Progress Note    Patient is a 73y old  Female who presents with a chief complaint of CVA (03 Mar 2023 11:52)      SUBJECTIVE / OVERNIGHT EVENTS:  seen and examined early am  Chart reviewed  No overnight events  Limb weakness improving with therapy  BM*3 since last night. loose stool. none since afternoon  No pain  No complaints    ADDITIONAL REVIEW OF SYSTEMS:  denied fever/chills/CP/SOB/cough/palpitation/dizziness/abdominal pian/nausea/vomiting/diarrhoea/constipation/dysuria/leg or calf pain/headaches.all other ROS neg    MEDICATIONS  (STANDING):  apixaban 5 milliGRAM(s) Oral every 12 hours  aspirin  chewable 81 milliGRAM(s) Oral daily  atorvastatin 80 milliGRAM(s) Oral at bedtime  colchicine 0.6 milliGRAM(s) Oral two times a day  diltiazem    milliGRAM(s) Oral daily  levothyroxine 50 MICROGram(s) Oral daily  magnesium oxide 400 milliGRAM(s) Oral three times a day with meals  memantine 5 milliGRAM(s) Oral daily  metoprolol tartrate 50 milliGRAM(s) Oral two times a day  mirtazapine 7.5 milliGRAM(s) Oral at bedtime  pantoprazole    Tablet 40 milliGRAM(s) Oral before breakfast  polyethylene glycol 3350 17 Gram(s) Oral daily  predniSONE   Tablet 30 milliGRAM(s) Oral daily  saline laxative (FLEET) Rectal Enema 1 Enema Rectal once  senna 2 Tablet(s) Oral at bedtime    MEDICATIONS  (PRN):  benzocaine/menthol Lozenge 1 Lozenge Oral three times a day PRN Sore Throat  ondansetron   Disintegrating Tablet 4 milliGRAM(s) Oral every 6 hours PRN Nausea and/or Vomiting    CAPILLARY BLOOD GLUCOSE        I&O's Summary      PHYSICAL EXAM:  Vital Signs Last 24 Hrs  T(C): 36.7 (03 Mar 2023 07:56), Max: 36.7 (03 Mar 2023 07:56)  T(F): 98 (03 Mar 2023 07:56), Max: 98 (03 Mar 2023 07:56)  HR: 80 (03 Mar 2023 07:56) (80 - 81)  BP: 111/74 (03 Mar 2023 07:56) (100/70 - 111/74)  BP(mean): --  RR: 15 (03 Mar 2023 07:56) (15 - 15)  SpO2: 92% (03 Mar 2023 07:56) (92% - 96%)    Parameters below as of 03 Mar 2023 07:56  Patient On (Oxygen Delivery Method): room air      GENERAL: Not in distress. Alert    HEENT: clear conjuctiva, MMM. no pallor or icterus  CARDIOVASCULAR: irregular S1, S2. No murmur/rubs/gallop  LUNGS: BLAE+, no rales, no wheezing, no rhonchi.    ABDOMEN: ND. Soft,  NT, no guarding / rebound / rigidity. BS normoactive  BACK: No spine tenderness.  EXTREMITIES: no edema. no leg or calf TP.  SKIN: warm and dry  PSYCHIATRIC: Calm.  No agitation.  CNS:AAO*3. moving limbs    LABS:                        12.2   7.07  )-----------( 407      ( 02 Mar 2023 09:35 )             39.2     03-03    142  |  106  |  15  ----------------------------<  92  4.3   |  27  |  0.69    Ca    9.3      03 Mar 2023 06:30  Mg     1.3     03-03    TPro  6.9  /  Alb  2.8<L>  /  TBili  0.4  /  DBili  x   /  AST  21  /  ALT  28  /  AlkPhos  80  03-02              COVID-19 PCR: NotDetec (26 Feb 2023 17:49)  COVID-19 PCR: NotDetec (24 Feb 2023 13:00)  SARS-CoV-2: NotDetec (16 Feb 2023 05:40)  SARS-CoV-2: NotDetec (30 Jan 2023 00:30)      RADIOLOGY & ADDITIONAL TESTS:  Imaging from Last 24 Hours:    Electrocardiogram/QTc Interval:    COORDINATION OF CARE:  Care Discussed with Consultants/Other Providers:

## 2023-03-03 NOTE — PROGRESS NOTE ADULT - ASSESSMENT
72 year old F PMH hypothyroidism, cerebral aneurysm s/p coiling, pericarditis possibly related to non-covid coronaviral illness (recently admitted 1/30/23-2/1/23), afib RVR presented to [facility] on 2/16 for afib with RVR, stroke called on 2/22 and found to have nonhemorrhagic acute ischemia in the left MCA territory now admitted to  rehab for ADL impairment.    #Left MCA infarct   - continue with eliquis 5mg PO BID and ASA 81mg PO QD   - not TKN candidate due to out of window, could not receive thrombectomy as lesion was too far to intervene   - continue atorvastatin 80mg qhs  - continue comprehensive rehab program  - fall, aspiration precaution    #abdominal pain likely from constipation - resolved  - possible ulcer vs IBS related issue given symptoms  - AXR reviewed    #Atrial fibrillation - stable  - given 1NS 2/28  - c/w metoprolol tartrate 50mg BID   - on long acting Cardizem CD. off short acting cardizem.   - continue with eliquis     #Acute pericarditis with pericardial effusion without tamponade physiology   - continue with prednisone 40mg QD until 3/2  - prednisone taper-> decrease 10mg a week over 4 weeks   - continue with colchicine 0.6mg BID  - cardio f/u post-dc    #Hypothyroidism   - continue synthroid 50mcg QD     # HypoK  - improved    # hypomagnesemia  - Mg 1.3 despite PO mg  - IV mg 2 gm once  - c/w PO Mg  - repeat mg in am    # diarrhoea  - improved  - on laxatives for constipation  - monitor electrolytes closely    #DVT prophylaxis:  - on eliquis  - Last doppler on 1/30/23 - No evidence of DVT     d/w Deepika SWAIN and dr. james at [facility]

## 2023-03-03 NOTE — PROGRESS NOTE ADULT - ASSESSMENT
Ms. Keller is a 72yr old female with PMH hypothyroidism, cerebral aneurysm s/p coiling,  pericarditis possibly related to non-covid coronaviral illness (recently admitted 1/30/23-2/1/23), afib RVR presents with similar symptoms of left sided chest pain on 2/16/23 at [facility]. Stroke code was called on 2/22/23 where patient found to be aphasic and on imaging, CTA head showed nonhemorrhagic acute ischemia in the left MCA territory. Patient is currently on an acute prednisone taper for pericarditis and is on Eliquis and cardizem for Afib management. Admitted for multidisciplinary rehab program.     #Left MCA infarct with residual dysarthria, Gait, ADL, Functional impairments  - c/w eliquis 5mg PO BID   - c/w ASA 81mg PO QD   - c/w atorvastatin 80 mg PO @bedtime   - c/w namenda 5mg PO QD   -Continue comprehensive Multidisciplinary Rehab Program PT/OT/ SLP     #Atrial fibrillation with RVR  -c/w cardizem 120mg ER  -metoprolol 50mg PO BID     - c/w eliquis   -Episode of tachycardia 2/28 in am - most likely due to poor PO intake -no further episodes   -IVF bolus given x 1 for dehydration     #Acute pericarditis with pericardial effusion without tamponade physiology   -c/w prednisone 30mg PO QD until 3/9  - prednisone taper-> decrease 10mg a week over 4 weeks   40mg x 1week (last dose on 3/2)  30mg x 1week (last dose on 3/9)  10mg x 1week (last dose on 3/23)  -c/w colchicine 0.6mg PO BID  -Encourage incentive spirometery for pleuritic pain on right -improving     #Hypothyroidism   - c/w synthroid 50mcg PO QD     #Mood / Cognition   - Neuropsych evaluation   -Continue Mirtazipine 7.5mg at bedtime for mood, sleep and appetite     #GI/Bowel/abdominal discomfort  - c/w zofran PRN  - Senna changed to standing  -Continue miralax  - GI ppx: pantoprazole 40mg PO QD   -KUB shows increased stool burden  -Lactulose and enema 2/28 and 3/1 - with good BM    #/Bladder:  - Monitor PVR if no void in 8h; SC for >400 cc  - Toileting schedule q4h    #Diet / Dysphagia:    - Diet: DASH/TLC  - ongoing SLP assessment  - Nutrition to follow    #Skin/ Pressure Injury Prevention:  - assessment on admission   - Incisions: none  - Turn Q2hrs in bed while awake, OOB to Chair, PT/OT/SLP     #DVT prophylaxis:  - c/w Eliquis 5mg PO BID   - Last doppler on 1/30/23 - No evidence of DVT - repeat pending due to LE discomfort -negative 2/27    #Precautions/ Restrictions  - Falls, Cardiac, Seizures   - Lungs: Aspiration, Incentive Spirometer    - COVID PCR: Neg 2/24    --------------------------------------------  Outpatient Follow up:    Gavino Yun; MPH)  Cardiology; Internal Medicine  [address] [place] [address]  Phone: [phone]  Fax: [phone]  Follow Up Time:     Ethan Barnes)  Neurology; Vascular Neurology  [address] [place] [address], [facility] A  [place], [place] [id number]  Phone: [phone]  Fax: [phone]  Follow Up Time:

## 2023-03-03 NOTE — PROGRESS NOTE ADULT - NS ATTEND AMEND GEN_ALL_CORE FT
I have personally seen and examined the patient with the NP. Medical records reviewed. I have made amendments to the documentation where necessary and adjusted the history, physical examination, and plan as documented by the NP.    CVA ppx - ASA, Apixaban, statin  Tachycardia resolved - no recurrent symptoms  Constipation - bowel regimen enhanced -  large BM today  Spoke with son - detailed update given, all questions answered  Hospitalist is following, case discussed, notes reviewed.  Labs - reviewed   Discharge plan discussed with SW, team.

## 2023-03-03 NOTE — PROGRESS NOTE ADULT - SUBJECTIVE AND OBJECTIVE BOX
SUBJECTIVE/ROS: She was evaluated while in the chair. She states she is feeling better today and slept well last night. She denies chest pain, fever, chills, nausea, vomiting, headache, or BLE pain.     HPI:  Ms. Garza is a 72yr old female with PMH hypothyroidism, cerebral aneurysm s/p coiling,  pericarditis possibly related non-covid coronaviral illness (recently admitted 1/30/23-2/1/23), afib RVR presents with similar symptoms of left sided chest pain on 2/16/23. In ER, she was found to be in Afib RVR. Stroke code was called on 2/22/23 where patient found to be aphasic and on imaging, CTA head showed nonhemorrhagic acute ischemia in the left MCA territory. Patient did not qualify for TNK due to last known sx was well over 4.5 hours. Patient was not a thrombectomy candidate as pt's thrombus is in M3 branch which is too distal to intervene on. Patient is currently on an acute prednisone taper for pericarditis and is on Eliquis and cardizem for Afib management.       Patient was evaluated by PM&R and therapy for functional deficits and gait/ ADL impairments and recommended acute rehabilitation. Patient was medically optimized for discharge to [facility] on 2/26/23      Vital Signs Last 24 Hrs  T(C): 36.7 (03 Mar 2023 07:56), Max: 36.7 (03 Mar 2023 07:56)  T(F): 98 (03 Mar 2023 07:56), Max: 98 (03 Mar 2023 07:56)  HR: 80 (03 Mar 2023 07:56) (80 - 81)  BP: 111/74 (03 Mar 2023 07:56) (100/70 - 111/74)  BP(mean): --  RR: 15 (03 Mar 2023 07:56) (15 - 15)  SpO2: 92% (03 Mar 2023 07:56) (92% - 96%)    Parameters below as of 03 Mar 2023 07:56  Patient On (Oxygen Delivery Method): room air              PHYSICAL EXAM  Constitutional - NAD, Comfortable  HEENT - NCAT, EOMI  Neck - Supple, No limited ROM  Chest - CTA bilaterally  Cardiovascular - RRR, S1S2  Abdomen -BS+, Soft, NTND  Extremities - No C/C/E, No calf tenderness   Neurologic Exam - aox3, dysarthric, naranjo 5/5       RECENT LABS:                          11.3   8.82  )-----------( 427      ( 27 Feb 2023 07:50 )             37.3     02-27    140  |  102  |  17  ----------------------------<  85  3.6   |  30  |  0.66    Ca    8.3<L>      27 Feb 2023 07:50  Phos  3.5     02-26  Mg     2.0     02-26    TPro  6.7  /  Alb  2.7<L>  /  TBili  0.5  /  DBili  x   /  AST  25  /  ALT  32  /  AlkPhos  74  02-27    LIVER FUNCTIONS - ( 27 Feb 2023 07:50 )  Alb: 2.7 g/dL / Pro: 6.7 g/dL / ALK PHOS: 74 U/L / ALT: 32 U/L / AST: 25 U/L / GGT: x               MEDICATIONS  (STANDING):  apixaban 5 milliGRAM(s) Oral every 12 hours  aspirin  chewable 81 milliGRAM(s) Oral daily  atorvastatin 80 milliGRAM(s) Oral at bedtime  bisacodyl Suppository 10 milliGRAM(s) Rectal once  colchicine 0.6 milliGRAM(s) Oral two times a day  diltiazem    milliGRAM(s) Oral daily  levothyroxine 50 MICROGram(s) Oral daily  memantine 5 milliGRAM(s) Oral daily  metoprolol tartrate 50 milliGRAM(s) Oral two times a day  mirtazapine 7.5 milliGRAM(s) Oral at bedtime  pantoprazole    Tablet 40 milliGRAM(s) Oral before breakfast  polyethylene glycol 3350 17 Gram(s) Oral daily  predniSONE   Tablet 40 milliGRAM(s) Oral daily  saline laxative (FLEET) Rectal Enema 1 Enema Rectal once  senna 2 Tablet(s) Oral at bedtime    MEDICATIONS  (PRN):  benzocaine/menthol Lozenge 1 Lozenge Oral three times a day PRN Sore Throat  ondansetron   Disintegrating Tablet 4 milliGRAM(s) Oral every 6 hours PRN Nausea and/or Vomiting

## 2023-03-04 LAB — MAGNESIUM SERPL-MCNC: 1.8 MG/DL — SIGNIFICANT CHANGE UP (ref 1.6–2.6)

## 2023-03-04 PROCEDURE — 99232 SBSQ HOSP IP/OBS MODERATE 35: CPT

## 2023-03-04 RX ADMIN — POLYETHYLENE GLYCOL 3350 17 GRAM(S): 17 POWDER, FOR SOLUTION ORAL at 11:23

## 2023-03-04 RX ADMIN — Medication 50 MILLIGRAM(S): at 06:26

## 2023-03-04 RX ADMIN — APIXABAN 5 MILLIGRAM(S): 2.5 TABLET, FILM COATED ORAL at 06:26

## 2023-03-04 RX ADMIN — Medication 0.6 MILLIGRAM(S): at 17:21

## 2023-03-04 RX ADMIN — PANTOPRAZOLE SODIUM 40 MILLIGRAM(S): 20 TABLET, DELAYED RELEASE ORAL at 06:26

## 2023-03-04 RX ADMIN — APIXABAN 5 MILLIGRAM(S): 2.5 TABLET, FILM COATED ORAL at 17:18

## 2023-03-04 RX ADMIN — Medication 30 MILLIGRAM(S): at 06:26

## 2023-03-04 RX ADMIN — Medication 50 MILLIGRAM(S): at 17:18

## 2023-03-04 RX ADMIN — Medication 50 MICROGRAM(S): at 06:26

## 2023-03-04 RX ADMIN — Medication 0.6 MILLIGRAM(S): at 06:26

## 2023-03-04 RX ADMIN — MAGNESIUM OXIDE 400 MG ORAL TABLET 400 MILLIGRAM(S): 241.3 TABLET ORAL at 09:54

## 2023-03-04 RX ADMIN — MEMANTINE HYDROCHLORIDE 5 MILLIGRAM(S): 10 TABLET ORAL at 11:23

## 2023-03-04 RX ADMIN — SENNA PLUS 2 TABLET(S): 8.6 TABLET ORAL at 21:15

## 2023-03-04 RX ADMIN — MIRTAZAPINE 7.5 MILLIGRAM(S): 45 TABLET, ORALLY DISINTEGRATING ORAL at 21:15

## 2023-03-04 RX ADMIN — ATORVASTATIN CALCIUM 80 MILLIGRAM(S): 80 TABLET, FILM COATED ORAL at 21:15

## 2023-03-04 RX ADMIN — Medication 81 MILLIGRAM(S): at 11:23

## 2023-03-04 NOTE — PROGRESS NOTE ADULT - ASSESSMENT
72 year old F PMH hypothyroidism, cerebral aneurysm s/p coiling, pericarditis possibly related to non-covid coronaviral illness (recently admitted 1/30/23-2/1/23), afib RVR presented to [facility] on 2/16 for afib with RVR, stroke called on 2/22 and found to have nonhemorrhagic acute ischemia in the left MCA territory now admitted to  rehab for ADL impairment.    #Left MCA infarct   - continue with eliquis 5mg PO BID and ASA 81mg PO QD   - not TKN candidate due to out of window, could not receive thrombectomy as lesion was too far to intervene   - continue atorvastatin 80mg qhs  - continue comprehensive rehab program  - fall, aspiration precaution    #abdominal pain likely from constipation - resolved    #Atrial fibrillation - stable  - given 1NS 2/28  - c/w metoprolol tartrate 50mg BID   - on long acting Cardizem CD.   - continue with eliquis     #Acute pericarditis with pericardial effusion without tamponade physiology   - continue with prednisone 40mg QD until 3/2  - prednisone taper-> decrease 10mg a week over 4 weeks   - continue with colchicine 0.6mg BID  - cardio f/u post-dc    #Hypothyroidism   - continue synthroid 50mcg QD     # HypoK  - improved    # hypomagnesemia  - repleted.   - Mg level wnl now    # diarrhea   - improved    #DVT prophylaxis:  - on eliquis  - Last doppler on 1/30/23 - No evidence of DVT

## 2023-03-04 NOTE — PROGRESS NOTE ADULT - SUBJECTIVE AND OBJECTIVE BOX
Chief complaint: no new complaints     Patient is a 73y old  Female who presents with a chief complaint of CVA (03 Mar 2023 16:29)    PAST MEDICAL & SURGICAL HISTORY:  High cholesterol      Hypothyroidism      H/O knee surgery      Brain aneurysm        VITALS  Vital Signs Last 24 Hrs  T(C): 36.7 (04 Mar 2023 07:41), Max: 36.7 (04 Mar 2023 07:41)  T(F): 98.1 (04 Mar 2023 07:41), Max: 98.1 (04 Mar 2023 07:41)  HR: 75 (04 Mar 2023 07:41) (72 - 80)  BP: 113/75 (04 Mar 2023 07:41) (109/69 - 113/75)  BP(mean): --  RR: 16 (04 Mar 2023 07:41) (16 - 16)  SpO2: 96% (04 Mar 2023 07:41) (96% - 96%)    Parameters below as of 04 Mar 2023 07:41  Patient On (Oxygen Delivery Method): room air          PHYSICAL EXAM  Constitutional - NAD, Comfortable  HEENT - NCAT, EOMI  Neck - Supple, No limited ROM  Chest - CTA bilaterally  Cardiovascular - RRR, S1S2  Abdomen -  Soft, NTND  Extremities - No C/C/E, No calf tenderness   Neurologic Exam -                    Cognitive - Awake, Alert, AAO X3     No new focal deficits                   RECENT LABS    03-03    142  |  106  |  15  ----------------------------<  92  4.3   |  27  |  0.69    Ca    9.3      03 Mar 2023 06:30  Mg     1.8     03-04    TPro  6.9  /  Alb  2.8<L>  /  TBili  0.4  /  DBili  x   /  AST  21  /  ALT  28  /  AlkPhos  80  03-02            CURRENT MEDICATIONS    MEDICATIONS  (STANDING):  apixaban 5 milliGRAM(s) Oral every 12 hours  aspirin  chewable 81 milliGRAM(s) Oral daily  atorvastatin 80 milliGRAM(s) Oral at bedtime  colchicine 0.6 milliGRAM(s) Oral two times a day  diltiazem    milliGRAM(s) Oral daily  levothyroxine 50 MICROGram(s) Oral daily  magnesium oxide 400 milliGRAM(s) Oral three times a day with meals  memantine 5 milliGRAM(s) Oral daily  metoprolol tartrate 50 milliGRAM(s) Oral two times a day  mirtazapine 7.5 milliGRAM(s) Oral at bedtime  pantoprazole    Tablet 40 milliGRAM(s) Oral before breakfast  polyethylene glycol 3350 17 Gram(s) Oral daily  predniSONE   Tablet 30 milliGRAM(s) Oral daily  senna 2 Tablet(s) Oral at bedtime    MEDICATIONS  (PRN):  benzocaine/menthol Lozenge 1 Lozenge Oral three times a day PRN Sore Throat  ondansetron   Disintegrating Tablet 4 milliGRAM(s) Oral every 6 hours PRN Nausea and/or Vomiting    ASSESSMENT & PLAN          GI/Bowel Management - Dulcolax PRN, Fleet PRN   Management - Toilet Q2  Skin - Turn Q2  Pain - Tylenol PRN  DVT PPX - Apixaban  CVA ppx - Apixaban/ ASA/ Lipitor       Continue comprehensive acute rehab program consisting of 3hrs/day of OT/PT and SLP.

## 2023-03-04 NOTE — PROGRESS NOTE ADULT - SUBJECTIVE AND OBJECTIVE BOX
Patient is a 73y old  Female who presents with a chief complaint of CVA (04 Mar 2023 12:56)      Subjective and overnight events:  Patient seen and examined at bedside. no new complaints. no fever, chills, sob, cp, abd pain.     ALLERGIES:  No Known Allergies    MEDICATIONS  (STANDING):  apixaban 5 milliGRAM(s) Oral every 12 hours  aspirin  chewable 81 milliGRAM(s) Oral daily  atorvastatin 80 milliGRAM(s) Oral at bedtime  colchicine 0.6 milliGRAM(s) Oral two times a day  diltiazem    milliGRAM(s) Oral daily  levothyroxine 50 MICROGram(s) Oral daily  magnesium oxide 400 milliGRAM(s) Oral three times a day with meals  memantine 5 milliGRAM(s) Oral daily  metoprolol tartrate 50 milliGRAM(s) Oral two times a day  mirtazapine 7.5 milliGRAM(s) Oral at bedtime  pantoprazole    Tablet 40 milliGRAM(s) Oral before breakfast  polyethylene glycol 3350 17 Gram(s) Oral daily  predniSONE   Tablet 30 milliGRAM(s) Oral daily  senna 2 Tablet(s) Oral at bedtime    MEDICATIONS  (PRN):  benzocaine/menthol Lozenge 1 Lozenge Oral three times a day PRN Sore Throat  ondansetron   Disintegrating Tablet 4 milliGRAM(s) Oral every 6 hours PRN Nausea and/or Vomiting    Vital Signs Last 24 Hrs  T(F): 98.1 (04 Mar 2023 07:41), Max: 98.1 (04 Mar 2023 07:41)  HR: 75 (04 Mar 2023 07:41) (72 - 80)  BP: 113/75 (04 Mar 2023 07:41) (109/69 - 113/75)  RR: 16 (04 Mar 2023 07:41) (16 - 16)  SpO2: 96% (04 Mar 2023 07:41) (96% - 96%)  I&O's Summary    PHYSICAL EXAM:  General: NAD, Awake alert and answering questions   ENT: MMM  Neck: Supple, No JVD  Lungs: Clear to auscultation bilaterally  Cardio: RRR, S1/S2, No murmurs  Abdomen: Soft, Nontender, Nondistended; Bowel sounds present  Extremities: No calf tenderness, No pitting edema    LABS:                        12.2   7.07  )-----------( 407      ( 02 Mar 2023 09:35 )             39.2     03-03    142  |  106  |  15  ----------------------------<  92  4.3   |  27  |  0.69    Ca    9.3      03 Mar 2023 06:30  Mg     1.8     03-04    TPro  6.9  /  Alb  2.8  /  TBili  0.4  /  DBili  x   /  AST  21  /  ALT  28  /  AlkPhos  80  03-02 02-23 Chol 152 mg/dL LDL -- HDL 46 mg/dL Trig 146 mg/dL            COVID-19 PCR: NotDetec (02-26-23 @ 17:49)  COVID-19 PCR: NotDetec (02-24-23 @ 13:00)      RADIOLOGY & ADDITIONAL TESTS:    Care Discussed with Consultants/Other Providers:

## 2023-03-05 LAB
ALBUMIN SERPL ELPH-MCNC: <0.6 G/DL — LOW (ref 3.3–5)
ALP SERPL-CCNC: 86 U/L — SIGNIFICANT CHANGE UP (ref 40–120)
ALT FLD-CCNC: 23 U/L — SIGNIFICANT CHANGE UP (ref 10–45)
ANION GAP SERPL CALC-SCNC: 12 MMOL/L — SIGNIFICANT CHANGE UP (ref 5–17)
AST SERPL-CCNC: <5 U/L — LOW (ref 10–40)
BILIRUB SERPL-MCNC: 0.6 MG/DL — SIGNIFICANT CHANGE UP (ref 0.2–1.2)
BUN SERPL-MCNC: 9 MG/DL — SIGNIFICANT CHANGE UP (ref 7–23)
CALCIUM SERPL-MCNC: 8.9 MG/DL — SIGNIFICANT CHANGE UP (ref 8.4–10.5)
CHLORIDE SERPL-SCNC: 99 MMOL/L — SIGNIFICANT CHANGE UP (ref 96–108)
CO2 SERPL-SCNC: 23 MMOL/L — SIGNIFICANT CHANGE UP (ref 22–31)
CREAT SERPL-MCNC: 0.62 MG/DL — SIGNIFICANT CHANGE UP (ref 0.5–1.3)
EGFR: 94 ML/MIN/1.73M2 — SIGNIFICANT CHANGE UP
GLUCOSE SERPL-MCNC: 72 MG/DL — SIGNIFICANT CHANGE UP (ref 70–99)
HCT VFR BLD CALC: 44.1 % — SIGNIFICANT CHANGE UP (ref 34.5–45)
HGB BLD-MCNC: 13.7 G/DL — SIGNIFICANT CHANGE UP (ref 11.5–15.5)
MAGNESIUM SERPL-MCNC: 1.7 MG/DL — SIGNIFICANT CHANGE UP (ref 1.6–2.6)
MCHC RBC-ENTMCNC: 23 PG — LOW (ref 27–34)
MCHC RBC-ENTMCNC: 31.1 GM/DL — LOW (ref 32–36)
MCV RBC AUTO: 74 FL — LOW (ref 80–100)
NRBC # BLD: 0 /100 WBCS — SIGNIFICANT CHANGE UP (ref 0–0)
PLATELET # BLD AUTO: 383 K/UL — SIGNIFICANT CHANGE UP (ref 150–400)
POTASSIUM SERPL-MCNC: 4.7 MMOL/L — SIGNIFICANT CHANGE UP (ref 3.5–5.3)
POTASSIUM SERPL-SCNC: 4.7 MMOL/L — SIGNIFICANT CHANGE UP (ref 3.5–5.3)
PROT SERPL-MCNC: 7.2 G/DL — SIGNIFICANT CHANGE UP (ref 6–8.3)
RBC # BLD: 5.96 M/UL — HIGH (ref 3.8–5.2)
RBC # FLD: 17.6 % — HIGH (ref 10.3–14.5)
SODIUM SERPL-SCNC: 134 MMOL/L — LOW (ref 135–145)
TROPONIN I, HIGH SENSITIVITY RESULT: 5.3 NG/L — SIGNIFICANT CHANGE UP
TROPONIN I, HIGH SENSITIVITY RESULT: 5.6 NG/L — SIGNIFICANT CHANGE UP
WBC # BLD: 10.89 K/UL — HIGH (ref 3.8–10.5)
WBC # FLD AUTO: 10.89 K/UL — HIGH (ref 3.8–10.5)

## 2023-03-05 PROCEDURE — 99232 SBSQ HOSP IP/OBS MODERATE 35: CPT

## 2023-03-05 PROCEDURE — 71045 X-RAY EXAM CHEST 1 VIEW: CPT | Mod: 26

## 2023-03-05 PROCEDURE — 99233 SBSQ HOSP IP/OBS HIGH 50: CPT

## 2023-03-05 PROCEDURE — 93010 ELECTROCARDIOGRAM REPORT: CPT

## 2023-03-05 RX ORDER — ACETAMINOPHEN 500 MG
650 TABLET ORAL ONCE
Refills: 0 | Status: COMPLETED | OUTPATIENT
Start: 2023-03-05 | End: 2023-03-05

## 2023-03-05 RX ORDER — METOPROLOL TARTRATE 50 MG
25 TABLET ORAL
Refills: 0 | Status: DISCONTINUED | OUTPATIENT
Start: 2023-03-05 | End: 2023-03-07

## 2023-03-05 RX ADMIN — Medication 650 MILLIGRAM(S): at 03:04

## 2023-03-05 RX ADMIN — Medication 0.6 MILLIGRAM(S): at 06:10

## 2023-03-05 RX ADMIN — Medication 0.6 MILLIGRAM(S): at 17:30

## 2023-03-05 RX ADMIN — MIRTAZAPINE 7.5 MILLIGRAM(S): 45 TABLET, ORALLY DISINTEGRATING ORAL at 22:24

## 2023-03-05 RX ADMIN — MEMANTINE HYDROCHLORIDE 5 MILLIGRAM(S): 10 TABLET ORAL at 11:34

## 2023-03-05 RX ADMIN — Medication 25 MILLIGRAM(S): at 17:54

## 2023-03-05 RX ADMIN — Medication 81 MILLIGRAM(S): at 11:33

## 2023-03-05 RX ADMIN — SENNA PLUS 2 TABLET(S): 8.6 TABLET ORAL at 22:24

## 2023-03-05 RX ADMIN — ATORVASTATIN CALCIUM 80 MILLIGRAM(S): 80 TABLET, FILM COATED ORAL at 22:24

## 2023-03-05 RX ADMIN — Medication 50 MICROGRAM(S): at 06:11

## 2023-03-05 RX ADMIN — Medication 30 MILLIGRAM(S): at 06:10

## 2023-03-05 RX ADMIN — APIXABAN 5 MILLIGRAM(S): 2.5 TABLET, FILM COATED ORAL at 06:11

## 2023-03-05 RX ADMIN — PANTOPRAZOLE SODIUM 40 MILLIGRAM(S): 20 TABLET, DELAYED RELEASE ORAL at 06:11

## 2023-03-05 RX ADMIN — Medication 650 MILLIGRAM(S): at 02:04

## 2023-03-05 RX ADMIN — MAGNESIUM OXIDE 400 MG ORAL TABLET 400 MILLIGRAM(S): 241.3 TABLET ORAL at 08:31

## 2023-03-05 RX ADMIN — APIXABAN 5 MILLIGRAM(S): 2.5 TABLET, FILM COATED ORAL at 17:28

## 2023-03-05 NOTE — PROGRESS NOTE ADULT - ASSESSMENT
72 year old F PMH hypothyroidism, cerebral aneurysm s/p coiling, pericarditis possibly related to non-covid coronaviral illness (recently admitted 1/30/23-2/1/23), afib RVR presented to [facility] on 2/16 for afib with RVR, stroke called on 2/22 and found to have nonhemorrhagic acute ischemia in the left MCA territory now admitted to  rehab for ADL impairment.    #Left MCA infarct   - continue with eliquis 5mg PO BID and ASA 81mg PO QD   - not TKN candidate due to out of window, could not receive thrombectomy as lesion was too far to intervene   - continue atorvastatin 80mg qhs  - continue comprehensive rehab program  - fall, aspiration precaution    #Transient palpitations overnight  - pt clarified that she did not have CP  - EKG overnight showed NSR, improved T wave abnormality in anterolateral leads  - troponin negative  - check TSH    #abdominal pain likely from constipation - resolved    #Paroxysmal Atrial fibrillation - stable  - given 1NS 2/28  - BP borderline, decrease metoprolol tartrate to 25mg BID 3/5  - on long acting Cardizem CD.   - continue with eliquis     #Acute pericarditis with pericardial effusion without tamponade physiology   - continue with prednisone 40mg QD until 3/2  - prednisone taper-> decrease 10mg a week over 4 weeks   - continue with colchicine 0.6mg BID  - cardio f/u post-dc    #Hypothyroidism   - continue synthroid 50mcg QD     # HypoK  - improved    # hypomagnesemia  - repleted.   - Mg level wnl now    # diarrhea   - improved    #DVT prophylaxis:  - on eliquis  - Last doppler on 1/30/23 - No evidence of DVT      72 year old F PMH hypothyroidism, cerebral aneurysm s/p coiling, pericarditis possibly related to non-covid coronaviral illness (recently admitted 1/30/23-2/1/23), afib RVR presented to [facility] on 2/16 for afib with RVR, stroke called on 2/22 and found to have nonhemorrhagic acute ischemia in the left MCA territory now admitted to  rehab for ADL impairment.    #Left MCA infarct   - continue with eliquis 5mg PO BID and ASA 81mg PO QD   - not TKN candidate due to out of window, could not receive thrombectomy as lesion was too far to intervene   - continue atorvastatin 80mg qhs  - continue comprehensive rehab program  - fall, aspiration precaution    #Transient palpitations overnight  - pt clarified that she did not have CP  - EKG overnight showed NSR, improved T wave abnormality in anterolateral leads  - troponin negative  - check TSH, Mg  - BP borderline, reduce BP medications    #Paroxysmal Atrial fibrillation - stable  - given 1NS 2/28  - BP borderline, decrease metoprolol tartrate to 25mg BID 3/5 and discontinue cardizem. pt did not receive cardiem yestreday  - continue with eliquis     #Acute pericarditis with pericardial effusion without tamponade physiology  - prednisone taper-> decrease 10mg a week over 4 weeks   - continue with colchicine 0.6mg BID  - cardio f/u post-dc    #Hypothyroidism   - continue synthroid 50mcg QD     # HypoK  - improved    # hypomagnesemia  - repleted.     # diarrhea   - improved    #DVT prophylaxis:  - on eliquis  - Last doppler on 1/30/23 - No evidence of DVT

## 2023-03-05 NOTE — PROGRESS NOTE ADULT - SUBJECTIVE AND OBJECTIVE BOX
Chief complaint: no new complaints     Patient is a 73y old  Female who presents with a chief complaint of CVA (05 Mar 2023 15:29)  PAST MEDICAL & SURGICAL HISTORY:  High cholesterol      Hypothyroidism      H/O knee surgery      Brain aneurysm        VITALS  Vital Signs Last 24 Hrs  T(C): 36.9 (05 Mar 2023 08:30), Max: 36.9 (05 Mar 2023 08:30)  T(F): 98.5 (05 Mar 2023 08:30), Max: 98.5 (05 Mar 2023 08:30)  HR: 98 (05 Mar 2023 08:30) (69 - 99)  BP: 98/66 (05 Mar 2023 08:30) (97/66 - 123/69)  BP(mean): --  RR: 16 (05 Mar 2023 08:30) (16 - 16)  SpO2: 95% (05 Mar 2023 08:30) (93% - 95%)    Parameters below as of 05 Mar 2023 08:30  Patient On (Oxygen Delivery Method): room air          PHYSICAL EXAM  Constitutional - NAD, Comfortable  HEENT - NCAT, EOMI  Neck - Supple, No limited ROM  Chest - CTA bilaterally  Cardiovascular - RRR, S1S2  Abdomen - Soft, NTND  Extremities - No calf tenderness   Neurologic Exam -                    Cognitive - Awake, Alert, AAO X3     No new focal deficits                 RECENT LABS                        13.7   10.89 )-----------( 383      ( 05 Mar 2023 01:55 )             44.1     03-05    134<L>  |  99  |  9   ----------------------------<  72  4.7   |  23  |  0.62    Ca    8.9      05 Mar 2023 01:55  Mg     1.8     03-04    TPro  7.2  /  Alb  2.9<L>  /  TBili  0.6  /  DBili  x   /  AST  <5<L>  /  ALT  23  /  AlkPhos  86  03-05      CURRENT MEDICATIONS    MEDICATIONS  (STANDING):  apixaban 5 milliGRAM(s) Oral every 12 hours  aspirin  chewable 81 milliGRAM(s) Oral daily  atorvastatin 80 milliGRAM(s) Oral at bedtime  colchicine 0.6 milliGRAM(s) Oral two times a day  levothyroxine 50 MICROGram(s) Oral daily  magnesium oxide 400 milliGRAM(s) Oral three times a day with meals  memantine 5 milliGRAM(s) Oral daily  metoprolol tartrate 25 milliGRAM(s) Oral two times a day  mirtazapine 7.5 milliGRAM(s) Oral at bedtime  pantoprazole    Tablet 40 milliGRAM(s) Oral before breakfast  polyethylene glycol 3350 17 Gram(s) Oral daily  predniSONE   Tablet 30 milliGRAM(s) Oral daily  senna 2 Tablet(s) Oral at bedtime    MEDICATIONS  (PRN):  benzocaine/menthol Lozenge 1 Lozenge Oral three times a day PRN Sore Throat  ondansetron   Disintegrating Tablet 4 milliGRAM(s) Oral every 6 hours PRN Nausea and/or Vomiting    ASSESSMENT & PLAN      GI/Bowel Management - Dulcolax PRN, Fleet PRN   Management - Toilet Q2  Skin - Turn Q2  Pain - Tylenol PRN  DVT PPX - Apixaban  CVA ppx - Apixaban/ ASA/ Lipitor       Continue comprehensive acute rehab program consisting of 3hrs/day of OT/PT and SLP.

## 2023-03-05 NOTE — PROGRESS NOTE ADULT - SUBJECTIVE AND OBJECTIVE BOX
Patient is a 73y old  Female who presents with a chief complaint of CVA (04 Mar 2023 14:50)      Subjective and overnight events:  Patient seen and examined at bedside. pt was evaluated for chest pain overnight. pt clarified to me that she did not have chest pain, she had palpitations overnight which has resolved now. no SOB or CP. no fever, chills, cough, abd pain.  - EKG overnight showed NSR, improved T wave abnormality in anterolateral leads    ALLERGIES:  No Known Allergies    MEDICATIONS  (STANDING):  apixaban 5 milliGRAM(s) Oral every 12 hours  aspirin  chewable 81 milliGRAM(s) Oral daily  atorvastatin 80 milliGRAM(s) Oral at bedtime  colchicine 0.6 milliGRAM(s) Oral two times a day  diltiazem    milliGRAM(s) Oral daily  levothyroxine 50 MICROGram(s) Oral daily  magnesium oxide 400 milliGRAM(s) Oral three times a day with meals  memantine 5 milliGRAM(s) Oral daily  metoprolol tartrate 50 milliGRAM(s) Oral two times a day  mirtazapine 7.5 milliGRAM(s) Oral at bedtime  pantoprazole    Tablet 40 milliGRAM(s) Oral before breakfast  polyethylene glycol 3350 17 Gram(s) Oral daily  predniSONE   Tablet 30 milliGRAM(s) Oral daily  senna 2 Tablet(s) Oral at bedtime    MEDICATIONS  (PRN):  benzocaine/menthol Lozenge 1 Lozenge Oral three times a day PRN Sore Throat  ondansetron   Disintegrating Tablet 4 milliGRAM(s) Oral every 6 hours PRN Nausea and/or Vomiting    Vital Signs Last 24 Hrs  T(F): 98.5 (05 Mar 2023 08:30), Max: 98.5 (05 Mar 2023 08:30)  HR: 98 (05 Mar 2023 08:30) (69 - 99)  BP: 98/66 (05 Mar 2023 08:30) (97/66 - 123/69)  RR: 16 (05 Mar 2023 08:30) (16 - 16)  SpO2: 95% (05 Mar 2023 08:30) (93% - 95%)  I&O's Summary    PHYSICAL EXAM:  General: NAD, A/O x 3  ENT: MMM  Neck: Supple, No JVD  Lungs: Clear to auscultation bilaterally  Cardio: RRR, S1/S2, + murmurs  Abdomen: Soft, Nontender, Nondistended; Bowel sounds present  Extremities: No calf tenderness, No pitting edema    LABS:                        13.7   10.89 )-----------( 383      ( 05 Mar 2023 01:55 )             44.1     03-05    134  |  99  |  9   ----------------------------<  72  4.7   |  23  |  0.62    Ca    8.9      05 Mar 2023 01:55  Mg     1.8     03-04    TPro  7.2  /  Alb  2.9  /  TBili  0.6  /  DBili  x   /  AST  <5  /  ALT  23  /  AlkPhos  86  03-05          CARDIAC MARKERS ( 05 Mar 2023 01:55 )  x     / 5.6 ng/L / x     / x     / x          02-23 Chol 152 mg/dL LDL -- HDL 46 mg/dL Trig 146 mg/dL              COVID-19 PCR: NotDetec (02-26-23 @ 17:49)  COVID-19 PCR: NotDetec (02-24-23 @ 13:00)      RADIOLOGY & ADDITIONAL TESTS:    Care Discussed with Consultants/Other Providers:

## 2023-03-06 ENCOUNTER — APPOINTMENT (OUTPATIENT)
Dept: PULMONOLOGY | Facility: CLINIC | Age: 73
End: 2023-03-06

## 2023-03-06 ENCOUNTER — TRANSCRIPTION ENCOUNTER (OUTPATIENT)
Age: 73
End: 2023-03-06

## 2023-03-06 LAB
ALBUMIN SERPL ELPH-MCNC: 2.5 G/DL — LOW (ref 3.3–5)
ALP SERPL-CCNC: 80 U/L — SIGNIFICANT CHANGE UP (ref 40–120)
ALT FLD-CCNC: 19 U/L — SIGNIFICANT CHANGE UP (ref 10–45)
ANION GAP SERPL CALC-SCNC: 12 MMOL/L — SIGNIFICANT CHANGE UP (ref 5–17)
AST SERPL-CCNC: 12 U/L — SIGNIFICANT CHANGE UP (ref 10–40)
BASOPHILS # BLD AUTO: 0.01 K/UL — SIGNIFICANT CHANGE UP (ref 0–0.2)
BASOPHILS NFR BLD AUTO: 0.1 % — SIGNIFICANT CHANGE UP (ref 0–2)
BILIRUB SERPL-MCNC: 0.5 MG/DL — SIGNIFICANT CHANGE UP (ref 0.2–1.2)
BUN SERPL-MCNC: 12 MG/DL — SIGNIFICANT CHANGE UP (ref 7–23)
CALCIUM SERPL-MCNC: 8.6 MG/DL — SIGNIFICANT CHANGE UP (ref 8.4–10.5)
CHLORIDE SERPL-SCNC: 101 MMOL/L — SIGNIFICANT CHANGE UP (ref 96–108)
CO2 SERPL-SCNC: 25 MMOL/L — SIGNIFICANT CHANGE UP (ref 22–31)
CREAT SERPL-MCNC: 0.71 MG/DL — SIGNIFICANT CHANGE UP (ref 0.5–1.3)
EGFR: 91 ML/MIN/1.73M2 — SIGNIFICANT CHANGE UP
EOSINOPHIL # BLD AUTO: 0.02 K/UL — SIGNIFICANT CHANGE UP (ref 0–0.5)
EOSINOPHIL NFR BLD AUTO: 0.2 % — SIGNIFICANT CHANGE UP (ref 0–6)
GLUCOSE SERPL-MCNC: 127 MG/DL — HIGH (ref 70–99)
HCT VFR BLD CALC: 40 % — SIGNIFICANT CHANGE UP (ref 34.5–45)
HGB BLD-MCNC: 12.4 G/DL — SIGNIFICANT CHANGE UP (ref 11.5–15.5)
IMM GRANULOCYTES NFR BLD AUTO: 0.6 % — SIGNIFICANT CHANGE UP (ref 0–0.9)
LYMPHOCYTES # BLD AUTO: 1.15 K/UL — SIGNIFICANT CHANGE UP (ref 1–3.3)
LYMPHOCYTES # BLD AUTO: 11.9 % — LOW (ref 13–44)
MCHC RBC-ENTMCNC: 22.8 PG — LOW (ref 27–34)
MCHC RBC-ENTMCNC: 31 GM/DL — LOW (ref 32–36)
MCV RBC AUTO: 73.5 FL — LOW (ref 80–100)
MONOCYTES # BLD AUTO: 0.8 K/UL — SIGNIFICANT CHANGE UP (ref 0–0.9)
MONOCYTES NFR BLD AUTO: 8.3 % — SIGNIFICANT CHANGE UP (ref 2–14)
NEUTROPHILS # BLD AUTO: 7.62 K/UL — HIGH (ref 1.8–7.4)
NEUTROPHILS NFR BLD AUTO: 78.9 % — HIGH (ref 43–77)
NRBC # BLD: 0 /100 WBCS — SIGNIFICANT CHANGE UP (ref 0–0)
PLATELET # BLD AUTO: 310 K/UL — SIGNIFICANT CHANGE UP (ref 150–400)
POTASSIUM SERPL-MCNC: 4.1 MMOL/L — SIGNIFICANT CHANGE UP (ref 3.5–5.3)
POTASSIUM SERPL-SCNC: 4.1 MMOL/L — SIGNIFICANT CHANGE UP (ref 3.5–5.3)
PROT SERPL-MCNC: 6.4 G/DL — SIGNIFICANT CHANGE UP (ref 6–8.3)
RBC # BLD: 5.44 M/UL — HIGH (ref 3.8–5.2)
RBC # FLD: 16.6 % — HIGH (ref 10.3–14.5)
SODIUM SERPL-SCNC: 138 MMOL/L — SIGNIFICANT CHANGE UP (ref 135–145)
T3FREE SERPL-MCNC: 1.28 PG/ML — LOW (ref 2–4.4)
T4 FREE SERPL-MCNC: 1.7 NG/DL — SIGNIFICANT CHANGE UP (ref 0.9–1.8)
TSH SERPL-MCNC: 1.31 UIU/ML — SIGNIFICANT CHANGE UP (ref 0.36–3.74)
WBC # BLD: 9.66 K/UL — SIGNIFICANT CHANGE UP (ref 3.8–10.5)
WBC # FLD AUTO: 9.66 K/UL — SIGNIFICANT CHANGE UP (ref 3.8–10.5)

## 2023-03-06 PROCEDURE — 99232 SBSQ HOSP IP/OBS MODERATE 35: CPT

## 2023-03-06 RX ORDER — COLCHICINE 0.6 MG
1 TABLET ORAL
Qty: 60 | Refills: 0
Start: 2023-03-06 | End: 2023-04-04

## 2023-03-06 RX ORDER — OMEPRAZOLE 10 MG/1
1 CAPSULE, DELAYED RELEASE ORAL
Qty: 0 | Refills: 0 | DISCHARGE

## 2023-03-06 RX ORDER — POLYETHYLENE GLYCOL 3350 17 G/17G
17 POWDER, FOR SOLUTION ORAL
Qty: 0 | Refills: 0 | DISCHARGE
Start: 2023-03-06

## 2023-03-06 RX ORDER — APIXABAN 2.5 MG/1
1 TABLET, FILM COATED ORAL
Qty: 60 | Refills: 0
Start: 2023-03-06 | End: 2023-04-04

## 2023-03-06 RX ORDER — METOPROLOL TARTRATE 50 MG
1 TABLET ORAL
Qty: 60 | Refills: 0
Start: 2023-03-06 | End: 2023-04-04

## 2023-03-06 RX ORDER — SODIUM CHLORIDE 9 MG/ML
1000 INJECTION INTRAMUSCULAR; INTRAVENOUS; SUBCUTANEOUS ONCE
Refills: 0 | Status: COMPLETED | OUTPATIENT
Start: 2023-03-06 | End: 2023-03-06

## 2023-03-06 RX ORDER — MEMANTINE HYDROCHLORIDE 10 MG/1
1 TABLET ORAL
Qty: 30 | Refills: 0
Start: 2023-03-06 | End: 2023-04-04

## 2023-03-06 RX ORDER — SENNA PLUS 8.6 MG/1
2 TABLET ORAL
Qty: 0 | Refills: 0 | DISCHARGE
Start: 2023-03-06

## 2023-03-06 RX ORDER — MAGNESIUM OXIDE 400 MG ORAL TABLET 241.3 MG
1 TABLET ORAL
Qty: 90 | Refills: 0
Start: 2023-03-06 | End: 2023-04-04

## 2023-03-06 RX ORDER — LEVOTHYROXINE SODIUM 125 MCG
1 TABLET ORAL
Qty: 30 | Refills: 0
Start: 2023-03-06 | End: 2023-04-04

## 2023-03-06 RX ORDER — ATORVASTATIN CALCIUM 80 MG/1
1 TABLET, FILM COATED ORAL
Qty: 30 | Refills: 0
Start: 2023-03-06 | End: 2023-04-04

## 2023-03-06 RX ORDER — PANTOPRAZOLE SODIUM 20 MG/1
1 TABLET, DELAYED RELEASE ORAL
Qty: 30 | Refills: 0
Start: 2023-03-06 | End: 2023-04-04

## 2023-03-06 RX ORDER — MIRTAZAPINE 45 MG/1
1 TABLET, ORALLY DISINTEGRATING ORAL
Qty: 30 | Refills: 0
Start: 2023-03-06 | End: 2023-04-04

## 2023-03-06 RX ORDER — ASPIRIN/CALCIUM CARB/MAGNESIUM 324 MG
1 TABLET ORAL
Qty: 30 | Refills: 0
Start: 2023-03-06 | End: 2023-04-04

## 2023-03-06 RX ADMIN — Medication 30 MILLIGRAM(S): at 05:23

## 2023-03-06 RX ADMIN — MAGNESIUM OXIDE 400 MG ORAL TABLET 400 MILLIGRAM(S): 241.3 TABLET ORAL at 17:38

## 2023-03-06 RX ADMIN — APIXABAN 5 MILLIGRAM(S): 2.5 TABLET, FILM COATED ORAL at 05:23

## 2023-03-06 RX ADMIN — PANTOPRAZOLE SODIUM 40 MILLIGRAM(S): 20 TABLET, DELAYED RELEASE ORAL at 05:22

## 2023-03-06 RX ADMIN — Medication 50 MICROGRAM(S): at 05:22

## 2023-03-06 RX ADMIN — MEMANTINE HYDROCHLORIDE 5 MILLIGRAM(S): 10 TABLET ORAL at 11:02

## 2023-03-06 RX ADMIN — ATORVASTATIN CALCIUM 80 MILLIGRAM(S): 80 TABLET, FILM COATED ORAL at 22:55

## 2023-03-06 RX ADMIN — MAGNESIUM OXIDE 400 MG ORAL TABLET 400 MILLIGRAM(S): 241.3 TABLET ORAL at 11:56

## 2023-03-06 RX ADMIN — Medication 0.6 MILLIGRAM(S): at 17:38

## 2023-03-06 RX ADMIN — MAGNESIUM OXIDE 400 MG ORAL TABLET 400 MILLIGRAM(S): 241.3 TABLET ORAL at 08:24

## 2023-03-06 RX ADMIN — MIRTAZAPINE 7.5 MILLIGRAM(S): 45 TABLET, ORALLY DISINTEGRATING ORAL at 22:55

## 2023-03-06 RX ADMIN — APIXABAN 5 MILLIGRAM(S): 2.5 TABLET, FILM COATED ORAL at 17:38

## 2023-03-06 RX ADMIN — Medication 0.6 MILLIGRAM(S): at 05:23

## 2023-03-06 RX ADMIN — SODIUM CHLORIDE 500 MILLILITER(S): 9 INJECTION INTRAMUSCULAR; INTRAVENOUS; SUBCUTANEOUS at 08:38

## 2023-03-06 RX ADMIN — Medication 25 MILLIGRAM(S): at 05:22

## 2023-03-06 RX ADMIN — SENNA PLUS 2 TABLET(S): 8.6 TABLET ORAL at 22:55

## 2023-03-06 RX ADMIN — Medication 81 MILLIGRAM(S): at 11:02

## 2023-03-06 NOTE — PROGRESS NOTE ADULT - ASSESSMENT
72 year old F PMH hypothyroidism, cerebral aneurysm s/p coiling, pericarditis possibly related to non-covid coronaviral illness (recently admitted 1/30/23-2/1/23), afib RVR presented to [facility] on 2/16 for afib with RVR, stroke called on 2/22 and found to have nonhemorrhagic acute ischemia in the left MCA territory now admitted to  rehab for ADL impairment.    # H/o Left MCA infarct   - continue with eliquis 5mg PO BID and ASA 81mg PO QD   - continue atorvastatin 80mg qhs    #Transient palpitations overnight  - pt clarified that she did not have CP  - EKG overnight showed NSR, improved T wave abnormality in anterolateral leads  - troponin negative  - BP borderline, reduce BP medications    #Paroxysmal Atrial fibrillation  - BP borderline/with HR <110  - Metoprolol tartrate decreased to 25mg BID 3/5 and discontinued cardizem 120mg daily.....  - I believe her hemodynamics have to do with her decreased oral intake; NS bolus given today; will see if we have to give her standing fluids........the role of decreased oral intake with metoprolol is not directly related......  - continue with eliquis     #Acute pericarditis with pericardial effusion without tamponade physiology  - prednisone taper-> decrease 10mg a week over 4 weeks; currently on 30mg daily ( started on 3/4); will decrease to 20mg on 3/11  - continue with colchicine 0.6mg BID  - cardio f/u post-dc    #Hypothyroidism   - continue synthroid 50mcg QD     # HypoK  - improved    # hypomagnesemia  - repleted.     # diarrhea   - improved    #Nutrition  - One liter NS bolus given today  -Will see if we can give her supplements to help with caloric intake  - ON remeron 7.5mg qhs; will ask primary team if we can increase remeron  - Other options would be to add megace?    #DVT prophylaxis:  - on eliquis  - Last doppler on 1/30/23 - No evidence of DVT

## 2023-03-06 NOTE — PROGRESS NOTE ADULT - SUBJECTIVE AND OBJECTIVE BOX
SUBJECTIVE/ROS: She was evaluated while in the chair. She had an episode of low BP (SBP 80s) this morning and was feeling dizzy at that time. She was placed back in bed and her BP recovered to SBp 100s. She continues to have poor PO intake and was encouraged again to increase PO fluids. She denies chest pain, fever, chills, nausea, vomiting, headache, or BLE pain.     HPI:  Ms. Garza is a 72yr old female with PMH hypothyroidism, cerebral aneurysm s/p coiling,  pericarditis possibly related non-covid coronaviral illness (recently admitted 1/30/23-2/1/23), afib RVR presents with similar symptoms of left sided chest pain on 2/16/23. In ER, she was found to be in Afib RVR. Stroke code was called on 2/22/23 where patient found to be aphasic and on imaging, CTA head showed nonhemorrhagic acute ischemia in the left MCA territory. Patient did not qualify for TNK due to last known sx was well over 4.5 hours. Patient was not a thrombectomy candidate as pt's thrombus is in M3 branch which is too distal to intervene on. Patient is currently on an acute prednisone taper for pericarditis and is on Eliquis and cardizem for Afib management.       Patient was evaluated by PM&R and therapy for functional deficits and gait/ ADL impairments and recommended acute rehabilitation. Patient was medically optimized for discharge to [facility] on 2/26/23      Vital Signs Last 24 Hrs  T(C): 37.6 (06 Mar 2023 07:26), Max: 37.6 (06 Mar 2023 07:26)  T(F): 99.6 (06 Mar 2023 07:26), Max: 99.6 (06 Mar 2023 07:26)  HR: 94 (06 Mar 2023 07:26) (71 - 99)  BP: 105/72 (06 Mar 2023 08:10) (85/60 - 117/75)  BP(mean): --  RR: 16 (06 Mar 2023 07:26) (16 - 16)  SpO2: 93% (06 Mar 2023 07:26) (93% - 95%)    Parameters below as of 06 Mar 2023 07:26  Patient On (Oxygen Delivery Method): room air            PHYSICAL EXAM  Constitutional - NAD, Comfortable  HEENT - NCAT, EOMI  Neck - Supple, No limited ROM  Chest - CTA bilaterally  Cardiovascular - RRR, S1S2  Abdomen -BS+, Soft, NTND  Extremities - No C/C/E, No calf tenderness   Neurologic Exam - aox3, dysarthric, naranjo 5/5             LABS:                          12.4   9.66  )-----------( 310      ( 06 Mar 2023 06:50 )             40.0     03-06    138  |  101  |  12  ----------------------------<  127<H>  4.1   |  25  |  0.71    Ca    8.6      06 Mar 2023 06:50  Mg     1.7     03-05    TPro  6.4  /  Alb  2.5<L>  /  TBili  0.5  /  DBili  x   /  AST  12  /  ALT  19  /  AlkPhos  80  03-06    LIVER FUNCTIONS - ( 06 Mar 2023 06:50 )  Alb: 2.5 g/dL / Pro: 6.4 g/dL / ALK PHOS: 80 U/L / ALT: 19 U/L / AST: 12 U/L / GGT: x                 MEDICATIONS  (STANDING):  apixaban 5 milliGRAM(s) Oral every 12 hours  aspirin  chewable 81 milliGRAM(s) Oral daily  atorvastatin 80 milliGRAM(s) Oral at bedtime  colchicine 0.6 milliGRAM(s) Oral two times a day  levothyroxine 50 MICROGram(s) Oral daily  magnesium oxide 400 milliGRAM(s) Oral three times a day with meals  memantine 5 milliGRAM(s) Oral daily  metoprolol tartrate 25 milliGRAM(s) Oral two times a day  mirtazapine 7.5 milliGRAM(s) Oral at bedtime  pantoprazole    Tablet 40 milliGRAM(s) Oral before breakfast  polyethylene glycol 3350 17 Gram(s) Oral daily  predniSONE   Tablet 30 milliGRAM(s) Oral daily  senna 2 Tablet(s) Oral at bedtime    MEDICATIONS  (PRN):  benzocaine/menthol Lozenge 1 Lozenge Oral three times a day PRN Sore Throat  ondansetron   Disintegrating Tablet 4 milliGRAM(s) Oral every 6 hours PRN Nausea and/or Vomiting

## 2023-03-06 NOTE — DISCHARGE NOTE PROVIDER - CARE PROVIDER_API CALL
Gavino Yun; MPH)  Cardiology; Internal Medicine  [address] [place] [id number]  Phone: [phone]  Fax: [phone]  Follow Up Time: 1 week    Ethan Barnes)  Neurology; Vascular Neurology  [address] [place] [id number]  Phone: [phone]  Fax: [phone]  Follow Up Time: 1 week    LAKSHMI LEDESMA  Internal Medicine  [address] [place] [address]  Phone: [phone]  Fax: [phone]  Follow Up Time: 1 week

## 2023-03-06 NOTE — DISCHARGE NOTE NURSING/CASE MANAGEMENT/SOCIAL WORK - PATIENT PORTAL LINK FT
You can access the FollowMyHealth Patient Portal offered by [facility] by registering at the following website: [email or web address]. By joining [facility]’s FollowMyHealth portal, you will also be able to view your health information using other applications (apps) compatible with our system.

## 2023-03-06 NOTE — DISCHARGE NOTE PROVIDER - NSDCMRMEDTOKEN_GEN_ALL_CORE_FT
apixaban 5 mg oral tablet: 1 tab(s) orally every 12 hours  aspirin 81 mg oral tablet, chewable: 1 tab(s) orally once a day  atorvastatin 80 mg oral tablet: 1 tab(s) orally once a day (at bedtime)  colchicine 0.6 mg oral tablet: 1 tab(s) orally 2 times a day  levothyroxine 50 mcg (0.05 mg) oral tablet: 1 tab(s) orally once a day  magnesium oxide 400 mg oral tablet: 1 tab(s) orally 3 times a day (with meals)  memantine 5 mg oral tablet: 1 tab(s) orally once a day  metoprolol tartrate 25 mg oral tablet: 1 tab(s) orally 2 times a day  mirtazapine 7.5 mg oral tablet: 1 tab(s) orally once a day (at bedtime)  pantoprazole 40 mg oral delayed release tablet: 1 tab(s) orally once a day (before a meal)  polyethylene glycol 3350 oral powder for reconstitution: 17 gram(s) orally once a day  predniSONE 10 mg oral tablet: 3 tab(s) orally once a day until 3/9 then   2 tabs orally once a day until 3/16 then  1 tab orally once a day until 3/23 then stop   senna leaf extract oral tablet: 2 tab(s) orally once a day (at bedtime)   apixaban 5 mg oral tablet: 1 tab(s) orally every 12 hours  aspirin 81 mg oral tablet, chewable: 1 tab(s) orally once a day  atorvastatin 80 mg oral tablet: 1 tab(s) orally once a day (at bedtime)  colchicine 0.6 mg oral tablet: 1 tab(s) orally 2 times a day  levothyroxine 50 mcg (0.05 mg) oral tablet: 1 tab(s) orally once a day  magnesium oxide 400 mg oral tablet: 1 tab(s) orally 3 times a day (with meals)  memantine 5 mg oral tablet: 1 tab(s) orally once a day  mirtazapine 7.5 mg oral tablet: 1 tab(s) orally once a day (at bedtime)  pantoprazole 40 mg oral delayed release tablet: 1 tab(s) orally once a day (before a meal)  polyethylene glycol 3350 oral powder for reconstitution: 17 gram(s) orally once a day  predniSONE 10 mg oral tablet: 3 tab(s) orally once a day until 3/9 then   2 tabs orally once a day until 3/16 then  1 tab orally once a day until 3/23 then stop   senna leaf extract oral tablet: 2 tab(s) orally once a day (at bedtime)

## 2023-03-06 NOTE — PROGRESS NOTE ADULT - SUBJECTIVE AND OBJECTIVE BOX
Patient is a 73y old  Female who presents with a chief complaint of CVA (06 Mar 2023 12:54)    No events overnight  She reports not eating much  She drinks but minimally  Denies chest pain, SOB  Had nausea yesterday but no vomiting  Had a BM last night  Patient seen and examined at bedside.    ALLERGIES:  No Known Allergies    MEDICATIONS  (STANDING):  apixaban 5 milliGRAM(s) Oral every 12 hours  aspirin  chewable 81 milliGRAM(s) Oral daily  atorvastatin 80 milliGRAM(s) Oral at bedtime  colchicine 0.6 milliGRAM(s) Oral two times a day  levothyroxine 50 MICROGram(s) Oral daily  magnesium oxide 400 milliGRAM(s) Oral three times a day with meals  memantine 5 milliGRAM(s) Oral daily  metoprolol tartrate 25 milliGRAM(s) Oral two times a day  mirtazapine 7.5 milliGRAM(s) Oral at bedtime  pantoprazole    Tablet 40 milliGRAM(s) Oral before breakfast  polyethylene glycol 3350 17 Gram(s) Oral daily  predniSONE   Tablet 30 milliGRAM(s) Oral daily  senna 2 Tablet(s) Oral at bedtime    MEDICATIONS  (PRN):  benzocaine/menthol Lozenge 1 Lozenge Oral three times a day PRN Sore Throat  ondansetron   Disintegrating Tablet 4 milliGRAM(s) Oral every 6 hours PRN Nausea and/or Vomiting    Vital Signs Last 24 Hrs  T(F): 99.6 (06 Mar 2023 07:26), Max: 99.6 (06 Mar 2023 07:26)  HR: 94 (06 Mar 2023 07:26) (71 - 99)  BP: 105/72 (06 Mar 2023 08:10) (85/60 - 117/75)  RR: 16 (06 Mar 2023 07:26) (16 - 16)  SpO2: 93% (06 Mar 2023 07:26) (93% - 95%)  I&O's Summary      PHYSICAL EXAM:  General: NAD, A/O x 3, weak appearing  ENT: MMM, no scleral icterus  Neck: Supple, No JVD, no thyroidomegaly  Lungs: Clear to auscultation bilaterally, no wheezes, no rales, no rhonchi, good inspiratory effort  Cardio: RRR, S1/S2, No murmurs  Abdomen: Soft, Nontender, Nondistended; Bowel sounds present  Extremities: No calf tenderness, No pitting edema, no skin changes    LABS:                        12.4   9.66  )-----------( 310      ( 06 Mar 2023 06:50 )             40.0       03-06    138  |  101  |  12  ----------------------------<  127  4.1   |  25  |  0.71    Ca    8.6      06 Mar 2023 06:50  Mg     1.7     03-05    TPro  6.4  /  Alb  2.5  /  TBili  0.5  /  DBili  x   /  AST  12  /  ALT  19  /  AlkPhos  80  03-06     CARDIAC MARKERS ( 05 Mar 2023 16:30 )  x     / 5.3 ng/L / x     / x     / x      CARDIAC MARKERS ( 05 Mar 2023 01:55 )  x     / 5.6 ng/L / x     / x     / x          02-23 Chol 152 mg/dL LDL -- HDL 46 mg/dL Trig 146 mg/dL  TSH 1.310   TSH with FT4 reflex --  Total T3 --    COVID-19 PCR: NotDetec (02-26-23 @ 17:49)  COVID-19 PCR: NotDetec (02-24-23 @ 13:00)  COVID-19 PCR: NotDetec (02-26-23 @ 17:49)  COVID-19 PCR: NotDetec (02-24-23 @ 13:00)        RADIOLOGY & ADDITIONAL TESTS:  Xray Chest 1 View- PORTABLE-Urgent (Xray Chest 1 View- PORTABLE-Urgent .) (03.05.23 @ 01:55) >  IMPRESSION:  No acute infiltrate seen    Care Discussed with Consultants/Other Providers:   Nutrition

## 2023-03-06 NOTE — DISCHARGE NOTE PROVIDER - HOSPITAL COURSE
HPI:  Ms. Keller is a 72yr old female with PMH hypothyroidism, cerebral aneurysm s/p coiling,  pericarditis possibly related non-covid coronaviral illness (recently admitted 1/30/23-2/1/23), afib RVR presents with similar symptoms of left sided chest pain on 2/16/23. In ER, she was found to be in Afib RVR. Stroke code was called on 2/22/23 where patient found to be aphasic and on imaging, CTA head showed nonhemorrhagic acute ischemia in the left MCA territory. Patient did not qualify for TNK due to last known sx was well over 4.5 hours. Patient was not a thrombectomy candidate as pt's thrombus is in M3 branch which is too distal to intervene on. Patient is currently on an acute prednisone taper for pericarditis and is on Eliquis and cardizem for Afib management.       Patient was evaluated by PM&R and therapy for functional deficits and gait/ ADL impairments and recommended acute rehabilitation. Patient was medically optimized for discharge to [facility] on 2/26/23      Rehab course significant for low appetite and patient was started on mirtazipine for appetite enhancement and reactive depression. She also required intermittent IV fluids due to poor intake with good results. Her BP medications were also adjusted. All other medical co-morbidites were stable.    Pt tolerated course of inpatient pt/ot/slp rehab with significant functional improvements and met rehab goals prior to discharge.     Pt was medically cleared on 3/7/23 for discharge to Home with homecare.      HPI:  Ms. Keller is a 72yr old female with PMH hypothyroidism, cerebral aneurysm s/p coiling,  pericarditis possibly related non-covid coronaviral illness (recently admitted 1/30/23-2/1/23), afib RVR presents with similar symptoms of left sided chest pain on 2/16/23. In ER, she was found to be in Afib RVR. Stroke code was called on 2/22/23 where patient found to be aphasic and on imaging, CTA head showed nonhemorrhagic acute ischemia in the left MCA territory. Patient did not qualify for TNK due to last known sx was well over 4.5 hours. Patient was not a thrombectomy candidate as pt's thrombus is in M3 branch which is too distal to intervene on. Patient is currently on an acute prednisone taper for pericarditis and is on Eliquis and cardizem for Afib management.     Patient was evaluated by PM&R and therapy for functional deficits and gait/ ADL impairments and recommended acute rehabilitation. Patient was medically optimized for discharge to [facility] on 2/26/23.      Rehab course significant for low appetite and patient was started on mirtazipine for appetite enhancement and reactive depression. She also required intermittent IV fluids due to poor intake with good results. Due to hypotension her cardizem was stopped and metoprolol tartrate 50mg BID decreased to 25mg BID on 3/5 and she was given a 1L NS bolus. Her On 3/7 at 3:30 am patient had episode of left-sided chest pain along the sternal border with N/v, diaphoresis, neck pain, shortness of breath and palpitations. EKG x 2 showed afib RVR with ventricular rate 167-170. She was given metoprolol tartrate 25 mg PO x 1, started on LR at 100cc/hr and stat labs were drawn and pending at time of discharge (troponin, BNP, CBC, CMP, magnesium). After consultation with nocturnist she was transferred to telemetry for closer monitoring. Pt tolerated course of inpatient pt/ot/slp rehab with significant functional improvements and met rehab goals prior to transfer to telemetry.       HPI:  Ms. Keller is a 72yr old female with PMH hypothyroidism, cerebral aneurysm s/p coiling,  pericarditis possibly related non-covid coronaviral illness (recently admitted 1/30/23-2/1/23), afib RVR presents with similar symptoms of left sided chest pain on 2/16/23. In ER, she was found to be in Afib RVR. Stroke code was called on 2/22/23 where patient found to be aphasic and on imaging, CTA head showed nonhemorrhagic acute ischemia in the left MCA territory. Patient did not qualify for TNK due to last known sx was well over 4.5 hours. Patient was not a thrombectomy candidate as pt's thrombus is in M3 branch which is too distal to intervene on. Patient is currently on an acute prednisone taper for pericarditis and is on Eliquis and cardizem for Afib management.     Patient was evaluated by PM&R and therapy for functional deficits and gait/ ADL impairments and recommended acute rehabilitation. Patient was medically optimized for discharge to [facility] on 2/26/23.      Rehab course significant for low appetite and patient was started on mirtazipine for appetite enhancement and reactive depression. She also required intermittent IV fluids due to poor intake with good results. Continued on memantine for aphasia. Due to hypotension her cardizem was stopped and metoprolol tartrate 50mg BID decreased to 25mg BID on 3/5 and she was given a 1L NS bolus. Her On 3/7 at 3:30 am patient had episode of left-sided chest pain along the sternal border with nausea, small amount of emesis, diaphoresis, neck pain, shortness of breath and palpitations. EKG x 2 showed afib RVR with ventricular rate 167-170. She was given metoprolol tartrate 25 mg PO x 1, started on LR at 100cc/hr and stat labs were drawn and pending at time of discharge (troponin, BNP, CBC, CMP, magnesium). After consultation with nocturnist she was transferred to telemetry for closer monitoring. Pt tolerated course of inpatient pt/ot/slp rehab with significant functional improvements and met rehab goals prior to transfer to telemetry.

## 2023-03-06 NOTE — DISCHARGE NOTE PROVIDER - PROVIDER TOKENS
PROVIDER:[TOKEN:[[id number]],FOLLOWUP:[1 week]],PROVIDER:[TOKEN:[[id number]],FOLLOWUP:[1 week]],PROVIDER:[TOKEN:[[id number]],FOLLOWUP:[1 week]]

## 2023-03-06 NOTE — PROGRESS NOTE ADULT - NS ATTEND AMEND GEN_ALL_CORE FT
I have personally seen and examined the patient with the NP. Medical records reviewed. I have made amendments to the documentation where necessary and adjusted the history, physical examination, and plan as documented by the NP.  CVA ppx - ASA, Apixaban, statin  Tachycardia resolved - no recurrent symptoms  Constipation - bowel regimen enhanced -  large BM today  Spoke with son - detailed update given, all questions answered - poor appetite: Remeron, Nutritional evaluation    Hospitalist is following, case discussed, notes reviewed.  Labs - reviewed   Discharge home in the morning -  plan discussed with ESTEVAN, team.

## 2023-03-06 NOTE — DISCHARGE NOTE NURSING/CASE MANAGEMENT/SOCIAL WORK - NSDCFUADDAPPT_GEN_ALL_CORE_FT
Please follow up with PCP in 2 weeks.     Start of care requested for March 8, 2023 through [facility].

## 2023-03-06 NOTE — PROGRESS NOTE ADULT - ASSESSMENT
Ms. Keller is a 72yr old female with PMH hypothyroidism, cerebral aneurysm s/p coiling,  pericarditis possibly related to non-covid coronaviral illness (recently admitted 1/30/23-2/1/23), afib RVR presents with similar symptoms of left sided chest pain on 2/16/23 at [facility]. Stroke code was called on 2/22/23 where patient found to be aphasic and on imaging, CTA head showed nonhemorrhagic acute ischemia in the left MCA territory. Patient is currently on an acute prednisone taper for pericarditis and is on Eliquis and cardizem for Afib management. Admitted for multidisciplinary rehab program.     #Left MCA infarct with residual dysarthria, Gait, ADL, Functional impairments  - c/w eliquis 5mg PO BID   - c/w ASA 81mg PO QD   - c/w atorvastatin 80 mg PO @bedtime   - c/w namenda 5mg PO QD   -Continue comprehensive Multidisciplinary Rehab Program PT/OT/ SLP     #Atrial fibrillation with RVR  -Cardizem discontinued   -metoprolol 25mg BID  -consider reduction due to low BP 3/6  -1L NS bolus given 3/6 due to hypotension   - c/w eliquis   -Episode of tachycardia 2/28 in am - most likely due to poor PO intake -no further episodes      #Acute pericarditis with pericardial effusion without tamponade physiology   -c/w prednisone 30mg PO QD until 3/9  - prednisone taper-> decrease 10mg a week over 4 weeks   - 40mg completed  - 30mg x 1week (last dose on 3/9)  - 10mg x 1week (last dose on 3/23)  -c/w colchicine 0.6mg PO BID  -Encourage incentive spirometery for pleuritic pain on right -improving     #Hypothyroidism   - c/w synthroid 50mcg PO QD     #Mood / Cognition   - Neuropsych evaluation   -Continue Mirtazipine 7.5mg at bedtime for mood, sleep and appetite     #GI/Bowel/abdominal discomfort  - c/w zofran PRN  - Senna changed to standing  -Continue miralax  - GI ppx: pantoprazole 40mg PO QD   -KUB shows increased stool burden  -Lactulose and enema 2/28 and 3/1 - with good BM    #/Bladder:  - Monitor PVR if no void in 8h; SC for >400 cc  - Toileting schedule q4h    #Diet / Dysphagia:    - Diet: DASH/TLC  - ongoing SLP assessment  - Nutrition to follow    #Skin/ Pressure Injury Prevention:  - assessment on admission   - Turn Q2hrs in bed while awake, OOB to Chair, PT/OT/SLP     #DVT prophylaxis:  - c/w Eliquis 5mg PO BID   - Last doppler on 1/30/23 - No evidence of DVT - repeat pending due to LE discomfort -negative 2/27    #Precautions/ Restrictions  - Falls, Cardiac, Seizures   - Lungs: Aspiration, Incentive Spirometer    - COVID PCR: Neg 2/24    --------------------------------------------  Outpatient Follow up:    Gavino Yun; MPH)  Cardiology; Internal Medicine  [address] [place] [address]  Phone: [phone]  Fax: [phone]  Follow Up Time:     Ethan Barnes)  Neurology; Vascular Neurology  [address] [place] [address] A  [place], [place] [id number]  Phone: [phone]  Fax: [phone]  Follow Up Time:

## 2023-03-06 NOTE — DISCHARGE NOTE PROVIDER - NSDCCPCAREPLAN_GEN_ALL_CORE_FT
PRINCIPAL DISCHARGE DIAGNOSIS  Diagnosis: Stroke  Assessment and Plan of Treatment: Left MCA infarct   - continue eliquis 5mg PO BID, ASA 81mg daily, atorvastatin 80mg daily, and namenda 5mg PO QD         SECONDARY DISCHARGE DIAGNOSES  Diagnosis: Atrial fibrillation  Assessment and Plan of Treatment: Stop cardizem and continue metoprolol 25mg BID  Continue eliquis  Follow up with cardiology for further management       Diagnosis: Pericarditis  Assessment and Plan of Treatment: Acute pericarditis with pericardial effusion without tamponade physiology   -Continue prednisone taper:   prednisone 30mg PO daily until 3/9 then 20mg daily until 3/16 then 10mg daily until 3/23 then stop   -Continue colchicine 0.6mg PO BID      Diagnosis: Hypothyroidism  Assessment and Plan of Treatment: Continue synthroid 50mcg PO QD       Diagnosis: Decreased appetite  Assessment and Plan of Treatment: Continue Mirtazipine 7.5mg at bedtime for mood, sleep and appetite   -Continue to titrate medications to acheive 1-2 BMs daily   Continue Senna, miralax, protonix

## 2023-03-06 NOTE — DISCHARGE NOTE PROVIDER - NSDCFUADDAPPT_GEN_ALL_CORE_FT
Please follow up with PCP in 2 weeks.     Start of care requested for March 8, 2023 through [facility].

## 2023-03-06 NOTE — PROGRESS NOTE ADULT - PROVIDER SPECIALTY LIST ADULT
Hospitalist
Neuro Rehabilitation
Neuro Rehabilitation
Hospitalist
Neuro Rehabilitation
Neuro Rehabilitation
Hospitalist
Hospitalist
Neuro Rehabilitation
Hospitalist
Neuro Rehabilitation

## 2023-03-07 ENCOUNTER — INPATIENT (INPATIENT)
Facility: HOSPITAL | Age: 73
LOS: 2 days | Discharge: LEFT BEFORE DISCHARGE | DRG: 309 | End: 2023-03-10
Attending: FAMILY MEDICINE | Admitting: INTERNAL MEDICINE
Payer: MEDICARE

## 2023-03-07 VITALS
OXYGEN SATURATION: 97 % | TEMPERATURE: 98 F | HEART RATE: 137 BPM | DIASTOLIC BLOOD PRESSURE: 67 MMHG | RESPIRATION RATE: 16 BRPM | SYSTOLIC BLOOD PRESSURE: 96 MMHG

## 2023-03-07 VITALS
RESPIRATION RATE: 16 BRPM | DIASTOLIC BLOOD PRESSURE: 85 MMHG | SYSTOLIC BLOOD PRESSURE: 115 MMHG | HEART RATE: 109 BPM | OXYGEN SATURATION: 97 %

## 2023-03-07 DIAGNOSIS — Z98.890 OTHER SPECIFIED POSTPROCEDURAL STATES: Chronic | ICD-10-CM

## 2023-03-07 DIAGNOSIS — I67.1 CEREBRAL ANEURYSM, NONRUPTURED: Chronic | ICD-10-CM

## 2023-03-07 DIAGNOSIS — I63.9 CEREBRAL INFARCTION, UNSPECIFIED: ICD-10-CM

## 2023-03-07 LAB
ALBUMIN SERPL ELPH-MCNC: 2.2 G/DL — LOW (ref 3.3–5)
ALBUMIN SERPL ELPH-MCNC: 2.4 G/DL — LOW (ref 3.3–5)
ALP SERPL-CCNC: 76 U/L — SIGNIFICANT CHANGE UP (ref 40–120)
ALP SERPL-CCNC: 79 U/L — SIGNIFICANT CHANGE UP (ref 40–120)
ALT FLD-CCNC: 20 U/L — SIGNIFICANT CHANGE UP (ref 10–45)
ALT FLD-CCNC: 22 U/L — SIGNIFICANT CHANGE UP (ref 10–45)
ANION GAP SERPL CALC-SCNC: 11 MMOL/L — SIGNIFICANT CHANGE UP (ref 5–17)
ANION GAP SERPL CALC-SCNC: 8 MMOL/L — SIGNIFICANT CHANGE UP (ref 5–17)
AST SERPL-CCNC: 13 U/L — SIGNIFICANT CHANGE UP (ref 10–40)
AST SERPL-CCNC: 14 U/L — SIGNIFICANT CHANGE UP (ref 10–40)
BASOPHILS # BLD AUTO: 0.01 K/UL — SIGNIFICANT CHANGE UP (ref 0–0.2)
BASOPHILS NFR BLD AUTO: 0.1 % — SIGNIFICANT CHANGE UP (ref 0–2)
BILIRUB SERPL-MCNC: 0.3 MG/DL — SIGNIFICANT CHANGE UP (ref 0.2–1.2)
BILIRUB SERPL-MCNC: 0.5 MG/DL — SIGNIFICANT CHANGE UP (ref 0.2–1.2)
BUN SERPL-MCNC: 10 MG/DL — SIGNIFICANT CHANGE UP (ref 7–23)
BUN SERPL-MCNC: 10 MG/DL — SIGNIFICANT CHANGE UP (ref 7–23)
CALCIUM SERPL-MCNC: 8 MG/DL — LOW (ref 8.4–10.5)
CALCIUM SERPL-MCNC: 8.3 MG/DL — LOW (ref 8.4–10.5)
CHLORIDE SERPL-SCNC: 102 MMOL/L — SIGNIFICANT CHANGE UP (ref 96–108)
CHLORIDE SERPL-SCNC: 103 MMOL/L — SIGNIFICANT CHANGE UP (ref 96–108)
CO2 SERPL-SCNC: 25 MMOL/L — SIGNIFICANT CHANGE UP (ref 22–31)
CO2 SERPL-SCNC: 26 MMOL/L — SIGNIFICANT CHANGE UP (ref 22–31)
CREAT SERPL-MCNC: 0.63 MG/DL — SIGNIFICANT CHANGE UP (ref 0.5–1.3)
CREAT SERPL-MCNC: 0.68 MG/DL — SIGNIFICANT CHANGE UP (ref 0.5–1.3)
EGFR: 93 ML/MIN/1.73M2 — SIGNIFICANT CHANGE UP
EGFR: 94 ML/MIN/1.73M2 — SIGNIFICANT CHANGE UP
EOSINOPHIL # BLD AUTO: 0.15 K/UL — SIGNIFICANT CHANGE UP (ref 0–0.5)
EOSINOPHIL NFR BLD AUTO: 1.8 % — SIGNIFICANT CHANGE UP (ref 0–6)
ERYTHROCYTE [SEDIMENTATION RATE] IN BLOOD: 84 MM/HR — HIGH (ref 0–20)
FOLATE SERPL-MCNC: >20 NG/ML — SIGNIFICANT CHANGE UP
GLUCOSE SERPL-MCNC: 176 MG/DL — HIGH (ref 70–99)
GLUCOSE SERPL-MCNC: 99 MG/DL — SIGNIFICANT CHANGE UP (ref 70–99)
HCT VFR BLD CALC: 36.6 % — SIGNIFICANT CHANGE UP (ref 34.5–45)
HCT VFR BLD CALC: 37 % — SIGNIFICANT CHANGE UP (ref 34.5–45)
HGB BLD-MCNC: 11.6 G/DL — SIGNIFICANT CHANGE UP (ref 11.5–15.5)
HGB BLD-MCNC: 11.7 G/DL — SIGNIFICANT CHANGE UP (ref 11.5–15.5)
IMM GRANULOCYTES NFR BLD AUTO: 0.5 % — SIGNIFICANT CHANGE UP (ref 0–0.9)
IRON SATN MFR SERPL: 25 UG/DL — LOW (ref 30–160)
LACTATE SERPL-SCNC: 0.7 MMOL/L — SIGNIFICANT CHANGE UP (ref 0.7–2)
LYMPHOCYTES # BLD AUTO: 2.17 K/UL — SIGNIFICANT CHANGE UP (ref 1–3.3)
LYMPHOCYTES # BLD AUTO: 26.2 % — SIGNIFICANT CHANGE UP (ref 13–44)
MAGNESIUM SERPL-MCNC: 1.6 MG/DL — SIGNIFICANT CHANGE UP (ref 1.6–2.6)
MAGNESIUM SERPL-MCNC: 1.6 MG/DL — SIGNIFICANT CHANGE UP (ref 1.6–2.6)
MCHC RBC-ENTMCNC: 23 PG — LOW (ref 27–34)
MCHC RBC-ENTMCNC: 23.1 PG — LOW (ref 27–34)
MCHC RBC-ENTMCNC: 31.6 GM/DL — LOW (ref 32–36)
MCHC RBC-ENTMCNC: 31.7 GM/DL — LOW (ref 32–36)
MCV RBC AUTO: 72.6 FL — LOW (ref 80–100)
MCV RBC AUTO: 73.1 FL — LOW (ref 80–100)
MONOCYTES # BLD AUTO: 0.52 K/UL — SIGNIFICANT CHANGE UP (ref 0–0.9)
MONOCYTES NFR BLD AUTO: 6.3 % — SIGNIFICANT CHANGE UP (ref 2–14)
NEUTROPHILS # BLD AUTO: 5.38 K/UL — SIGNIFICANT CHANGE UP (ref 1.8–7.4)
NEUTROPHILS NFR BLD AUTO: 65.1 % — SIGNIFICANT CHANGE UP (ref 43–77)
NRBC # BLD: 0 /100 WBCS — SIGNIFICANT CHANGE UP (ref 0–0)
NRBC # BLD: 0 /100 WBCS — SIGNIFICANT CHANGE UP (ref 0–0)
NT-PROBNP SERPL-SCNC: 1744 PG/ML — HIGH (ref 0–300)
PHOSPHATE SERPL-MCNC: 3.3 MG/DL — SIGNIFICANT CHANGE UP (ref 2.5–4.5)
PLATELET # BLD AUTO: 360 K/UL — SIGNIFICANT CHANGE UP (ref 150–400)
PLATELET # BLD AUTO: 381 K/UL — SIGNIFICANT CHANGE UP (ref 150–400)
POTASSIUM SERPL-MCNC: 3.7 MMOL/L — SIGNIFICANT CHANGE UP (ref 3.5–5.3)
POTASSIUM SERPL-MCNC: 4.5 MMOL/L — SIGNIFICANT CHANGE UP (ref 3.5–5.3)
POTASSIUM SERPL-SCNC: 3.7 MMOL/L — SIGNIFICANT CHANGE UP (ref 3.5–5.3)
POTASSIUM SERPL-SCNC: 4.5 MMOL/L — SIGNIFICANT CHANGE UP (ref 3.5–5.3)
PROT SERPL-MCNC: 6.4 G/DL — SIGNIFICANT CHANGE UP (ref 6–8.3)
PROT SERPL-MCNC: 6.5 G/DL — SIGNIFICANT CHANGE UP (ref 6–8.3)
RBC # BLD: 5.04 M/UL — SIGNIFICANT CHANGE UP (ref 3.8–5.2)
RBC # BLD: 5.06 M/UL — SIGNIFICANT CHANGE UP (ref 3.8–5.2)
RBC # FLD: 16.2 % — HIGH (ref 10.3–14.5)
RBC # FLD: 16.3 % — HIGH (ref 10.3–14.5)
SODIUM SERPL-SCNC: 137 MMOL/L — SIGNIFICANT CHANGE UP (ref 135–145)
SODIUM SERPL-SCNC: 138 MMOL/L — SIGNIFICANT CHANGE UP (ref 135–145)
TROPONIN I, HIGH SENSITIVITY RESULT: 11.5 NG/L — SIGNIFICANT CHANGE UP
TROPONIN I, HIGH SENSITIVITY RESULT: 11.6 NG/L — SIGNIFICANT CHANGE UP
TROPONIN I, HIGH SENSITIVITY RESULT: 9.5 NG/L — SIGNIFICANT CHANGE UP
VIT B12 SERPL-MCNC: 1777 PG/ML — HIGH (ref 232–1245)
WBC # BLD: 10.59 K/UL — HIGH (ref 3.8–10.5)
WBC # BLD: 8.27 K/UL — SIGNIFICANT CHANGE UP (ref 3.8–10.5)
WBC # FLD AUTO: 10.59 K/UL — HIGH (ref 3.8–10.5)
WBC # FLD AUTO: 8.27 K/UL — SIGNIFICANT CHANGE UP (ref 3.8–10.5)

## 2023-03-07 PROCEDURE — 97167 OT EVAL HIGH COMPLEX 60 MIN: CPT

## 2023-03-07 PROCEDURE — 97163 PT EVAL HIGH COMPLEX 45 MIN: CPT

## 2023-03-07 PROCEDURE — 99223 1ST HOSP IP/OBS HIGH 75: CPT

## 2023-03-07 PROCEDURE — 80048 BASIC METABOLIC PNL TOTAL CA: CPT

## 2023-03-07 PROCEDURE — 80053 COMPREHEN METABOLIC PANEL: CPT

## 2023-03-07 PROCEDURE — 97535 SELF CARE MNGMENT TRAINING: CPT

## 2023-03-07 PROCEDURE — 93308 TTE F-UP OR LMTD: CPT | Mod: 26

## 2023-03-07 PROCEDURE — 93010 ELECTROCARDIOGRAM REPORT: CPT | Mod: 76

## 2023-03-07 PROCEDURE — 84443 ASSAY THYROID STIM HORMONE: CPT

## 2023-03-07 PROCEDURE — 84481 FREE ASSAY (FT-3): CPT

## 2023-03-07 PROCEDURE — 84439 ASSAY OF FREE THYROXINE: CPT

## 2023-03-07 PROCEDURE — 93005 ELECTROCARDIOGRAM TRACING: CPT

## 2023-03-07 PROCEDURE — 92507 TX SP LANG VOICE COMM INDIV: CPT

## 2023-03-07 PROCEDURE — 85027 COMPLETE CBC AUTOMATED: CPT

## 2023-03-07 PROCEDURE — 92523 SPEECH SOUND LANG COMPREHEN: CPT

## 2023-03-07 PROCEDURE — 71045 X-RAY EXAM CHEST 1 VIEW: CPT

## 2023-03-07 PROCEDURE — 85025 COMPLETE CBC W/AUTO DIFF WBC: CPT

## 2023-03-07 PROCEDURE — 97112 NEUROMUSCULAR REEDUCATION: CPT

## 2023-03-07 PROCEDURE — 83735 ASSAY OF MAGNESIUM: CPT

## 2023-03-07 PROCEDURE — 97116 GAIT TRAINING THERAPY: CPT

## 2023-03-07 PROCEDURE — 83880 ASSAY OF NATRIURETIC PEPTIDE: CPT

## 2023-03-07 PROCEDURE — 99222 1ST HOSP IP/OBS MODERATE 55: CPT

## 2023-03-07 PROCEDURE — 92610 EVALUATE SWALLOWING FUNCTION: CPT

## 2023-03-07 PROCEDURE — 97530 THERAPEUTIC ACTIVITIES: CPT

## 2023-03-07 PROCEDURE — 97110 THERAPEUTIC EXERCISES: CPT

## 2023-03-07 PROCEDURE — 93970 EXTREMITY STUDY: CPT

## 2023-03-07 PROCEDURE — 84484 ASSAY OF TROPONIN QUANT: CPT

## 2023-03-07 PROCEDURE — 74018 RADEX ABDOMEN 1 VIEW: CPT

## 2023-03-07 PROCEDURE — 93010 ELECTROCARDIOGRAM REPORT: CPT

## 2023-03-07 PROCEDURE — 36415 COLL VENOUS BLD VENIPUNCTURE: CPT

## 2023-03-07 PROCEDURE — 87635 SARS-COV-2 COVID-19 AMP PRB: CPT

## 2023-03-07 RX ORDER — METOPROLOL TARTRATE 50 MG
50 TABLET ORAL EVERY 12 HOURS
Refills: 0 | Status: DISCONTINUED | OUTPATIENT
Start: 2023-03-07 | End: 2023-03-07

## 2023-03-07 RX ORDER — COLCHICINE 0.6 MG
0.6 TABLET ORAL
Refills: 0 | Status: DISCONTINUED | OUTPATIENT
Start: 2023-03-07 | End: 2023-03-10

## 2023-03-07 RX ORDER — MIRTAZAPINE 45 MG/1
15 TABLET, ORALLY DISINTEGRATING ORAL AT BEDTIME
Refills: 0 | Status: DISCONTINUED | OUTPATIENT
Start: 2023-03-07 | End: 2023-03-10

## 2023-03-07 RX ORDER — METOPROLOL TARTRATE 50 MG
25 TABLET ORAL ONCE
Refills: 0 | Status: COMPLETED | OUTPATIENT
Start: 2023-03-07 | End: 2023-03-07

## 2023-03-07 RX ORDER — SODIUM CHLORIDE 9 MG/ML
1000 INJECTION, SOLUTION INTRAVENOUS
Refills: 0 | Status: DISCONTINUED | OUTPATIENT
Start: 2023-03-07 | End: 2023-03-07

## 2023-03-07 RX ORDER — METOPROLOL TARTRATE 50 MG
2.5 TABLET ORAL ONCE
Refills: 0 | Status: COMPLETED | OUTPATIENT
Start: 2023-03-07 | End: 2023-03-07

## 2023-03-07 RX ORDER — DIGOXIN 250 MCG
250 TABLET ORAL ONCE
Refills: 0 | Status: COMPLETED | OUTPATIENT
Start: 2023-03-07 | End: 2023-03-07

## 2023-03-07 RX ORDER — ESMOLOL HCL 100MG/10ML
50 VIAL (ML) INTRAVENOUS
Qty: 2500 | Refills: 0 | Status: DISCONTINUED | OUTPATIENT
Start: 2023-03-07 | End: 2023-03-07

## 2023-03-07 RX ORDER — ATORVASTATIN CALCIUM 80 MG/1
80 TABLET, FILM COATED ORAL AT BEDTIME
Refills: 0 | Status: DISCONTINUED | OUTPATIENT
Start: 2023-03-07 | End: 2023-03-10

## 2023-03-07 RX ORDER — MAGNESIUM SULFATE 500 MG/ML
2 VIAL (ML) INJECTION ONCE
Refills: 0 | Status: COMPLETED | OUTPATIENT
Start: 2023-03-07 | End: 2023-03-07

## 2023-03-07 RX ORDER — METOPROLOL TARTRATE 50 MG
5 TABLET ORAL ONCE
Refills: 0 | Status: COMPLETED | OUTPATIENT
Start: 2023-03-07 | End: 2023-03-07

## 2023-03-07 RX ORDER — SODIUM CHLORIDE 9 MG/ML
1000 INJECTION, SOLUTION INTRAVENOUS
Refills: 0 | Status: DISCONTINUED | OUTPATIENT
Start: 2023-03-07 | End: 2023-03-09

## 2023-03-07 RX ORDER — ASPIRIN/CALCIUM CARB/MAGNESIUM 324 MG
81 TABLET ORAL DAILY
Refills: 0 | Status: DISCONTINUED | OUTPATIENT
Start: 2023-03-07 | End: 2023-03-10

## 2023-03-07 RX ORDER — ONDANSETRON 8 MG/1
4 TABLET, FILM COATED ORAL EVERY 6 HOURS
Refills: 0 | Status: DISCONTINUED | OUTPATIENT
Start: 2023-03-07 | End: 2023-03-10

## 2023-03-07 RX ORDER — ACETAMINOPHEN 500 MG
650 TABLET ORAL EVERY 6 HOURS
Refills: 0 | Status: DISCONTINUED | OUTPATIENT
Start: 2023-03-07 | End: 2023-03-10

## 2023-03-07 RX ORDER — FAMOTIDINE 10 MG/ML
20 INJECTION INTRAVENOUS AT BEDTIME
Refills: 0 | Status: DISCONTINUED | OUTPATIENT
Start: 2023-03-07 | End: 2023-03-10

## 2023-03-07 RX ORDER — MIRTAZAPINE 45 MG/1
7.5 TABLET, ORALLY DISINTEGRATING ORAL AT BEDTIME
Refills: 0 | Status: DISCONTINUED | OUTPATIENT
Start: 2023-03-07 | End: 2023-03-07

## 2023-03-07 RX ORDER — PANTOPRAZOLE SODIUM 20 MG/1
40 TABLET, DELAYED RELEASE ORAL
Refills: 0 | Status: DISCONTINUED | OUTPATIENT
Start: 2023-03-08 | End: 2023-03-10

## 2023-03-07 RX ORDER — SENNA PLUS 8.6 MG/1
2 TABLET ORAL AT BEDTIME
Refills: 0 | Status: DISCONTINUED | OUTPATIENT
Start: 2023-03-07 | End: 2023-03-10

## 2023-03-07 RX ORDER — METOPROLOL TARTRATE 50 MG
2 TABLET ORAL
Qty: 120 | Refills: 0
Start: 2023-03-07 | End: 2023-04-05

## 2023-03-07 RX ORDER — CHLORHEXIDINE GLUCONATE 213 G/1000ML
1 SOLUTION TOPICAL
Refills: 0 | Status: DISCONTINUED | OUTPATIENT
Start: 2023-03-07 | End: 2023-03-10

## 2023-03-07 RX ORDER — METOPROLOL TARTRATE 50 MG
5 TABLET ORAL ONCE
Refills: 0 | Status: DISCONTINUED | OUTPATIENT
Start: 2023-03-07 | End: 2023-03-07

## 2023-03-07 RX ORDER — DIGOXIN 250 MCG
500 TABLET ORAL ONCE
Refills: 0 | Status: COMPLETED | OUTPATIENT
Start: 2023-03-07 | End: 2023-03-07

## 2023-03-07 RX ORDER — POLYETHYLENE GLYCOL 3350 17 G/17G
17 POWDER, FOR SOLUTION ORAL DAILY
Refills: 0 | Status: DISCONTINUED | OUTPATIENT
Start: 2023-03-07 | End: 2023-03-10

## 2023-03-07 RX ORDER — MEMANTINE HYDROCHLORIDE 10 MG/1
5 TABLET ORAL DAILY
Refills: 0 | Status: DISCONTINUED | OUTPATIENT
Start: 2023-03-07 | End: 2023-03-10

## 2023-03-07 RX ORDER — ESMOLOL HCL 100MG/10ML
25 VIAL (ML) INTRAVENOUS
Qty: 2500 | Refills: 0 | Status: DISCONTINUED | OUTPATIENT
Start: 2023-03-07 | End: 2023-03-08

## 2023-03-07 RX ORDER — DIGOXIN 250 MCG
125 TABLET ORAL ONCE
Refills: 0 | Status: COMPLETED | OUTPATIENT
Start: 2023-03-07 | End: 2023-03-07

## 2023-03-07 RX ORDER — MAGNESIUM OXIDE 400 MG ORAL TABLET 241.3 MG
400 TABLET ORAL
Refills: 0 | Status: DISCONTINUED | OUTPATIENT
Start: 2023-03-07 | End: 2023-03-10

## 2023-03-07 RX ORDER — MIDODRINE HYDROCHLORIDE 2.5 MG/1
5 TABLET ORAL ONCE
Refills: 0 | Status: COMPLETED | OUTPATIENT
Start: 2023-03-07 | End: 2023-03-07

## 2023-03-07 RX ORDER — PANTOPRAZOLE SODIUM 20 MG/1
40 TABLET, DELAYED RELEASE ORAL ONCE
Refills: 0 | Status: COMPLETED | OUTPATIENT
Start: 2023-03-07 | End: 2023-03-07

## 2023-03-07 RX ORDER — LEVOTHYROXINE SODIUM 125 MCG
50 TABLET ORAL DAILY
Refills: 0 | Status: DISCONTINUED | OUTPATIENT
Start: 2023-03-07 | End: 2023-03-10

## 2023-03-07 RX ORDER — APIXABAN 2.5 MG/1
5 TABLET, FILM COATED ORAL EVERY 12 HOURS
Refills: 0 | Status: DISCONTINUED | OUTPATIENT
Start: 2023-03-07 | End: 2023-03-10

## 2023-03-07 RX ORDER — DILTIAZEM HCL 120 MG
30 CAPSULE, EXT RELEASE 24 HR ORAL
Refills: 0 | Status: DISCONTINUED | OUTPATIENT
Start: 2023-03-07 | End: 2023-03-07

## 2023-03-07 RX ADMIN — ATORVASTATIN CALCIUM 80 MILLIGRAM(S): 80 TABLET, FILM COATED ORAL at 21:18

## 2023-03-07 RX ADMIN — SODIUM CHLORIDE 75 MILLILITER(S): 9 INJECTION, SOLUTION INTRAVENOUS at 20:20

## 2023-03-07 RX ADMIN — MAGNESIUM OXIDE 400 MG ORAL TABLET 400 MILLIGRAM(S): 241.3 TABLET ORAL at 17:15

## 2023-03-07 RX ADMIN — FAMOTIDINE 20 MILLIGRAM(S): 10 INJECTION INTRAVENOUS at 21:18

## 2023-03-07 RX ADMIN — Medication 125 MICROGRAM(S): at 12:25

## 2023-03-07 RX ADMIN — MAGNESIUM OXIDE 400 MG ORAL TABLET 400 MILLIGRAM(S): 241.3 TABLET ORAL at 12:37

## 2023-03-07 RX ADMIN — Medication 16.8 MICROGRAM(S)/KG/MIN: at 15:11

## 2023-03-07 RX ADMIN — Medication 0.6 MILLIGRAM(S): at 17:15

## 2023-03-07 RX ADMIN — Medication 2.5 MILLIGRAM(S): at 15:00

## 2023-03-07 RX ADMIN — ONDANSETRON 4 MILLIGRAM(S): 8 TABLET, FILM COATED ORAL at 11:54

## 2023-03-07 RX ADMIN — Medication 25 GRAM(S): at 17:23

## 2023-03-07 RX ADMIN — Medication 0.6 MILLIGRAM(S): at 07:55

## 2023-03-07 RX ADMIN — Medication 30 MILLIGRAM(S): at 07:54

## 2023-03-07 RX ADMIN — Medication 25 GRAM(S): at 20:06

## 2023-03-07 RX ADMIN — ONDANSETRON 4 MILLIGRAM(S): 8 TABLET, FILM COATED ORAL at 04:32

## 2023-03-07 RX ADMIN — Medication 500 MICROGRAM(S): at 13:27

## 2023-03-07 RX ADMIN — Medication 5 MILLIGRAM(S): at 08:33

## 2023-03-07 RX ADMIN — PANTOPRAZOLE SODIUM 40 MILLIGRAM(S): 20 TABLET, DELAYED RELEASE ORAL at 06:12

## 2023-03-07 RX ADMIN — MIDODRINE HYDROCHLORIDE 5 MILLIGRAM(S): 2.5 TABLET ORAL at 17:15

## 2023-03-07 RX ADMIN — APIXABAN 5 MILLIGRAM(S): 2.5 TABLET, FILM COATED ORAL at 17:18

## 2023-03-07 RX ADMIN — Medication 50 MICROGRAM(S): at 07:54

## 2023-03-07 RX ADMIN — Medication 250 MICROGRAM(S): at 20:06

## 2023-03-07 RX ADMIN — SENNA PLUS 2 TABLET(S): 8.6 TABLET ORAL at 21:18

## 2023-03-07 RX ADMIN — MIRTAZAPINE 15 MILLIGRAM(S): 45 TABLET, ORALLY DISINTEGRATING ORAL at 21:18

## 2023-03-07 RX ADMIN — MAGNESIUM OXIDE 400 MG ORAL TABLET 400 MILLIGRAM(S): 241.3 TABLET ORAL at 07:54

## 2023-03-07 RX ADMIN — APIXABAN 5 MILLIGRAM(S): 2.5 TABLET, FILM COATED ORAL at 07:54

## 2023-03-07 RX ADMIN — Medication 81 MILLIGRAM(S): at 12:36

## 2023-03-07 RX ADMIN — Medication 25 MILLIGRAM(S): at 04:00

## 2023-03-07 RX ADMIN — MEMANTINE HYDROCHLORIDE 5 MILLIGRAM(S): 10 TABLET ORAL at 12:36

## 2023-03-07 NOTE — H&P ADULT - NSICDXPASTMEDICALHX_GEN_ALL_CORE_FT
PAST MEDICAL HISTORY:  CVA (cerebrovascular accident)     High cholesterol     Hypothyroidism     Paroxysmal atrial fibrillation

## 2023-03-07 NOTE — PROVIDER CONTACT NOTE (CHANGE IN STATUS NOTIFICATION) - ASSESSMENT
No SOB, dizziness, difficulty breathing, or headache upon assessment.
PT complained of chest pain, SOB

## 2023-03-07 NOTE — CONSULT NOTE ADULT - SUBJECTIVE AND OBJECTIVE BOX
ORTIZ PATEL  [id number]      HPI:    Ortiz Patel is a 73 year old woman with past medical history of Atrial fibrillation (on Eliquis), Pericarditis, Normal coronaries (1/2023 cath), Cerebral aneurysm (s/p coiling), Hypothyroidism with recent hospitalization at [facility] this month for acute left MCA territory ischemia, was admitted to [facility] and now transferred to telemetry due to episode of chest pain and atrial fibrillation with RVR.      ALLERGIES:  No Known Allergies      PAST MEDICAL & SURGICAL HISTORY:  High cholesterol  Hypothyroidism  CVA (cerebrovascular accident)  Paroxysmal atrial fibrillation  H/O knee surgery  Brain aneurysm      CURRENT MEDICATIONS:  acetaminophen     Tablet .. 650 milliGRAM(s) Oral every 6 hours PRN  apixaban 5 milliGRAM(s) Oral every 12 hours  aspirin  chewable 81 milliGRAM(s) Oral daily  atorvastatin 80 milliGRAM(s) Oral at bedtime  colchicine 0.6 milliGRAM(s) Oral two times a day  famotidine    Tablet 20 milliGRAM(s) Oral at bedtime  lactated ringers. 1000 milliLiter(s) IV Continuous <Continuous>  levothyroxine 50 MICROGram(s) Oral daily  magnesium oxide 400 milliGRAM(s) Oral three times a day with meals  memantine 5 milliGRAM(s) Oral daily  metoprolol tartrate 50 milliGRAM(s) Oral every 12 hours  mirtazapine 7.5 milliGRAM(s) Oral at bedtime  ondansetron Injectable 4 milliGRAM(s) IV Push every 6 hours PRN  polyethylene glycol 3350 17 Gram(s) Oral daily  predniSONE   Tablet 30 milliGRAM(s) Oral daily  senna 2 Tablet(s) Oral at bedtime    ROS:  All 10 systems reviewed and positives noted in HPI    OBJECTIVE:    VITAL SIGNS:  Vital Signs Last 24 Hrs  T(C): 36.6 (07 Mar 2023 05:06), Max: 36.6 (07 Mar 2023 05:06)  T(F): 97.9 (07 Mar 2023 05:06), Max: 97.9 (07 Mar 2023 05:06)  HR: 134 (07 Mar 2023 08:39) (77 - 137)  BP: 115/78 (07 Mar 2023 08:39) (82/52 - 157/81)  BP(mean): --  RR: 17 (07 Mar 2023 07:50) (16 - 17)  SpO2: 92% (07 Mar 2023 07:50) (92% - 97%)    Parameters below as of 07 Mar 2023 07:50  Patient On (Oxygen Delivery Method): nasal cannula  O2 Flow (L/min): 3      PHYSICAL EXAM:  General: well appearing, no distress  HEENT: sclera anicteric  Neck: supple, no carotid bruits b/l  CVS: JVP ~ 7 cm H20, RRR, s1, s2, no murmurs/rubs/gallops  Chest: unlabored respirations, clear to auscultation b/l  Abdomen: non-distended  Extremities: no lower extremity edema b/l  Neuro: awake, alert & oriented x 3  Psych: normal affect      LABS:                        11.6   8.27  )-----------( 381      ( 07 Mar 2023 04:15 )             36.6     03-07    138  |  102  |  10  ----------------------------<  99  3.7   |  25  |  0.63    Ca    8.3<L>      07 Mar 2023 04:15  Mg     1.6     03-07    TPro  6.5  /  Alb  2.4<L>  /  TBili  0.5  /  DBili  x   /  AST  13  /  ALT  20  /  AlkPhos  76  03-07      Coronary angiogram (1/30/23):  LM: normal  LAD: normal   LCx: normal   RCA: normal     Diagnostic Conclusions:   Normal coronary arteries   Normal LV EDP   Moderately depressed LVEF with basal to mid inferior hypokinesis.   Her presentation is more likely percarditis in the setting of COVID  infection, and/or takasubu in setting of rapid Afib    TTE (2/23/23):  LVEF 50-55%  Normal RV size and function  MIld TR    ECG (3/7/23): atrial fibrillation with RVR, nonspecific ST abnormalities (similar to prior ECG)       ORTIZ PATEL  [id number]      HPI:    Ortiz Patel is a 73 year old woman with past medical history of Atrial fibrillation (on Eliquis), Pericarditis, Normal coronaries (1/2023 cath), Cerebral aneurysm (s/p coiling), Hypothyroidism with recent hospitalization at [facility] this month for acute left MCA territory ischemia, was admitted to [facility] and now transferred to telemetry due to episode of chest pain and atrial fibrillation with RVR.    ALLERGIES:  No Known Allergies      PAST MEDICAL & SURGICAL HISTORY:  High cholesterol  Hypothyroidism  CVA (cerebrovascular accident)  Paroxysmal atrial fibrillation  H/O knee surgery  Brain aneurysm      CURRENT MEDICATIONS:  acetaminophen     Tablet .. 650 milliGRAM(s) Oral every 6 hours PRN  apixaban 5 milliGRAM(s) Oral every 12 hours  aspirin  chewable 81 milliGRAM(s) Oral daily  atorvastatin 80 milliGRAM(s) Oral at bedtime  colchicine 0.6 milliGRAM(s) Oral two times a day  famotidine    Tablet 20 milliGRAM(s) Oral at bedtime  lactated ringers. 1000 milliLiter(s) IV Continuous <Continuous>  levothyroxine 50 MICROGram(s) Oral daily  magnesium oxide 400 milliGRAM(s) Oral three times a day with meals  memantine 5 milliGRAM(s) Oral daily  metoprolol tartrate 50 milliGRAM(s) Oral every 12 hours  mirtazapine 7.5 milliGRAM(s) Oral at bedtime  ondansetron Injectable 4 milliGRAM(s) IV Push every 6 hours PRN  polyethylene glycol 3350 17 Gram(s) Oral daily  predniSONE   Tablet 30 milliGRAM(s) Oral daily  senna 2 Tablet(s) Oral at bedtime    ROS:  All 10 systems reviewed and positives noted in HPI    OBJECTIVE:    VITAL SIGNS:  Vital Signs Last 24 Hrs  T(C): 36.6 (07 Mar 2023 05:06), Max: 36.6 (07 Mar 2023 05:06)  T(F): 97.9 (07 Mar 2023 05:06), Max: 97.9 (07 Mar 2023 05:06)  HR: 134 (07 Mar 2023 08:39) (77 - 137)  BP: 115/78 (07 Mar 2023 08:39) (82/52 - 157/81)  BP(mean): --  RR: 17 (07 Mar 2023 07:50) (16 - 17)  SpO2: 92% (07 Mar 2023 07:50) (92% - 97%)    Parameters below as of 07 Mar 2023 07:50  Patient On (Oxygen Delivery Method): nasal cannula  O2 Flow (L/min): 3      PHYSICAL EXAM:  General: mild anxiety   HEENT: sclera anicteric  Neck: supple  CVS: JVP ~ 7 cm H20, RRR, s1, s2, no murmurs/rubs/gallops  Chest: unlabored respirations, clear to auscultation b/l  Abdomen: non-distended  Extremities: no lower extremity edema b/l  Neuro: awake, alert & oriented   Psych: normal affect      LABS:                        11.6   8.27  )-----------( 381      ( 07 Mar 2023 04:15 )             36.6     03-07    138  |  102  |  10  ----------------------------<  99  3.7   |  25  |  0.63    Ca    8.3<L>      07 Mar 2023 04:15  Mg     1.6     03-07    TPro  6.5  /  Alb  2.4<L>  /  TBili  0.5  /  DBili  x   /  AST  13  /  ALT  20  /  AlkPhos  76  03-07      Coronary angiogram (1/30/23):  LM: normal  LAD: normal   LCx: normal   RCA: normal     Diagnostic Conclusions:   Normal coronary arteries   Normal LV EDP   Moderately depressed LVEF with basal to mid inferior hypokinesis.   Her presentation is more likely percarditis in the setting of COVID  infection, and/or takasubu in setting of rapid Afib    TTE (2/23/23):  LVEF 50-55%  Normal RV size and function  MIld TR    ECG (3/7/23): atrial fibrillation with RVR, nonspecific ST abnormalities (similar to prior ECG)       ORTIZ PATEL  [id number]      HPI:    Ortiz Patel is a 73 year old woman with past medical history of Atrial fibrillation (on Eliquis), Pericarditis with pericardial effusion, Normal coronaries (1/2023 cath), Cerebral aneurysm (s/p coiling), Hypothyroidism with recent hospitalization at [facility] this month for acute left MCA territory ischemia, was admitted to [facility] and now transferred to telemetry due to episode of chest pain and atrial fibrillation with RVR.    ALLERGIES:  No Known Allergies      PAST MEDICAL & SURGICAL HISTORY:  High cholesterol  Hypothyroidism  CVA (cerebrovascular accident)  Paroxysmal atrial fibrillation  H/O knee surgery  Brain aneurysm      CURRENT MEDICATIONS:  acetaminophen     Tablet .. 650 milliGRAM(s) Oral every 6 hours PRN  apixaban 5 milliGRAM(s) Oral every 12 hours  aspirin  chewable 81 milliGRAM(s) Oral daily  atorvastatin 80 milliGRAM(s) Oral at bedtime  colchicine 0.6 milliGRAM(s) Oral two times a day  famotidine    Tablet 20 milliGRAM(s) Oral at bedtime  lactated ringers. 1000 milliLiter(s) IV Continuous <Continuous>  levothyroxine 50 MICROGram(s) Oral daily  magnesium oxide 400 milliGRAM(s) Oral three times a day with meals  memantine 5 milliGRAM(s) Oral daily  metoprolol tartrate 50 milliGRAM(s) Oral every 12 hours  mirtazapine 7.5 milliGRAM(s) Oral at bedtime  ondansetron Injectable 4 milliGRAM(s) IV Push every 6 hours PRN  polyethylene glycol 3350 17 Gram(s) Oral daily  predniSONE   Tablet 30 milliGRAM(s) Oral daily  senna 2 Tablet(s) Oral at bedtime    ROS:  All 10 systems reviewed and positives noted in HPI    OBJECTIVE:    VITAL SIGNS:  Vital Signs Last 24 Hrs  T(C): 36.6 (07 Mar 2023 05:06), Max: 36.6 (07 Mar 2023 05:06)  T(F): 97.9 (07 Mar 2023 05:06), Max: 97.9 (07 Mar 2023 05:06)  HR: 134 (07 Mar 2023 08:39) (77 - 137)  BP: 115/78 (07 Mar 2023 08:39) (82/52 - 157/81)  BP(mean): --  RR: 17 (07 Mar 2023 07:50) (16 - 17)  SpO2: 92% (07 Mar 2023 07:50) (92% - 97%)    Parameters below as of 07 Mar 2023 07:50  Patient On (Oxygen Delivery Method): nasal cannula  O2 Flow (L/min): 3      PHYSICAL EXAM:  General: mild anxiety   HEENT: sclera anicteric  Neck: supple  CVS: JVP ~ 7 cm H20, RRR, s1, s2, no murmurs/rubs/gallops  Chest: unlabored respirations, clear to auscultation b/l  Abdomen: non-distended  Extremities: no lower extremity edema b/l  Neuro: awake, alert & oriented   Psych: normal affect      LABS:                        11.6   8.27  )-----------( 381      ( 07 Mar 2023 04:15 )             36.6     03-07    138  |  102  |  10  ----------------------------<  99  3.7   |  25  |  0.63    Ca    8.3<L>      07 Mar 2023 04:15  Mg     1.6     03-07    TPro  6.5  /  Alb  2.4<L>  /  TBili  0.5  /  DBili  x   /  AST  13  /  ALT  20  /  AlkPhos  76  03-07      Coronary angiogram (1/30/23):  LM: normal  LAD: normal   LCx: normal   RCA: normal     Diagnostic Conclusions:   Normal coronary arteries   Normal LV EDP   Moderately depressed LVEF with basal to mid inferior hypokinesis.   Her presentation is more likely percarditis in the setting of COVID  infection, and/or takasubu in setting of rapid Afib    TTE (2/18/23):  LVEF 50-55%  Normal RV size and function  Moderate TR  Small to moderate size circumferential pericardial effusion (measures   up to 1.3 cm) with fibrinous thickened layering over the RV. No   echocardiographic evidence of tamponade.  Compared to the prior TTE study from 1/30/23, new pericardial   effusion noted. Findings reported to Dr. Flynn.      TTE (2/23/23):  LVEF 50-55%  Normal RV size and function  MIld TR    ECG (3/7/23): atrial fibrillation with RVR, nonspecific ST abnormalities (similar to prior ECG)

## 2023-03-07 NOTE — CONSULT NOTE ADULT - ASSESSMENT
73 year old woman with past medical history of Atrial fibrillation (on Eliquis), Pericarditis with pericardial effusion, Normal coronaries (1/2023 cath), Cerebral aneurysm (s/p coiling), Hypothyroidism with recent hospitalization at [facility] this month for acute left MCA territory ischemia presenting to ICU for:      Issues:  atrial fibrillation with RVR  mildly reduced EF, 40-45%  pericarditis   hyperglycemia  hypothyroidism  anxiety    PMH CVA, afib, pericarditis with pericardial effusion, hypothyroid, anxiety      Plan:  --admit to ICU, monitor tele rhythm, neuro status, o2 sat  --cont esmolol gtt, titrate to HR ~120; transition with PO metoprolol; s/p "dig load" initiation today will complete this evening  --supp o2 via nc prn, goal sat approx between 92-98%; d/c if not needed / wean   --tte with mildly reduced ef from prior baseline study  --continue eliquis at current dosage + daily asa  --cardiology consult ongoing, appreciate their recc's  --colchicine and prednisone taper for tx of pericarditis  --dash diet as tolerated, monitor blood glucose levels; add ISS if needed  --fup labs, aggressive electrolyte repletion goal K 4, mg 2, Phos 3; will trend bnp   --cont statin for post stroke inflammatory processes  --pt / ot   --psych eval for anxiety, ongoing; doubt major contributing factor at this time  --synthroid as dosed, tsh and t4 wnl  --pt / family education / emotional support       73 year old woman with past medical history of Atrial fibrillation (on Eliquis), Pericarditis with pericardial effusion, Normal coronaries (1/2023 cath), Cerebral aneurysm (s/p coiling), Hypothyroidism with recent hospitalization at [facility] this month for acute left MCA territory ischemia presenting to ICU for:      Issues:  atrial fibrillation with RVR  mildly reduced EF, 40-45%  pericarditis on prednisone and cholchecine  hyperglycemia  hypothyroidism  anxiety    PMH CVA, afib, pericarditis with pericardial effusion, hypothyroid, anxiety      Plan:  --admit to ICU, monitor tele rhythm, neuro status, o2 sat  --cont esmolol gtt, titrate to HR ~120; transition with PO metoprolol; s/p "dig load" initiation today will complete this evening  --supp o2 via nc prn, goal sat approx between 92-98%; d/c if not needed / wean   --tte with mildly reduced ef from prior baseline study  --continue eliquis at current dosage + daily asa  --cardiology consult ongoing, appreciate their recc's  --colchicine and prednisone taper for tx of pericarditis  --dash diet as tolerated, monitor blood glucose levels; add ISS if needed  --fup labs, aggressive electrolyte repletion goal K 4, mg 2, Phos 3; will trend bnp   --cont statin for post stroke inflammatory processes  --pt / ot   --psych eval for anxiety, ongoing; doubt major contributing factor at this time  --synthroid as dosed, tsh and t4 wnl  --pt / family education / emotional support

## 2023-03-07 NOTE — PATIENT PROFILE ADULT - FALL HARM RISK - HARM RISK INTERVENTIONS

## 2023-03-07 NOTE — BH CONSULTATION LIAISON ASSESSMENT NOTE - MSE UNSTRUCTURED FT
Awake, dressed in hospital gown. Pt is lethargic, nauseas, c/o abdominal pain. Asked writer to come back. Rest of MSE unable to be assessed.

## 2023-03-07 NOTE — BH CONSULTATION LIAISON ASSESSMENT NOTE - NSBHCHARTREVIEWVS_PSY_A_CORE FT
Vital Signs Last 24 Hrs  T(C): 36.6 (07 Mar 2023 13:06), Max: 36.6 (07 Mar 2023 05:06)  T(F): 97.8 (07 Mar 2023 13:06), Max: 97.9 (07 Mar 2023 05:06)  HR: 103 (07 Mar 2023 16:00) (77 - 152)  BP: 88/35 (07 Mar 2023 16:00) (82/52 - 157/81)  BP(mean): 52 (07 Mar 2023 16:00) (52 - 118)  RR: 21 (07 Mar 2023 16:00) (16 - 27)  SpO2: 96% (07 Mar 2023 16:00) (91% - 97%)    Parameters below as of 07 Mar 2023 16:00  Patient On (Oxygen Delivery Method): nasal cannula  O2 Flow (L/min): 2

## 2023-03-07 NOTE — PROGRESS NOTE ADULT - ASSESSMENT
72 year old F PMH hypothyroidism, cerebral aneurysm s/p coiling, pericarditis possibly related to non-covid coronaviral illness (recently admitted 1/30/23-2/1/23), afib RVR presented to [facility] on 2/16 for afib with RVR, stroke called on 2/22 and found to have nonhemorrhagic acute ischemia in the left MCA territory now admitted to  rehab for ADL impairment.    #Chest Pain  #Afib with RVR  #Paroxysmal Atrial fibrillation  - Troponin negative x 3  - BP been running low, but elevated this morning with systolic in 150s  - Cardio consulted- Dr Kohler, continue Metoprolol tartrate 50 mg PO BID & Dose Diltiazem 30 mg PO BID  - Patient's Metoprolol tartrate was previously decreased to 25mg BID 3/5 and cardizem was discontinued daily..... which may be source of symptoms  - continue with eliquis   - F/U limited echo    # H/o Left MCA infarct   - continue with eliquis 5mg PO BID and ASA 81mg PO QD   - continue atorvastatin 80mg qhs    #Acute pericarditis with pericardial effusion without tamponade physiology  - prednisone taper-> decrease 10mg a week over 4 weeks; currently on 30mg daily ( started on 3/4); will decrease to 20mg on 3/11  - continue with colchicine 0.6mg BID  - cardio f/u post-dc    #Hypothyroidism   - continue synthroid 50mcg QD     # HypoK-resolved  - improved    # hypomagnesemia-resolved  - repleted.     #Nutrition  - One liter NS bolus given  yesterday  - Will see if we can give her supplements to help with caloric intake  - ON remeron 7.5mg qhs  - Other options would be to add megace?    #DVT prophylaxis:  - on eliquis  - Last doppler on 1/30/23 - No evidence of DVT     Updated son today 72 year old F PMH hypothyroidism, cerebral aneurysm s/p coiling, pericarditis possibly related to non-covid coronaviral illness (recently admitted 1/30/23-2/1/23), afib RVR presented to [facility] on 2/16 for afib with RVR, stroke called on 2/22 and found to have nonhemorrhagic acute ischemia in the left MCA territory now admitted to  rehab for ADL impairment.    #Chest Pain  #Afib with RVR  #Paroxysmal Atrial fibrillation  - Troponin negative x 3  - BP been running low, but elevated this morning with systolic in 150s and was able to receive 1 dose of 5mg  IV Lopressor  - Cardio consulted- Dr Kohler, continue Metoprolol tartrate 50 mg PO BID & Dose Diltiazem 30 mg PO BID  - Patient's Metoprolol tartrate was previously decreased to 25mg BID 3/5 and cardizem was discontinued daily..... which may be source of symptoms  - continue with eliquis   - F/U limited echo    #Anxiety, possible depression  -Patient has not been eating much  -Family members state she has been feeling anxious which may be contributing to her afib  -Psychiatry consul    #Nutrition  - One liter NS bolus given yesterday  - Will see if we can give her supplements to help with caloric intake  - ON remeron 7.5mg qhs  - Other options would be to add megace?    # H/o Left MCA infarct   - continue with eliquis 5mg PO BID and ASA 81mg PO QD   - continue atorvastatin 80mg qhs    #Acute pericarditis with pericardial effusion without tamponade physiology  - prednisone taper-> decrease 10mg a week over 4 weeks; currently on 30mg daily ( started on 3/4); will decrease to 20mg on 3/11  - continue with colchicine 0.6mg BID  - cardio f/u post-dc    #Hypothyroidism   - continue synthroid 50mcg QD     # HypoK-resolved  - improved    # hypomagnesemia-resolved  - repleted.     #DVT prophylaxis:  - on eliquis  - Last doppler on 1/30/23 - No evidence of DVT     Updated son today, also updated brother in law Ari NP at bedside 72 year old F PMH hypothyroidism, cerebral aneurysm s/p coiling, pericarditis possibly related to non-covid coronaviral illness (recently admitted 1/30/23-2/1/23), afib RVR presented to [facility] on 2/16 for afib with RVR, stroke called on 2/22 and found to have nonhemorrhagic acute ischemia in the left MCA territory now admitted to  rehab for ADL impairment.    #Chest Pain  #Afib with RVR  #Paroxysmal Atrial fibrillation  - Troponin negative x 3  - BP been running low, but elevated this morning with systolic in 150s and was able to receive 1 dose of 5mg  IV Lopressor  - Cardio consulted- Dr Kohler, continue Metoprolol tartrate 50 mg PO BID & Dose Diltiazem 30 mg PO BID  - Patient's Metoprolol tartrate was previously decreased to 25mg BID 3/5 and cardizem was discontinued daily..... which may be source of symptoms  - continue with eliquis   - F/U limited echo    #Anxiety, possible depression  -Patient has not been eating much  -Family members state she has been feeling anxious which may be contributing to her afib  -Psychiatry consul    #Nutrition  - One liter NS bolus given yesterday  - Will see if we can give her supplements to help with caloric intake  - ON remeron 7.5mg qhs  - Other options would be to add megace?    # H/o Left MCA infarct   - continue with eliquis 5mg PO BID and ASA 81mg PO QD   - continue atorvastatin 80mg qhs    #Acute pericarditis with pericardial effusion without tamponade physiology  - prednisone taper-> decrease 10mg a week over 4 weeks; currently on 30mg daily ( started on 3/4); will decrease to 20mg on 3/11  - continue with colchicine 0.6mg BID  - cardio f/u post-dc    #Hypothyroidism   - continue synthroid 50mcg QD     # HypoK-resolved  - improved    # hypomagnesemia-resolved  - repleted.     #DVT prophylaxis:  - on eliquis  - Last doppler on 1/30/23 - No evidence of DVT     Updated son today, also updated brother in law Ari NP at bedside    Addendum__________  -Patient was in rapid afib with HR in 160s-170s and hypotensive with bp 96/67, repeat 89/67. Patient was given 1 dose of IV push digoxin 0.125mg. 30 min later, patient HR still sustaining in 160s. Discussed with Dr. Kohler, started dig load. Will continue to monitor patient. 72 year old F PMH hypothyroidism, cerebral aneurysm s/p coiling, pericarditis possibly related to non-covid coronaviral illness (recently admitted 1/30/23-2/1/23), afib RVR presented to [facility] on 2/16 for afib with RVR, stroke called on 2/22 and found to have nonhemorrhagic acute ischemia in the left MCA territory now admitted to  rehab for ADL impairment.    #Chest Pain  #Afib with RVR  #Paroxysmal Atrial fibrillation  - Troponin negative x 3  - BP been running low, but elevated this morning with systolic in 150s and was able to receive 1 dose of 5mg  IV Lopressor  - Cardio consulted- Dr Kohler, continue Metoprolol tartrate 50 mg PO BID & Dose Diltiazem 30 mg PO BID  - Patient's Metoprolol tartrate was previously decreased to 25mg BID 3/5 and cardizem was discontinued daily..... which may be source of symptoms  - continue with eliquis   - F/U limited echo    #Anxiety, possible depression  -Patient has not been eating much  -Family members state she has been feeling anxious which may be contributing to her afib  -Psychiatry consult    #Nutrition  - One liter NS bolus given yesterday  - Will see if we can give her supplements to help with caloric intake  - ON remeron 7.5mg qhs  - Other options would be to add megace?    # H/o Left MCA infarct   - continue with eliquis 5mg PO BID and ASA 81mg PO QD   - continue atorvastatin 80mg qhs    #Acute pericarditis with pericardial effusion without tamponade physiology  - prednisone taper-> decrease 10mg a week over 4 weeks; currently on 30mg daily ( started on 3/4); will decrease to 20mg on 3/11  - continue with colchicine 0.6mg BID  - cardio f/u post-dc    #Hypothyroidism   - continue synthroid 50mcg QD     # HypoK-resolved  - improved    # hypomagnesemia-resolved  - repleted.     #DVT prophylaxis:  - on eliquis  - Last doppler on 1/30/23 - No evidence of DVT     Updated son today, also updated brother in law Ari NP at bedside    Addendum__________  -Patient was in rapid afib with HR in 160s-170s and hypotensive with bp 96/67, repeat 89/67. Patient was given 1 dose of IV push digoxin 0.125mg. 30 min later, patient HR still sustaining in 160s. Discussed with Dr. Kohler, started dig load. Will continue to monitor patient.

## 2023-03-07 NOTE — PROVIDER CONTACT NOTE (CHANGE IN STATUS NOTIFICATION) - BACKGROUND
CVA, Hyperlipidemia , Hypothyroidism
Pt has h/x of L CVA, a fib, atrial flutter, and pericarditis. Complained of chest pain. No SOB, dizziness, difficulty breathing, or headache upon assessment.

## 2023-03-07 NOTE — CONSULT NOTE ADULT - ASSESSMENT
Assessment:  Ortiz Garza is a 73 year old woman with past medical history of Atrial fibrillation (on Eliquis), Pericarditis, Normal coronaries (1/2023 cath), Cerebral aneurysm (s/p coiling), Hypothyroidism with recent hospitalization at [facility] this month for acute left MCA territory ischemia, was admitted to [facility] and now transferred to telemetry due to episode of chest pain and atrial fibrillation with RVR.   Assessment:  Ortiz Garza is a 73 year old woman with past medical history of Atrial fibrillation (on Eliquis), Pericarditis, Normal coronaries (1/2023 cath), Cerebral aneurysm (s/p coiling), Hypothyroidism with recent hospitalization at [facility] this month for acute left MCA territory ischemia, was admitted to [facility] and now transferred to telemetry due to episode of chest pain and atrial fibrillation with RVR.    ECG consistent with atrial fibrillation with RVR and nonspecific ST abnormalities, troponins are negative and recent normal coronary angiogram makes this not consistent with an acute coronary syndrome. Recent echo report with normal LVEF 50-55% and normal RV size and function. Telemetry with episodes of atrial fibrillation with RVR, but overall sinus rhythm now. Appears patient's AV nakita blockers were held intermittently.    Recommendations:  [] Atrial fibrillation with RVR: Now in sinus rhythm, continue Metoprolol tartrate 50 mg PO BID & Dose Diltiazem 30 mg PO BID. Continue Eliquis for stroke prophylaxis. Continue to monitor on telemetry. Check limited echo to ensure no pericardial effusion given history.   [] Pericarditis: Continue Colchicine and Prednisone, patient with episodes of chest pain likely due to this.     Discussed with patient's son over the phone. We will continue to follow along.    Vamshi Kohler MD  Cardiology      Assessment:  Ortiz Garza is a 73 year old woman with past medical history of Atrial fibrillation (on Eliquis), Pericarditis with pericardial effusion, Normal coronaries (1/2023 cath), Cerebral aneurysm (s/p coiling), Hypothyroidism with recent hospitalization at [facility] this month for acute left MCA territory ischemia, was admitted to [facility] and now transferred to telemetry due to episode of chest pain and atrial fibrillation with RVR.    ECG consistent with atrial fibrillation with RVR and nonspecific ST abnormalities, troponins are negative and recent normal coronary angiogram makes this not consistent with an acute coronary syndrome. Recent echo report with normal LVEF 50-55% and normal RV size and function, prior echos at [facility] there was a small-to-moderate sized pericardial effusion. Telemetry with episodes of atrial fibrillation with RVR, but overall sinus rhythm now. Appears patient's AV nakita blockers were held intermittently.    Recommendations:  [] Atrial fibrillation with RVR: Now in sinus rhythm, continue Metoprolol tartrate 50 mg PO BID & Dose Diltiazem 30 mg PO BID. Continue Eliquis for stroke prophylaxis. Continue to monitor on telemetry. Check limited echo to ensure no pericardial effusion given history.   [] Pericarditis: Continue Colchicine and Prednisone, patient with episodes of chest pain likely due to this.     Discussed with patient's son over the phone. We will continue to follow along.    Vamshi Kohler MD  Cardiology

## 2023-03-07 NOTE — H&P ADULT - HISTORY OF PRESENT ILLNESS
[facility] 72[facility], s/p CVA, now c/o chest pain, nausea, and is in rapid afib to 150's. Chart reviewed and apparently Cardizem CD 120mg d/vicky on 03/05/23 because of soft BP's. Early this AM, pt started c/o chest tightness, found to be tachycardic to 170's.  EKG done showed afib rate of 160/min.  Pt was given a dose of Metoprolol 25 mg po x 1, HR decreased to 110's-130's, however, pt continued to c/o chest pain, then started having nausea and vomited scant mucus x 1. Pt is transferred to telemetry for closer cardiac monitoring and management.   Ms. Garza has HTN, PAF, hypothyroidism, hx of cerebral aneurysm s/p coiling, recent hospitalization 01/20-02/01/23 for pericarditis possibly related non-covid coronaviral illness, then readmitted at [facility] for left sided chest pain on 02/16/23, found to be in afib with RVR.  During that hospitalization, pt had aphasia 02/22/23, stroke code called, CTA head showed nonhemorrhagic acute ischemia in the left MCA territory. Patient did not qualify for TNK due to last known sx was well over 4.5 hours. Patient was not a thrombectomy candidate as pt's thrombus is in M3 branch which is too distal to intervene on.        [facility] 72[facility], s/p CVA, now c/o chest pain, nausea, and is in rapid afib to 150's. Chart reviewed and apparently Cardizem CD 120mg held 03/04 then d/vicky on 03/05/23, Metoprolol was also decreased to 25 mg (from 50 mg) twice daily because of soft BP's. Early this AM, pt started c/o chest tightness, found to be tachycardic to 170's.  EKG done showed afib rate of 160/min.  Pt was given a dose of Metoprolol 25 mg po x 1, HR decreased to 110's-130's, however, pt continued to c/o chest pain, then started having nausea and vomited scant mucus x 1. Pt is transferred to telemetry for closer cardiac monitoring and management.   Ms. Garza has HTN, PAF on Eliquis, hypothyroidism, hx of cerebral aneurysm s/p coiling, recent hospitalization 01/20-02/01/23 for pericarditis possibly related non-covid coronaviral illness, then readmitted at [facility] for left sided chest pain on 02/16/23, found to be in afib with RVR.  During that hospitalization, pt had aphasia 02/22/23, stroke code called, CTA head showed nonhemorrhagic acute ischemia in the left MCA territory. Patient did not qualify for TNK due to last known sx was well over 4.5 hours. Patient was not a thrombectomy candidate as pt's thrombus is in M3 branch which is too distal to intervene on.        [facility] 72[facility], s/p CVA, now c/o chest pain, nausea, and is in rapid afib to 150's. Chart reviewed and apparently Cardizem CD 120mg held 03/04 then d/vicky on 03/05/23, Metoprolol was also decreased to 25 mg (from 50 mg) twice daily because of soft BP's. Early this AM, pt started c/o chest tightness, found to be tachycardic to 170's.  EKG done showed afib rate of 160/min.  Pt was given a dose of Metoprolol 25 mg po x 1, HR decreased to 110's-130's, however, pt continued to c/o chest pain, then started having nausea and vomited scant mucus x 1. Pt is transferred to telemetry for closer cardiac monitoring and management.   Ms. Garza has HTN, PAF on Eliquis, hypothyroidism, hx of cerebral aneurysm s/p coiling, recent hospitalization 01/20-02/01/23 for pericarditis possibly related non-covid coronaviral illness, then readmitted at [facility] for left sided chest pain on 02/16/23, found to be in afib with RVR.  During that hospitalization, pt had aphasia 02/22/23, stroke code called, CTA head showed nonhemorrhagic acute ischemia in the left MCA territory. Patient did not qualify for TNK due to last known sx was well over 4.5 hours. Patient was not a thrombectomy candidate as pt's thrombus is in M3 branch which is too distal to intervene on.   Pt just had an echo on 02/23/23, w/c reported normal global left ventricular systolic function, left ventricular ejection fraction, by visual estimation, is 50 to 55%. Normal right ventricular size and function. Mild tricuspid regurgitation. There is no evidence of pericardial effusion.

## 2023-03-07 NOTE — PROGRESS NOTE ADULT - SUBJECTIVE AND OBJECTIVE BOX
Patient is a 73y old  Female who presents with a chief complaint of chest pain, afib with RVR (07 Mar 2023 08:55)      Patient seen and examined at bedside. Patient noted to have chest pain overnight. Patient's son translated conversations via phone at bedside. Patient currently complaining of chest pain and discomfort. Patient's HR in 150s-170s, given 1 dose of IV 5mg Lopressor. HR decreased to 90s. Cardio consulted.    ALLERGIES:  No Known Allergies    MEDICATIONS  (STANDING):  apixaban 5 milliGRAM(s) Oral every 12 hours  aspirin  chewable 81 milliGRAM(s) Oral daily  atorvastatin 80 milliGRAM(s) Oral at bedtime  colchicine 0.6 milliGRAM(s) Oral two times a day  famotidine    Tablet 20 milliGRAM(s) Oral at bedtime  lactated ringers. 1000 milliLiter(s) (50 mL/Hr) IV Continuous <Continuous>  levothyroxine 50 MICROGram(s) Oral daily  magnesium oxide 400 milliGRAM(s) Oral three times a day with meals  memantine 5 milliGRAM(s) Oral daily  metoprolol tartrate 50 milliGRAM(s) Oral every 12 hours  mirtazapine 7.5 milliGRAM(s) Oral at bedtime  polyethylene glycol 3350 17 Gram(s) Oral daily  predniSONE   Tablet 30 milliGRAM(s) Oral daily  senna 2 Tablet(s) Oral at bedtime    MEDICATIONS  (PRN):  acetaminophen     Tablet .. 650 milliGRAM(s) Oral every 6 hours PRN Temp greater or equal to 38C (100.4F), Mild Pain (1 - 3)  ondansetron Injectable 4 milliGRAM(s) IV Push every 6 hours PRN Nausea and/or Vomiting    Vital Signs Last 24 Hrs  T(F): 97.9 (07 Mar 2023 05:06), Max: 97.9 (07 Mar 2023 05:06)  HR: 134 (07 Mar 2023 08:39) (77 - 137)  BP: 115/78 (07 Mar 2023 08:39) (82/52 - 157/81)  RR: 17 (07 Mar 2023 07:50) (16 - 17)  SpO2: 92% (07 Mar 2023 07:50) (92% - 97%)  I&O's Summary    PHYSICAL EXAM:  General: mild distress with chest pain, A/O x 3  ENT: No gross hearing impairment, Moist mucous membranes, no thrush  Neck: Supple, No JVD  Lungs: Clear to auscultation bilaterally, good air entry, non-labored breathing  Cardio: irregularly irregular RR, S1/S2, No murmur  Abdomen: Soft, Nontender, Nondistended; Bowel sounds present  Extremities: No calf tenderness, No cyanosis, No pitting edema  Psych: Appropriate mood and affect    LABS:                        11.6   8.27  )-----------( 381      ( 07 Mar 2023 04:15 )             36.6     03-07    138  |  102  |  10  ----------------------------<  99  3.7   |  25  |  0.63    Ca    8.3      07 Mar 2023 04:15  Mg     1.6     03-07    TPro  6.5  /  Alb  2.4  /  TBili  0.5  /  DBili  x   /  AST  13  /  ALT  20  /  AlkPhos  76  03-07                CARDIAC MARKERS ( 07 Mar 2023 07:00 )  x     / 11.6 ng/L / x     / x     / x      CARDIAC MARKERS ( 07 Mar 2023 04:15 )  x     / 11.5 ng/L / x     / x     / x      CARDIAC MARKERS ( 05 Mar 2023 16:30 )  x     / 5.3 ng/L / x     / x     / x      CARDIAC MARKERS ( 05 Mar 2023 01:55 )  x     / 5.6 ng/L / x     / x     / x          02-23 Chol 152 mg/dL LDL -- HDL 46 mg/dL Trig 146 mg/dL  TSH 1.310   TSH with FT4 reflex --  Total T3 --                      COVID-19 PCR: NotDetec (02-26-23 @ 17:49)  COVID-19 PCR: NotDetec (02-24-23 @ 13:00)    RADIOLOGY & ADDITIONAL TESTS:    Care Discussed with Consultants/Other Providers: Discussed plan with Dr. Kohler from cardiology

## 2023-03-07 NOTE — BH CONSULTATION LIAISON ASSESSMENT NOTE - CURRENT MEDICATION
Fax received from [employer] for INR.    Form placed in provider's in box.    Angela Pham CMA  [facility] [place]  [phone]       MEDICATIONS  (STANDING):  apixaban 5 milliGRAM(s) Oral every 12 hours  aspirin  chewable 81 milliGRAM(s) Oral daily  atorvastatin 80 milliGRAM(s) Oral at bedtime  colchicine 0.6 milliGRAM(s) Oral two times a day  esmolol  Infusion 50 MICROgram(s)/kG/Min (16.8 mL/Hr) IV Continuous <Continuous>  famotidine    Tablet 20 milliGRAM(s) Oral at bedtime  levothyroxine 50 MICROGram(s) Oral daily  magnesium oxide 400 milliGRAM(s) Oral three times a day with meals  memantine 5 milliGRAM(s) Oral daily  metoprolol tartrate Injectable 2.5 milliGRAM(s) IV Push once  mirtazapine 15 milliGRAM(s) Oral at bedtime  polyethylene glycol 3350 17 Gram(s) Oral daily  predniSONE   Tablet 30 milliGRAM(s) Oral daily  senna 2 Tablet(s) Oral at bedtime    MEDICATIONS  (PRN):  acetaminophen     Tablet .. 650 milliGRAM(s) Oral every 6 hours PRN Temp greater or equal to 38C (100.4F), Mild Pain (1 - 3)  ondansetron Injectable 4 milliGRAM(s) IV Push every 6 hours PRN Nausea and/or Vomiting
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
## 2023-03-07 NOTE — BH CONSULTATION LIAISON ASSESSMENT NOTE - NSBHCHARTREVIEWINVESTIGATE_PSY_A_CORE FT
< from: 12 Lead ECG (03.07.23 @ 03:45) >      Ventricular Rate 167 BPM    QRS Duration 76 ms    Q-T Interval 272 ms    QTC Calculation(Bazett) 453 ms    R Axis 9 degrees    T Axis 215 degrees    Diagnosis Line *** Critical Test Result: High HR  Atrial fibrillation with rapid ventricular response  Abnormal ECG  When compared with ECG of 05-MAR-2023 01:37,  Previous ECG was poor baseline           Confirmed by Roderick Kohler ([id number]) on 3/7/2023 2:11:54 PM    < end of copied text >

## 2023-03-07 NOTE — PROGRESS NOTE ADULT - NS ATTEND AMEND GEN_ALL_CORE FT
72 year old F PMH hypothyroidism, cerebral aneurysm s/p coiling, pericarditis possibly related to non-covid coronaviral illness (recently admitted 1/30/23-2/1/23), afib RVR presented to [facility] on 2/16 for afib with RVR, stroke called on 2/22 and found to have nonhemorrhagic acute ischemia in the left MCA territory now admitted to  rehab for ADL impairment.    #Chest Pain  #Afib with RVR  #Paroxysmal Atrial fibrillation  - trop neg x3, likely chest pain from rapid afib  - RVR noted this AM- ordered for lopressor 5mg IVP this AM as BP could tolerate and responded, however back to RVR later in afternoon therefore discussed with cardio and agree with giving dig due to low BP now  - ordered for dig 0.125mg IVP x1  - afib with RVR could be in setting of medication adjustments made due to low BP  - continue Eliquis  - follow up echo    #Acute pericarditis with pericardial effusion without tamponade physiology  - continue prednisone taper by 10mg every week, order already placed  - continue with colchicine 0.6mg BID    discussed with son Yovany over the phone    PT consulted- patient was suppose to be discharged from acute rehab to home today however now admitted to medical side for afib with RVR 72 year old F PMH hypothyroidism, cerebral aneurysm s/p coiling, pericarditis possibly related to non-covid coronaviral illness (recently admitted 1/30/23-2/1/23), afib RVR presented to [facility] on 2/16 for afib with RVR, stroke called on 2/22 and found to have nonhemorrhagic acute ischemia in the left MCA territory now admitted to  rehab for ADL impairment.    #Chest Pain  #Afib with RVR  #Paroxysmal Atrial fibrillation  - trop neg x3, likely chest pain from rapid afib  - RVR noted this AM- ordered for lopressor 5mg IVP this AM as BP could tolerate and responded, however back to RVR later in afternoon therefore discussed with cardio and agree with giving dig due to low BP now  - ordered for dig 0.125mg IVP x1  - afib with RVR could be in setting of medication adjustments made due to low BP  - continue lopressor 50mg BID and cardizem 30mg BID as per cardio (was on lopressor 50mg BID and cardizem 120mg daily prior but changed due to hypotension in rehab)  - continue Eliquis  - follow up echo    #Acute pericarditis with pericardial effusion without tamponade physiology  - continue prednisone taper by 10mg every week, order already placed  - continue with colchicine 0.6mg BID    discussed with son Yovany over the phone    PT consulted- patient was suppose to be discharged from acute rehab to home today however now admitted to medical side for afib with RVR 72 year old F PMH hypothyroidism, cerebral aneurysm s/p coiling, pericarditis possibly related to non-covid coronaviral illness (recently admitted 1/30/23-2/1/23), afib RVR presented to [facility] on 2/16 for afib with RVR, stroke called on 2/22 and found to have nonhemorrhagic acute ischemia in the left MCA territory now admitted to  rehab for ADL impairment.    #Chest Pain  #Afib with RVR  #Paroxysmal Atrial fibrillation  - trop neg x3, likely chest pain from rapid afib  - RVR noted this AM- ordered for lopressor 5mg IVP this AM as BP could tolerate and responded, however back to RVR later in afternoon therefore discussed with cardio and agree with giving dig due to low BP now  - ordered for dig 0.125mg IVP x1  - afib with RVR could be in setting of medication adjustments made due to low BP  - continue lopressor 50mg BID and cardizem 30mg BID as per cardio (was on lopressor 50mg BID and cardizem 120mg daily prior but changed due to hypotension in rehab)  - continue Eliquis  - follow up echo    #Acute pericarditis with pericardial effusion without tamponade physiology  - continue prednisone taper by 10mg every week, order already placed  - continue with colchicine 0.6mg BID    discussed with son Yovany over the phone    PT consulted- patient was suppose to be discharged from acute rehab to home today however now admitted to medical side for afib with RVR    -------------  addendum:  patient noted to be in rapid afib- HR 160s. She received dig 125mcg IVP x1 dose about 15-20 min prior and did not respond. Discussed with Dr. Kohler, will start on dig load with dig 500mg IVP x1 STAT and monitor. Can give dig 250 in 6 hours if noted to have RVR again  follow up dig level in the AM    brother-in-law Ari (NP) at bedside and updated. 72 year old F PMH hypothyroidism, cerebral aneurysm s/p coiling, pericarditis possibly related to non-covid coronaviral illness (recently admitted 1/30/23-2/1/23), afib RVR presented to [facility] on 2/16 for afib with RVR, stroke called on 2/22 and found to have nonhemorrhagic acute ischemia in the left MCA territory now admitted to  rehab for ADL impairment.    #Chest Pain  #Afib with RVR  #Paroxysmal Atrial fibrillation  - trop neg x3, likely chest pain from rapid afib  - RVR noted this AM- ordered for lopressor 5mg IVP this AM as BP could tolerate and responded, however back to RVR later in afternoon therefore discussed with cardio and agree with giving dig due to low BP now  - ordered for dig 0.125mg IVP x1  - afib with RVR could be in setting of medication adjustments made due to low BP  - continue lopressor 50mg BID and cardizem 30mg BID as per cardio (was on lopressor 50mg BID and cardizem 120mg daily prior but changed due to hypotension in rehab)  - continue Eliquis  - follow up echo    #Acute pericarditis with pericardial effusion without tamponade physiology  - continue prednisone taper by 10mg every week, order already placed  - continue with colchicine 0.6mg BID    discussed with son Yovany over the phone    PT consulted- patient was suppose to be discharged from acute rehab to home today however now admitted to medical side for afib with RVR    -------------  addendum:  patient noted to be in rapid afib- HR 160s. She received dig 125mcg IVP x1 dose about 15-20 min prior and did not respond. Discussed with Dr. Kohler, will start on dig load with dig 500mg IVP x1 STAT and monitor. Can give dig 250 in 6 hours if noted to have RVR again  follow up dig level in the AM    brother-in-law Ari (NP) at bedside and updated.    ----------------  addendum:  continues to remain in afib RVR even after dig load. Discussed with Dr. Albert, patient might require amio gtt vs cardizem gtt vs esmol gtt. Transferring to ICU

## 2023-03-07 NOTE — BH CONSULTATION LIAISON ASSESSMENT NOTE - NSBHCHARTREVIEWLAB_PSY_A_CORE FT
11.7   10.59 )-----------( 360      ( 07 Mar 2023 15:43 )             37.0   03-07    137  |  103  |  10  ----------------------------<  176<H>  4.5   |  26  |  0.68    Ca    8.0<L>      07 Mar 2023 15:43  Phos  3.3     03-07  Mg     1.6     03-07    TPro  6.4  /  Alb  2.2<L>  /  TBili  0.3  /  DBili  x   /  AST  14  /  ALT  22  /  AlkPhos  79  03-07

## 2023-03-07 NOTE — BH CONSULTATION LIAISON ASSESSMENT NOTE - NSBHCONSULTRECOMMENDOTHER_PSY_A_CORE FT
Pending full consult.  Pt unable to participate in an interview today. Psychiatry will f/u when more medically stable.

## 2023-03-07 NOTE — BH CONSULTATION LIAISON ASSESSMENT NOTE - HPI (INCLUDE ILLNESS QUALITY, SEVERITY, DURATION, TIMING, CONTEXT, MODIFYING FACTORS, ASSOCIATED SIGNS AND SYMPTOMS)
Ms. Garza has HTN, PAF on Eliquis, hypothyroidism, hx of cerebral aneurysm s/p coiling, recent hospitalization 01/20-02/01/23 for pericarditis possibly related non-covid coronaviral illness, then readmitted at [facility] for left sided chest pain on 02/16/23, found to be in afib with RVR.  During that hospitalization, pt had aphasia 02/22/23, stroke code called, CTA head showed nonhemorrhagic acute ischemia in the left MCA territory. Patient did not qualify for TNK due to last known sx was well over 4.5 hours. Patient was not a thrombectomy candidate as pt's thrombus is in M3 branch which is too distal to intervene on.   Pt just had an echo on 02/23/23, w/c reported normal global left ventricular systolic function, left ventricular ejection fraction, by visual estimation, is 50 to 55%. Normal right ventricular size and function. Mild tricuspid regurgitation. There is no evidence of pericardial effusion. Transferred to acute rehab at [facility] admitted on 02/26/23, s/p CVA, now c/o chest pain, nausea, and is in rapid afib to 150's. Chart reviewed and apparently Cardizem CD 120mg held 03/04 then d/vicky on 03/05/23, Metoprolol was also decreased to 25 mg (from 50 mg) twice daily because of soft BP's. Early this AM, pt started c/o chest tightness, found to be tachycardic to 170's.  EKG done showed afib rate of 160/min.  Pt was given a dose of Metoprolol 25 mg po x 1, HR decreased to 110's-130's, however, pt continued to c/o chest pain, then started having nausea and vomited scant mucus x 1. Pt is transferred to telemetry for closer cardiac monitoring and management. Patient noted to be in rapid afib- HR 160s. She received dig 125mcg IVP x1 dose about 15-20 min prior and did not respond. Discussed with Dr. Kohler, will start on dig load with dig 500mg IVP x1 STAT and monitor. Can give dig 250 in 6 hours if noted to have RVR again  follow up dig level in the AM. Brother-in-law Ari (NP) at bedside and updated. continues to remain in afib RVR even after dig load. Discussed with Dr. Albert, patient might require amio gtt vs cardizem gtt vs esmol gtt. Transferring to ICU.    Psychiatry C/L: full consult pending.  Ms. Garza has HTN, PAF on Eliquis, hypothyroidism, hx of cerebral aneurysm s/p coiling, recent hospitalization 01/20-02/01/23 for pericarditis possibly related non-covid coronaviral illness, then readmitted at [facility] for left sided chest pain on 02/16/23, found to be in afib with RVR.  During that hospitalization, pt had aphasia 02/22/23, stroke code called, CTA head showed nonhemorrhagic acute ischemia in the left MCA territory. Patient did not qualify for TNK due to last known sx was well over 4.5 hours. Patient was not a thrombectomy candidate as pt's thrombus is in M3 branch which is too distal to intervene on.   Pt just had an echo on 02/23/23, w/c reported normal global left ventricular systolic function, left ventricular ejection fraction, by visual estimation, is 50 to 55%. Normal right ventricular size and function. Mild tricuspid regurgitation. There is no evidence of pericardial effusion. Transferred to acute rehab at [facility] admitted on 02/26/23, s/p CVA, now c/o chest pain, nausea, and is in rapid afib to 150's. Chart reviewed and apparently Cardizem CD 120mg held 03/04 then d/vicky on 03/05/23, Metoprolol was also decreased to 25 mg (from 50 mg) twice daily because of soft BP's. Early this AM, pt started c/o chest tightness, found to be tachycardic to 170's.  EKG done showed afib rate of 160/min.  Pt was given a dose of Metoprolol 25 mg po x 1, HR decreased to 110's-130's, however, pt continued to c/o chest pain, then started having nausea and vomited scant mucus x 1. Pt is transferred to telemetry for closer cardiac monitoring and management. Patient noted to be in rapid afib- HR 160s. She received dig 125mcg IVP x1 dose about 15-20 min prior and did not respond. Discussed with Dr. Kohler, will start on dig load with dig 500mg IVP x1 STAT and monitor. Can give dig 250 in 6 hours if noted to have RVR again  follow up dig level in the AM. Brother-in-law Ari (NP) at bedside and updated. continues to remain in afib RVR even after dig load. Discussed with Dr. Albert, patient might require amio gtt vs cardizem gtt vs esmol gtt. Transferring to ICU.    Psychiatry C/L: Asked to evaluate this patient for anxiety and depression sx. Pt seen in ICU, she is nauseas, grimacing, c/o abdominal pain. Pt asked writer to come back at another time. Psychiatry will f/u.

## 2023-03-07 NOTE — CHART NOTE - NSCHARTNOTEFT_GEN_A_CORE
Nutrition Follow Up Note  Hospital Course   (Per Electronic Medical Record)    Source:  Patient [X]  Nursing Staff [X]   Medical Record [X]      Diet: Diet, Regular:   DASH/TLC {Sodium & Cholesterol Restricted}  Supplement Feeding Modality:  Oral  Ensure Plus High Protein Cans or Servings Per Day:  1       Frequency:  Two Times a day (03-01-23 @ 14:28) [Active]    At this time patient w/ adequate appetite/intake consuming % of meals. Reviewed preferences and menu alternatives; likes fresh fruit cup and yogurt noted in Kardex. Recommend Prosource 30ml Daily (Provides 60kcal & 15grams of Protein). No issues w/ chewing and swallowing. Denies N/V/C, however w/ multiple BM at this time, last BM 3/3 Per nursing flowsheets.      Enteral/Parenteral Nutrition: Not Applicable    Current Weight: 142.1lb on 2/26    Pertinent Medications: MEDICATIONS  (STANDING):  apixaban 5 milliGRAM(s) Oral every 12 hours  aspirin  chewable 81 milliGRAM(s) Oral daily  atorvastatin 80 milliGRAM(s) Oral at bedtime  bisacodyl Suppository 10 milliGRAM(s) Rectal once  colchicine 0.6 milliGRAM(s) Oral two times a day  diltiazem    milliGRAM(s) Oral daily  levothyroxine 50 MICROGram(s) Oral daily  magnesium oxide 400 milliGRAM(s) Oral three times a day with meals  memantine 5 milliGRAM(s) Oral daily  metoprolol tartrate 50 milliGRAM(s) Oral two times a day  mirtazapine 7.5 milliGRAM(s) Oral at bedtime  pantoprazole    Tablet 40 milliGRAM(s) Oral before breakfast  polyethylene glycol 3350 17 Gram(s) Oral daily  predniSONE   Tablet 30 milliGRAM(s) Oral daily  saline laxative (FLEET) Rectal Enema 1 Enema Rectal once  senna 2 Tablet(s) Oral at bedtime    MEDICATIONS  (PRN):  benzocaine/menthol Lozenge 1 Lozenge Oral three times a day PRN Sore Throat  ondansetron   Disintegrating Tablet 4 milliGRAM(s) Oral every 6 hours PRN Nausea and/or Vomiting      Pertinent Labs:  03-03 Na142 mmol/L Glu 92 mg/dL K+ 4.3 mmol/L Cr  0.69 mg/dL BUN 15 mg/dL 02-26 Phos 3.5 mg/dL 03-02 Alb 2.8 g/dL<L> 02-23 Chol 152 mg/dL LDL --    HDL 46 mg/dL<L> Trig 146 mg/dL      Skin: No pressure injury per nursing flowsheets    Edema: No edema noted per nursing flowsheet    Last Bowel Movement: on 3/3 Per nursing flowsheets     Estimated Needs:   [X] No Change Since Previous Assessment    Previous Nutrition Diagnosis:   Inadeqate Energy Intake  related to poor appetite 2/2 abdominal pain x 4 days  as evidenced by patient report of reduced intake    Nutrition Diagnosis is [X] Ongoing - addressed w/ Ensure Plus High Protein Daily 8 oz (Provides 350 kcal, 20 grams of protein) BID + Prosource 30ml Daily (Provides 60kcal & 15grams of Protein)     Swallowing Difficulty  related to motor causes s/p L MCA stroke  as evidenced by soft & bite sized diet texture    Nutrition Diagnosis is [X] Resolved - addressed w/ diet upgrade to regular (3/1)    New Nutrition Diagnosis: [X] Not Applicable    Interventions:   1. Recommend Prosource 30ml Daily (Provides 60kcal & 15grams of Protein) to assist patient in meeting estimated energy requirements.   2. Recommend Continue Nutrition Plan of Care.    Monitoring & Evaluation:   [X] Weights   [X] PO Intake   [X] Skin Integrity   [X] Follow Up (Per Protocol)  [X] Tolerance to Diet Prescription   [X] Other: Labs    Registered Dietitian/Nutritionist Remains Available.  Jenny Franklin RD, MS, CDN    Phone# [phone]
REHABILITATION DIAGNOSIS/IMPAIRMENT GROUP CODE:  CVA    COMORBIDITIES/COMPLICATING CONDITIONS IMPACTING REHABILITATION:  HEALTH ISSUES - PROBLEM Dx:        PAST MEDICAL & SURGICAL HISTORY:  High cholesterol      Hypothyroidism      H/O knee surgery      Brain aneurysm          Based upon consideration of the patient's impairments, functional status, complicating conditions and any other contributing factors and after information garnered from the assessments of all therapy disciplines involved in treating the patient and other pertinent clinicians:    INTERDISCIPLINARY REHABILITATION INTERVENTIONS:    [ X  ] Transfer Training  [X   ] Bed Mobility  [ X  ] Therapeutic Exercise  [ X  ] Balance/Coordination Exercises  [ X  ] Locomotion retraining  [ X  ] Stairs  [ X  ] Functional Transfer Training  [ X  ] Bowel/Bladder program  [  X ] Pain Management  [ X  ] Skin/Wound Care  [X   ] Visual/Perceptual Training  [X   ] Therapeutic Recreation Activities  [  X ] Neuromuscular Re-education  [  X ] Activities of Daily Living  [ X  ] Speech Exercise  [   ] Swallowing Exercises  [   ] Vital Stim  [   ] Dietary Supplements  [ X  ] Calorie Count  [X   ] Cognitive Exercises  [  X ] Congnitive/Linguistic Treatment  [   ] Behavior Program  [  X ] Neuropsych Therapy  [ X  ] Patient/Family Counseling  [ X  ] Family Training  [ X  ] Community Re-entry  [   ] Orthotic Evaluation  [   ] Prosthetic Eval/Training    MEDICAL PROGNOSIS:  Good     REHAB POTENTIAL:  Good     EXPECTED DAILY THERAPY:         PT: 1hr/day       OT: 1hr/day       ST: 1hr/day        EXPECTED INTENSITY OF PROGRAM:  3 hrs/day    EXPECTED FREQUENCY OF PROGRAM:  5 days/week    ESTIMATED LOS:  10-14 days    ESTIMATED DISPOSITION:  home    INTERDISCIPLINARY FUNCTIONAL OUTCOMES/GOALS:           Gait/Mobility: Mod I       Transfers: Supervision       ADLs: Supervision       Functional Transfers: Min Assist       Medication Management: Independent       Communication: Supervision       Cognitive: Min Assist       Dysphagia: Supervision       Bladder: Supervision       Bowel: Supervision
Was called at 1:15am for parasternal CP that was described as a sharp 7/10 pain that did not radiate. Patient said pain lasted for 15 minutes and denies N/V, SOB/dyspnea, palpitation, new onset weakness or numbness. EKG was similar to previous performed on 2/28 but there was no episode of atrial flutter.     Vital Signs Last 24 Hrs  T(C): 36.7 (04 Mar 2023 20:39), Max: 36.7 (04 Mar 2023 07:41)  T(F): 98.1 (04 Mar 2023 20:39), Max: 98.1 (04 Mar 2023 07:41)  HR: 69 (04 Mar 2023 20:39) (69 - 99)  BP: 105/69 (04 Mar 2023 20:39) (105/69 - 115/71)  BP(mean): --  RR: 16 (04 Mar 2023 20:39) (16 - 16)  SpO2: 95% (04 Mar 2023 20:39) (95% - 96%)    Parameters below as of 04 Mar 2023 20:39  Patient On (Oxygen Delivery Method): room air  Gen - NAD AAOx3  HEENT - NCAT, EOMI  Neck - Supple, No limited ROM  Chest - good chest expansion, good respiratory effort, CTAB   Cardio - warm and well perfused, RRR, no murmur  Abdomen -  Soft, NTND  Extremities - No peripheral edema, No calf tenderness       A/P:     Ms. Keller is a 72yr old female with PMH hypothyroidism, cerebral aneurysm s/p coiling,  pericarditis possibly related to non-covid coronaviral illness (recently admitted 1/30/23-2/1/23), afib RVR presents with similar symptoms of left sided chest pain on 2/16/23 at [facility]. Stroke code was called on 2/22/23 where patient found to be aphasic and on imaging, CTA head showed nonhemorrhagic acute ischemia in the left MCA territory. Patient is currently on an acute prednisone taper for pericarditis and is on Eliquis and cardizem for Afib management.     -f/u troponin   -f/u CBC/CMP  -f/u CXR   -monitor vitals q4
Was called at 3:30am for patient with left-sided CP that was described as a sharp 8/10 pain along the sternum. Patient said pain was ongoing and was present 45 minutes later at time of this note. She c/os dyspnea, tachypnea, palpitations and neck pain with diaphoresis. No new onset weakness or numbness. EKG showed afib RVR with  and BP 85/60. Pt was transferred to bedside chair and SBP improved to 111. She continued to endorse symptoms 1 hour later. Nocturnist consulted.    Vital Signs Last 24 Hrs  T(C): 36.5 (06 Mar 2023 20:00), Max: 37.6 (06 Mar 2023 07:26)  T(F): 97.7 (06 Mar 2023 20:00), Max: 99.6 (06 Mar 2023 07:26)  HR: 79 (06 Mar 2023 20:00) (71 - 94)  BP: 97/69 (06 Mar 2023 20:00) (85/60 - 108/64)  BP(mean): --  RR: 16 (06 Mar 2023 20:00) (16 - 16)  SpO2: 93% (06 Mar 2023 20:00) (93% - 93%)    Parameters below as of 06 Mar 2023 20:00  Patient On (Oxygen Delivery Method): room air    Parameters below as of 04 Mar 2023 20:39  Patient On (Oxygen Delivery Method): room air      Gen - AAOx3, appears short of breath  HEENT - NCAT, EOMI  Neck - Supple, No limited ROM  Chest - tachypneic, CTAB   Cardio - rapid heart rate with irregular rhythm  Abdomen -  Soft, NTND  Extremities - No peripheral edema, No calf tenderness     EKG read by me: Atrial fibrillation with RVR, ventricular rate 167  Repeat EKG with HR in 170s also afib RVR    A/P:     Ms. Keller is a 72yr old female with PMH hypothyroidism, cerebral aneurysm s/p coiling,  pericarditis possibly related to non-covid coronaviral illness (recently admitted 1/30/23-2/1/23), afib RVR presents with similar symptoms of left sided chest pain on 2/16/23 at [facility] and on 2/5 while in acute rehab. Stroke code was called on 2/22/23 where patient found to be aphasic and on imaging, CTA head showed nonhemorrhagic acute ischemia in the left MCA territory. Patient is currently on an acute prednisone taper for pericarditis and is on Eliquis. As of 3/5 patient's metoprolol tartrate was decreased from 50mg BID to 25mg BID and cardizem for Afib management was discontinued. Plan was for discharge from [facility] on 3/7.     - metoprolol 25,g PO x 1  - f/u labs- CBC, CMP, mag, troponin, BNP  -LR @100 cc for 1 L  - Tylenol prn for chest pain  - Zofran, pantroprazole  - transfer to tele for closer monitoring  - plan  discussed with RN, Dr. Eb Hassan, DO  PM&R PGY-4 Resident
Informed by RN that after watery BM this AM, patient was tachycardic to 141, /73, SpO2 95%, afebrile. Patient describes some discomfort in upper abdomen especially with food and intermittent pain in left posterior lateral chest wall which is different from previous pericarditis pain. Denies SOB, difficulty breathing.    Physical Exam:  HR 90s-105 when I checked at bedside, SpO2 93-94% on RA  Gen: NAD  CV: irregularly irregular rhythm, rapid heart rate  Pulm: CTA B/L, no w/r/r  Abd: soft, NTND  Chest wall: no TTP to left chest wall including left posterior chest wall and left periscapular region  Ext: no pain with ROM of left shoulder    A/P:  Ms. Keller is a 72yr old female with PMH hypothyroidism, cerebral aneurysm s/p coiling,  pericarditis possibly related to non-covid coronaviral illness (recently admitted 1/30/23-2/1/23), afib RVR presents with similar symptoms of left sided chest pain on 2/16/23 at [facility]. Stroke code was called on 2/22/23 where patient found to be aphasic and on imaging, CTA head showed nonhemorrhagic acute ischemia in the left MCA territory. Patient is currently on an acute prednisone taper for pericarditis and is on Eliquis and cardizem for Afib management. Admitted for multidisciplinary rehab program. Now with afib w/RVR.  - EKG showed aflutter/afib,   - CXR ordered for slight decrease in SpO2 despite patient asymptomatic, and left chest pain  - HR improved after AM BP meds Lopressor 50mg and Cardizem 30mg. Will consider another dose of either if HR does not continue to improve.  - Protonix given for abdominal discomfort. Patient initially refused this AM but now agreeable. If pain continues with breakfast, would consider dose of Maalox.  - Continue to monitor

## 2023-03-07 NOTE — H&P ADULT - ASSESSMENT
[facility] Gar[facility] 72[facility], s/p CVA, now c/o chest pain, nausea, and is in rapid afib to 150's. She has HTN, PAF on Eliquis, hypothyroidism, hx of cerebral aneurysm s/p coiling, recent hospitalization 01/20-02/01/23 for pericarditis possibly related non-covid coronaviral illness, then readmitted at [facility] for left sided chest pain on 02/16/23, found to be in afib with RVR.  During that hospitalization, pt had aphasia 02/22/23, stroke code called, CTA head showed nonhemorrhagic acute ischemia in the left MCA territory. Patient did not qualify for TNK due to last known sx was well over 4.5 hours. Pt was also not a thrombectomy candidate-M3 thrombus was too distal. Pt was placed on ASA, Eliquis.   Chart reviewed and apparently Cardizem CD 120mg held 03/04 then d/vicky on 03/05/23, Metoprolol was also decreased to 25 mg (from 50 mg) twice daily because of soft BP's. Early this AM, pt started c/o chest tightness, found to be tachycardic to 170's.  EKG done showed afib rate of 160/min.  Pt was given a dose of Metoprolol 25 mg po x 1, HR decreased to 110's-130's, however, pt continued to c/o chest pain, then started having nausea and vomited scant mucus x 1. Pt is transferred to telemetry for closer cardiac monitoring and management.    [facility] 72[facility], s/p CVA, now c/o chest pain, nausea, and is in rapid afib to 150's. She has HTN, PAF on Eliquis, hypothyroidism, hx of cerebral aneurysm s/p coiling, recent hospitalization 01/20-02/01/23 for pericarditis possibly related non-covid coronaviral illness, then readmitted at [facility] for left sided chest pain on 02/16/23, found to be in afib with RVR.  During that hospitalization, pt had aphasia 02/22/23, stroke code called, CTA head showed nonhemorrhagic acute ischemia in the left MCA territory. Patient did not qualify for TNK due to last known sx was well over 4.5 hours. Pt was also not a thrombectomy candidate-M3 thrombus was too distal. Pt was placed on ASA, Eliquis.   Chart reviewed and apparently Cardizem CD 120mg held 03/04 then d/vicky on 03/05/23, Metoprolol was also decreased to 25 mg (from 50 mg) twice daily because of soft BP's. Early this AM, pt started c/o chest tightness, found to be tachycardic to 170's.  EKG done showed afib rate of 160/min.  Pt was given a dose of Metoprolol 25 mg po x 1, HR decreased to 110's-130's, however, pt continued to c/o chest pain, then started having nausea and vomited scant mucus x 1. Pt is transferred to telemetry for closer cardiac monitoring and management.     Chest pain, Afib with RVR  Pt with pericarditis  Pt with HTN, recent CVA, hypothyroid  Hypoalbuminemia      Admit to telemetry  Trend trops, check ESR  Cont current meds: Increase Metoprolol to 50 mg twice daily, Apixaban, ASA, levothyroxine, Colchicine, Prednisone tapering course, Pantoprazole  Cardio consult     [facility] 72[facility], s/p CVA, now c/o chest pain, nausea, and is in rapid afib to 150's. She has HTN, PAF on Eliquis, hypothyroidism, hx of cerebral aneurysm s/p coiling, recent hospitalization 01/20-02/01/23 for pericarditis possibly related non-covid coronaviral illness, then readmitted at [facility] for left sided chest pain on 02/16/23, found to be in afib with RVR.  During that hospitalization, pt had aphasia 02/22/23, stroke code called, CTA head showed nonhemorrhagic acute ischemia in the left MCA territory. Patient did not qualify for TNK due to last known sx was well over 4.5 hours. Pt was also not a thrombectomy candidate-M3 thrombus was too distal. Pt was placed on ASA, Eliquis.   Chart reviewed and apparently Cardizem CD 120mg held 03/04 then d/vicky on 03/05/23, Metoprolol was also decreased to 25 mg (from 50 mg) twice daily because of soft BP's. Early this AM, pt started c/o chest tightness, found to be tachycardic to 170's.  EKG done showed afib rate of 160/min.  Pt was given a dose of Metoprolol 25 mg po x 1, HR decreased to 110's-130's, however, pt continued to c/o chest pain, then started having nausea and vomited scant mucus x 1. Pt is transferred to telemetry for closer cardiac monitoring and management.     Chest pain, Afib with RVR  Pt with pericarditis  Pt with HTN, recent CVA, hypothyroid  Hypoalbuminemia      Admit to telemetry  Trend trops, check ESR, check TSH, B12, Folate  Cont current meds: Increase Metoprolol to 50 mg twice daily, Apixaban, ASA, levothyroxine, Colchicine, Prednisone tapering course, Pantoprazole  Cardio consult  Zofran prn fro nausea  Gentle IV hydration       [facility] 72[facility], s/p CVA, now c/o chest pain, nausea, and is in rapid afib to 150's. She has HTN, PAF on Eliquis, hypothyroidism, hx of cerebral aneurysm s/p coiling, recent hospitalization 01/20-02/01/23 for pericarditis possibly related non-covid coronaviral illness, then readmitted at [facility] for left sided chest pain on 02/16/23, found to be in afib with RVR.  During that hospitalization, pt had aphasia 02/22/23, stroke code called, CTA head showed nonhemorrhagic acute ischemia in the left MCA territory. Patient did not qualify for TNK due to last known sx was well over 4.5 hours. Pt was also not a thrombectomy candidate-M3 thrombus was too distal. Pt was placed on ASA, Eliquis.   Chart reviewed and apparently Cardizem CD 120mg held 03/04 then d/vicky on 03/05/23, Metoprolol was also decreased to 25 mg (from 50 mg) twice daily because of soft BP's. Early this AM, pt started c/o chest tightness, found to be tachycardic to 170's.  EKG done showed afib rate of 160/min.  Pt was given a dose of Metoprolol 25 mg po x 1, HR decreased to 110's-130's, however, pt continued to c/o chest pain, then started having nausea and vomited scant mucus x 1. Pt is transferred to telemetry for closer cardiac monitoring and management.     Chest pain, Afib with RVR  Pt with pericarditis  Pt with HTN, recent CVA, hypothyroid  Hypoalbuminemia      Admit to telemetry  Trend trops, check ESR, check anemia studies  Cont current meds: Increase Metoprolol to 50 mg twice daily, Apixaban, ASA, levothyroxine, Colchicine, Prednisone tapering course, Pantoprazole  Cardio consult  Zofran prn fro nausea  Gentle IV hydration       [facility] 72[facility], s/p CVA, now c/o chest pain, nausea, and is in rapid afib to 150's. She has HTN, PAF on Eliquis, hypothyroidism, hx of cerebral aneurysm s/p coiling, recent hospitalization 01/20-02/01/23 for pericarditis possibly related non-covid coronaviral illness, then readmitted at [facility] for left sided chest pain on 02/16/23, found to be in afib with RVR.  During that hospitalization, pt had aphasia 02/22/23, stroke code called, CTA head showed nonhemorrhagic acute ischemia in the left MCA territory. Patient did not qualify for TNK due to last known sx was well over 4.5 hours. Pt was also not a thrombectomy candidate-M3 thrombus was too distal. Pt was placed on ASA, Eliquis.   Chart reviewed and apparently Cardizem CD 120mg held 03/04 then d/vicky on 03/05/23, Metoprolol was also decreased to 25 mg (from 50 mg) twice daily because of soft BP's. Early this AM, pt started c/o chest tightness, found to be tachycardic to 170's.  EKG done showed afib rate of 160/min.  Pt was given a dose of Metoprolol 25 mg po x 1, HR decreased to 110's-130's, however, pt continued to c/o chest pain, then started having nausea and vomited scant mucus x 1. Pt is transferred to telemetry for closer cardiac monitoring and management.     Chest pain, Afib with RVR  Pt with pericarditis  Pt with HTN, recent CVA, hypothyroid  Hypoalbuminemia      Admit to telemetry  Trend trops, check ESR, check anemia studies  Cont current meds: Increase Metoprolol to 50 mg twice daily, Apixaban, ASA, levothyroxine, Colchicine, Prednisone tapering course, Pantoprazole  Cardio consult -Dr Kohler (service notified)  Zofran prn fro nausea  Gentle IV hydration

## 2023-03-07 NOTE — CONSULT NOTE ADULT - SUBJECTIVE AND OBJECTIVE BOX
Ortiz Garza is a 73 year old woman with past medical history of Atrial fibrillation (on Eliquis), Pericarditis with pericardial effusion, Normal coronaries (1/2023 cath), Cerebral aneurysm (s/p coiling), Hypothyroidism with recent hospitalization at [facility] this month for acute left MCA territory ischemia, was admitted to [facility] and now transferred to telemetry due to episode of chest pain and atrial fibrillation with RVR s/p missed cardizem doses and decreased bb 2/2 "soft bp's."    ICU team consulted for persistent afib with rvr. Accepting pt for close monitoring and tx with continuos esmolol gtt.         Allergies  No Known Allergies        MEDICATIONS  (STANDING):  apixaban 5 milliGRAM(s) Oral every 12 hours  aspirin  chewable 81 milliGRAM(s) Oral daily  atorvastatin 80 milliGRAM(s) Oral at bedtime  chlorhexidine 2% Cloths 1 Application(s) Topical <User Schedule>  colchicine 0.6 milliGRAM(s) Oral two times a day  esmolol  Infusion 50 MICROgram(s)/kG/Min (16.8 mL/Hr) IV Continuous <Continuous>  famotidine    Tablet 20 milliGRAM(s) Oral at bedtime  levothyroxine 50 MICROGram(s) Oral daily  magnesium oxide 400 milliGRAM(s) Oral three times a day with meals  memantine 5 milliGRAM(s) Oral daily  midodrine 5 milliGRAM(s) Oral once  mirtazapine 15 milliGRAM(s) Oral at bedtime  polyethylene glycol 3350 17 Gram(s) Oral daily  predniSONE   Tablet 30 milliGRAM(s) Oral daily  senna 2 Tablet(s) Oral at bedtime    MEDICATIONS  (PRN):  acetaminophen     Tablet .. 650 milliGRAM(s) Oral every 6 hours PRN Temp greater or equal to 38C (100.4F), Mild Pain (1 - 3)  ondansetron Injectable 4 milliGRAM(s) IV Push every 6 hours PRN Nausea and/or Vomiting      Daily Height in cm: 149.86 (07 Mar 2023 15:30)    Daily     Drug Dosing Weight  Height (cm): 149.9 (07 Mar 2023 15:30)  Weight (kg): 65.6 (07 Mar 2023 16:24)  BMI (kg/m2): 29.2 (07 Mar 2023 16:24)  BSA (m2): 1.61 (07 Mar 2023 16:24)        PAST MEDICAL & SURGICAL HISTORY:  High cholesterol  Hypothyroidism  CVA (cerebrovascular accident)  Paroxysmal atrial fibrillation  H/O knee surgery  Brain aneurysm        FAMILY HISTORY:  No pertinent family history        ADVANCE DIRECTIVES: Full code        REVIEW OF SYSTEMS: ROS negative. Pt denies dizziness, CP, SOB at this time.         Vital Signs Last 24 Hrs  T(C): 36.6 (07 Mar 2023 13:06), Max: 36.6 (07 Mar 2023 05:06)  T(F): 97.8 (07 Mar 2023 13:06), Max: 97.9 (07 Mar 2023 05:06)  HR: 103 (07 Mar 2023 16:00) (77 - 152)  BP: 88/35 (07 Mar 2023 16:00) (82/52 - 157/81)  BP(mean): 52 (07 Mar 2023 16:00) (52 - 118)  RR: 21 (07 Mar 2023 16:00) (16 - 27)  SpO2: 96% (07 Mar 2023 16:00) (91% - 97%)      O2 Parameters below as of 07 Mar 2023 16:00  Patient On (Oxygen Delivery Method): nasal cannula  O2 Flow (L/min): 2      I&O's Detail    07 Mar 2023 07:01  -  07 Mar 2023 16:58  --------------------------------------------------------  IN:    Esmolol: 39 mL  Total IN: 39 mL    OUT:  Total OUT: 0 mL    Total NET: 39 mL        PHYSICAL EXAM:    GENERAL: NAD, well-groomed, well-developed  HEAD:  Atraumatic, Normocephalic  EYES: EOMI, PERRLA, conjunctiva and sclera clear  NECK: Supple, No JVD  NERVOUS SYSTEM:  Alert & Oriented X3   CHEST/LUNG: Clear to percussion bilaterally; No rales, rhonchi, wheezing, or rubs  HEART: irregular rate rhythm, s1/s2  ABDOMEN: Soft, Nontender, Nondistended; Bowel sounds present  EXTREMITIES:  2+ Peripheral Pulses, No clubbing, cyanosis, or edema  LYMPH: No lymphadenopathy noted  SKIN: No rashes or lesions        LABS:  CBC Full  -  ( 07 Mar 2023 15:43 )  WBC Count : 10.59 K/uL  RBC Count : 5.06 M/uL  Hemoglobin : 11.7 g/dL  Hematocrit : 37.0 %  Platelet Count - Automated : 360 K/uL  Mean Cell Volume : 73.1 fl  Mean Cell Hemoglobin : 23.1 pg  Mean Cell Hemoglobin Concentration : 31.6 gm/dL        03-07    137  |  103  |  10  ----------------------------<  176<H>  4.5   |  26  |  0.68    Ca    8.0<L>      07 Mar 2023 15:43  Phos  3.3     03-07  Mg     1.6     03-07    TPro  6.4  /  Alb  2.2<L>  /  TBili  0.3  /  DBili  x   /  AST  14  /  ALT  22  /  AlkPhos  79  03-07        RADIOLOGY:  TTE Echo Limited or F/U (03.07.23 @ 11:16)   Summary:   1. Limited echocardiogram performed.   2. The heart rate is tachycardic throughout the study.   3. The left ventricle systolic function appears at least mildly reduced   on limited views, estimated visual LVEF 40-45%.   4. Decreased left ventricular internal cavitysize.   5. There is mild septal left ventricular hypertrophy.   6. Normal right ventricular size and function.   7. Left atrial enlargement.   8. The right atrium is normal in size.   9. Mild mitral annular calcification.  10. Trace mitral valve regurgitation.  11. Moderate tricuspid regurgitation.  12. Mild aortic valve leaflet calcification. No aortic valve stenosis.  13. Mild to moderate pulmonic valve regurgitation.  14. Estimated pulmonary artery systolic pressure is 43.9 mmHg assuming a   right atrial pressure of 15 mmHg, which is consistent with mild pulmonary   hypertension.  15. There is no evidence of pericardial effusion.  16. Consider repeating echocardiogram when patient is no longer   tachycardic for more accurate assessment of left ventricle ejection   fraction, if clinically indicated.            Xray Chest 1 View- PORTABLE-Urgent (Xray Chest 1 View- PORTABLE-Urgent .) (03.05.23 @ 01:55) >  IMPRESSION:  No acute infiltrate seen        CRITICAL CARE TIME SPENT: 30 mins   Ortiz Garza is a 73 year old woman with past medical history of Atrial fibrillation (on Eliquis), Pericarditis with pericardial effusion, Normal coronaries (1/2023 cath), Cerebral aneurysm (s/p coiling), Hypothyroidism with recent hospitalization at [facility] this month for acute left MCA territory ischemia, was admitted to [facility] and now transferred to telemetry due to episode of chest pain and atrial fibrillation with RVR s/p missed cardizem doses and decreased bb 2/2 "soft bp's."    ICU team consulted for persistent afib with rvr despite CCB and Dig. Accepting pt for close monitoring and tx with continuos esmolol gtt.     patient seen and examined noted patient being discomfort, complain of chest pressure earlier, now resolved  no n/v, abd pain  mild sob / BAKER      Allergies  No Known Allergies        MEDICATIONS  (STANDING):  apixaban 5 milliGRAM(s) Oral every 12 hours  aspirin  chewable 81 milliGRAM(s) Oral daily  atorvastatin 80 milliGRAM(s) Oral at bedtime  chlorhexidine 2% Cloths 1 Application(s) Topical <User Schedule>  colchicine 0.6 milliGRAM(s) Oral two times a day  esmolol  Infusion 50 MICROgram(s)/kG/Min (16.8 mL/Hr) IV Continuous <Continuous>  famotidine    Tablet 20 milliGRAM(s) Oral at bedtime  levothyroxine 50 MICROGram(s) Oral daily  magnesium oxide 400 milliGRAM(s) Oral three times a day with meals  memantine 5 milliGRAM(s) Oral daily  midodrine 5 milliGRAM(s) Oral once  mirtazapine 15 milliGRAM(s) Oral at bedtime  polyethylene glycol 3350 17 Gram(s) Oral daily  predniSONE   Tablet 30 milliGRAM(s) Oral daily  senna 2 Tablet(s) Oral at bedtime    MEDICATIONS  (PRN):  acetaminophen     Tablet .. 650 milliGRAM(s) Oral every 6 hours PRN Temp greater or equal to 38C (100.4F), Mild Pain (1 - 3)  ondansetron Injectable 4 milliGRAM(s) IV Push every 6 hours PRN Nausea and/or Vomiting      Daily Height in cm: 149.86 (07 Mar 2023 15:30)    Daily     Drug Dosing Weight  Height (cm): 149.9 (07 Mar 2023 15:30)  Weight (kg): 65.6 (07 Mar 2023 16:24)  BMI (kg/m2): 29.2 (07 Mar 2023 16:24)  BSA (m2): 1.61 (07 Mar 2023 16:24)        PAST MEDICAL & SURGICAL HISTORY:  High cholesterol  Hypothyroidism  CVA (cerebrovascular accident)  Paroxysmal atrial fibrillation  H/O knee surgery  Brain aneurysm        FAMILY HISTORY:  No pertinent family history        ADVANCE DIRECTIVES: Full code        REVIEW OF SYSTEMS: ROS negative. Pt denies dizziness, CP, SOB at this time.         Vital Signs Last 24 Hrs  T(C): 36.6 (07 Mar 2023 13:06), Max: 36.6 (07 Mar 2023 05:06)  T(F): 97.8 (07 Mar 2023 13:06), Max: 97.9 (07 Mar 2023 05:06)  HR: 103 (07 Mar 2023 16:00) (77 - 152)  BP: 88/35 (07 Mar 2023 16:00) (82/52 - 157/81)  BP(mean): 52 (07 Mar 2023 16:00) (52 - 118)  RR: 21 (07 Mar 2023 16:00) (16 - 27)  SpO2: 96% (07 Mar 2023 16:00) (91% - 97%)      O2 Parameters below as of 07 Mar 2023 16:00  Patient On (Oxygen Delivery Method): nasal cannula  O2 Flow (L/min): 2      I&O's Detail    07 Mar 2023 07:01  -  07 Mar 2023 16:58  --------------------------------------------------------  IN:    Esmolol: 39 mL  Total IN: 39 mL    OUT:  Total OUT: 0 mL    Total NET: 39 mL        PHYSICAL EXAM:    GENERAL: NAD, well-groomed, well-developed  HEAD:  Atraumatic, Normocephalic  EYES: EOMI, PERRLA, conjunctiva and sclera clear  NECK: Supple, No JVD  NERVOUS SYSTEM:  Alert & Oriented X3   CHEST/LUNG: Clear to percussion bilaterally; No rales, rhonchi, wheezing, or rubs  HEART: irregular rate rhythm, s1/s2  ABDOMEN: Soft, Nontender, Nondistended; Bowel sounds present  EXTREMITIES:  2+ Peripheral Pulses, No clubbing, cyanosis, or edema  LYMPH: No lymphadenopathy noted  SKIN: No rashes or lesions        LABS:  CBC Full  -  ( 07 Mar 2023 15:43 )  WBC Count : 10.59 K/uL  RBC Count : 5.06 M/uL  Hemoglobin : 11.7 g/dL  Hematocrit : 37.0 %  Platelet Count - Automated : 360 K/uL  Mean Cell Volume : 73.1 fl  Mean Cell Hemoglobin : 23.1 pg  Mean Cell Hemoglobin Concentration : 31.6 gm/dL        03-07    137  |  103  |  10  ----------------------------<  176<H>  4.5   |  26  |  0.68    Ca    8.0<L>      07 Mar 2023 15:43  Phos  3.3     03-07  Mg     1.6     03-07    TPro  6.4  /  Alb  2.2<L>  /  TBili  0.3  /  DBili  x   /  AST  14  /  ALT  22  /  AlkPhos  79  03-07        RADIOLOGY:  TTE Echo Limited or F/U (03.07.23 @ 11:16)   Summary:   1. Limited echocardiogram performed.   2. The heart rate is tachycardic throughout the study.   3. The left ventricle systolic function appears at least mildly reduced on limited views, estimated visual LVEF 40-45%.   4. Decreased left ventricular internal cavitysize.   5. There is mild septal left ventricular hypertrophy.   6. Normal right ventricular size and function.   7. Left atrial enlargement.   8. The right atrium is normal in size.   9. Mild mitral annular calcification.  10. Trace mitral valve regurgitation.  11. Moderate tricuspid regurgitation.  12. Mild aortic valve leaflet calcification. No aortic valve stenosis.  13. Mild to moderate pulmonic valve regurgitation.  14. Estimated pulmonary artery systolic pressure is 43.9 mmHg assuming a right atrial pressure of 15 mmHg, which is consistent with mild pulmonary hypertension.  15. There is no evidence of pericardial effusion.  16. Consider repeating echocardiogram when patient is no longer tachycardic for more accurate assessment of left ventricle ejection fraction, if clinically indicated.            Xray Chest 1 View- PORTABLE-Urgent (Xray Chest 1 View- PORTABLE-Urgent .) (03.05.23 @ 01:55) >  IMPRESSION:  No acute infiltrate seen        CRITICAL CARE TIME SPENT: 30 mins

## 2023-03-07 NOTE — PROVIDER CONTACT NOTE (CHANGE IN STATUS NOTIFICATION) - ACTION/TREATMENT ORDERED:
EKG x1
STAT CBC CMP Troponin Chest X ray and EKG ordered. 1x dose of Tylenol 650 mg given. Pt complained of decreased pain and discomfort before Tylenol was given. Will follow up and re assess with MD.

## 2023-03-07 NOTE — PROVIDER CONTACT NOTE (CHANGE IN STATUS NOTIFICATION) - SITUATION
Chest pain
RN called to room by patient complaining of chest pain. Denies SOB and difficulty breathing. MD Hwang came to assess.

## 2023-03-07 NOTE — H&P ADULT - NSHPPHYSICALEXAM_GEN_ALL_CORE
Vital Signs (24 Hrs):  T(C): 36.6 (03-07-23 @ 05:06), Max: 37.6 (03-06-23 @ 07:26)  HR: 137 (03-07-23 @ 05:06) (77 - 137)  BP: 96/67 (03-07-23 @ 05:06) (82/52 - 115/85)  RR: 16 (03-07-23 @ 05:06) (16 - 16)  SpO2: 97% (03-07-23 @ 05:06) (93% - 97%)

## 2023-03-07 NOTE — BH CONSULTATION LIAISON ASSESSMENT NOTE - SUMMARY
Pt is a 73 year old female s/p CVA, originally transferred to [facility] for acute rehab services, found to be rapid AFIF and now transferred to ICU for further management. Psychiatry consulted to evaluate for depression and anxiety.

## 2023-03-08 DIAGNOSIS — D64.9 ANEMIA, UNSPECIFIED: ICD-10-CM

## 2023-03-08 DIAGNOSIS — R11.2 NAUSEA WITH VOMITING, UNSPECIFIED: ICD-10-CM

## 2023-03-08 DIAGNOSIS — K85.90 ACUTE PANCREATITIS WITHOUT NECROSIS OR INFECTION, UNSPECIFIED: ICD-10-CM

## 2023-03-08 DIAGNOSIS — R79.89 OTHER SPECIFIED ABNORMAL FINDINGS OF BLOOD CHEMISTRY: ICD-10-CM

## 2023-03-08 LAB
ALBUMIN SERPL ELPH-MCNC: 1.4 G/DL — LOW (ref 3.3–5)
ALP SERPL-CCNC: 56 U/L — SIGNIFICANT CHANGE UP (ref 40–120)
ALT FLD-CCNC: 12 U/L — SIGNIFICANT CHANGE UP (ref 10–45)
ANION GAP SERPL CALC-SCNC: 9 MMOL/L — SIGNIFICANT CHANGE UP (ref 5–17)
AST SERPL-CCNC: 14 U/L — SIGNIFICANT CHANGE UP (ref 10–40)
BILIRUB SERPL-MCNC: 0.2 MG/DL — SIGNIFICANT CHANGE UP (ref 0.2–1.2)
BUN SERPL-MCNC: 7 MG/DL — SIGNIFICANT CHANGE UP (ref 7–23)
CALCIUM SERPL-MCNC: 7.3 MG/DL — LOW (ref 8.4–10.5)
CHLORIDE SERPL-SCNC: 104 MMOL/L — SIGNIFICANT CHANGE UP (ref 96–108)
CO2 SERPL-SCNC: 24 MMOL/L — SIGNIFICANT CHANGE UP (ref 22–31)
CREAT SERPL-MCNC: 0.41 MG/DL — LOW (ref 0.5–1.3)
DIGOXIN SERPL-MCNC: 1.3 NG/ML — SIGNIFICANT CHANGE UP (ref 0.8–2)
EGFR: 105 ML/MIN/1.73M2 — SIGNIFICANT CHANGE UP
GLUCOSE SERPL-MCNC: 78 MG/DL — SIGNIFICANT CHANGE UP (ref 70–99)
HCT VFR BLD CALC: 34.8 % — SIGNIFICANT CHANGE UP (ref 34.5–45)
HGB BLD-MCNC: 10.7 G/DL — LOW (ref 11.5–15.5)
MAGNESIUM SERPL-MCNC: 1.8 MG/DL — SIGNIFICANT CHANGE UP (ref 1.6–2.6)
MCHC RBC-ENTMCNC: 23 PG — LOW (ref 27–34)
MCHC RBC-ENTMCNC: 30.7 GM/DL — LOW (ref 32–36)
MCV RBC AUTO: 74.7 FL — LOW (ref 80–100)
NRBC # BLD: 0 /100 WBCS — SIGNIFICANT CHANGE UP (ref 0–0)
NT-PROBNP SERPL-SCNC: 5047 PG/ML — HIGH (ref 0–300)
PHOSPHATE SERPL-MCNC: 1.8 MG/DL — LOW (ref 2.5–4.5)
PLATELET # BLD AUTO: 307 K/UL — SIGNIFICANT CHANGE UP (ref 150–400)
POTASSIUM SERPL-MCNC: 3.8 MMOL/L — SIGNIFICANT CHANGE UP (ref 3.5–5.3)
POTASSIUM SERPL-SCNC: 3.8 MMOL/L — SIGNIFICANT CHANGE UP (ref 3.5–5.3)
PROT SERPL-MCNC: 4.5 G/DL — LOW (ref 6–8.3)
RBC # BLD: 4.66 M/UL — SIGNIFICANT CHANGE UP (ref 3.8–5.2)
RBC # FLD: 16.4 % — HIGH (ref 10.3–14.5)
SODIUM SERPL-SCNC: 137 MMOL/L — SIGNIFICANT CHANGE UP (ref 135–145)
WBC # BLD: 7.06 K/UL — SIGNIFICANT CHANGE UP (ref 3.8–10.5)
WBC # FLD AUTO: 7.06 K/UL — SIGNIFICANT CHANGE UP (ref 3.8–10.5)

## 2023-03-08 PROCEDURE — 99233 SBSQ HOSP IP/OBS HIGH 50: CPT

## 2023-03-08 PROCEDURE — 99223 1ST HOSP IP/OBS HIGH 75: CPT

## 2023-03-08 PROCEDURE — 93010 ELECTROCARDIOGRAM REPORT: CPT

## 2023-03-08 PROCEDURE — 99232 SBSQ HOSP IP/OBS MODERATE 35: CPT

## 2023-03-08 PROCEDURE — 74018 RADEX ABDOMEN 1 VIEW: CPT | Mod: 26

## 2023-03-08 RX ORDER — POTASSIUM PHOSPHATE, MONOBASIC POTASSIUM PHOSPHATE, DIBASIC 236; 224 MG/ML; MG/ML
30 INJECTION, SOLUTION INTRAVENOUS ONCE
Refills: 0 | Status: COMPLETED | OUTPATIENT
Start: 2023-03-08 | End: 2023-03-08

## 2023-03-08 RX ORDER — METOPROLOL TARTRATE 50 MG
50 TABLET ORAL EVERY 8 HOURS
Refills: 0 | Status: DISCONTINUED | OUTPATIENT
Start: 2023-03-08 | End: 2023-03-10

## 2023-03-08 RX ORDER — METOPROLOL TARTRATE 50 MG
12.5 TABLET ORAL ONCE
Refills: 0 | Status: COMPLETED | OUTPATIENT
Start: 2023-03-08 | End: 2023-03-08

## 2023-03-08 RX ORDER — DIGOXIN 250 MCG
125 TABLET ORAL DAILY
Refills: 0 | Status: DISCONTINUED | OUTPATIENT
Start: 2023-03-08 | End: 2023-03-10

## 2023-03-08 RX ORDER — METOPROLOL TARTRATE 50 MG
50 TABLET ORAL EVERY 8 HOURS
Refills: 0 | Status: DISCONTINUED | OUTPATIENT
Start: 2023-03-08 | End: 2023-03-08

## 2023-03-08 RX ADMIN — ONDANSETRON 4 MILLIGRAM(S): 8 TABLET, FILM COATED ORAL at 04:42

## 2023-03-08 RX ADMIN — Medication 125 MICROGRAM(S): at 09:11

## 2023-03-08 RX ADMIN — CHLORHEXIDINE GLUCONATE 1 APPLICATION(S): 213 SOLUTION TOPICAL at 05:48

## 2023-03-08 RX ADMIN — APIXABAN 5 MILLIGRAM(S): 2.5 TABLET, FILM COATED ORAL at 17:42

## 2023-03-08 RX ADMIN — Medication 50 MICROGRAM(S): at 06:33

## 2023-03-08 RX ADMIN — ATORVASTATIN CALCIUM 80 MILLIGRAM(S): 80 TABLET, FILM COATED ORAL at 21:04

## 2023-03-08 RX ADMIN — POLYETHYLENE GLYCOL 3350 17 GRAM(S): 17 POWDER, FOR SOLUTION ORAL at 12:17

## 2023-03-08 RX ADMIN — Medication 0.6 MILLIGRAM(S): at 17:41

## 2023-03-08 RX ADMIN — POTASSIUM PHOSPHATE, MONOBASIC POTASSIUM PHOSPHATE, DIBASIC 83.33 MILLIMOLE(S): 236; 224 INJECTION, SOLUTION INTRAVENOUS at 10:15

## 2023-03-08 RX ADMIN — MAGNESIUM OXIDE 400 MG ORAL TABLET 400 MILLIGRAM(S): 241.3 TABLET ORAL at 17:42

## 2023-03-08 RX ADMIN — Medication 0.6 MILLIGRAM(S): at 06:33

## 2023-03-08 RX ADMIN — ONDANSETRON 4 MILLIGRAM(S): 8 TABLET, FILM COATED ORAL at 10:07

## 2023-03-08 RX ADMIN — Medication 12.5 MILLIGRAM(S): at 06:48

## 2023-03-08 RX ADMIN — FAMOTIDINE 20 MILLIGRAM(S): 10 INJECTION INTRAVENOUS at 21:04

## 2023-03-08 RX ADMIN — PANTOPRAZOLE SODIUM 40 MILLIGRAM(S): 20 TABLET, DELAYED RELEASE ORAL at 06:33

## 2023-03-08 RX ADMIN — MEMANTINE HYDROCHLORIDE 5 MILLIGRAM(S): 10 TABLET ORAL at 12:17

## 2023-03-08 RX ADMIN — Medication 30 MILLIGRAM(S): at 06:33

## 2023-03-08 RX ADMIN — MAGNESIUM OXIDE 400 MG ORAL TABLET 400 MILLIGRAM(S): 241.3 TABLET ORAL at 09:11

## 2023-03-08 RX ADMIN — Medication 50 MILLIGRAM(S): at 09:12

## 2023-03-08 RX ADMIN — MIRTAZAPINE 15 MILLIGRAM(S): 45 TABLET, ORALLY DISINTEGRATING ORAL at 21:04

## 2023-03-08 RX ADMIN — Medication 81 MILLIGRAM(S): at 12:17

## 2023-03-08 RX ADMIN — APIXABAN 5 MILLIGRAM(S): 2.5 TABLET, FILM COATED ORAL at 06:33

## 2023-03-08 RX ADMIN — Medication 50 MILLIGRAM(S): at 17:42

## 2023-03-08 NOTE — OCCUPATIONAL THERAPY INITIAL EVALUATION ADULT - NSOTDISCHREC_GEN_A_CORE
[[id number]] : 40-year-old female with a history of dyslipidemia, hypothyroidism, presents today for right upper extremity pain\par \par Right upper extremity pain\par Right shoulder pain\par Patient noticed this was associated with trauma which occurred yesterday\par Patient denies any numbness tingling distal extremity. 
Home OT

## 2023-03-08 NOTE — DIETITIAN INITIAL EVALUATION ADULT - NUTRITIONGOAL OUTCOME1
Patient to increase po intake & meet 75% of assessed needs via diet & po supplements during hospitalization

## 2023-03-08 NOTE — DIETITIAN INITIAL EVALUATION ADULT - ORAL INTAKE PTA/DIET HISTORY
Patient note with poor appetite PTA at presvious hospital & when patient on acute rehab floor & was being provided with po nutrition supplements

## 2023-03-08 NOTE — PROGRESS NOTE ADULT - ASSESSMENT
Physical Examination:  GENERAL:               Alert, Oriented, No acute distress.    HEENT:                    . No JVD, Moist MM  PULM:                     Bilateral air entry, Clear to auscultation bilaterally, no significant sputum production, No Rales, No Rhonchi, No Wheezing  CVS:                         S1, S2,  No Murmur  ABD:                        Soft, nondistended, nontender, normoactive bowel sounds,   EXT:                         No edema, nontender, No Cyanosis or Clubbing    NEURO:                  Alert, oriented, interactive, nonfocal, follows commands  PSYC:                      Calm, + Insight.        Assessment  Afib with RVR resolved - s/p esmolol drip  Pericarditis with h/o pleural effusion  recent CVA  Chronic CHF with mild reduction of systoli fuction  Underlying High chol, hypothyroidism,     Plan  Transition to oral BB and Dig  hold esmolol  Cardio f/u  continue eliquis  D/C IVF today  GI eval for decreased po intake and possible gastritis  prednisone taper-> decrease 10mg a week over 4 weeks ; 40mg x 1week (last dose on 3/2), 30mg x 1week (last dose on 3/9), 10mg x 1week (last dose on 3/23)  c/w colchicine 0.6mg PO BID      PMD:				                   Notified(Date):  Family Updated: 	Son on patients phone	                                 Date: 3/8/23      Sedation & Analgesia:	  Diet/Nutrition:		   Diet, DASH/TLC:   Sodium & Cholesterol Restricted  No Carb Prosource (1pkg = 15gms Protein)     Qty per Day:  QD  Supplement Feeding Modality:  Oral  Ensure Plus High Protein Cans or Servings Per Day:  1       Frequency:  Two Times a day (03-07-23 @ 05:34) [Active]        GI PPx:			  famotidine    Tablet 20 milliGRAM(s) Oral at bedtime  pantoprazole    Tablet 40 milliGRAM(s) Oral before breakfast  polyethylene glycol 3350 17 Gram(s) Oral daily  senna 2 Tablet(s) Oral at bedtime      DVT Ppx:		  apixaban 5 milliGRAM(s) Oral every 12 hours  aspirin  chewable 81 milliGRAM(s) Oral daily         Activity:		    Head of Bed:               35-45 Deg  Glycemic Control:        n/a  Lines: PIV     Restraints were deemed necessary to prevent removal of life-sustaining devices [  ] YES   [    ]  NO    Disposition: ICU care, likely transfer to medical floor in next 12-24 hrs    Goals of Care: Full code Physical Examination:  GENERAL:               Alert, Oriented, No acute distress.    HEENT:                   No JVD, Moist MM  PULM:                     Bilateral air entry, Clear to auscultation bilaterally, no significant sputum production, No Rales, No Rhonchi, No Wheezing  CVS:                         S1, S2,  +Murmur  ABD:                        Soft, nondistended, nontender, normoactive bowel sounds,   EXT:                         No edema, nontender, No Cyanosis or Clubbing    NEURO:                  Alert, oriented, interactive, nonfocal, follows commands  PSYC:                      Calm, + Insight.        Assessment  Afib with RVR resolved - s/p esmolol drip  Pericarditis with h/o pleural effusion  recent CVA  Chronic CHF with mild reduction of systoli fuction  Underlying High chol, hypothyroidism,     Plan  Transition to oral BB and Dig  hold esmolol  Cardio f/u  continue eliquis  D/C IVF today  GI eval for decreased po intake and possible gastritis  prednisone taper-> decrease 10mg a week over 4 weeks ; 40mg x 1week (last dose on 3/2), 30mg x 1week (last dose on 3/9), 10mg x 1week (last dose on 3/23)  c/w colchicine 0.6mg PO BID      PMD:				                   Notified(Date):  Family Updated: 	Son on patients phone	                                 Date: 3/8/23      Sedation & Analgesia:	  Diet/Nutrition:		   Diet, DASH/TLC:   Sodium & Cholesterol Restricted  No Carb Prosource (1pkg = 15gms Protein)     Qty per Day:  QD  Supplement Feeding Modality:  Oral  Ensure Plus High Protein Cans or Servings Per Day:  1       Frequency:  Two Times a day (03-07-23 @ 05:34) [Active]        GI PPx:			  famotidine    Tablet 20 milliGRAM(s) Oral at bedtime  pantoprazole    Tablet 40 milliGRAM(s) Oral before breakfast  polyethylene glycol 3350 17 Gram(s) Oral daily  senna 2 Tablet(s) Oral at bedtime      DVT Ppx:		  apixaban 5 milliGRAM(s) Oral every 12 hours  aspirin  chewable 81 milliGRAM(s) Oral daily         Activity:		    Head of Bed:               35-45 Deg  Glycemic Control:        n/a  Lines: PIV     Restraints were deemed necessary to prevent removal of life-sustaining devices [  ] YES   [    ]  NO    Disposition: ICU care, likely transfer to medical floor in next 12-24 hrs    Goals of Care: Full code

## 2023-03-08 NOTE — CONSULT NOTE ADULT - NS ATTEND AMEND GEN_ALL_CORE FT
patient seen and examined  brought to ICU for cardizem drip  lopressor 2.5 given hr imrproved  borderline BP with high HR, improved with slower hr s/p bp  d/w cardio  d/w family
Patient seen and examined at the bedside. Agree with the assessment and plan of BRYNN Wood.  Cardiac issues appear controlled at present.  Suspect GI complaints secondary to meds. Continue PPI. Zofran prn. Diet as tolerated.  No immediate indication for EGD.  Reviewed AXR report - unrevealing.

## 2023-03-08 NOTE — PROGRESS NOTE ADULT - SUBJECTIVE AND OBJECTIVE BOX
AFAF ABIZEID  [id number]      Chief Complaint: Cerebral ischemia/Atrial fibrillation with RVR/Pericarditis with prior pericardial effusion     Interval History: The patient was transferred to ICU yesterday afternoon to receive Esmolol drip and has now converted to normal sinus rhythm. She reports feeling better, denies chest pain, shortness of breath or palpitations.     Tele: sinus rhythm 80s BPM      Current meds:   acetaminophen     Tablet .. 650 milliGRAM(s) Oral every 6 hours PRN  apixaban 5 milliGRAM(s) Oral every 12 hours  aspirin  chewable 81 milliGRAM(s) Oral daily  atorvastatin 80 milliGRAM(s) Oral at bedtime  chlorhexidine 2% Cloths 1 Application(s) Topical <User Schedule>  colchicine 0.6 milliGRAM(s) Oral two times a day  esmolol  Infusion 25 MICROgram(s)/kG/Min IV Continuous <Continuous>  famotidine    Tablet 20 milliGRAM(s) Oral at bedtime  lactated ringers. 1000 milliLiter(s) IV Continuous <Continuous>  levothyroxine 50 MICROGram(s) Oral daily  magnesium oxide 400 milliGRAM(s) Oral three times a day with meals  memantine 5 milliGRAM(s) Oral daily  mirtazapine 15 milliGRAM(s) Oral at bedtime  ondansetron Injectable 4 milliGRAM(s) IV Push every 6 hours PRN  pantoprazole    Tablet 40 milliGRAM(s) Oral before breakfast  polyethylene glycol 3350 17 Gram(s) Oral daily  predniSONE   Tablet 30 milliGRAM(s) Oral daily  senna 2 Tablet(s) Oral at bedtime      Objective:     Vital Signs:   T(C): 36.9 (03-08-23 @ 07:00), Max: 36.9 (03-07-23 @ 19:00)  HR: 99 (03-08-23 @ 07:00) (79 - 152)  BP: 115/52 (03-08-23 @ 07:00) (80/66 - 157/81)  RR: 19 (03-08-23 @ 07:00) (14 - 27)  SpO2: 98% (03-08-23 @ 07:00) (91% - 99%)  Wt(kg): --    PHYSICAL EXAM:  General: mild anxiety   HEENT: sclera anicteric  Neck: supple  CVS: JVP ~ 7 cm H20, RRR, s1, s2, no murmurs/rubs/gallops  Chest: unlabored respirations, clear to auscultation b/l  Abdomen: non-distended  Extremities: no lower extremity edema b/l  Neuro: awake, alert & oriented   Psych: normal affect      Labs:   08 Mar 2023 05:15    137    |  104    |  7      ----------------------------<  78     3.8     |  24     |  0.41     Ca    7.3        08 Mar 2023 05:15  Phos  1.8       08 Mar 2023 05:15  Mg     1.8       08 Mar 2023 05:15    TPro  4.5    /  Alb  1.4    /  TBili  0.2    /  DBili  x      /  AST  14     /  ALT  12     /  AlkPhos  56     08 Mar 2023 05:15                          10.7   7.06  )-----------( 307      ( 08 Mar 2023 05:15 )             34.8         Coronary angiogram (1/30/23):  LM: normal  LAD: normal   LCx: normal   RCA: normal     Diagnostic Conclusions:   Normal coronary arteries   Normal LV EDP   Moderately depressed LVEF with basal to mid inferior hypokinesis.   Her presentation is more likely percarditis in the setting of COVID  infection, and/or takasubu in setting of rapid Afib    TTE (2/18/23):  LVEF 50-55%  Normal RV size and function  Moderate TR  Small to moderate size circumferential pericardial effusion (measures   up to 1.3 cm) with fibrinous thickened layering over the RV. No   echocardiographic evidence of tamponade.  Compared to the prior TTE study from 1/30/23, new pericardial   effusion noted. Findings reported to Dr. Flynn.      TTE (2/23/23):  LVEF 50-55%  Normal RV size and function  MIld TR    TTE (3/7/23):   1. Limited echocardiogram performed.   2. The heart rate is tachycardic throughout the study.   3. The left ventricle systolic function appears at least mildly reduced   on limited views, estimated visual LVEF 40-45%.   4. Decreased left ventricular internal cavity size.   5. There is mild septal left ventricular hypertrophy.   6. Normal right ventricular size and function.   7. Left atrial enlargement.   8. The right atrium is normal in size.   9. Mild mitral annular calcification.  10. Trace mitral valve regurgitation.  11. Moderate tricuspid regurgitation.  12. Mild aortic valve leaflet calcification. No aortic valve stenosis.  13. Mild to moderate pulmonic valve regurgitation.  14. Estimated pulmonary artery systolic pressure is 43.9 mmHg assuming a right atrial pressure of 15 mmHg, which is consistent with mild pulmonary hypertension.  15. There is no evidence of pericardial effusion.  16. Consider repeating echocardiogram when patient is no longer   tachycardic for more accurate assessment of left ventricle ejection   fraction, if clinically indicated.      ECG (3/7/23): atrial fibrillation with RVR, nonspecific ST abnormalities (similar to prior ECG)

## 2023-03-08 NOTE — PROGRESS NOTE ADULT - SUBJECTIVE AND OBJECTIVE BOX
Follow-up Critical Care Progress Note  Chief Complaint : A-Fib and Chest pain    HPI: 74 y/o female w h/o a-fib, pericarditis, pericardial effusion, CVA and hypothyroidism.   Pt originally admitted to  rehab for Cerebral Infarct but 3/7/23 was c/o chest pain and a-fib   (150-170's) w/RVR. Pt was transferred to Telemetry where she Pt Recieved 5mg lopressor IV resolving   HR (90's) and pain. Later in afternoon She then was transferred to CCU for A-fib with RVR and given metoprolol IV   and placed on esmolol gtt.     Interval HX- Pt responded well to esmolol gtt with HR controlled (70's). No events overnight and Esmolol   D/C. No c/o pain but states to have intermittent nausea.    Allergies :No Known Allergies    PAST MEDICAL & SURGICAL HISTORY:  High cholesterol  Hypothyroidism  CVA (cerebrovascular accident)  Paroxysmal atrial fibrillation  H/O knee surgery  Brain aneurysm    Medications:  MEDICATIONS  (STANDING):  apixaban 5 milliGRAM(s) Oral every 12 hours  aspirin  chewable 81 milliGRAM(s) Oral daily  atorvastatin 80 milliGRAM(s) Oral at bedtime  chlorhexidine 2% Cloths 1 Application(s) Topical <User Schedule>  colchicine 0.6 milliGRAM(s) Oral two times a day  digoxin     Tablet 125 MICROGram(s) Oral daily  famotidine    Tablet 20 milliGRAM(s) Oral at bedtime  lactated ringers. 1000 milliLiter(s) (75 mL/Hr) IV Continuous <Continuous>  levothyroxine 50 MICROGram(s) Oral daily  magnesium oxide 400 milliGRAM(s) Oral three times a day with meals  memantine 5 milliGRAM(s) Oral daily  metoprolol tartrate 50 milliGRAM(s) Oral every 8 hours  mirtazapine 15 milliGRAM(s) Oral at bedtime  pantoprazole    Tablet 40 milliGRAM(s) Oral before breakfast  polyethylene glycol 3350 17 Gram(s) Oral daily  potassium phosphate IVPB 30 milliMole(s) IV Intermittent once  predniSONE   Tablet 30 milliGRAM(s) Oral daily  senna 2 Tablet(s) Oral at bedtime    MEDICATIONS  (PRN):  acetaminophen     Tablet .. 650 milliGRAM(s) Oral every 6 hours PRN Temp greater or equal to 38C (100.4F), Mild Pain (1 - 3)  ondansetron Injectable 4 milliGRAM(s) IV Push every 6 hours PRN Nausea and/or Vomiting    Antibiotics History    Heme Medications   apixaban 5 milliGRAM(s) Oral every 12 hours, 03-07-23 @ 05:27  aspirin  chewable 81 milliGRAM(s) Oral daily, 03-07-23 @ 05:26    GI Medications  famotidine    Tablet 20 milliGRAM(s) Oral at bedtime, 03-07-23 @ 05:31, Routine  pantoprazole    Tablet 40 milliGRAM(s) Oral before breakfast, 03-08-23 @ 00:00, Routine  polyethylene glycol 3350 17 Gram(s) Oral daily, 03-07-23 @ 05:28, Routine  senna 2 Tablet(s) Oral at bedtime, 03-07-23 @ 05:28, Routine    COVID  02-26-23 @ 17:49  COVID -   NotDetec  02-24-23 @ 13:00  COVID -   NotDetec  02-16-23 @ 05:40  COVID -   NotDetec  01-30-23 @ 00:30  COVID -   [facility]    COVID Biomarkers    03-07-23 @ 07:00 ESR 84<H>  ---  CRP --  ---  DDimer  --   ---   LDH --   ---   Ferritin --      Trend Cardiac Enzymes  03-07-23 @ 15:43  CAJ-PHYUB-ZGBEO-CPKMM/Trop I - -- - --  - --  -  --  /  9.5  03-07-23 @ 07:00  SCR-XWMLQ-WLFQW-CPKMM/Trop I - -- - --  - --  -  --  /  11.6  03-07-23 @ 04:15  DAX-NNTNX-XYPQB-CPKMM/Trop I - -- - --  - --  -  --  /  11.5  03-05-23 @ 16:30  JXO-XHLUK-IXPMU-CPKMM/Trop I - -- - --  - --  -  --  /  5.3    Trend BNP  03-08-23 @ 05:15   -  5047<H>  03-07-23 @ 04:15   -  1744<H>  01-30-23 @ 00:30   -  142    Procalcitonin Trend    WBC Trend  03-08-23 @ 05:15   -  7.06  03-07-23 @ 15:43   -  10.59<H>  03-07-23 @ 04:15   -  8.27  03-06-23 @ 06:50   -  9.66    H/H Trend  03-08-23 @ 05:15   -   10.7<L>/ 34.8  03-07-23 @ 15:43   -   11.7/ 37.0  03-07-23 @ 04:15   -   11.6/ 36.6  03-06-23 @ 06:50   -   12.4/ 40.0  03-05-23 @ 01:55   -   13.7/ 44.1  03-02-23 @ 09:35   -   12.2/ 39.2    Stool Occult Blood    Platelet Trend  03-08-23 @ 05:15   -  307  03-07-23 @ 15:43   -  360  03-07-23 @ 04:15   -  381  03-06-23 @ 06:50   -  310    Trend Sodium  03-08-23 @ 05:15   -  137  03-07-23 @ 15:43   -  137  03-07-23 @ 04:15   -  138  03-06-23 @ 06:50   -  138    Trend Potassium  03-08-23 @ 05:15   -  3.8  03-07-23 @ 15:43   -  4.5  03-07-23 @ 04:15   -  3.7  03-06-23 @ 06:50   -  4.1    Trend Bun/Cr  03-08-23 @ 05:15  BUN/CR -  7 / 0.41<L>  03-07-23 @ 15:43  BUN/CR -  10 / 0.68  03-07-23 @ 04:15  BUN/CR -  10 / 0.63  03-06-23 @ 06:50  BUN/CR -  12 / 0.71    Lactic Acid Trend  03-07-23 @ 15:43   -   0.7    ABG Trend  02-22-23 @ 02:51   - 7.530<H>/33/113<H>/98.0    Trend AST/ALT/ALK Phos/Bili  03-08-23 @ 05:15   14/12/56/0.2  03-07-23 @ 15:43   14/22/79/0.3  03-07-23 @ 04:15   13/20/76/0.5  03-06-23 @ 06:50   12/19/80/0.5  03-05-23 @ 01:55   <5<L>/23/86/0.6  03-02-23 @ 13:25   21/28/80/0.4  03-02-23 @ 09:35   24/29/75/0.6  02-27-23 @ 07:50   25/32/74/0.5  02-22-23 @ 01:50   18/27/88/0.2<L>  02-17-23 @ 19:40   14/10/69/0.5  02-16-23 @ 05:40   11/10/57/0.5  02-01-23 @ 05:55   11/7/54/0.3<L>    Ammonia Trend    Amylase / Lipase Trend  01-30-23 @ 00:30   -   -- / 40    Albumin Trend  03-08-23 @ 05:15   -   1.4<L>  03-07-23 @ 15:43   -   2.2<L>  03-07-23 @ 04:15   -   2.4<L>  03-06-23 @ 06:50   -   2.5<L>  03-05-23 @ 01:55   -   2.9<L>  03-02-23 @ 13:25   -   2.8<L>    PTT - PT - INR Trend  02-24-23 @ 08:45   -   64.7<H> - -- - --  02-24-23 @ 03:13   -   67.7<H> - -- - --  02-23-23 @ 19:28   -   64.6<H> - -- - --  02-23-23 @ 09:07   -   55.0<H> - -- - --  02-23-23 @ 02:14   -   44.4<H> - -- - --  02-22-23 @ 01:50   -   33.9 - 12.1 - 1.04    Glucose Trend  03-08-23 @ 05:15   -  -- 78 --  03-07-23 @ 15:43   -  -- 176<H> --  03-07-23 @ 04:15   -  -- 99 --  03-06-23 @ 06:50   -  -- 127<H> --    A1C with Estimated Average Glucose Result: 6.1 % *H* [4.0 - 5.6] (02-23-23 @ 06:01)    LABS:                        10.7   7.06  )-----------( 307      ( 08 Mar 2023 05:15 )             34.8     03-08    137  |  104  |  7   ----------------------------<  78  3.8   |  24  |  0.41<L>    Ca    7.3<L>      08 Mar 2023 05:15  Phos  1.8     03-08  Mg     1.8     03-08    TPro  4.5<L>  /  Alb  1.4<L>  /  TBili  0.2  /  DBili  x   /  AST  14  /  ALT  12  /  AlkPhos  56  03-08    Serum Pro-Brain Natriuretic Peptide: 5047 pg/mL (03-08-23 @ 05:15)  Serum Pro-Brain Natriuretic Peptide: 1744 pg/mL (03-07-23 @ 04:15)    CULTURES: (if applicable)  CAPILLARY BLOOD GLUCOSE  RADIOLOGY  CXR:    CT:    ECHO: EF-40-45%   Mild septal left ventricular hypertrophy   left atrial enlargment   trace mitral valve regurgitation   moderate tricuspid regurgitation  mild to moderate pulmonic valve reguritation   mild aortic valve leaflet calcification without stenosis    VITALS:  T(C): 36.9 (03-08-23 @ 07:00), Max: 36.9 (03-07-23 @ 19:00)  T(F): 98.4 (03-08-23 @ 07:00), Max: 98.5 (03-07-23 @ 19:00)  HR: 99 (03-08-23 @ 07:00) (79 - 152)  BP: 115/52 (03-08-23 @ 07:00) (80/66 - 132/81)  BP(mean): 73 (03-08-23 @ 07:00) (52 - 118)  ABP: --  ABP(mean): --  RR: 19 (03-08-23 @ 07:00) (14 - 27)  SpO2: 98% (03-08-23 @ 07:00) (91% - 99%)  CVP(mm Hg): --  CVP(cm H2O): --    Ins and Outs     03-07-23 @ 07:01  -  03-08-23 @ 07:00  --------------------------------------------------------  IN: 1299.8 mL / OUT: 950 mL / NET: 349.8 mL    03-08-23 @ 07:01  -  03-08-23 @ 09:36  --------------------------------------------------------  IN: 75 mL / OUT: 0 mL / NET: 75 mL    Height (cm): 149.9 (03-07-23 @ 15:30)  Weight (kg): 65.6 (03-07-23 @ 16:24)  BMI (kg/m2): 29.2 (03-07-23 @ 16:24)    I&O's Detail    07 Mar 2023 07:01  -  08 Mar 2023 07:00  --------------------------------------------------------  IN:    Esmolol: 117 mL    Esmolol: 107.8 mL    IV PiggyBack: 100 mL    Lactated Ringers: 975 mL  Total IN: 1299.8 mL    OUT:    Voided (mL): 950 mL  Total OUT: 950 mL    Total NET: 349.8 mL    08 Mar 2023 07:01  -  08 Mar 2023 09:36  --------------------------------------------------------  IN:    Lactated Ringers: 75 mL  Total IN: 75 mL    OUT:  Total OUT: 0 mL    Total NET: 75 mL    Physical Examination:  GENERAL:               Alert, Oriented, No acute distress.    HEENT:                   No JVD, Moist MM  PULM:                     Bilateral air entry, Clear to auscultation bilaterally, BAKER noted, no significant sputum production, No     Rales, No Rhonchi, No Wheezing  CVS:                        S1, S2, abnormal (h/o a-fib)  ABD:                       Soft, nondistended, nontender, normoactive bowel sounds,   EXT:                        No edema, nontender, No Cyanosis or Clubbing   Vascular:                 Warm Extremities, Normal Capillary refill, Normal Distal Pulses  SKIN:                      Warm and well perfused, no rashes noted.   NEURO:                  Alert, oriented, interactive, nonfocal, follows commands  PSYC:                     Calm, + Insight.             Follow-up Critical Care Progress Note  Chief Complaint : A-Fib and Chest pain    HPI: 74 y/o female w h/o a-fib, pericarditis, pericardial effusion, CVA and hypothyroidism.   Pt originally admitted to  rehab for Cerebral Infarct but 3/7/23 was c/o chest pain and a-fib   (150-170's) w/RVR. Pt was transferred to Telemetry where she Pt Recieved 5mg lopressor IV resolving   HR (90's) and pain. Later in afternoon She then was transferred to CCU for A-fib with RVR and given metoprolol IV   and placed on esmolol gtt.     Interval HX- Pt responded well to esmolol gtt with HR controlled (70's). No events overnight and Esmolol   D/C. No c/o pain but states to have intermittent nausea.    Allergies :No Known Allergies    PAST MEDICAL & SURGICAL HISTORY:  High cholesterol  Hypothyroidism  CVA (cerebrovascular accident)  Paroxysmal atrial fibrillation  H/O knee surgery  Brain aneurysm    Medications:  MEDICATIONS  (STANDING):  apixaban 5 milliGRAM(s) Oral every 12 hours  aspirin  chewable 81 milliGRAM(s) Oral daily  atorvastatin 80 milliGRAM(s) Oral at bedtime  chlorhexidine 2% Cloths 1 Application(s) Topical <User Schedule>  colchicine 0.6 milliGRAM(s) Oral two times a day  digoxin     Tablet 125 MICROGram(s) Oral daily  famotidine    Tablet 20 milliGRAM(s) Oral at bedtime  lactated ringers. 1000 milliLiter(s) (75 mL/Hr) IV Continuous <Continuous>  levothyroxine 50 MICROGram(s) Oral daily  magnesium oxide 400 milliGRAM(s) Oral three times a day with meals  memantine 5 milliGRAM(s) Oral daily  metoprolol tartrate 50 milliGRAM(s) Oral every 8 hours  mirtazapine 15 milliGRAM(s) Oral at bedtime  pantoprazole    Tablet 40 milliGRAM(s) Oral before breakfast  polyethylene glycol 3350 17 Gram(s) Oral daily  potassium phosphate IVPB 30 milliMole(s) IV Intermittent once  predniSONE   Tablet 30 milliGRAM(s) Oral daily  senna 2 Tablet(s) Oral at bedtime    MEDICATIONS  (PRN):  acetaminophen     Tablet .. 650 milliGRAM(s) Oral every 6 hours PRN Temp greater or equal to 38C (100.4F), Mild Pain (1 - 3)  ondansetron Injectable 4 milliGRAM(s) IV Push every 6 hours PRN Nausea and/or Vomiting    Antibiotics History    Heme Medications   apixaban 5 milliGRAM(s) Oral every 12 hours, 03-07-23 @ 05:27  aspirin  chewable 81 milliGRAM(s) Oral daily, 03-07-23 @ 05:26    GI Medications  famotidine    Tablet 20 milliGRAM(s) Oral at bedtime, 03-07-23 @ 05:31, Routine  pantoprazole    Tablet 40 milliGRAM(s) Oral before breakfast, 03-08-23 @ 00:00, Routine  polyethylene glycol 3350 17 Gram(s) Oral daily, 03-07-23 @ 05:28, Routine  senna 2 Tablet(s) Oral at bedtime, 03-07-23 @ 05:28, Routine    COVID  02-26-23 @ 17:49  COVID -   NotDetec  02-24-23 @ 13:00  COVID -   NotDetec  02-16-23 @ 05:40  COVID -   NotDetec  01-30-23 @ 00:30  COVID -   [facility]    COVID Biomarkers    03-07-23 @ 07:00 ESR 84<H>  ---  CRP --  ---  DDimer  --   ---   LDH --   ---   Ferritin --      Trend Cardiac Enzymes  03-07-23 @ 15:43  GWI-HZBYL-HIRES-CPKMM/Trop I - -- - --  - --  -  --  /  9.5  03-07-23 @ 07:00  VSV-FHXBD-LRVBU-CPKMM/Trop I - -- - --  - --  -  --  /  11.6  03-07-23 @ 04:15  USD-DSONE-SGLOA-CPKMM/Trop I - -- - --  - --  -  --  /  11.5  03-05-23 @ 16:30  DKJ-HFRYQ-WNXNY-CPKMM/Trop I - -- - --  - --  -  --  /  5.3    Trend BNP  03-08-23 @ 05:15   -  5047<H>  03-07-23 @ 04:15   -  1744<H>  01-30-23 @ 00:30   -  142    Procalcitonin Trend    WBC Trend  03-08-23 @ 05:15   -  7.06  03-07-23 @ 15:43   -  10.59<H>  03-07-23 @ 04:15   -  8.27  03-06-23 @ 06:50   -  9.66    H/H Trend  03-08-23 @ 05:15   -   10.7<L>/ 34.8  03-07-23 @ 15:43   -   11.7/ 37.0  03-07-23 @ 04:15   -   11.6/ 36.6  03-06-23 @ 06:50   -   12.4/ 40.0  03-05-23 @ 01:55   -   13.7/ 44.1  03-02-23 @ 09:35   -   12.2/ 39.2    Stool Occult Blood    Platelet Trend  03-08-23 @ 05:15   -  307  03-07-23 @ 15:43   -  360  03-07-23 @ 04:15   -  381  03-06-23 @ 06:50   -  310    Trend Sodium  03-08-23 @ 05:15   -  137  03-07-23 @ 15:43   -  137  03-07-23 @ 04:15   -  138  03-06-23 @ 06:50   -  138    Trend Potassium  03-08-23 @ 05:15   -  3.8  03-07-23 @ 15:43   -  4.5  03-07-23 @ 04:15   -  3.7  03-06-23 @ 06:50   -  4.1    Trend Bun/Cr  03-08-23 @ 05:15  BUN/CR -  7 / 0.41<L>  03-07-23 @ 15:43  BUN/CR -  10 / 0.68  03-07-23 @ 04:15  BUN/CR -  10 / 0.63  03-06-23 @ 06:50  BUN/CR -  12 / 0.71    Lactic Acid Trend  03-07-23 @ 15:43   -   0.7    ABG Trend  02-22-23 @ 02:51   - 7.530<H>/33/113<H>/98.0    Trend AST/ALT/ALK Phos/Bili  03-08-23 @ 05:15   14/12/56/0.2  03-07-23 @ 15:43   14/22/79/0.3  03-07-23 @ 04:15   13/20/76/0.5  03-06-23 @ 06:50   12/19/80/0.5  03-05-23 @ 01:55   <5<L>/23/86/0.6  03-02-23 @ 13:25   21/28/80/0.4  03-02-23 @ 09:35   24/29/75/0.6  02-27-23 @ 07:50   25/32/74/0.5  02-22-23 @ 01:50   18/27/88/0.2<L>  02-17-23 @ 19:40   14/10/69/0.5  02-16-23 @ 05:40   11/10/57/0.5  02-01-23 @ 05:55   11/7/54/0.3<L>    Ammonia Trend    Amylase / Lipase Trend  01-30-23 @ 00:30   -   -- / 40    Albumin Trend  03-08-23 @ 05:15   -   1.4<L>  03-07-23 @ 15:43   -   2.2<L>  03-07-23 @ 04:15   -   2.4<L>  03-06-23 @ 06:50   -   2.5<L>  03-05-23 @ 01:55   -   2.9<L>  03-02-23 @ 13:25   -   2.8<L>    PTT - PT - INR Trend  02-24-23 @ 08:45   -   64.7<H> - -- - --  02-24-23 @ 03:13   -   67.7<H> - -- - --  02-23-23 @ 19:28   -   64.6<H> - -- - --  02-23-23 @ 09:07   -   55.0<H> - -- - --  02-23-23 @ 02:14   -   44.4<H> - -- - --  02-22-23 @ 01:50   -   33.9 - 12.1 - 1.04    Glucose Trend  03-08-23 @ 05:15   -  -- 78 --  03-07-23 @ 15:43   -  -- 176<H> --  03-07-23 @ 04:15   -  -- 99 --  03-06-23 @ 06:50   -  -- 127<H> --    A1C with Estimated Average Glucose Result: 6.1 % *H* [4.0 - 5.6] (02-23-23 @ 06:01)    LABS:                        10.7   7.06  )-----------( 307      ( 08 Mar 2023 05:15 )             34.8     03-08    137  |  104  |  7   ----------------------------<  78  3.8   |  24  |  0.41<L>    Ca    7.3<L>      08 Mar 2023 05:15  Phos  1.8     03-08  Mg     1.8     03-08    TPro  4.5<L>  /  Alb  1.4<L>  /  TBili  0.2  /  DBili  x   /  AST  14  /  ALT  12  /  AlkPhos  56  03-08    Serum Pro-Brain Natriuretic Peptide: 5047 pg/mL (03-08-23 @ 05:15)  Serum Pro-Brain Natriuretic Peptide: 1744 pg/mL (03-07-23 @ 04:15)    CULTURES: (if applicable)  CAPILLARY BLOOD GLUCOSE  RADIOLOGY  CXR:    CT:    ECHO: EF-40-45%   Mild septal left ventricular hypertrophy   left atrial enlargment   trace mitral valve regurgitation   moderate tricuspid regurgitation  mild to moderate pulmonic valve reguritation   mild aortic valve leaflet calcification without stenosis    VITALS:  T(C): 36.9 (03-08-23 @ 07:00), Max: 36.9 (03-07-23 @ 19:00)  T(F): 98.4 (03-08-23 @ 07:00), Max: 98.5 (03-07-23 @ 19:00)  HR: 99 (03-08-23 @ 07:00) (79 - 152)  BP: 115/52 (03-08-23 @ 07:00) (80/66 - 132/81)  BP(mean): 73 (03-08-23 @ 07:00) (52 - 118)  ABP: --  ABP(mean): --  RR: 19 (03-08-23 @ 07:00) (14 - 27)  SpO2: 98% (03-08-23 @ 07:00) (91% - 99%)  CVP(mm Hg): --  CVP(cm H2O): --    Ins and Outs     03-07-23 @ 07:01  -  03-08-23 @ 07:00  --------------------------------------------------------  IN: 1299.8 mL / OUT: 950 mL / NET: 349.8 mL    03-08-23 @ 07:01  -  03-08-23 @ 09:36  --------------------------------------------------------  IN: 75 mL / OUT: 0 mL / NET: 75 mL    Height (cm): 149.9 (03-07-23 @ 15:30)  Weight (kg): 65.6 (03-07-23 @ 16:24)  BMI (kg/m2): 29.2 (03-07-23 @ 16:24)    I&O's Detail    07 Mar 2023 07:01  -  08 Mar 2023 07:00  --------------------------------------------------------  IN:    Esmolol: 117 mL    Esmolol: 107.8 mL    IV PiggyBack: 100 mL    Lactated Ringers: 975 mL  Total IN: 1299.8 mL    OUT:    Voided (mL): 950 mL  Total OUT: 950 mL    Total NET: 349.8 mL    08 Mar 2023 07:01  -  08 Mar 2023 09:36  --------------------------------------------------------  IN:    Lactated Ringers: 75 mL  Total IN: 75 mL    OUT:  Total OUT: 0 mL    Total NET: 75 mL    Physical Examination:  GENERAL:               Alert, Oriented, No acute distress.    HEENT:                   No JVD, Moist MM  PULM:                     Bilateral air entry, Clear to auscultation bilaterally, BAKER noted, no significant sputum production, No     Rales, No Rhonchi, No Wheezing  CVS:                        S1, S2, murmur, irregular rhythm   ABD:                       Soft, nondistended, nontender, normoactive bowel sounds,   EXT:                        No edema, nontender, No Cyanosis or Clubbing   Vascular:                 Warm Extremities, Normal Capillary refill, Normal Distal Pulses  SKIN:                      Warm and well perfused, no rashes noted.   NEURO:                  Alert, oriented, interactive, nonfocal, follows commands  PSYC:                     Calm, + Insight.             Follow-up Critical Care Progress Note  Chief Complaint : A-Fib and Chest pain    HPI: 72 y/o female w h/o a-fib, pericarditis, pericardial effusion, CVA and hypothyroidism.   Pt originally admitted to  rehab for Cerebral Infarct but 3/7/23 was c/o chest pain and a-fib   (150-170's) w/RVR. Pt was transferred to Telemetry where she Pt Recieved 5mg lopressor IV resolving   HR (90's) and pain. Later in afternoon She then was transferred to CCU for A-fib with RVR and given metoprolol IV   and placed on esmolol gtt.     Interval HX- Pt responded well to esmolol gtt with HR controlled (70's). No events overnight and Esmolol D/Kristopher. No c/o pain but states to have intermittent nausea.    Allergies :No Known Allergies    PAST MEDICAL & SURGICAL HISTORY:  High cholesterol  Hypothyroidism  CVA (cerebrovascular accident)  Paroxysmal atrial fibrillation  H/O knee surgery  Brain aneurysm    Medications:  MEDICATIONS  (STANDING):  apixaban 5 milliGRAM(s) Oral every 12 hours  aspirin  chewable 81 milliGRAM(s) Oral daily  atorvastatin 80 milliGRAM(s) Oral at bedtime  chlorhexidine 2% Cloths 1 Application(s) Topical <User Schedule>  colchicine 0.6 milliGRAM(s) Oral two times a day  digoxin     Tablet 125 MICROGram(s) Oral daily  famotidine    Tablet 20 milliGRAM(s) Oral at bedtime  lactated ringers. 1000 milliLiter(s) (75 mL/Hr) IV Continuous <Continuous>  levothyroxine 50 MICROGram(s) Oral daily  magnesium oxide 400 milliGRAM(s) Oral three times a day with meals  memantine 5 milliGRAM(s) Oral daily  metoprolol tartrate 50 milliGRAM(s) Oral every 8 hours  mirtazapine 15 milliGRAM(s) Oral at bedtime  pantoprazole    Tablet 40 milliGRAM(s) Oral before breakfast  polyethylene glycol 3350 17 Gram(s) Oral daily  potassium phosphate IVPB 30 milliMole(s) IV Intermittent once  predniSONE   Tablet 30 milliGRAM(s) Oral daily  senna 2 Tablet(s) Oral at bedtime    MEDICATIONS  (PRN):  acetaminophen     Tablet .. 650 milliGRAM(s) Oral every 6 hours PRN Temp greater or equal to 38C (100.4F), Mild Pain (1 - 3)  ondansetron Injectable 4 milliGRAM(s) IV Push every 6 hours PRN Nausea and/or Vomiting    Antibiotics History    Heme Medications   apixaban 5 milliGRAM(s) Oral every 12 hours, 03-07-23 @ 05:27  aspirin  chewable 81 milliGRAM(s) Oral daily, 03-07-23 @ 05:26    GI Medications  famotidine    Tablet 20 milliGRAM(s) Oral at bedtime, 03-07-23 @ 05:31, Routine  pantoprazole    Tablet 40 milliGRAM(s) Oral before breakfast, 03-08-23 @ 00:00, Routine  polyethylene glycol 3350 17 Gram(s) Oral daily, 03-07-23 @ 05:28, Routine  senna 2 Tablet(s) Oral at bedtime, 03-07-23 @ 05:28, Routine    COVID  02-26-23 @ 17:49  COVID -   NotDetec  02-24-23 @ 13:00  COVID -   NotDetec  02-16-23 @ 05:40  COVID -   NotDetec  01-30-23 @ 00:30  COVID -   [facility]    COVID Biomarkers    03-07-23 @ 07:00 ESR 84<H>  ---  CRP --  ---  DDimer  --   ---   LDH --   ---   Ferritin --      Trend Cardiac Enzymes  03-07-23 @ 15:43  KFU-BYWWY-RWNSI-CPKMM/Trop I - -- - --  - --  -  --  /  9.5  03-07-23 @ 07:00  VOT-HQTMU-VFWUH-CPKMM/Trop I - -- - --  - --  -  --  /  11.6  03-07-23 @ 04:15  UTC-IUQJP-UFHFS-CPKMM/Trop I - -- - --  - --  -  --  /  11.5  03-05-23 @ 16:30  QAK-OBPWD-ZFBEQ-CPKMM/Trop I - -- - --  - --  -  --  /  5.3    Trend BNP  03-08-23 @ 05:15   -  5047<H>  03-07-23 @ 04:15   -  1744<H>  01-30-23 @ 00:30   -  142    Procalcitonin Trend    WBC Trend  03-08-23 @ 05:15   -  7.06  03-07-23 @ 15:43   -  10.59<H>  03-07-23 @ 04:15   -  8.27  03-06-23 @ 06:50   -  9.66    H/H Trend  03-08-23 @ 05:15   -   10.7<L>/ 34.8  03-07-23 @ 15:43   -   11.7/ 37.0  03-07-23 @ 04:15   -   11.6/ 36.6  03-06-23 @ 06:50   -   12.4/ 40.0  03-05-23 @ 01:55   -   13.7/ 44.1  03-02-23 @ 09:35   -   12.2/ 39.2    Stool Occult Blood    Platelet Trend  03-08-23 @ 05:15   -  307  03-07-23 @ 15:43   -  360  03-07-23 @ 04:15   -  381  03-06-23 @ 06:50   -  310    Trend Sodium  03-08-23 @ 05:15   -  137  03-07-23 @ 15:43   -  137  03-07-23 @ 04:15   -  138  03-06-23 @ 06:50   -  138    Trend Potassium  03-08-23 @ 05:15   -  3.8  03-07-23 @ 15:43   -  4.5  03-07-23 @ 04:15   -  3.7  03-06-23 @ 06:50   -  4.1    Trend Bun/Cr  03-08-23 @ 05:15  BUN/CR -  7 / 0.41<L>  03-07-23 @ 15:43  BUN/CR -  10 / 0.68  03-07-23 @ 04:15  BUN/CR -  10 / 0.63  03-06-23 @ 06:50  BUN/CR -  12 / 0.71    Lactic Acid Trend  03-07-23 @ 15:43   -   0.7    ABG Trend  02-22-23 @ 02:51   - 7.530<H>/33/113<H>/98.0    Trend AST/ALT/ALK Phos/Bili  03-08-23 @ 05:15   14/12/56/0.2  03-07-23 @ 15:43   14/22/79/0.3  03-07-23 @ 04:15   13/20/76/0.5  03-06-23 @ 06:50   12/19/80/0.5  03-05-23 @ 01:55   <5<L>/23/86/0.6  03-02-23 @ 13:25   21/28/80/0.4  03-02-23 @ 09:35   24/29/75/0.6  02-27-23 @ 07:50   25/32/74/0.5  02-22-23 @ 01:50   18/27/88/0.2<L>  02-17-23 @ 19:40   14/10/69/0.5  02-16-23 @ 05:40   11/10/57/0.5  02-01-23 @ 05:55   11/7/54/0.3<L>    Ammonia Trend    Amylase / Lipase Trend  01-30-23 @ 00:30   -   -- / 40    Albumin Trend  03-08-23 @ 05:15   -   1.4<L>  03-07-23 @ 15:43   -   2.2<L>  03-07-23 @ 04:15   -   2.4<L>  03-06-23 @ 06:50   -   2.5<L>  03-05-23 @ 01:55   -   2.9<L>  03-02-23 @ 13:25   -   2.8<L>    PTT - PT - INR Trend  02-24-23 @ 08:45   -   64.7<H> - -- - --  02-24-23 @ 03:13   -   67.7<H> - -- - --  02-23-23 @ 19:28   -   64.6<H> - -- - --  02-23-23 @ 09:07   -   55.0<H> - -- - --  02-23-23 @ 02:14   -   44.4<H> - -- - --  02-22-23 @ 01:50   -   33.9 - 12.1 - 1.04    Glucose Trend  03-08-23 @ 05:15   -  -- 78 --  03-07-23 @ 15:43   -  -- 176<H> --  03-07-23 @ 04:15   -  -- 99 --  03-06-23 @ 06:50   -  -- 127<H> --    A1C with Estimated Average Glucose Result: 6.1 % *H* [4.0 - 5.6] (02-23-23 @ 06:01)    LABS:                        10.7   7.06  )-----------( 307      ( 08 Mar 2023 05:15 )             34.8     03-08    137  |  104  |  7   ----------------------------<  78  3.8   |  24  |  0.41<L>    Ca    7.3<L>      08 Mar 2023 05:15  Phos  1.8     03-08  Mg     1.8     03-08    TPro  4.5<L>  /  Alb  1.4<L>  /  TBili  0.2  /  DBili  x   /  AST  14  /  ALT  12  /  AlkPhos  56  03-08    Serum Pro-Brain Natriuretic Peptide: 5047 pg/mL (03-08-23 @ 05:15)  Serum Pro-Brain Natriuretic Peptide: 1744 pg/mL (03-07-23 @ 04:15)    CULTURES: (if applicable)  CAPILLARY BLOOD GLUCOSE  RADIOLOGY  CXR:    CT:    ECHO: EF-40-45%   Mild septal left ventricular hypertrophy   left atrial enlargment   trace mitral valve regurgitation   moderate tricuspid regurgitation  mild to moderate pulmonic valve reguritation   mild aortic valve leaflet calcification without stenosis    VITALS:  T(C): 36.9 (03-08-23 @ 07:00), Max: 36.9 (03-07-23 @ 19:00)  T(F): 98.4 (03-08-23 @ 07:00), Max: 98.5 (03-07-23 @ 19:00)  HR: 99 (03-08-23 @ 07:00) (79 - 152)  BP: 115/52 (03-08-23 @ 07:00) (80/66 - 132/81)  BP(mean): 73 (03-08-23 @ 07:00) (52 - 118)  ABP: --  ABP(mean): --  RR: 19 (03-08-23 @ 07:00) (14 - 27)  SpO2: 98% (03-08-23 @ 07:00) (91% - 99%)  CVP(mm Hg): --  CVP(cm H2O): --    Ins and Outs     03-07-23 @ 07:01  -  03-08-23 @ 07:00  --------------------------------------------------------  IN: 1299.8 mL / OUT: 950 mL / NET: 349.8 mL    03-08-23 @ 07:01  -  03-08-23 @ 09:36  --------------------------------------------------------  IN: 75 mL / OUT: 0 mL / NET: 75 mL    Height (cm): 149.9 (03-07-23 @ 15:30)  Weight (kg): 65.6 (03-07-23 @ 16:24)  BMI (kg/m2): 29.2 (03-07-23 @ 16:24)    I&O's Detail    07 Mar 2023 07:01  -  08 Mar 2023 07:00  --------------------------------------------------------  IN:    Esmolol: 117 mL    Esmolol: 107.8 mL    IV PiggyBack: 100 mL    Lactated Ringers: 975 mL  Total IN: 1299.8 mL    OUT:    Voided (mL): 950 mL  Total OUT: 950 mL    Total NET: 349.8 mL    08 Mar 2023 07:01  -  08 Mar 2023 09:36  --------------------------------------------------------  IN:    Lactated Ringers: 75 mL  Total IN: 75 mL    OUT:  Total OUT: 0 mL    Total NET: 75 mL    Physical Examination:  GENERAL:               Alert, Oriented, No acute distress.    HEENT:                   No JVD, Moist MM  PULM:                     Bilateral air entry, Clear to auscultation bilaterally, BAKER noted, no significant sputum production, No     Rales, No Rhonchi, No Wheezing  CVS:                        S1, S2, murmur, irregular rhythm   ABD:                       Soft, nondistended, nontender, normoactive bowel sounds,   EXT:                        No edema, nontender, No Cyanosis or Clubbing   Vascular:                 Warm Extremities, Normal Capillary refill, Normal Distal Pulses  SKIN:                      Warm and well perfused, no rashes noted.   NEURO:                  Alert, oriented, interactive, nonfocal, follows commands  PSYC:                     Calm, + Insight.

## 2023-03-08 NOTE — PROGRESS NOTE ADULT - ASSESSMENT
Assessment/Plan    Problem list:    A-fib  Chest pain  Pericarditis   Nausea  Hypoalbuminemia   Hypothyroidism   Alzheimer's?    Continue to monitor for chest pain and HR for arrhthymias Maintain current medication regimen.     #Cardiovascular  Continue cardiac and O2 Sat monitoring  Continue 81mg ASA daily for prophylactic measure against clot formation   Continue 5mg Eliquis daily for A-Fib   125mcg Digoxin for cardiac arrhthymias   30mg Prednisone (x3 doses) PO for Pericarditis   D/c Esmolol gtt   Metoprolol 50Q8h for rate control  0.6mg colchicine for pericarditis    #Renal  400mg Magnesium Oxide PO TID for downtrending Mg levels  Potassium Phospate IV once over 6 hrs for downtrending potassium level  1000ml Lactated Ringers 75ml/hr for rehydration    #GI  40mg Protonix daily  17gm Miralax daily  4mg Zofran PRN for nausea     #Endocrine   50mcg Synthroid for hypothyroidism     #Psych   5mg Namenda for Alzheimer's (prophylaxis?)    #Pain  650mg Tylenol PRN for pain   Assessment/Plan    Problem list:    A-fib w rvr  Chest pain  Pericarditis   Nausea  Hypoalbuminemia   Hypothyroidism   Alzheimer's?    Continue to monitor for chest pain and HR for arrhthymias Maintain current medication regimen.     #Cardiovascular  Continue cardiac and O2 Sat monitoring  Continue 81mg ASA daily for prophylactic measure against clot formation   Continue 5mg Eliquis daily for A-Fib   125mcg Digoxin for cardiac arrhthymias   30mg Prednisone (taper) PO for Pericarditis   D/c Esmolol gtt   Metoprolol 50Q8h for rate control  0.6mg colchicine for pericarditis    #Renal  400mg Magnesium Oxide PO TID for downtrending Mg levels  Potassium Phospate IV once over 6 hrs for downtrending potassium level  1000ml Lactated Ringers 75ml/hr for rehydration x12 hrs    #GI  40mg Protonix daily  17gm Miralax daily  4mg Zofran PRN for nausea     #Endocrine   50mcg Synthroid for hypothyroidism     #Psych   5mg Namenda for Alzheimer's (prophylaxis?)    #Pain  650mg Tylenol PRN for pain

## 2023-03-08 NOTE — OCCUPATIONAL THERAPY INITIAL EVALUATION ADULT - GENERAL OBSERVATIONS, REHAB EVAL
Pt received supine in bed +primafit, +IV, +tele +NC 2LO2; verbalizing pain in IV insertion area, RN notified; agreeable to OT IE, pt declining , calling son Yovany to interpret; pt left as received supine in bed, all lines intact, NAD, call bell in reach.

## 2023-03-08 NOTE — OCCUPATIONAL THERAPY INITIAL EVALUATION ADULT - PERTINENT HX OF CURRENT PROBLEM, REHAB EVAL
[facility] 72[facility], s/p CVA, now c/o chest pain, nausea, and is in rapid afib to 150's. Chart reviewed and apparently Cardizem CD 120mg held 03/04 then d/vicky on 03/05/23, Metoprolol was also decreased to 25 mg (from 50 mg) twice daily because of soft BP's. Early this AM, pt started c/o chest tightness, found to be tachycardic to 170's.  EKG done showed afib rate of 160/min.  Pt was given a dose of Metoprolol 25 mg po x 1, HR decreased to 110's-130's, however, pt continued to c/o chest pain, then started having nausea and vomited scant mucus x 1. Pt is transferred to telemetry for closer cardiac monitoring and management.   Ms. Garza has HTN, PAF on Eliquis, hypothyroidism, hx of cerebral aneurysm s/p coiling, recent hospitalization 01/20-02/01/23 for pericarditis possibly related non-covid coronaviral illness, then readmitted at [facility] for left sided chest pain on 02/16/23, found to be in afib with RVR.  During that hospitalization, pt had aphasia 02/22/23, stroke code called, CTA head showed nonhemorrhagic acute ischemia in the left MCA territory. Patient did not qualify for TNK due to last known sx was well over 4.5 hours. Patient was not a thrombectomy candidate as pt's thrombus is in M3 branch which is too distal to intervene on.   Pt just had an echo on 02/23/23, w/c reported normal global left ventricular systolic function, left ventricular ejection fraction, by visual estimation, is 50 to 55%. Normal right ventricular size and function. Mild tricuspid regurgitation. There is no evidence of pericardial effusion.

## 2023-03-08 NOTE — DIETITIAN INITIAL EVALUATION ADULT - OTHER INFO
72[facility] [place] [facility] on 02/26/23 for acute rehab, s/p CVA, now c/o chest pain, nausea, and is in rapid afib to 150's, Appetite is noted poor due to c/o nausea , no vomiting noted, Skin intact , last BM 93/7) No edema , Food preferences noted & provided . Requested addition of Zofran due to c/o nausea. UBW reported ~ 146lbs , Patient noted with dx of moderate protein calorie malnutrition due to patient meeting <75% of EEN needs >7 days & mild muscle wasting & fat loss observed .

## 2023-03-08 NOTE — CONSULT NOTE ADULT - SUBJECTIVE AND OBJECTIVE BOX
INTERVAL HPI/OVERNIGHT EVENTS:  HPI:  Ms. Garza is a 72yr old [place] [facility] admitted on 23 for acute rehab, s/p CVA, now c/o chest pain, nausea, and is in rapid afib to 150's. Chart reviewed and apparently Cardizem CD 120mg held  then d/vicky on 23, Metoprolol was also decreased to 25 mg (from 50 mg) twice daily because of soft BP's. Early this AM, pt started c/o chest tightness, found to be tachycardic to 170's.  EKG done showed afib rate of 160/min.  Pt was given a dose of Metoprolol 25 mg po x 1, HR decreased to 110's-130's, however, pt continued to c/o chest pain, then started having nausea and vomited scant mucus x 1. Pt is transferred to telemetry for closer cardiac monitoring and management.   Ms. Garza has HTN, PAF on Eliquis, hypothyroidism, hx of cerebral aneurysm s/p coiling, recent hospitalization -23 for pericarditis possibly related non-covid coronaviral illness, then readmitted at [facility] for left sided chest pain on 23, found to be in afib with RVR.  During that hospitalization, pt had aphasia 23, stroke code called, CTA head showed nonhemorrhagic acute ischemia in the left MCA territory. Patient did not qualify for TNK due to last known sx was well over 4.5 hours. Patient was not a thrombectomy candidate as pt's thrombus is in M3 branch which is too distal to intervene on.   Pt just had an echo on 23, w/c reported normal global left ventricular systolic function, left ventricular ejection fraction, by visual estimation, is 50 to 55%. Normal right ventricular size and function. Mild tricuspid regurgitation. There is no evidence of pericardial effusion.         (07 Mar 2023 05:05)    GI consulted to see patient due to nausea and vomiting. Per son symptoms began during hospitalization. Patient was able to tolerate solids and liquids without any difficulty prior to hospitalization. Last bowel movement was yesterday and reported as loose. She had an upper endoscopy a few years ago and was normal, procedure was for abdominal discomfort. No BRBPR, melena, hematemesis or coffee ground emesis.       MEDICATIONS  (STANDING):  apixaban 5 milliGRAM(s) Oral every 12 hours  aspirin  chewable 81 milliGRAM(s) Oral daily  atorvastatin 80 milliGRAM(s) Oral at bedtime  chlorhexidine 2% Cloths 1 Application(s) Topical <User Schedule>  colchicine 0.6 milliGRAM(s) Oral two times a day  digoxin     Tablet 125 MICROGram(s) Oral daily  famotidine    Tablet 20 milliGRAM(s) Oral at bedtime  lactated ringers. 1000 milliLiter(s) (75 mL/Hr) IV Continuous <Continuous>  levothyroxine 50 MICROGram(s) Oral daily  magnesium oxide 400 milliGRAM(s) Oral three times a day with meals  memantine 5 milliGRAM(s) Oral daily  metoprolol tartrate 50 milliGRAM(s) Oral every 8 hours  mirtazapine 15 milliGRAM(s) Oral at bedtime  pantoprazole    Tablet 40 milliGRAM(s) Oral before breakfast  polyethylene glycol 3350 17 Gram(s) Oral daily  predniSONE   Tablet 30 milliGRAM(s) Oral daily  senna 2 Tablet(s) Oral at bedtime    MEDICATIONS  (PRN):  acetaminophen     Tablet .. 650 milliGRAM(s) Oral every 6 hours PRN Temp greater or equal to 38C (100.4F), Mild Pain (1 - 3)  ondansetron Injectable 4 milliGRAM(s) IV Push every 6 hours PRN Nausea and/or Vomiting      Allergies    No Known Allergies    Intolerances        PAST MEDICAL & SURGICAL HISTORY:  High cholesterol      Hypothyroidism      CVA (cerebrovascular accident)      Paroxysmal atrial fibrillation      H/O knee surgery      Brain aneurysm          REVIEW OF SYSTEMS: negative unless indicated in HPI      PHYSICAL EXAM:   Vital Signs:  Vital Signs Last 24 Hrs  T(C): 36.7 (08 Mar 2023 11:00), Max: 36.9 (07 Mar 2023 19:00)  T(F): 98.1 (08 Mar 2023 11:00), Max: 98.5 (07 Mar 2023 19:00)  HR: 77 (08 Mar 2023 15:00) (72 - 119)  BP: 133/59 (08 Mar 2023 15:00) (80/66 - 133/59)  BP(mean): 75 (08 Mar 2023 15:00) (52 - 99)  RR: 22 (08 Mar 2023 15:00) (14 - 35)  SpO2: 98% (08 Mar 2023 15:00) (88% - 99%)    Parameters below as of 08 Mar 2023 15:00  Patient On (Oxygen Delivery Method): nasal cannula  O2 Flow (L/min): 2    Daily     Daily Weight in k.5 (08 Mar 2023 06:00)I&O's Summary    07 Mar 2023 07:01  -  08 Mar 2023 07:00  --------------------------------------------------------  IN: 1299.8 mL / OUT: 950 mL / NET: 349.8 mL    08 Mar 2023 07:01  -  08 Mar 2023 15:56  --------------------------------------------------------  IN: 699.8 mL / OUT: 500 mL / NET: 199.8 mL        GENERAL:  Appears stated age,   HEENT:  NC/AT,  conjunctivae clear and pink,  CHEST:  Full & symmetric excursion, no increased effort, breath sounds clear  HEART:  Regular rhythm, S1, S2, no murmur  ABDOMEN:  Soft, non-tender, non-distended, normoactive bowel sounds  EXTEREMITIES:  no edema  SKIN:  No rash/warm/dry  NEURO:  Alert, oriented      LABS:                        10.7   7.06  )-----------( 307      ( 08 Mar 2023 05:15 )             34.8     03-08    137  |  104  |  7   ----------------------------<  78  3.8   |  24  |  0.41<L>    Ca    7.3<L>      08 Mar 2023 05:15  Phos  1.8     03-08  Mg     1.8     03-08    TPro  4.5<L>  /  Alb  1.4<L>  /  TBili  0.2  /  DBili  x   /  AST  14  /  ALT  12  /  AlkPhos  56  03-08        amylase   lipase  RADIOLOGY & ADDITIONAL TESTS:     [facility] 72[facility], s/p CVA, now c/o chest pain, nausea, and is in rapid afib to 150's. Chart reviewed and apparently Cardizem CD 120mg held  then d/vicky on 23, Metoprolol was also decreased to 25 mg (from 50 mg) twice daily because of soft BP's. Early this AM, pt started c/o chest tightness, found to be tachycardic to 170's.  EKG done showed afib rate of 160/min.  Pt was given a dose of Metoprolol 25 mg po x 1, HR decreased to 110's-130's, however, pt continued to c/o chest pain, then started having nausea and vomited scant mucus x 1. Pt is transferred to telemetry for closer cardiac monitoring and management.   Ms. Garza has HTN, PAF on Eliquis, hypothyroidism, hx of cerebral aneurysm s/p coiling, recent hospitalization -23 for pericarditis possibly related non-covid coronaviral illness, then readmitted at [facility] for left sided chest pain on 23, found to be in afib with RVR.  During that hospitalization, pt had aphasia 23, stroke code called, CTA head showed nonhemorrhagic acute ischemia in the left MCA territory. Patient did not qualify for TNK due to last known sx was well over 4.5 hours. Patient was not a thrombectomy candidate as pt's thrombus is in M3 branch which is too distal to intervene on.   Pt just had an echo on 23, w/c reported normal global left ventricular systolic function, left ventricular ejection fraction, by visual estimation, is 50 to 55%. Normal right ventricular size and function. Mild tricuspid regurgitation. There is no evidence of pericardial effusion.    GI consulted to see patient due to nausea and vomiting. Per son symptoms began during hospitalization. Patient was able to tolerate solids and liquids without any difficulty prior to hospitalization. Last bowel movement was yesterday and reported as loose. She reports having had an EGD performed a few years ago which she states was normal; procedure was performed for abdominal discomfort. Denies BRBPR, melena, hematemesis or coffee ground emesis.       MEDICATIONS  (STANDING):  apixaban 5 milliGRAM(s) Oral every 12 hours  aspirin  chewable 81 milliGRAM(s) Oral daily  atorvastatin 80 milliGRAM(s) Oral at bedtime  chlorhexidine 2% Cloths 1 Application(s) Topical <User Schedule>  colchicine 0.6 milliGRAM(s) Oral two times a day  digoxin     Tablet 125 MICROGram(s) Oral daily  famotidine    Tablet 20 milliGRAM(s) Oral at bedtime  lactated ringers. 1000 milliLiter(s) (75 mL/Hr) IV Continuous <Continuous>  levothyroxine 50 MICROGram(s) Oral daily  magnesium oxide 400 milliGRAM(s) Oral three times a day with meals  memantine 5 milliGRAM(s) Oral daily  metoprolol tartrate 50 milliGRAM(s) Oral every 8 hours  mirtazapine 15 milliGRAM(s) Oral at bedtime  pantoprazole    Tablet 40 milliGRAM(s) Oral before breakfast  polyethylene glycol 3350 17 Gram(s) Oral daily  predniSONE   Tablet 30 milliGRAM(s) Oral daily  senna 2 Tablet(s) Oral at bedtime    MEDICATIONS  (PRN):  acetaminophen     Tablet .. 650 milliGRAM(s) Oral every 6 hours PRN Temp greater or equal to 38C (100.4F), Mild Pain (1 - 3)  ondansetron Injectable 4 milliGRAM(s) IV Push every 6 hours PRN Nausea and/or Vomiting      Allergies    No Known Allergies    Intolerances        PAST MEDICAL & SURGICAL HISTORY:  High cholesterol      Hypothyroidism      CVA (cerebrovascular accident)      Paroxysmal atrial fibrillation      H/O knee surgery      Brain aneurysm          REVIEW OF SYSTEMS: negative unless indicated in HPI      PHYSICAL EXAM:   Vital Signs:  Vital Signs Last 24 Hrs  T(C): 36.7 (08 Mar 2023 11:00), Max: 36.9 (07 Mar 2023 19:00)  T(F): 98.1 (08 Mar 2023 11:00), Max: 98.5 (07 Mar 2023 19:00)  HR: 77 (08 Mar 2023 15:00) (72 - 119)  BP: 133/59 (08 Mar 2023 15:00) (80/66 - 133/59)  BP(mean): 75 (08 Mar 2023 15:00) (52 - 99)  RR: 22 (08 Mar 2023 15:00) (14 - 35)  SpO2: 98% (08 Mar 2023 15:00) (88% - 99%)    Parameters below as of 08 Mar 2023 15:00  Patient On (Oxygen Delivery Method): nasal cannula  O2 Flow (L/min): 2    Daily     Daily Weight in k.5 (08 Mar 2023 06:00)I&O's Summary    07 Mar 2023 07:01  -  08 Mar 2023 07:00  --------------------------------------------------------  IN: 1299.8 mL / OUT: 950 mL / NET: 349.8 mL    08 Mar 2023 07:01  -  08 Mar 2023 15:56  --------------------------------------------------------  IN: 699.8 mL / OUT: 500 mL / NET: 199.8 mL        GENERAL:  Appears stated age,   HEENT:  NC/AT,  conjunctivae clear and pink,  CHEST:   clear  HEART:  +S1S2  ABDOMEN:  Soft, non-tender, non-distended, normoactive bowel sounds  EXTREMITIES:  no edema  SKIN:  No rash/warm/dry  NEURO:  Alert, oriented      LABS:                        10.7   7.06  )-----------( 307      ( 08 Mar 2023 05:15 )             34.8     03-08    137  |  104  |  7   ----------------------------<  78  3.8   |  24  |  0.41<L>    Ca    7.3<L>      08 Mar 2023 05:15  Phos  1.8     03-08  Mg     1.8     03-08    TPro  4.5<L>  /  Alb  1.4<L>  /  TBili  0.2  /  DBili  x   /  AST  14  /  ALT  12  /  AlkPhos  56  03-08        ACC: [id number] EXAM:  XR ABDOMEN PORTABLE ROUTINE 1V   ORDERED BY: AAYUSH SIMMS     PROCEDURE DATE:  2023          INTERPRETATION:  KUB: AP    COMPARISON: 2023 abdominal radiograph.    CLINICAL INFORMATION: Abdominal pain. Abdominal distention    FINDINGS:    There is a nonspecific, nonobstructive bowel gas pattern.  No free intra-abdominal air.  No obvious intra-abdominal masses.  No abnormal calcifications.  The osseous structures are intact.    IMPRESSION:    No acute radiographicabdominal findings.  If symptoms warrant further investigation follow-up abdominal CT scan   suggested.    --- End of Report ---            CARRIE VALENZUELA MD; Attending Radiologist  This document has been electronically signed. Mar  8 2023  3:58PM

## 2023-03-08 NOTE — PHYSICAL THERAPY INITIAL EVALUATION ADULT - ADDITIONAL COMMENTS
pt w/expressive aphasia, hx per chart and son. pt from acute rehab, ambulated w/supervision, no AD, required assist for some ADL's

## 2023-03-08 NOTE — DIETITIAN NUTRITION RISK NOTIFICATION - TREATMENT: THE FOLLOWING DIET HAS BEEN RECOMMENDED
Diet, DASH/TLC:   Sodium & Cholesterol Restricted  No Carb Prosource (1pkg = 15gms Protein)     Qty per Day:  QD  Supplement Feeding Modality:  Oral  Ensure Plus High Protein Cans or Servings Per Day:  1       Frequency:  Two Times a day (03-07-23 @ 05:34) [Active]

## 2023-03-08 NOTE — PROGRESS NOTE ADULT - ASSESSMENT
Assessment:  Ortiz Garza is a 73 year old woman with past medical history of Atrial fibrillation (on Eliquis), Pericarditis with pericardial effusion, Normal coronaries (1/2023 cath), Cerebral aneurysm (s/p coiling), Hypothyroidism with recent hospitalization at [facility] this month for acute left MCA territory ischemia, was admitted to [facility] and now transferred to telemetry due to episode of chest pain and atrial fibrillation with RVR.    ECG consistent with atrial fibrillation with RVR and nonspecific ST abnormalities, troponins are negative and recent normal coronary angiogram makes this not consistent with an acute coronary syndrome. Recent echo report with normal LVEF 50-55% and normal RV size and function, prior echos at [facility] there was a small-to-moderate sized pericardial effusion. Telemetry with episodes of atrial fibrillation with RVR, but overall sinus rhythm now. Appears patient's AV nakita blockers were held intermittently.    Recommendations:  [] Atrial fibrillation with RVR: S/p IV digoxin loading and Esmolol drip and now converted to normal sinus rhythm. Would wean off Esmolol drip and transition to Metoprolol tartrate 50 mg PO TID. Digoxin level normal, dose Digoxin 250 mcg PO daily. Continue Eliquis for stroke prophylaxis. Continue to monitor on telemetry.  [] HFmrEF: LVEF 40-45%, likely tachy-mediated, plan to transition to Metoprolol succinate prior to discharge. Avoid further IV fluids as patient has mildly elevated filling pressures. Pending oxygen saturation may require low dose intermittent PO Lasix   [] Pericarditis: Continue Colchicine and Prednisone    Discussed with patient's son over the phone. Will sign out case to cardiologist to follow along tomorrow.     Vamshi Kohler MD  Cardiology

## 2023-03-08 NOTE — DIETITIAN INITIAL EVALUATION ADULT - PERTINENT MEDS FT
MEDICATIONS  (STANDING):  apixaban 5 milliGRAM(s) Oral every 12 hours  aspirin  chewable 81 milliGRAM(s) Oral daily  atorvastatin 80 milliGRAM(s) Oral at bedtime  chlorhexidine 2% Cloths 1 Application(s) Topical <User Schedule>  colchicine 0.6 milliGRAM(s) Oral two times a day  digoxin     Tablet 125 MICROGram(s) Oral daily  famotidine    Tablet 20 milliGRAM(s) Oral at bedtime  lactated ringers. 1000 milliLiter(s) (75 mL/Hr) IV Continuous <Continuous>  levothyroxine 50 MICROGram(s) Oral daily  magnesium oxide 400 milliGRAM(s) Oral three times a day with meals  memantine 5 milliGRAM(s) Oral daily  metoprolol tartrate 50 milliGRAM(s) Oral every 8 hours  mirtazapine 15 milliGRAM(s) Oral at bedtime  pantoprazole    Tablet 40 milliGRAM(s) Oral before breakfast  polyethylene glycol 3350 17 Gram(s) Oral daily  potassium phosphate IVPB 30 milliMole(s) IV Intermittent once  predniSONE   Tablet 30 milliGRAM(s) Oral daily  senna 2 Tablet(s) Oral at bedtime    MEDICATIONS  (PRN):  acetaminophen     Tablet .. 650 milliGRAM(s) Oral every 6 hours PRN Temp greater or equal to 38C (100.4F), Mild Pain (1 - 3)  ondansetron Injectable 4 milliGRAM(s) IV Push every 6 hours PRN Nausea and/or Vomiting

## 2023-03-08 NOTE — PROGRESS NOTE ADULT - SUBJECTIVE AND OBJECTIVE BOX
F/U Note:    73y Female admitted with rapid afib, required esmolol drip and transfer to ICU. Quickly respnded, converted back to NSR, and was weaned off esmolol    Interval Hx:  -transferred from ICU --> tele this evening    Vital Signs Last 24 Hrs  T(C): 36.9 (08 Mar 2023 21:00), Max: 36.9 (08 Mar 2023 07:00)  T(F): 98.5 (08 Mar 2023 21:00), Max: 98.5 (08 Mar 2023 21:00)  HR: 67 (08 Mar 2023 21:00) (67 - 110)  BP: 97/72 (08 Mar 2023 21:00) (80/66 - 133/59)  BP(mean): 81 (08 Mar 2023 21:00) (68 - 107)  RR: 15 (08 Mar 2023 21:00) (14 - 35)  SpO2: 98% (08 Mar 2023 21:00) (88% - 100%)    Parameters below as of 08 Mar 2023 21:00  Patient On (Oxygen Delivery Method): nasal cannula  O2 Flow (L/min): 2                              10.7   7.06  )-----------( 307      ( 08 Mar 2023 05:15 )             34.8         03-08    137  |  104  |  7   ----------------------------<  78  3.8   |  24  |  0.41<L>    Ca    7.3<L>      08 Mar 2023 05:15  Phos  1.8     03-08  Mg     1.8     03-08    TPro  4.5<L>  /  Alb  1.4<L>  /  TBili  0.2  /  DBili  x   /  AST  14  /  ALT  12  /  AlkPhos  56  03-08                NEURO: awake and alert  CV: (+) S1/S2, rrr, no mrg  RESP: CTA b/l  GI: soft, non tender

## 2023-03-08 NOTE — PROGRESS NOTE ADULT - SUBJECTIVE AND OBJECTIVE BOX
Follow-up Critical Care Progress Note  Chief Complaint : Cerebral infarction      Patient seen and examined  comfortable  complain of persistent nausea, no vomiting going on for few days  no cp, palp, n/v    overnight off esmolol  got lopressor oral this am.     Allergies :No Known Allergies      PAST MEDICAL & SURGICAL HISTORY:  High cholesterol    Hypothyroidism    CVA (cerebrovascular accident)    Paroxysmal atrial fibrillation    H/O knee surgery    Brain aneurysm        Medications:  MEDICATIONS  (STANDING):  apixaban 5 milliGRAM(s) Oral every 12 hours  aspirin  chewable 81 milliGRAM(s) Oral daily  atorvastatin 80 milliGRAM(s) Oral at bedtime  chlorhexidine 2% Cloths 1 Application(s) Topical <User Schedule>  colchicine 0.6 milliGRAM(s) Oral two times a day  digoxin     Tablet 125 MICROGram(s) Oral daily  famotidine    Tablet 20 milliGRAM(s) Oral at bedtime  lactated ringers. 1000 milliLiter(s) (75 mL/Hr) IV Continuous <Continuous>  levothyroxine 50 MICROGram(s) Oral daily  magnesium oxide 400 milliGRAM(s) Oral three times a day with meals  memantine 5 milliGRAM(s) Oral daily  metoprolol tartrate 50 milliGRAM(s) Oral every 8 hours  mirtazapine 15 milliGRAM(s) Oral at bedtime  pantoprazole    Tablet 40 milliGRAM(s) Oral before breakfast  polyethylene glycol 3350 17 Gram(s) Oral daily  predniSONE   Tablet 30 milliGRAM(s) Oral daily  senna 2 Tablet(s) Oral at bedtime    MEDICATIONS  (PRN):  acetaminophen     Tablet .. 650 milliGRAM(s) Oral every 6 hours PRN Temp greater or equal to 38C (100.4F), Mild Pain (1 - 3)  ondansetron Injectable 4 milliGRAM(s) IV Push every 6 hours PRN Nausea and/or Vomiting      Antibiotics History      Heme Medications   apixaban 5 milliGRAM(s) Oral every 12 hours, 03-07-23 @ 05:27  aspirin  chewable 81 milliGRAM(s) Oral daily, 03-07-23 @ 05:26      GI Medications  famotidine    Tablet 20 milliGRAM(s) Oral at bedtime, 03-07-23 @ 05:31, Routine  pantoprazole    Tablet 40 milliGRAM(s) Oral before breakfast, 03-08-23 @ 00:00, Routine  polyethylene glycol 3350 17 Gram(s) Oral daily, 03-07-23 @ 05:28, Routine  senna 2 Tablet(s) Oral at bedtime, 03-07-23 @ 05:28, Routine      COVID  02-26-23 @ 17:49  COVID -   NotDetec  02-24-23 @ 13:00  COVID -   NotDetec  02-16-23 @ 05:40  COVID -   NotDetec  01-30-23 @ 00:30  COVID -   NotDete      COVID Biomarkers    03-07-23 @ 07:00 ESR 84<H>  ---  CRP --  ---  DDimer  --   ---   LDH --   ---   Ferritin --            Trend Cardiac Enzymes  03-07-23 @ 15:43  AQN-GYFRN-FTZFQ-CPKMM/Trop I - -- - --  - --  -  --  /  9.5  03-07-23 @ 07:00  GPE-ZLABN-MVDTD-CPKMM/Trop I - -- - --  - --  -  --  /  11.6  03-07-23 @ 04:15  LHL-VJRYQ-QVTAK-CPKMM/Trop I - -- - --  - --  -  --  /  11.5  03-05-23 @ 16:30  POS-TDQSX-SMGHE-CPKMM/Trop I - -- - --  - --  -  --  /  5.3    Trend BNP  03-08-23 @ 05:15   -  5047<H>  03-07-23 @ 04:15   -  1744<H>  01-30-23 @ 00:30   -  142      WBC Trend  03-08-23 @ 05:15   -  7.06  03-07-23 @ 15:43   -  10.59<H>  03-07-23 @ 04:15   -  8.27  03-06-23 @ 06:50   -  9.66    H/H Trend  03-08-23 @ 05:15   -   10.7<L>/ 34.8  03-07-23 @ 15:43   -   11.7/ 37.0  03-07-23 @ 04:15   -   11.6/ 36.6  03-06-23 @ 06:50   -   12.4/ 40.0  03-05-23 @ 01:55   -   13.7/ 44.1  03-02-23 @ 09:35   -   12.2/ 39.2    Stool Occult Blood    Platelet Trend  03-08-23 @ 05:15   -  307  03-07-23 @ 15:43   -  360  03-07-23 @ 04:15   -  381  03-06-23 @ 06:50   -  310    Trend Sodium  03-08-23 @ 05:15   -  137  03-07-23 @ 15:43   -  137  03-07-23 @ 04:15   -  138  03-06-23 @ 06:50   -  138    Trend Potassium  03-08-23 @ 05:15   -  3.8  03-07-23 @ 15:43   -  4.5  03-07-23 @ 04:15   -  3.7  03-06-23 @ 06:50   -  4.1    Trend Bun/Cr  03-08-23 @ 05:15  BUN/CR -  7 / 0.41<L>  03-07-23 @ 15:43  BUN/CR -  10 / 0.68  03-07-23 @ 04:15  BUN/CR -  10 / 0.63  03-06-23 @ 06:50  BUN/CR -  12 / 0.71    Lactic Acid Trend  03-07-23 @ 15:43   -   0.7    ABG Trend  02-22-23 @ 02:51   - 7.530<H>/33/113<H>/98.0    Trend AST/ALT/ALK Phos/Bili  03-08-23 @ 05:15   14/12/56/0.2  03-07-23 @ 15:43   14/22/79/0.3  03-07-23 @ 04:15   13/20/76/0.5  03-06-23 @ 06:50   12/19/80/0.5  03-05-23 @ 01:55   <5<L>/23/86/0.6  03-02-23 @ 13:25   21/28/80/0.4  03-02-23 @ 09:35   24/29/75/0.6  02-27-23 @ 07:50   25/32/74/0.5  02-22-23 @ 01:50   18/27/88/0.2<L>  02-17-23 @ 19:40   14/10/69/0.5  02-16-23 @ 05:40   11/10/57/0.5  02-01-23 @ 05:55   11/7/54/0.3<L>         LABS:                        10.7   7.06  )-----------( 307      ( 08 Mar 2023 05:15 )             34.8     03-08    137  |  104  |  7   ----------------------------<  78  3.8   |  24  |  0.41<L>    Ca    7.3<L>      08 Mar 2023 05:15  Phos  1.8     03-08  Mg     1.8     03-08    TPro  4.5<L>  /  Alb  1.4<L>  /  TBili  0.2  /  DBili  x   /  AST  14  /  ALT  12  /  AlkPhos  56  03-08               RADIOLOGY  < from: TTE Echo Limited or F/U (03.07.23 @ 11:16) >  Summary:   1. Limited echocardiogram performed.   2. The heart rate is tachycardic throughout the study.   3. The left ventricle systolic function appears at least mildly reduced on limited views, estimated visual LVEF 40-45%.   4. Decreased left ventricular internal cavitysize.   5. There is mild septal left ventricular hypertrophy.   6. Normal right ventricular size and function.   7. Left atrial enlargement.   8. The right atrium is normal in size.   9. Mild mitral annular calcification.  10. Trace mitral valve regurgitation.  11. Moderate tricuspid regurgitation.  12. Mild aortic valve leaflet calcification. No aortic valve stenosis.  13. Mild to moderate pulmonic valve regurgitation.  14. Estimated pulmonary artery systolic pressure is 43.9 mmHg assuming a right atrial pressure of 15 mmHg, which is consistent with mild pulmonary pertension.  15. There is no evidence of pericardial effusion.  16. Consider repeating echocardiogram when patient is no longer tachycardic for more accurate assessment of left ventricle ejection fraction, if clinically indicated.    < end of copied text >        VITALS:  T(C): 36.9 (03-08-23 @ 07:00), Max: 36.9 (03-07-23 @ 19:00)  T(F): 98.4 (03-08-23 @ 07:00), Max: 98.5 (03-07-23 @ 19:00)  HR: 99 (03-08-23 @ 07:00) (79 - 152)  BP: 115/52 (03-08-23 @ 07:00) (80/66 - 132/81)  BP(mean): 73 (03-08-23 @ 07:00) (52 - 118)  ABP: --  ABP(mean): --  RR: 19 (03-08-23 @ 07:00) (14 - 27)  SpO2: 98% (03-08-23 @ 07:00) (91% - 99%)  CVP(mm Hg): --  CVP(cm H2O): --    Ins and Outs     03-07-23 @ 07:01  -  03-08-23 @ 07:00  --------------------------------------------------------  IN: 1299.8 mL / OUT: 950 mL / NET: 349.8 mL    03-08-23 @ 07:01  -  03-08-23 @ 09:31  --------------------------------------------------------  IN: 75 mL / OUT: 0 mL / NET: 75 mL        Height (cm): 149.9 (03-07-23 @ 15:30)  Weight (kg): 65.6 (03-07-23 @ 16:24)  BMI (kg/m2): 29.2 (03-07-23 @ 16:24)        I&O's Detail    07 Mar 2023 07:01  -  08 Mar 2023 07:00  --------------------------------------------------------  IN:    Esmolol: 117 mL    Esmolol: 107.8 mL    IV PiggyBack: 100 mL    Lactated Ringers: 975 mL  Total IN: 1299.8 mL    OUT:    Voided (mL): 950 mL  Total OUT: 950 mL    Total NET: 349.8 mL      08 Mar 2023 07:01  -  08 Mar 2023 09:31  --------------------------------------------------------  IN:    Lactated Ringers: 75 mL  Total IN: 75 mL    OUT:  Total OUT: 0 mL    Total NET: 75 mL

## 2023-03-08 NOTE — CONSULT NOTE ADULT - ASSESSMENT
GI consulted to see patient due to nausea and vomiting. Per son symptoms began during hospitalization. Patient was able to tolerate solids and liquids without any difficulty prior to hospitalization. Last bowel movement was yesterday and reported as loose. She had an upper endoscopy a few years ago and was normal, procedure was for abdominal discomfort. No BRBPR, melena, hematemesis or coffee ground emesis.  72 year old woman w/PMHx hypothyroidism, cerebral aneurysm s/p coiling, pericarditis possibly related to non-covid coronaviral illness, afib w/RVR presented to [facility] on 2/16 for afib with RVR, stroke called on 2/22 and found to have nonhemorrhagic acute ischemia in the left MCA territory. Admitted to  rehab for ADL impairment. Had recurrent afib likely to holding meds, now improved.  GI consulted to see patient due to nausea and vomiting which reportedly began during hospitalization. Patient was able to tolerate solids and liquids without any difficulty prior to hospitalization. Last bowel movement was yesterday and reported as loose.  She reports undergoing EGD a few years ago for reported abdominal discomfort which was normal. No BRBPR, melena, hematemesis or coffee ground emesis.

## 2023-03-08 NOTE — CONSULT NOTE ADULT - PROBLEM SELECTOR RECOMMENDATION 9
Likely medication induced  Continue PPI  Zofran PRN  Check abdominal x-ray  Continue present diet Likely medication induced  Continue PPI  Zofran PRN  Abdominal x-ray  Continue present diet

## 2023-03-08 NOTE — PHYSICAL THERAPY INITIAL EVALUATION ADULT - PERTINENT HX OF CURRENT PROBLEM, REHAB EVAL
[facility] 72[facility], s/p CVA, now c/o chest pain, nausea, and is in rapid afib to 150's. Chart reviewed and apparently Cardizem CD 120mg held 03/04 then d/vicky on 03/05/23, Metoprolol was also decreased to 25 mg (from 50 mg) twice daily because of soft BP's. Early this AM, pt started c/o chest tightness, found to be tachycardic to 170's.  EKG done showed afib rate of 160/min.  Pt was given a dose of Metoprolol 25 mg po x 1, HR decreased to 110's-130's, however, pt continued to c/o chest pain, then started having nausea and vomited scant mucus x 1. Pt is transferred to telemetry for closer cardiac monitoring and management.

## 2023-03-08 NOTE — OCCUPATIONAL THERAPY INITIAL EVALUATION ADULT - ADDITIONAL COMMENTS
Pt was at [facility] and was planned to d/c home with son independently 3/7. Pt has all equipment at home.

## 2023-03-09 LAB
ALBUMIN SERPL ELPH-MCNC: 2.1 G/DL — LOW (ref 3.3–5)
ALP SERPL-CCNC: 71 U/L — SIGNIFICANT CHANGE UP (ref 40–120)
ALT FLD-CCNC: 14 U/L — SIGNIFICANT CHANGE UP (ref 10–45)
ANION GAP SERPL CALC-SCNC: 9 MMOL/L — SIGNIFICANT CHANGE UP (ref 5–17)
AST SERPL-CCNC: 13 U/L — SIGNIFICANT CHANGE UP (ref 10–40)
BILIRUB SERPL-MCNC: 0.2 MG/DL — SIGNIFICANT CHANGE UP (ref 0.2–1.2)
BUN SERPL-MCNC: 10 MG/DL — SIGNIFICANT CHANGE UP (ref 7–23)
CALCIUM SERPL-MCNC: 7.9 MG/DL — LOW (ref 8.4–10.5)
CHLORIDE SERPL-SCNC: 101 MMOL/L — SIGNIFICANT CHANGE UP (ref 96–108)
CO2 SERPL-SCNC: 29 MMOL/L — SIGNIFICANT CHANGE UP (ref 22–31)
CREAT SERPL-MCNC: 0.47 MG/DL — LOW (ref 0.5–1.3)
EGFR: 101 ML/MIN/1.73M2 — SIGNIFICANT CHANGE UP
GLUCOSE SERPL-MCNC: 79 MG/DL — SIGNIFICANT CHANGE UP (ref 70–99)
HCT VFR BLD CALC: 36.5 % — SIGNIFICANT CHANGE UP (ref 34.5–45)
HGB BLD-MCNC: 10.9 G/DL — LOW (ref 11.5–15.5)
MAGNESIUM SERPL-MCNC: 2.2 MG/DL — SIGNIFICANT CHANGE UP (ref 1.6–2.6)
MCHC RBC-ENTMCNC: 22.7 PG — LOW (ref 27–34)
MCHC RBC-ENTMCNC: 29.9 GM/DL — LOW (ref 32–36)
MCV RBC AUTO: 76 FL — LOW (ref 80–100)
NRBC # BLD: 0 /100 WBCS — SIGNIFICANT CHANGE UP (ref 0–0)
PHOSPHATE SERPL-MCNC: 2.4 MG/DL — LOW (ref 2.5–4.5)
PLATELET # BLD AUTO: 281 K/UL — SIGNIFICANT CHANGE UP (ref 150–400)
POTASSIUM SERPL-MCNC: 4.9 MMOL/L — SIGNIFICANT CHANGE UP (ref 3.5–5.3)
POTASSIUM SERPL-SCNC: 4.9 MMOL/L — SIGNIFICANT CHANGE UP (ref 3.5–5.3)
PROT SERPL-MCNC: 6.1 G/DL — SIGNIFICANT CHANGE UP (ref 6–8.3)
RBC # BLD: 4.8 M/UL — SIGNIFICANT CHANGE UP (ref 3.8–5.2)
RBC # FLD: 16.5 % — HIGH (ref 10.3–14.5)
SODIUM SERPL-SCNC: 139 MMOL/L — SIGNIFICANT CHANGE UP (ref 135–145)
WBC # BLD: 4.69 K/UL — SIGNIFICANT CHANGE UP (ref 3.8–10.5)
WBC # FLD AUTO: 4.69 K/UL — SIGNIFICANT CHANGE UP (ref 3.8–10.5)

## 2023-03-09 PROCEDURE — 99233 SBSQ HOSP IP/OBS HIGH 50: CPT

## 2023-03-09 PROCEDURE — 93010 ELECTROCARDIOGRAM REPORT: CPT

## 2023-03-09 RX ADMIN — SENNA PLUS 2 TABLET(S): 8.6 TABLET ORAL at 21:09

## 2023-03-09 RX ADMIN — Medication 50 MILLIGRAM(S): at 11:22

## 2023-03-09 RX ADMIN — Medication 81 MILLIGRAM(S): at 11:22

## 2023-03-09 RX ADMIN — Medication 0.6 MILLIGRAM(S): at 18:00

## 2023-03-09 RX ADMIN — MAGNESIUM OXIDE 400 MG ORAL TABLET 400 MILLIGRAM(S): 241.3 TABLET ORAL at 18:00

## 2023-03-09 RX ADMIN — FAMOTIDINE 20 MILLIGRAM(S): 10 INJECTION INTRAVENOUS at 21:09

## 2023-03-09 RX ADMIN — ONDANSETRON 4 MILLIGRAM(S): 8 TABLET, FILM COATED ORAL at 18:00

## 2023-03-09 RX ADMIN — Medication 50 MICROGRAM(S): at 05:31

## 2023-03-09 RX ADMIN — ATORVASTATIN CALCIUM 80 MILLIGRAM(S): 80 TABLET, FILM COATED ORAL at 21:09

## 2023-03-09 RX ADMIN — Medication 50 MILLIGRAM(S): at 01:55

## 2023-03-09 RX ADMIN — PANTOPRAZOLE SODIUM 40 MILLIGRAM(S): 20 TABLET, DELAYED RELEASE ORAL at 05:31

## 2023-03-09 RX ADMIN — Medication 50 MILLIGRAM(S): at 18:00

## 2023-03-09 RX ADMIN — Medication 0.6 MILLIGRAM(S): at 05:31

## 2023-03-09 RX ADMIN — MEMANTINE HYDROCHLORIDE 5 MILLIGRAM(S): 10 TABLET ORAL at 11:22

## 2023-03-09 RX ADMIN — MAGNESIUM OXIDE 400 MG ORAL TABLET 400 MILLIGRAM(S): 241.3 TABLET ORAL at 07:44

## 2023-03-09 RX ADMIN — CHLORHEXIDINE GLUCONATE 1 APPLICATION(S): 213 SOLUTION TOPICAL at 05:36

## 2023-03-09 RX ADMIN — ONDANSETRON 4 MILLIGRAM(S): 8 TABLET, FILM COATED ORAL at 12:20

## 2023-03-09 RX ADMIN — APIXABAN 5 MILLIGRAM(S): 2.5 TABLET, FILM COATED ORAL at 18:00

## 2023-03-09 RX ADMIN — Medication 125 MICROGRAM(S): at 05:31

## 2023-03-09 RX ADMIN — MIRTAZAPINE 15 MILLIGRAM(S): 45 TABLET, ORALLY DISINTEGRATING ORAL at 21:09

## 2023-03-09 RX ADMIN — APIXABAN 5 MILLIGRAM(S): 2.5 TABLET, FILM COATED ORAL at 05:31

## 2023-03-09 RX ADMIN — Medication 30 MILLIGRAM(S): at 05:32

## 2023-03-09 RX ADMIN — MAGNESIUM OXIDE 400 MG ORAL TABLET 400 MILLIGRAM(S): 241.3 TABLET ORAL at 11:26

## 2023-03-09 NOTE — PROGRESS NOTE ADULT - SUBJECTIVE AND OBJECTIVE BOX
SUBJ:  Patient is a 73y old  Female who presents with a chief complaint of chest pain, afib with RVR (09 Mar 2023 10:41)  patient seen and examined  chart is reviewed  case discussed with Dr. Kohler  patient now on telemetry ... comfortable, no distress       PAST MEDICAL & SURGICAL HISTORY:  High cholesterol      Hypothyroidism      CVA (cerebrovascular accident)      Paroxysmal atrial fibrillation      H/O knee surgery      Brain aneurysm          MEDICATIONS  (STANDING):  apixaban 5 milliGRAM(s) Oral every 12 hours  aspirin  chewable 81 milliGRAM(s) Oral daily  atorvastatin 80 milliGRAM(s) Oral at bedtime  chlorhexidine 2% Cloths 1 Application(s) Topical <User Schedule>  colchicine 0.6 milliGRAM(s) Oral two times a day  digoxin     Tablet 125 MICROGram(s) Oral daily  famotidine    Tablet 20 milliGRAM(s) Oral at bedtime  lactated ringers. 1000 milliLiter(s) (75 mL/Hr) IV Continuous <Continuous>  levothyroxine 50 MICROGram(s) Oral daily  magnesium oxide 400 milliGRAM(s) Oral three times a day with meals  memantine 5 milliGRAM(s) Oral daily  metoprolol tartrate 50 milliGRAM(s) Oral every 8 hours  mirtazapine 15 milliGRAM(s) Oral at bedtime  pantoprazole    Tablet 40 milliGRAM(s) Oral before breakfast  polyethylene glycol 3350 17 Gram(s) Oral daily  senna 2 Tablet(s) Oral at bedtime    MEDICATIONS  (PRN):  acetaminophen     Tablet .. 650 milliGRAM(s) Oral every 6 hours PRN Temp greater or equal to 38C (100.4F), Mild Pain (1 - 3)  ondansetron Injectable 4 milliGRAM(s) IV Push every 6 hours PRN Nausea and/or Vomiting          Vital Signs Last 24 Hrs  T(C): 36.7 (09 Mar 2023 05:30), Max: 36.9 (08 Mar 2023 21:00)  T(F): 98.1 (09 Mar 2023 05:30), Max: 98.5 (08 Mar 2023 21:00)  HR: 78 (09 Mar 2023 11:07) (67 - 79)  BP: 102/69 (09 Mar 2023 11:07) (97/72 - 133/59)  BP(mean): 91 (09 Mar 2023 05:30) (68 - 107)  RR: 16 (09 Mar 2023 05:30) (15 - 23)  SpO2: 100% (09 Mar 2023 05:30) (95% - 100%)    Parameters below as of 09 Mar 2023 05:30  Patient On (Oxygen Delivery Method): nasal cannula  O2 Flow (L/min): 2      REVIEW OF SYSTEMS:  CONSTITUTIONAL: fatigue   RESPIRATORY: No cough, wheezing, chills or hemoptysis; No shortness of breath  CARDIOVASCULAR: No chest pain or chest pressure.  No shortness of breath or dyspnea on exertion.  No palpitations, dizziness, light headedness, syncope or near syncope.  No edema, no orthopnea.   NEUROLOGICAL: No headaches, memory loss, loss of strength, numbness, or tremors      PHYSICAL EXAM  Constitutional:  WDWN. No acute distress  HEENT: normocephalic, atraumatic.  PERRLA. EOMI  Neck : No JVD. no carotid bruits  Lungs:  clear to auscultation bilaterally. no rhonchi. no wheezing  Heart:  S1 and S2. No S3, S4. I/VI systolic murmur.  Abdomen:  soft, non tender.  Extremities: No clubbing, cyanoisis or edema  Nuerologic:  A+O x 3. No focal deficits  Skin:  no rashes        LABS:                        10.9   4.69  )-----------( 281      ( 09 Mar 2023 07:00 )             36.5     03-09    139  |  101  |  10  ----------------------------<  79  4.9   |  29  |  0.47<L>    Ca    7.9<L>      09 Mar 2023 07:00  Phos  2.4     03-09  Mg     2.2     03-09    TPro  6.1  /  Alb  2.1<L>  /  TBili  0.2  /  DBili  x   /  AST  13  /  ALT  14  /  AlkPhos  71  03-09    I&O's Summary    08 Mar 2023 07:01  -  09 Mar 2023 07:00  --------------------------------------------------------  IN: 883.1 mL / OUT: 700 mL / NET: 183.1 mL    09 Mar 2023 07:01  -  09 Mar 2023 12:13  --------------------------------------------------------  IN: 240 mL / OUT: 0 mL / NET: 240 mL    tele sinus rhythm    < from: 12 Lead ECG (03.09.23 @ 04:35) >    Diagnosis Line Normal sinus rhythm  Nonspecific T wave abnormality  Abnormal ECG  When compared with ECG of 08-MAR-2023 08:38,  premature atrial complexes are no longer present    < end of copied text >  < from: 12 Lead ECG (03.08.23 @ 08:38) >  Diagnosis Line Sinus rhythm with premature atrial complexes  Nonspecific T wave abnormality  Abnormal ECG  When compared with ECG of 07-MAR-2023 13:05,  Sinus rhythm has replaced Atrial fibrillation    < end of copied text >  < from: 12 Lead ECG (03.07.23 @ 13:05) >    Diagnosis Line Atrial fibrillation with rapid ventricular response  Minimalvoltage criteria for LVH, may be normal variant  Nonspecific ST and T wave abnormality  Abnormal ECG  When compared with ECG of 07-MAR-2023 03:45,  No significant change was found    < end of copied text >    < from: TTE Echo Limited or F/U (03.07.23 @ 11:16) >   1. Limited echocardiogram performed.   2. The heart rate is tachycardic throughout the study.   3. The left ventricle systolic function appears at least mildly reduced   on limited views, estimated visual LVEF 40-45%.   4. Decreased left ventricular internal cavitysize.   5. There is mild septal left ventricular hypertrophy.   6. Normal right ventricular size and function.   7. Left atrial enlargement.   8. The right atrium is normal in size.   9. Mild mitral annular calcification.  10. Trace mitral valve regurgitation.  11. Moderate tricuspid regurgitation.  12. Mild aortic valve leaflet calcification. No aortic valve stenosis.  13. Mild to moderate pulmonic valve regurgitation.  14. Estimated pulmonary artery systolic pressure is 43.9 mmHg assuming a   right atrial pressure of 15 mmHg, which is consistent with mild pulmonary   hypertension.  15. There is no evidence of pericardial effusion.  16. Consider repeating echocardiogram when patient is no longer   tachycardic for more accurate assessment of left ventricle ejection   fraction, if clinically indicated.    < end of copied text >  < from: TTE Echo Limited or F/U (02.23.23 @ 21:46) >   1. Technically limited study, for assessment of pericardial effusion.   2. Left ventricular ejection fraction, by visual estimation, is 50 to   55%.   3. Normal global left ventricular systolic function.   4. Normal right ventricular size and function.   5. Mild tricuspid regurgitation.   6. Sclerotic aortic valve with normal opening.   7. There is no evidence of pericardial effusion.   8. Compared to prior study on 2/22/2023, there is no change in left   ventricular function and no evidence of pericardial effusion.    < end of copied text >

## 2023-03-09 NOTE — PROGRESS NOTE ADULT - ASSESSMENT
72 year old female PMH hypothyroid, cerebral aneurysm s/p coiling, pericarditis, possibly related to non covid coronaviral illness (recently admitted 1/30/23-2/1/23), afib with rvr presented to [facility] on 2/16 for afib with rvr, tuyet called on 2/22 and found to have non hemorrhagic acute ischemia in the left mca territory admitted to acute rehab 2/26, now transferred to ICU with chest pain, nausea, rapid afib, now downgraded to telemetry.     Atrial Fibrillation with RVR, now resolved  Status post Digoxin loading and esmolol drip, now downgraded from ICU, Sinus Rhythm  Transitioned to metoprolol tartrate 50 TID, continue digoxin 250 daily, continue eliquis  ECHO reviewed by cardiology, new onset reduced EF, 40-45%, thought to be tachy mediated, plan to repeat ECHO in am  cardiology appreciated    Pericarditis with h/o pleural effusion  stable, continue colchicine and prednisone  stable respiratory status  will dc oxygen monitoring    Nausea and Vomiting  GI appreciated  likely medication induced  continue PPE, zofran prn, present diet  AXR with no acute findings  recommend outpatient follow up for EGD if recurrence of symptoms  Chronic CHF with mild reduction in systolic function  plan to repeat ECHO as per cardiology in am  mild reduction in EF may be secondary to rapid Afib  appears euvolemic  continue BB and digoxin  Avoid IVF, lasix prn  monitor fluid status    Recent CVA status post Acute Rehab  High Cholesterol  pt finished comprehensive acute rehab program  PT evaluation appreciated  plan to dc home with services when stable  continue eliquis, ASA, high intensity statin therapy    Hypothyroid  chronic condition  continue synthroid    VTE PPx  pt is on eliquis    spoke to colin wasserman today on phone and offered an update, all questions answered  plan to possibly dc home tomorrow with services if repeat ECHO ok and cleared by cardiology         72 year old female PMH hypothyroid, cerebral aneurysm s/p coiling, pericarditis, possibly related to non covid coronaviral illness (recently admitted 1/30/23-2/1/23), afib with rvr presented to [facility] on 2/16 for afib with rvr, tuyet called on 2/22 and found to have non hemorrhagic acute ischemia in the left mca territory admitted to acute rehab 2/26, now transferred to ICU with chest pain, nausea, rapid afib, now downgraded to telemetry.     Atrial Fibrillation with RVR, now resolved, acute on chronic  Status post Digoxin loading and esmolol drip, now downgraded from ICU, Sinus Rhythm  Transitioned to metoprolol tartrate 50 TID, continue digoxin 250 daily, continue eliquis  ECHO reviewed by cardiology, new onset reduced EF, 40-45%, thought to be tachy mediated, plan to repeat ECHO in am  cardiology appreciated    Pericarditis with h/o pleural effusion  stable, continue colchicine and prednisone  stable respiratory status  will dc oxygen monitoring    Nausea and Vomiting  GI appreciated  likely medication induced  continue PPE, zofran prn, present diet  AXR with no acute findings  recommend outpatient follow up for EGD if recurrence of symptoms  Chronic CHF with mild reduction in systolic function  plan to repeat ECHO as per cardiology in am  mild reduction in EF may be secondary to rapid Afib  appears euvolemic  continue BB and digoxin  Avoid IVF, lasix prn  monitor fluid status    Recent CVA status post Acute Rehab  High Cholesterol  pt finished comprehensive acute rehab program  PT evaluation appreciated  plan to dc home with services when stable  continue eliquis, ASA, high intensity statin therapy    Hypothyroid  chronic condition  continue synthroid    VTE PPx  pt is on eliquis    spoke to colin wasserman today on phone and offered an update, all questions answered  plan to possibly dc home tomorrow with services if repeat ECHO ok and cleared by cardiology

## 2023-03-09 NOTE — PROGRESS NOTE ADULT - ASSESSMENT
73 year old woman transferred from acute rehab post chest pain and rapid atrial fibrillation.  She is s/p ICU for esmolol drip and has converted back to sinus rhythm  She has PAF, pericarditis with pericardial effusion, s/p cath with normal coronaries, cerebral aneurysm s/p coiling, s/p recent hospitalization for TIA  Echo with mild moderate decreased LV function... done in the setting of rapid atrial fibrillation.  Prior echo done last month had demonstrated normal LV function.   Pulse ox has been greater than 90%   Remains in sinus rhythm     Plan  - continue metoprolol and digoxin  - continue anticoagulation   - consider repeat echo tomorrow if remains in sinus rhythm .. hopeful that LV function is recovered post conversion to sinus rhythm   - advance activity as tolerable     discussed with patient and with Medicine team

## 2023-03-09 NOTE — PROGRESS NOTE ADULT - SUBJECTIVE AND OBJECTIVE BOX
F/U Note:    73y Female admitted with afib rvr initially in ICU on esmolol drip, was weaned off and transferred to telemetry.       Interval Hx:  -remains rate controlled, in sinus rhythm     Vital Signs Last 24 Hrs  T(C): 36.7 (09 Mar 2023 21:09), Max: 36.7 (09 Mar 2023 05:30)  T(F): 98 (09 Mar 2023 21:09), Max: 98.1 (09 Mar 2023 05:30)  HR: 67 (09 Mar 2023 21:09) (67 - 78)  BP: 114/75 (09 Mar 2023 21:09) (100/68 - 123/75)  BP(mean): 91 (09 Mar 2023 05:30) (91 - 91)  RR: 16 (09 Mar 2023 21:09) (16 - 16)  SpO2: 93% (09 Mar 2023 21:09) (93% - 100%)    Parameters below as of 09 Mar 2023 21:09  Patient On (Oxygen Delivery Method): room air                                10.9   4.69  )-----------( 281      ( 09 Mar 2023 07:00 )             36.5         03-09    139  |  101  |  10  ----------------------------<  79  4.9   |  29  |  0.47<L>    Ca    7.9<L>      09 Mar 2023 07:00  Phos  2.4     03-09  Mg     2.2     03-09    TPro  6.1  /  Alb  2.1<L>  /  TBili  0.2  /  DBili  x   /  AST  13  /  ALT  14  /  AlkPhos  71  03-09                NEURO: awake and alert  CV: (+) S1/S2, rrr, no mrg  RESP: CTA b/l  GI: soft, non tender

## 2023-03-09 NOTE — PROGRESS NOTE ADULT - SUBJECTIVE AND OBJECTIVE BOX
Patient is a 73y old  Female who presents with a chief complaint of chest pain, afib with RVR (09 Mar 2023 10:41)    Patient seen and examined at bedside.  no acute overnight events    ALLERGIES:  No Known Allergies        Vital Signs Last 24 Hrs  T(F): 98.1 (09 Mar 2023 05:30), Max: 98.5 (08 Mar 2023 21:00)  HR: 78 (09 Mar 2023 11:07) (67 - 79)  BP: 102/69 (09 Mar 2023 11:07) (97/72 - 133/59)  RR: 16 (09 Mar 2023 05:30) (15 - 23)  SpO2: 100% (09 Mar 2023 05:30) (95% - 100%)  I&O's Summary    08 Mar 2023 07:01  -  09 Mar 2023 07:00  --------------------------------------------------------  IN: 883.1 mL / OUT: 700 mL / NET: 183.1 mL    09 Mar 2023 07:01  -  09 Mar 2023 12:25  --------------------------------------------------------  IN: 240 mL / OUT: 0 mL / NET: 240 mL      MEDICATIONS:  acetaminophen     Tablet .. 650 milliGRAM(s) Oral every 6 hours PRN  apixaban 5 milliGRAM(s) Oral every 12 hours  aspirin  chewable 81 milliGRAM(s) Oral daily  atorvastatin 80 milliGRAM(s) Oral at bedtime  chlorhexidine 2% Cloths 1 Application(s) Topical <User Schedule>  colchicine 0.6 milliGRAM(s) Oral two times a day  digoxin     Tablet 125 MICROGram(s) Oral daily  famotidine    Tablet 20 milliGRAM(s) Oral at bedtime  lactated ringers. 1000 milliLiter(s) IV Continuous <Continuous>  levothyroxine 50 MICROGram(s) Oral daily  magnesium oxide 400 milliGRAM(s) Oral three times a day with meals  memantine 5 milliGRAM(s) Oral daily  metoprolol tartrate 50 milliGRAM(s) Oral every 8 hours  mirtazapine 15 milliGRAM(s) Oral at bedtime  ondansetron Injectable 4 milliGRAM(s) IV Push every 6 hours PRN  pantoprazole    Tablet 40 milliGRAM(s) Oral before breakfast  polyethylene glycol 3350 17 Gram(s) Oral daily  senna 2 Tablet(s) Oral at bedtime      PHYSICAL EXAM:  General: NAD, A/O x 3  ENT: MMM, no thrush  Neck: Supple, No JVD  Lungs: Clear to auscultation bilaterally, non labored, good air entry  Cardio: S1S2 regular  Abdomen: Soft, Nontender, Nondistended; Bowel sounds present  Extremities: No cyanosis, No edema    LABS:                        10.9   4.69  )-----------( 281      ( 09 Mar 2023 07:00 )             36.5     03-09    139  |  101  |  10  ----------------------------<  79  4.9   |  29  |  0.47    Ca    7.9      09 Mar 2023 07:00  Phos  2.4     03-09  Mg     2.2     03-09    TPro  6.1  /  Alb  2.1  /  TBili  0.2  /  DBili  x   /  AST  13  /  ALT  14  /  AlkPhos  71  03-09        Lactate, Blood: 0.7 mmol/L (03-07 @ 15:43)    CARDIAC MARKERS ( 07 Mar 2023 15:43 )  x     / 9.5 ng/L / x     / x     / x      CARDIAC MARKERS ( 07 Mar 2023 07:00 )  x     / 11.6 ng/L / x     / x     / x      CARDIAC MARKERS ( 07 Mar 2023 04:15 )  x     / 11.5 ng/L / x     / x     / x          02-23 Chol 152 mg/dL LDL -- HDL 46 mg/dL Trig 146 mg/dL                      COVID-19 PCR: NotDetec (02-26-23 @ 17:49)  COVID-19 PCR: NotDetec (02-24-23 @ 13:00)      RADIOLOGY & ADDITIONAL TESTS:    Care Discussed with Consultants/Other Providers:

## 2023-03-09 NOTE — PROGRESS NOTE ADULT - PROBLEM SELECTOR PLAN 1
Likely medication induced  Continue PPI  Zofran PRN  Continue present diet.  EGD out patient if recurrence of symptom Likely medication induced  Continue PPI  Zofran PRN  Continue present diet.  EGD if recurrence of symptoms.

## 2023-03-09 NOTE — PROGRESS NOTE ADULT - SUBJECTIVE AND OBJECTIVE BOX
Follow-up Critical Care Progress Note  Chief Complaint : Cerebral infarction      hr sinus  pt complain of  difficulty eating/tolerating food  no cp palp n/v      Allergies :No Known Allergies      PAST MEDICAL & SURGICAL HISTORY:  High cholesterol    Hypothyroidism    CVA (cerebrovascular accident)    Paroxysmal atrial fibrillation    H/O knee surgery    Brain aneurysm        Medications:  MEDICATIONS  (STANDING):  apixaban 5 milliGRAM(s) Oral every 12 hours  aspirin  chewable 81 milliGRAM(s) Oral daily  atorvastatin 80 milliGRAM(s) Oral at bedtime  chlorhexidine 2% Cloths 1 Application(s) Topical <User Schedule>  colchicine 0.6 milliGRAM(s) Oral two times a day  digoxin     Tablet 125 MICROGram(s) Oral daily  famotidine    Tablet 20 milliGRAM(s) Oral at bedtime  levothyroxine 50 MICROGram(s) Oral daily  magnesium oxide 400 milliGRAM(s) Oral three times a day with meals  memantine 5 milliGRAM(s) Oral daily  metoprolol tartrate 50 milliGRAM(s) Oral every 8 hours  mirtazapine 15 milliGRAM(s) Oral at bedtime  pantoprazole    Tablet 40 milliGRAM(s) Oral before breakfast  polyethylene glycol 3350 17 Gram(s) Oral daily  senna 2 Tablet(s) Oral at bedtime    MEDICATIONS  (PRN):  acetaminophen     Tablet .. 650 milliGRAM(s) Oral every 6 hours PRN Temp greater or equal to 38C (100.4F), Mild Pain (1 - 3)  ondansetron Injectable 4 milliGRAM(s) IV Push every 6 hours PRN Nausea and/or Vomiting      Antibiotics History      Heme Medications   apixaban 5 milliGRAM(s) Oral every 12 hours, 03-07-23 @ 05:27  aspirin  chewable 81 milliGRAM(s) Oral daily, 03-07-23 @ 05:26      GI Medications  famotidine    Tablet 20 milliGRAM(s) Oral at bedtime, 03-07-23 @ 05:31, Routine  pantoprazole    Tablet 40 milliGRAM(s) Oral before breakfast, 03-08-23 @ 00:00, Routine  polyethylene glycol 3350 17 Gram(s) Oral daily, 03-07-23 @ 05:28, Routine  senna 2 Tablet(s) Oral at bedtime, 03-07-23 @ 05:28, Routine      COVID  02-26-23 @ 17:49  COVID -   NotDetec  02-24-23 @ 13:00  COVID -   NotDetec  02-16-23 @ 05:40  COVID -   NotDetec  01-30-23 @ 00:30  COVID -   NotDetec      COVID Biomarkers    03-07-23 @ 07:00 ESR 84<H>  ---  CRP --  ---  DDimer  --   ---   LDH --   ---   Ferritin --      Trend Cardiac Enzymes  03-07-23 @ 15:43  OLP-WZYIP-JIUPS-CPKMM/Trop I - -- - --  - --  -  --  /  9.5  03-07-23 @ 07:00  HHH-KFEAJ-JQDWF-CPKMM/Trop I - -- - --  - --  -  --  /  11.6  03-07-23 @ 04:15  MKP-VJTMI-CYZVZ-CPKMM/Trop I - -- - --  - --  -  --  /  11.5    Trend BNP  03-08-23 @ 05:15   -  5047<H>  03-07-23 @ 04:15   -  1744<H>  01-30-23 @ 00:30   -  142    Procalcitonin Trend    WBC Trend  03-09-23 @ 07:00   -  4.69  03-08-23 @ 05:15   -  7.06  03-07-23 @ 15:43   -  10.59<H>  03-07-23 @ 04:15   -  8.27    H/H Trend  03-09-23 @ 07:00   -   10.9<L>/ 36.5  03-08-23 @ 05:15   -   10.7<L>/ 34.8  03-07-23 @ 15:43   -   11.7/ 37.0  03-07-23 @ 04:15   -   11.6/ 36.6  03-06-23 @ 06:50   -   12.4/ 40.0  03-05-23 @ 01:55   -   13.7/ 44.1    Stool Occult Blood    Platelet Trend  03-09-23 @ 07:00   -  281  03-08-23 @ 05:15   -  307  03-07-23 @ 15:43   -  360  03-07-23 @ 04:15   -  381    Trend Sodium  03-09-23 @ 07:00   -  139  03-08-23 @ 05:15   -  137  03-07-23 @ 15:43   -  137  03-07-23 @ 04:15   -  138    Trend Potassium  03-09-23 @ 07:00   -  4.9  03-08-23 @ 05:15   -  3.8  03-07-23 @ 15:43   -  4.5  03-07-23 @ 04:15   -  3.7    Trend Bun/Cr  03-09-23 @ 07:00  BUN/CR -  10 / 0.47<L>  03-08-23 @ 05:15  BUN/CR -  7 / 0.41<L>  03-07-23 @ 15:43  BUN/CR -  10 / 0.68  03-07-23 @ 04:15  BUN/CR -  10 / 0.63    Lactic Acid Trend  03-07-23 @ 15:43   -   0.7    ABG Trend  02-22-23 @ 02:51   - 7.530<H>/33/113<H>/98.0    Trend AST/ALT/ALK Phos/Bili  03-09-23 @ 07:00   13/14/71/0.2  03-08-23 @ 05:15   14/12/56/0.2  03-07-23 @ 15:43   14/22/79/0.3  03-07-23 @ 04:15   13/20/76/0.5  03-06-23 @ 06:50   12/19/80/0.5  03-05-23 @ 01:55   <5<L>/23/86/0.6  03-02-23 @ 13:25   21/28/80/0.4  03-02-23 @ 09:35   24/29/75/0.6    Amylase / Lipase Trend  01-30-23 @ 00:30   -   -- / 40      Albumin Trend  03-09-23 @ 07:00   -   2.1<L>  03-08-23 @ 05:15   -   1.4<L>  03-07-23 @ 15:43   -   2.2<L>  03-07-23 @ 04:15   -   2.4<L>  03-06-23 @ 06:50   -   2.5<L>  03-05-23 @ 01:55   -   2.9<L>      PTT - PT - INR Trend  02-24-23 @ 08:45   -   64.7<H> - -- - --  02-24-23 @ 03:13   -   67.7<H> - -- - --  02-23-23 @ 19:28   -   64.6<H> - -- - --  02-23-23 @ 09:07   -   55.0<H> - -- - --  02-23-23 @ 02:14   -   44.4<H> - -- - --  02-22-23 @ 01:50   -   33.9 - 12.1 - 1.04    Glucose Trend  03-09-23 @ 07:00   -  -- 79 --  03-08-23 @ 05:15   -  -- 78 --  03-07-23 @ 15:43   -  -- 176<H> --  03-07-23 @ 04:15   -  -- 99 --    A1C with Estimated Average Glucose Result: 6.1 % *H* [4.0 - 5.6] (02-23-23 @ 06:01)      LABS:                        10.9   4.69  )-----------( 281      ( 09 Mar 2023 07:00 )             36.5     03-09    139  |  101  |  10  ----------------------------<  79  4.9   |  29  |  0.47<L>    Ca    7.9<L>      09 Mar 2023 07:00  Phos  2.4     03-09  Mg     2.2     03-09    TPro  6.1  /  Alb  2.1<L>  /  TBili  0.2  /  DBili  x   /  AST  13  /  ALT  14  /  AlkPhos  71  03-09      VITALS:  T(C): 36.6 (03-09-23 @ 13:45), Max: 36.9 (03-08-23 @ 21:00)  T(F): 97.9 (03-09-23 @ 13:45), Max: 98.5 (03-08-23 @ 21:00)  HR: 72 (03-09-23 @ 13:45) (67 - 79)  BP: 100/68 (03-09-23 @ 13:45) (97/72 - 133/59)  BP(mean): 91 (03-09-23 @ 05:30) (68 - 107)  ABP: --  ABP(mean): --  RR: 16 (03-09-23 @ 13:45) (15 - 23)  SpO2: 94% (03-09-23 @ 13:45) (94% - 100%)  CVP(mm Hg): --  CVP(cm H2O): --    Ins and Outs     03-08-23 @ 07:01  -  03-09-23 @ 07:00  --------------------------------------------------------  IN: 883.1 mL / OUT: 700 mL / NET: 183.1 mL    03-09-23 @ 07:01  -  03-09-23 @ 14:30  --------------------------------------------------------  IN: 240 mL / OUT: 0 mL / NET: 240 mL        Height (cm): 149.9 (03-07-23 @ 15:30)  Weight (kg): 65.6 (03-07-23 @ 16:24)  BMI (kg/m2): 29.2 (03-07-23 @ 16:24)        I&O's Detail    08 Mar 2023 07:01  -  09 Mar 2023 07:00  --------------------------------------------------------  IN:    IV PiggyBack: 583.1 mL    Lactated Ringers: 150 mL    Oral Fluid: 150 mL  Total IN: 883.1 mL    OUT:    Voided (mL): 700 mL  Total OUT: 700 mL    Total NET: 183.1 mL      09 Mar 2023 07:01  -  09 Mar 2023 14:30  --------------------------------------------------------  IN:    Oral Fluid: 240 mL  Total IN: 240 mL    OUT:  Total OUT: 0 mL    Total NET: 240 mL

## 2023-03-09 NOTE — PROGRESS NOTE ADULT - NS ATTEND AMEND GEN_ALL_CORE FT
72 year old female PMH hypothyroid, cerebral aneurysm s/p coiling, pericarditis, possibly related to non covid coronaviral illness (recently admitted 1/30/23-2/1/23), afib with rvr presented to [facility] on 2/16 for afib with rvr, stoke called on 2/22 and found to have non hemorrhagic acute ischemia in the left mca territory admitted to acute rehab 2/26, now transferred to ICU with chest pain, nausea, rapid afib, now downgraded to telemetry. Plan: repeat echo in AM, dc planning underway to home with home pt, monitor clinical course, apprec PT eval, monitor clinical course

## 2023-03-09 NOTE — PROGRESS NOTE ADULT - SUBJECTIVE AND OBJECTIVE BOX
INTERVAL HPI/OVERNIGHT EVENTS:  HPI:  Patient seen and examined at bed side, her son on the phone. She is feeling better No more nausea or vomiting. Tolerated breakfast. She denies abdominal pain, Melena, hematemesis, constipation or diarrhea. Her abdominal X ray normal.  She reports undergoing EGD a few years ago for reported abdominal discomfort which was normal.     MEDICATIONS  (STANDING):  apixaban 5 milliGRAM(s) Oral every 12 hours  aspirin  chewable 81 milliGRAM(s) Oral daily   atorvastatin 80 milliGRAM(s) Oral at bedtime  chlorhexidine 2% Cloths 1 Application(s) Topical <User Schedule>  colchicine 0.6 milliGRAM(s) Oral two times a day  digoxin     Tablet 125 MICROGram(s) Oral daily  famotidine    Tablet 20 milliGRAM(s) Oral at bedtime  lactated ringers. 1000 milliLiter(s) (75 mL/Hr) IV Continuous <Continuous>  levothyroxine 50 MICROGram(s) Oral daily  magnesium oxide 400 milliGRAM(s) Oral three times a day with meals  memantine 5 milliGRAM(s) Oral daily  metoprolol tartrate 50 milliGRAM(s) Oral every 8 hours  mirtazapine 15 milliGRAM(s) Oral at bedtime  pantoprazole    Tablet 40 milliGRAM(s) Oral before breakfast  polyethylene glycol 3350 17 Gram(s) Oral daily  senna 2 Tablet(s) Oral at bedtime    MEDICATIONS  (PRN):  acetaminophen     Tablet .. 650 milliGRAM(s) Oral every 6 hours PRN Temp greater or equal to 38C (100.4F), Mild Pain (1 - 3)  ondansetron Injectable 4 milliGRAM(s) IV Push every 6 hours PRN Nausea and/or Vomiting      Allergies    No Known Allergies    Intolerances        PAST MEDICAL & SURGICAL HISTORY:  High cholesterol      Hypothyroidism      CVA (cerebrovascular accident)      Paroxysmal atrial fibrillation      H/O knee surgery      Brain aneurysm      	    PHYSICAL EXAM:   Vital Signs:  Vital Signs Last 24 Hrs  T(C): 36.7 (09 Mar 2023 05:30), Max: 36.9 (08 Mar 2023 21:00)  T(F): 98.1 (09 Mar 2023 05:30), Max: 98.5 (08 Mar 2023 21:00)  HR: 75 (09 Mar 2023 05:30) (67 - 79)  BP: 123/75 (09 Mar 2023 05:30) (97/72 - 133/59)  BP(mean): 91 (09 Mar 2023 05:30) (68 - 107)  RR: 16 (09 Mar 2023 05:30) (15 - 35)  SpO2: 100% (09 Mar 2023 05:30) (95% - 100%)    Parameters below as of 09 Mar 2023 05:30  Patient On (Oxygen Delivery Method): nasal cannula  O2 Flow (L/min): 2    Daily     Daily I&O's Summary    08 Mar 2023 07:01  -  09 Mar 2023 07:00  --------------------------------------------------------  IN: 883.1 mL / OUT: 700 mL / NET: 183.1 mL    09 Mar 2023 07:01  -  09 Mar 2023 10:42  --------------------------------------------------------  IN: 240 mL / OUT: 0 mL / NET: 240 mL        GENERAL:  Appears stated age,  no distress  HEENT:  NC/AT,  conjunctivae clear and pink   CHEST:  Full & symmetric excursion, no increased effort  HEART:  Regular rhythm, S1, S2   ABDOMEN:  Soft, non-tender, non-distended, normoactive bowel sounds  EXTREMITIES  no cyanosis, clubbing or edema  SKIN:  No rash/warm/dry  NEURO:  Alert, oriented       LABS:                        10.9   4.69  )-----------( 281      ( 09 Mar 2023 07:00 )             36.5     03-08    137  |  104  |  7   ----------------------------<  78  3.8   |  24  |  0.41<L>    Ca    7.3<L>      08 Mar 2023 05:15  Phos  1.8     03-08  Mg     2.2     03-09    TPro  4.5<L>  /  Alb  1.4<L>  /  TBili  0.2  /  DBili  x   /  AST  14  /  ALT  12  /  AlkPhos  56  03-08        amylase   lipase  RADIOLOGY & ADDITIONAL TESTS:     Patient seen and examined at bedside, her son on the phone. She reports she is feeling better, no more nausea or vomiting. Tolerated breakfast. She denies abdominal pain, melena, hematemesis, constipation or diarrhea. Her abdominal X ray normal.  She reports undergoing EGD a few years ago for reported abdominal discomfort which was normal.     MEDICATIONS  (STANDING):  apixaban 5 milliGRAM(s) Oral every 12 hours  aspirin  chewable 81 milliGRAM(s) Oral daily   atorvastatin 80 milliGRAM(s) Oral at bedtime  chlorhexidine 2% Cloths 1 Application(s) Topical <User Schedule>  colchicine 0.6 milliGRAM(s) Oral two times a day  digoxin     Tablet 125 MICROGram(s) Oral daily  famotidine    Tablet 20 milliGRAM(s) Oral at bedtime  lactated ringers. 1000 milliLiter(s) (75 mL/Hr) IV Continuous <Continuous>  levothyroxine 50 MICROGram(s) Oral daily  magnesium oxide 400 milliGRAM(s) Oral three times a day with meals  memantine 5 milliGRAM(s) Oral daily  metoprolol tartrate 50 milliGRAM(s) Oral every 8 hours  mirtazapine 15 milliGRAM(s) Oral at bedtime  pantoprazole    Tablet 40 milliGRAM(s) Oral before breakfast  polyethylene glycol 3350 17 Gram(s) Oral daily  senna 2 Tablet(s) Oral at bedtime    MEDICATIONS  (PRN):  acetaminophen     Tablet .. 650 milliGRAM(s) Oral every 6 hours PRN Temp greater or equal to 38C (100.4F), Mild Pain (1 - 3)  ondansetron Injectable 4 milliGRAM(s) IV Push every 6 hours PRN Nausea and/or Vomiting      Allergies    No Known Allergies    Intolerances        	    PHYSICAL EXAM:   Vital Signs:  Vital Signs Last 24 Hrs  T(C): 36.7 (09 Mar 2023 05:30), Max: 36.9 (08 Mar 2023 21:00)  T(F): 98.1 (09 Mar 2023 05:30), Max: 98.5 (08 Mar 2023 21:00)  HR: 75 (09 Mar 2023 05:30) (67 - 79)  BP: 123/75 (09 Mar 2023 05:30) (97/72 - 133/59)  BP(mean): 91 (09 Mar 2023 05:30) (68 - 107)  RR: 16 (09 Mar 2023 05:30) (15 - 35)  SpO2: 100% (09 Mar 2023 05:30) (95% - 100%)    Parameters below as of 09 Mar 2023 05:30  Patient On (Oxygen Delivery Method): nasal cannula  O2 Flow (L/min): 2    Daily     Daily I&O's Summary    08 Mar 2023 07:01  -  09 Mar 2023 07:00  --------------------------------------------------------  IN: 883.1 mL / OUT: 700 mL / NET: 183.1 mL    09 Mar 2023 07:01  -  09 Mar 2023 10:42  --------------------------------------------------------  IN: 240 mL / OUT: 0 mL / NET: 240 mL        GENERAL:  Appears stated age,  no distress  HEENT:  NC/AT,  conjunctivae clear and pink   CHEST:  Full & symmetric excursion, no increased effort  HEART:  Regular rhythm, S1, S2   ABDOMEN:  Soft, non-tender, non-distended, normoactive bowel sounds  EXTREMITIES  no cyanosis, clubbing or edema  SKIN:  No rash/warm/dry  NEURO:  Alert, oriented       LABS:                        10.9   4.69  )-----------( 281      ( 09 Mar 2023 07:00 )             36.5     03-08    137  |  104  |  7   ----------------------------<  78  3.8   |  24  |  0.41<L>    Ca    7.3<L>      08 Mar 2023 05:15  Phos  1.8     03-08  Mg     2.2     03-09    TPro  4.5<L>  /  Alb  1.4<L>  /  TBili  0.2  /  DBili  x   /  AST  14  /  ALT  12  /  AlkPhos  56  03-08        ACC: [id number] EXAM:  XR ABDOMEN PORTABLE ROUTINE 1V   ORDERED BY: AAYUSH   DEMI     PROCEDURE DATE:  03/08/2023          INTERPRETATION:  KUB: AP    COMPARISON: 2/27/2023 abdominal radiograph.    CLINICAL INFORMATION: Abdominal pain. Abdominal distention    FINDINGS:    There is a nonspecific, nonobstructive bowel gas pattern.  No free intra-abdominal air.  No obvious intra-abdominal masses.  No abnormal calcifications.  The osseous structures are intact.    IMPRESSION:    No acute radiographicabdominal findings.  If symptoms warrant further investigation follow-up abdominal CT scan   suggested.    --- End of Report ---            CARRIE VALENZUELA MD; Attending Radiologist  This document has been electronically signed. Mar  8 2023  3:58PM

## 2023-03-09 NOTE — PROGRESS NOTE ADULT - ASSESSMENT
Physical Examination:  GENERAL:               Alert, Oriented, No acute distress.    HEENT:                   No JVD, Moist MM  PULM:                     Bilateral air entry, Clear to auscultation bilaterally, no significant sputum production, No Rales, No Rhonchi, No Wheezing  CVS:                         S1, S2,  +Murmur  ABD:                        Soft, nondistended, nontender, normoactive bowel sounds,   EXT:                         No edema, nontender, No Cyanosis or Clubbing    NEURO:                  Alert, oriented, interactive, nonfocal, follows commands  PSYC:                      Calm, + Insight.        Assessment  Afib with RVR resolved - s/p esmolol drip  Pericarditis with h/o pleural effusion  recent CVA  Chronic CHF with mild reduction of systoli fuction  Underlying High chol, hypothyroidism,     Plan  Continue oral Dig and BB for rate control  continue eliquis  Cardio f/u  GI eval for decreased po intake and possible gastritis  prednisone taper-> decrease 10mg a week over 4 weeks ; 40mg x 1week (last dose on 3/2), 30mg x 1week (last dose on 3/9), 10mg x 1week (last dose on 3/23)  c/w colchicine 0.6mg PO BID    discuss with son bedside  d/w Dr. Pantoja  continue care on medical floor  reconsult as needed

## 2023-03-09 NOTE — PROGRESS NOTE ADULT - ASSESSMENT
72 year old woman w/PMHx hypothyroidism, cerebral aneurysm s/p coiling, pericarditis possibly related to non-covid coronaviral illness, afib w/RVR presented to [facility] on 2/16 for afib with RVR, stroke called on 2/22 and found to have nonhemorrhagic acute ischemia in the left MCA territory.  She was consulted for nausea and vomiting began during hospitalization. She reports undergoing EGD a few years ago for reported abdominal discomfort which was normal.   She agree with out patient GI F/U may need EGD as out patient.  72 year old woman w/PMHx hypothyroidism, cerebral aneurysm s/p coiling, pericarditis possibly related to non-covid coronaviral illness, afib w/RVR presented to [facility] on 2/16 for afib with RVR, stroke called on 2/22 and found to have nonhemorrhagic acute ischemia in the left MCA territory.  She was consulted for nausea and vomiting began during hospitalization. She reports undergoing EGD a few years ago for reported abdominal discomfort which was normal.   She agrees with out patient GI F/U may need EGD as out patient.

## 2023-03-09 NOTE — PROGRESS NOTE ADULT - ASSESSMENT
PLAN:    -maintain rate control with digoxin and lopressor  -on colchicine for hx of pericarditis  -cards following  -on eliquis given hx of fib  -Am labs

## 2023-03-09 NOTE — PROGRESS NOTE ADULT - NS ATTEND AMEND GEN_ALL_CORE FT
Patient seen and examined at the bedside. Agree with the assessment and plan of NP Marcelo.  Feels better today.  Notes appreciated. Could pursue further GI workup as outpatient if pt does not have recurrent symptoms and she is otherwise clear to DC home.

## 2023-03-10 ENCOUNTER — TRANSCRIPTION ENCOUNTER (OUTPATIENT)
Age: 73
End: 2023-03-10

## 2023-03-10 VITALS
SYSTOLIC BLOOD PRESSURE: 110 MMHG | RESPIRATION RATE: 18 BRPM | HEART RATE: 76 BPM | DIASTOLIC BLOOD PRESSURE: 77 MMHG | TEMPERATURE: 98 F | OXYGEN SATURATION: 95 %

## 2023-03-10 PROCEDURE — 93005 ELECTROCARDIOGRAM TRACING: CPT

## 2023-03-10 PROCEDURE — 99232 SBSQ HOSP IP/OBS MODERATE 35: CPT

## 2023-03-10 PROCEDURE — 82607 VITAMIN B-12: CPT

## 2023-03-10 PROCEDURE — 97116 GAIT TRAINING THERAPY: CPT

## 2023-03-10 PROCEDURE — 82746 ASSAY OF FOLIC ACID SERUM: CPT

## 2023-03-10 PROCEDURE — 83735 ASSAY OF MAGNESIUM: CPT

## 2023-03-10 PROCEDURE — 36415 COLL VENOUS BLD VENIPUNCTURE: CPT

## 2023-03-10 PROCEDURE — 84100 ASSAY OF PHOSPHORUS: CPT

## 2023-03-10 PROCEDURE — 74018 RADEX ABDOMEN 1 VIEW: CPT

## 2023-03-10 PROCEDURE — 84484 ASSAY OF TROPONIN QUANT: CPT

## 2023-03-10 PROCEDURE — 83540 ASSAY OF IRON: CPT

## 2023-03-10 PROCEDURE — 85027 COMPLETE CBC AUTOMATED: CPT

## 2023-03-10 PROCEDURE — 85652 RBC SED RATE AUTOMATED: CPT

## 2023-03-10 PROCEDURE — 83605 ASSAY OF LACTIC ACID: CPT

## 2023-03-10 PROCEDURE — 93308 TTE F-UP OR LMTD: CPT

## 2023-03-10 PROCEDURE — 80162 ASSAY OF DIGOXIN TOTAL: CPT

## 2023-03-10 PROCEDURE — 99233 SBSQ HOSP IP/OBS HIGH 50: CPT

## 2023-03-10 PROCEDURE — 97162 PT EVAL MOD COMPLEX 30 MIN: CPT

## 2023-03-10 PROCEDURE — 83880 ASSAY OF NATRIURETIC PEPTIDE: CPT

## 2023-03-10 PROCEDURE — 93308 TTE F-UP OR LMTD: CPT | Mod: 26

## 2023-03-10 PROCEDURE — 80053 COMPREHEN METABOLIC PANEL: CPT

## 2023-03-10 PROCEDURE — 97166 OT EVAL MOD COMPLEX 45 MIN: CPT

## 2023-03-10 RX ORDER — METOPROLOL TARTRATE 50 MG
1 TABLET ORAL
Qty: 90 | Refills: 0
Start: 2023-03-10 | End: 2023-04-08

## 2023-03-10 RX ORDER — METOPROLOL TARTRATE 50 MG
5 TABLET ORAL ONCE
Refills: 0 | Status: COMPLETED | OUTPATIENT
Start: 2023-03-10 | End: 2023-03-10

## 2023-03-10 RX ORDER — DIGOXIN 250 MCG
1 TABLET ORAL
Qty: 30 | Refills: 0
Start: 2023-03-10 | End: 2023-04-08

## 2023-03-10 RX ORDER — METOPROLOL TARTRATE 50 MG
1 TABLET ORAL
Qty: 0 | Refills: 0 | DISCHARGE

## 2023-03-10 RX ADMIN — Medication 20 MILLIGRAM(S): at 05:12

## 2023-03-10 RX ADMIN — Medication 50 MICROGRAM(S): at 05:12

## 2023-03-10 RX ADMIN — Medication 81 MILLIGRAM(S): at 12:53

## 2023-03-10 RX ADMIN — PANTOPRAZOLE SODIUM 40 MILLIGRAM(S): 20 TABLET, DELAYED RELEASE ORAL at 05:11

## 2023-03-10 RX ADMIN — MAGNESIUM OXIDE 400 MG ORAL TABLET 400 MILLIGRAM(S): 241.3 TABLET ORAL at 12:53

## 2023-03-10 RX ADMIN — Medication 5 MILLIGRAM(S): at 05:41

## 2023-03-10 RX ADMIN — MEMANTINE HYDROCHLORIDE 5 MILLIGRAM(S): 10 TABLET ORAL at 12:53

## 2023-03-10 RX ADMIN — Medication 125 MICROGRAM(S): at 05:12

## 2023-03-10 RX ADMIN — APIXABAN 5 MILLIGRAM(S): 2.5 TABLET, FILM COATED ORAL at 05:12

## 2023-03-10 RX ADMIN — Medication 50 MILLIGRAM(S): at 10:36

## 2023-03-10 RX ADMIN — Medication 0.6 MILLIGRAM(S): at 05:12

## 2023-03-10 NOTE — PROGRESS NOTE ADULT - REASON FOR ADMISSION
chest pain, afib with RVR

## 2023-03-10 NOTE — DISCHARGE NOTE NURSING/CASE MANAGEMENT/SOCIAL WORK - NSDCPEFALRISK_GEN_ALL_CORE
For information on Fall & Injury Prevention, visit: [email or web address] OR  [email or web address] OR  https://www.cdc.gov/steadi/patient.html

## 2023-03-10 NOTE — DISCHARGE NOTE PROVIDER - NSDCCPCAREPLAN_GEN_ALL_CORE_FT
PRINCIPAL DISCHARGE DIAGNOSIS  Diagnosis: Paroxysmal atrial fibrillation  Assessment and Plan of Treatment: you decided to sign out against medical advice.  please follow up with your cardiologist at your earliest conveniece.

## 2023-03-10 NOTE — PROGRESS NOTE ADULT - SUBJECTIVE AND OBJECTIVE BOX
SUBJ:  Patient is a 73y old  Female who presents with a chief complaint of chest pain, afib with RVR (10 Mar 2023 08:34)  patient seen and examined  chart is reviewed  patient currently comfortable  she had rapid atrial fibrillation over night ... refused late dose of PO metoprolol and required IV metoprolol earlier this morning  she is now back in sinus rhythm       PAST MEDICAL & SURGICAL HISTORY:  High cholesterol      Hypothyroidism      CVA (cerebrovascular accident)      Paroxysmal atrial fibrillation      H/O knee surgery      Brain aneurysm          MEDICATIONS  (STANDING):  apixaban 5 milliGRAM(s) Oral every 12 hours  aspirin  chewable 81 milliGRAM(s) Oral daily  atorvastatin 80 milliGRAM(s) Oral at bedtime  chlorhexidine 2% Cloths 1 Application(s) Topical <User Schedule>  colchicine 0.6 milliGRAM(s) Oral two times a day  digoxin     Tablet 125 MICROGram(s) Oral daily  famotidine    Tablet 20 milliGRAM(s) Oral at bedtime  levothyroxine 50 MICROGram(s) Oral daily  magnesium oxide 400 milliGRAM(s) Oral three times a day with meals  memantine 5 milliGRAM(s) Oral daily  metoprolol tartrate 50 milliGRAM(s) Oral every 8 hours  mirtazapine 15 milliGRAM(s) Oral at bedtime  pantoprazole    Tablet 40 milliGRAM(s) Oral before breakfast  polyethylene glycol 3350 17 Gram(s) Oral daily  predniSONE   Tablet 20 milliGRAM(s) Oral daily  senna 2 Tablet(s) Oral at bedtime    MEDICATIONS  (PRN):  acetaminophen     Tablet .. 650 milliGRAM(s) Oral every 6 hours PRN Temp greater or equal to 38C (100.4F), Mild Pain (1 - 3)  ondansetron Injectable 4 milliGRAM(s) IV Push every 6 hours PRN Nausea and/or Vomiting          Vital Signs Last 24 Hrs  T(C): 36.6 (10 Mar 2023 05:21), Max: 36.7 (09 Mar 2023 21:09)  T(F): 97.8 (10 Mar 2023 05:21), Max: 98 (09 Mar 2023 21:09)  HR: 80 (10 Mar 2023 05:21) (67 - 89)  BP: 105/60 (10 Mar 2023 05:21) (97/60 - 114/75)  BP(mean): 73 (10 Mar 2023 02:05) (73 - 73)  RR: 18 (10 Mar 2023 05:21) (16 - 20)  SpO2: 93% (10 Mar 2023 05:21) (93% - 96%)    Parameters below as of 10 Mar 2023 05:21  Patient On (Oxygen Delivery Method): nasal cannula  O2 Flow (L/min): 2      REVIEW OF SYSTEMS:  CONSTITUTIONAL: No fever, weight loss, or fatigue  RESPIRATORY: No cough, wheezing, chills or hemoptysis; No shortness of breath  CARDIOVASCULAR: No chest pain or chest pressure.  No shortness of breath or dyspnea on exertion.  No palpitations, dizziness, light headedness, syncope or near syncope.  No edema, no orthopnea.   NEUROLOGICAL: No headaches, memory loss, loss of strength, numbness, or tremors      PHYSICAL EXAM  Constitutional:  WDWN. No acute distress  HEENT: normocephalic, atraumatic.  PERRLA. EOMI  Neck : No JVD. no carotid bruits  Lungs:  clear to auscultation bilaterally. no rhonchi. no wheezing  Heart:  S1 and S2. No S3, S4. II/VI systolic murmur.  Abdomen:  soft, non tender.  Extremities: No clubbing, cyanoisis or edema  Nuerologic:  A+O x 3. No focal deficits  Skin:  no rashes        LABS:                        10.9   4.69  )-----------( 281      ( 09 Mar 2023 07:00 )             36.5     03-09    139  |  101  |  10  ----------------------------<  79  4.9   |  29  |  0.47<L>    Ca    7.9<L>      09 Mar 2023 07:00  Phos  2.4     03-09  < from: TTE Echo Limited or F/U (03.10.23 @ 08:14) >   1. Left ventricular ejection fraction, by visual estimation, is 55 to   60%.   2. Normal global left ventricular systolic function.   3. Normal left ventricular internal cavity size.   4. Mildly enlarged left atrium.   5. Mildly enlarged right atrium.   6. Mild mitral valve regurgitation.   7. Mild tricuspid regurgitation.   8. Mild aortic regurgitation.   9. Compared to previous echocardiogram done March 8 2023, there is an   improvement in LV systolic performance.    < end of copied text >  < from: 12 Lead ECG (03.09.23 @ 04:35) >  Diagnosis Line Normal sinus rhythm  Nonspecific T wave abnormality  Abnormal ECG  When compared with ECG of 08-MAR-2023 08:38,  premature atrial complexes are no longer present    < end of copied text >    tele SR, PAF with rapid rates

## 2023-03-10 NOTE — PROGRESS NOTE ADULT - ASSESSMENT
72 year old female PMH hypothyroid, cerebral aneurysm s/p coiling, pericarditis, possibly related to non covid coronaviral illness (recently admitted 1/30/23-2/1/23), afib with rvr presented to [facility] on 2/16 for afib with rvr, tuyet called on 2/22 and found to have non hemorrhagic acute ischemia in the left mca territory admitted to acute rehab 2/26, now transferred to ICU with chest pain, nausea, rapid afib, now downgraded to telemetry.     Atrial Fibrillation with RVR, now resolved, acute on chronic  Status post Digoxin loading and esmolol drip, now downgraded from ICU, Sinus Rhythm  Transitioned to metoprolol tartrate 50 TID, continue digoxin 250 daily, continue eliquis  ECHO reviewed by cardiology, new onset reduced EF, 40-45%, thought to be tachy mediated, plan to repeat ECHO today as per cardiology  cardiology following    Pericarditis with h/o pleural effusion  stable, continue colchicine and prednisone  stable respiratory status  will dc oxygen monitoring    Nausea and Vomiting  GI appreciated  likely medication induced  continue PPI, zofran prn, present diet  AXR with no acute findings  recommend outpatient follow up for EGD if recurrence of symptoms    Chronic CHF with mild reduction in systolic function  plan to repeat ECHO as per cardiology today  mild reduction in EF may be secondary to rapid Afib  appears euvolemic  continue BB and digoxin  Avoid IVF, lasix prn  monitor fluid status    Recent CVA status post Acute Rehab  High Cholesterol  pt finished comprehensive acute rehab program  PT evaluation appreciated  plan to dc home with services when stable  continue eliquis, ASA, high intensity statin therapy    Hypothyroid  chronic condition  continue synthroid    VTE PPx  pt is on eliquis    Dispo  plan to possibly dc home today with services if repeat ECHO ok and cleared by cardiology    spoke to colin menard [phone] and offered an update,  all questions answered

## 2023-03-10 NOTE — PROGRESS NOTE ADULT - ASSESSMENT
72 year old woman w/PMHx hypothyroidism, cerebral aneurysm s/p coiling, pericarditis possibly related to non-covid coronaviral illness, afib w/RVR presented to [facility] on 2/16 for afib with RVR, stroke called on 2/22 and found to have nonhemorrhagic acute ischemia in the left MCA territory.  She was consulted for nausea and vomiting began during hospitalization. She reports undergoing EGD a few years ago for reported abdominal discomfort which was normal.   She agrees with out patient GI F/U may need EGD as out patient.

## 2023-03-10 NOTE — PROGRESS NOTE ADULT - PROBLEM SELECTOR PLAN 1
Likely medication induced  Continue PPI  Zofran PRN  Continue present diet.  EGD when more stable if recurrence of symptoms.

## 2023-03-10 NOTE — DISCHARGE NOTE PROVIDER - ATTENDING DISCHARGE PHYSICAL EXAMINATION:
General: NAD, A/O x 3  ENT: MMM, no thrush  Neck: Supple, No JVD  Lungs: Clear to auscultation bilaterally, non labored, good air entry  Cardio: S1S2 regular  Abdomen: Soft, Nontender, Nondistended; Bowel sounds present  Extremities: No cyanosis, No edema

## 2023-03-10 NOTE — DISCHARGE NOTE NURSING/CASE MANAGEMENT/SOCIAL WORK - PATIENT PORTAL LINK FT
You can access the FollowMyHealth Patient Portal offered by [facility] by registering at the following website: [email or web address]. By joining [employer]’s FollowMyHealth portal, you will also be able to view your health information using other applications (apps) compatible with our system.

## 2023-03-10 NOTE — DISCHARGE NOTE PROVIDER - DETAILS OF MALNUTRITION DIAGNOSIS/DIAGNOSES
This patient has been assessed with a concern for Malnutrition and was treated during this hospitalization for the following Nutrition diagnosis/diagnoses:     -  03/08/2023: Moderate protein-calorie malnutrition

## 2023-03-10 NOTE — PROGRESS NOTE ADULT - SUBJECTIVE AND OBJECTIVE BOX
INTERVAL HPI/OVERNIGHT EVENTS:  HPI:  Patient seen and examined at bed side. She denies abdominal pain. She has nausea, she had one large bowel movement last night.  As per RN she had rapid atrial fibrillation over night, refused late dose of PO metoprolol and required IV metoprolol earlier this morning    MEDICATIONS  (STANDING):  apixaban 5 milliGRAM(s) Oral every 12 hours  aspirin  chewable 81 milliGRAM(s) Oral daily  atorvastatin 80 milliGRAM(s) Oral at bedtime  chlorhexidine 2% Cloths 1 Application(s) Topical <User Schedule>  colchicine 0.6 milliGRAM(s) Oral two times a day  digoxin     Tablet 125 MICROGram(s) Oral daily  famotidine    Tablet 20 milliGRAM(s) Oral at bedtime  levothyroxine 50 MICROGram(s) Oral daily  magnesium oxide 400 milliGRAM(s) Oral three times a day with meals  memantine 5 milliGRAM(s) Oral daily  metoprolol tartrate 50 milliGRAM(s) Oral every 8 hours  mirtazapine 15 milliGRAM(s) Oral at bedtime  pantoprazole    Tablet 40 milliGRAM(s) Oral before breakfast  polyethylene glycol 3350 17 Gram(s) Oral daily  predniSONE   Tablet 20 milliGRAM(s) Oral daily  senna 2 Tablet(s) Oral at bedtime    MEDICATIONS  (PRN):  acetaminophen     Tablet .. 650 milliGRAM(s) Oral every 6 hours PRN Temp greater or equal to 38C (100.4F), Mild Pain (1 - 3)  ondansetron Injectable 4 milliGRAM(s) IV Push every 6 hours PRN Nausea and/or Vomiting      Allergies    No Known Allergies    Intolerances        PAST MEDICAL & SURGICAL HISTORY:  High cholesterol      Hypothyroidism      CVA (cerebrovascular accident)      Paroxysmal atrial fibrillation      H/O knee surgery      Brain aneurysm  	    PHYSICAL EXAM:   Vital Signs:  Vital Signs Last 24 Hrs  T(C): 36.6 (10 Mar 2023 05:21), Max: 36.7 (09 Mar 2023 21:09)  T(F): 97.8 (10 Mar 2023 05:21), Max: 98 (09 Mar 2023 21:09)  HR: 76 (10 Mar 2023 11:57) (67 - 130)  BP: 105/52 (10 Mar 2023 10:35) (97/60 - 114/75)  BP(mean): 73 (10 Mar 2023 02:05) (73 - 73)  RR: 18 (10 Mar 2023 05:21) (16 - 20)  SpO2: 93% (10 Mar 2023 05:21) (93% - 96%)    Parameters below as of 10 Mar 2023 11:57  Patient On (Oxygen Delivery Method): room air  O2 Flow (L/min): 95    Daily     Daily I&O's Summary    09 Mar 2023 07:01  -  10 Mar 2023 07:00  --------------------------------------------------------  IN: 240 mL / OUT: 0 mL / NET: 240 mL        GENERAL:  Appears stated age, no distress  HEENT:  NC/AT,  conjunctivae clear and pink  CHEST:  Full & symmetric excursion, no increased effort.   HEART:  Regular rhythm,  ABDOMEN:  Soft, non-tender, non-distended, normoactive bowel sounds  EXTEREMITIES:  no cyanosis, clubbing or edema  SKIN:  No rash/warm/dry  NEURO:  Alert, oriented       LABS:                        10.9   4.69  )-----------( 281      ( 09 Mar 2023 07:00 )             36.5     03-09    139  |  101  |  10  ----------------------------<  79  4.9   |  29  |  0.47<L>    Ca    7.9<L>      09 Mar 2023 07:00  Phos  2.4     03-09  Mg     2.2     03-09    TPro  6.1  /  Alb  2.1<L>  /  TBili  0.2  /  DBili  x   /  AST  13  /  ALT  14  /  AlkPhos  71  03-09        amylase   lipase  RADIOLOGY & ADDITIONAL TESTS:     Patient seen and examined at bed side. She denies abdominal pain. She has nausea, she had one large bowel movement last night.  As per RN she had rapid atrial fibrillation over night, refused late dose of PO metoprolol and required IV metoprolol earlier this morning    MEDICATIONS  (STANDING):  apixaban 5 milliGRAM(s) Oral every 12 hours  aspirin  chewable 81 milliGRAM(s) Oral daily  atorvastatin 80 milliGRAM(s) Oral at bedtime  chlorhexidine 2% Cloths 1 Application(s) Topical <User Schedule>  colchicine 0.6 milliGRAM(s) Oral two times a day  digoxin     Tablet 125 MICROGram(s) Oral daily  famotidine    Tablet 20 milliGRAM(s) Oral at bedtime  levothyroxine 50 MICROGram(s) Oral daily  magnesium oxide 400 milliGRAM(s) Oral three times a day with meals  memantine 5 milliGRAM(s) Oral daily  metoprolol tartrate 50 milliGRAM(s) Oral every 8 hours  mirtazapine 15 milliGRAM(s) Oral at bedtime  pantoprazole    Tablet 40 milliGRAM(s) Oral before breakfast  polyethylene glycol 3350 17 Gram(s) Oral daily  predniSONE   Tablet 20 milliGRAM(s) Oral daily  senna 2 Tablet(s) Oral at bedtime    MEDICATIONS  (PRN):  acetaminophen     Tablet .. 650 milliGRAM(s) Oral every 6 hours PRN Temp greater or equal to 38C (100.4F), Mild Pain (1 - 3)  ondansetron Injectable 4 milliGRAM(s) IV Push every 6 hours PRN Nausea and/or Vomiting      Allergies    No Known Allergies    Intolerances    	    PHYSICAL EXAM:   Vital Signs:  Vital Signs Last 24 Hrs  T(C): 36.6 (10 Mar 2023 05:21), Max: 36.7 (09 Mar 2023 21:09)  T(F): 97.8 (10 Mar 2023 05:21), Max: 98 (09 Mar 2023 21:09)  HR: 76 (10 Mar 2023 11:57) (67 - 130)  BP: 105/52 (10 Mar 2023 10:35) (97/60 - 114/75)  BP(mean): 73 (10 Mar 2023 02:05) (73 - 73)  RR: 18 (10 Mar 2023 05:21) (16 - 20)  SpO2: 93% (10 Mar 2023 05:21) (93% - 96%)    Parameters below as of 10 Mar 2023 11:57  Patient On (Oxygen Delivery Method): room air  O2 Flow (L/min): 95    Daily     Daily I&O's Summary    09 Mar 2023 07:01  -  10 Mar 2023 07:00  --------------------------------------------------------  IN: 240 mL / OUT: 0 mL / NET: 240 mL        GENERAL:  Appears stated age, no distress  HEENT:  NC/AT,  conjunctivae clear and pink  CHEST:  Full & symmetric excursion, no increased effort.   HEART:  S1S2  ABDOMEN:  Soft, non-tender, non-distended, normoactive bowel sounds  EXTREMITIES:  no cyanosis, clubbing or edema  SKIN:  No rash/warm/dry  NEURO:  Alert, oriented       LABS:                        10.9   4.69  )-----------( 281      ( 09 Mar 2023 07:00 )             36.5     03-09    139  |  101  |  10  ----------------------------<  79  4.9   |  29  |  0.47<L>    Ca    7.9<L>      09 Mar 2023 07:00  Phos  2.4     03-09  Mg     2.2     03-09    TPro  6.1  /  Alb  2.1<L>  /  TBili  0.2  /  DBili  x   /  AST  13  /  ALT  14  /  AlkPhos  71  03-09

## 2023-03-10 NOTE — PROGRESS NOTE ADULT - ASSESSMENT
73 year old woman transferred from acute rehab post chest pain and rapid atrial fibrillation.  She is s/p ICU for esmolol drip  Recurrent rapid AF overnight.. she had refused night dose of PO metoprolol   She has PAF, pericarditis with pericardial effusion, s/p cath with normal coronaries, cerebral aneurysm s/p coiling, s/p recent hospitalization for TIA  Repeat echo done this morning ... there is improvement in LV systolic performance.. normal LV function   Pulse ox has been greater than 90%     Plan  - continue metoprolol and digoxin .. note borderline low BP   - continue anticoagulation   - continue colchicine and prednisone   - continue statin   - advance activity as tolerable     discussed with patient and with Medicine team

## 2023-03-10 NOTE — PROGRESS NOTE ADULT - NUTRITIONAL ASSESSMENT
This patient has been assessed with a concern for Malnutrition and has been determined to have a diagnosis/diagnoses of Moderate protein-calorie malnutrition.    This patient is being managed with:   Diet DASH/TLC-  Sodium & Cholesterol Restricted  No Carb Prosource (1pkg = 15gms Protein)     Qty per Day:  QD  Supplement Feeding Modality:  Oral  Ensure Plus High Protein Cans or Servings Per Day:  1       Frequency:  Two Times a day  Entered: Mar  7 2023  5:34AM    
This patient has been assessed with a concern for Malnutrition and has been determined to have a diagnosis/diagnoses of Moderate protein-calorie malnutrition.    This patient is being managed with:   Diet DASH/TLC-  Sodium & Cholesterol Restricted  No Carb Prosource (1pkg = 15gms Protein)     Qty per Day:  QD  Supplement Feeding Modality:  Oral  Ensure Plus High Protein Cans or Servings Per Day:  1       Frequency:  Two Times a day  Entered: Mar  7 2023  5:34AM

## 2023-03-10 NOTE — DISCHARGE NOTE PROVIDER - HOSPITAL COURSE
Hospital Course  73y Female with PMH of Paroxysmal Atrial Fibrillation, Pericarditis with pericardial effusion, Status post Cath with normal coronaries, cerebral aneurysm status post coiling, recent TIA admitted to acute rehab at [place] 2/26, transferred to ICU with chest pain, nausea, Rapid Atrial fibrillation on 3/8.  Managed with esmolol drip and digoxin loading. ECHO done on 3/7 revealed new mildly reduced EF 40-45%, thought to be tachy mediated from Atrial Fibrillation with RVR.  Repeat study done on 3/10 showed improvement in LV systolic function.  Plan to continue metoprolol and digoxin.  Continue anticoagulation, colchicine and prednisone taper for pericarditis.  Medicine and Cardiology cleared for outpatient follow up.     Source of Infection:  Antibiotic / Last Day: Na    Palliative Care / Advanced Care Planning  Code Status: Full Code  Patient/Family agreeable to Hospice/Palliative (Y/N)?  Summary of Goals of Care Conversation:    Discharging Provider:  Colten Angel NP  Contact Info: Cell [phone] - Please call with any questions or concerns.    Outpatient Provider: Dr. Shoemaker Cardiology           Hospital Course  73y Female with PMH of Paroxysmal Atrial Fibrillation, Pericarditis with pericardial effusion, Status post Cath with normal coronaries, cerebral aneurysm status post coiling, recent TIA admitted to acute rehab at [place] 2/26, transferred to ICU with chest pain, nausea, Rapid Atrial fibrillation on 3/8.  Managed with esmolol drip and digoxin loading. ECHO done on 3/7 revealed new mildly reduced EF 40-45%, thought to be tachy mediated from Atrial Fibrillation with RVR.  Repeat study done on 3/10 showed improvement in LV systolic function.  Pt with persistent episodes of Paroxysmal Atrial fibrillation with RVR.  Spoke with Cardiology who is not comfortable "clearing" the patient for discharge.  Spoke with patient's son and patient who wish to be discharged.  I and other members of the healthcare team have made reasonable attempts to explain why leaving the hospital at his time may result in patient's harm, injury or death.  Explained that having Atrial Fibrillation with a rapid heart rate increases risk for Stroke and other thromboembolic events.  We have answered all patient's questions and fully answered and explained rationales.  Family and patient have been adamant about wanting to leave the hospital and be discharged.  Patient signed out Against Medical Advice, paperwork signed and left in chart.     Source of Infection:  Antibiotic / Last Day: Na    Palliative Care / Advanced Care Planning  Code Status: Full Code  Patient/Family agreeable to Hospice/Palliative (Y/N)?  Summary of Goals of Care Conversation:    Discharging Provider:  Colten Angel NP  Contact Info: Cell [phone] - Please call with any questions or concerns.    Outpatient Provider: Dr. Shoemaker Cardiology           Hospital Course  73y Female with PMH of Paroxysmal Atrial Fibrillation, Pericarditis with pericardial effusion, Status post Cath with normal coronaries, cerebral aneurysm status post coiling, recent TIA admitted to acute rehab at [place] 2/26, transferred to ICU with chest pain, nausea, Rapid Atrial fibrillation on 3/8.  Managed with esmolol drip and digoxin loading. ECHO done on 3/7 revealed new mildly reduced EF 40-45%, thought to be tachy mediated from Atrial Fibrillation with RVR.  Repeat study done on 3/10 showed improvement in LV systolic function.  Pt with persistent episodes of Paroxysmal Atrial fibrillation with RVR.  Spoke with Cardiology who is not comfortable "clearing" the patient for discharge.  Spoke with patient's son and patient who wish to be discharged.  I and other members of the healthcare team have made reasonable attempts to explain why leaving the hospital at his time may result in patient's harm, injury or death.  Explained that having Atrial Fibrillation with a rapid heart rate increases risk for Stroke and other thromboembolic events.  We have answered all patient's questions and fully answered and explained rationales.  Family and patient have been adamant about wanting to leave the hospital and be discharged.  Patient signed out Against Medical Advice, paperwork signed and left in chart.     Source of Infection:  Antibiotic / Last Day: Na    Palliative Care / Advanced Care Planning  Code Status: Full Code  Patient/Family agreeable to Hospice/Palliative (Y/N)?  Summary of Goals of Care Conversation:    Discharging Provider:  Colten Angel NP  Contact Info: Cell [phone] - Please call with any questions or concerns.    Outpatient Provider: Dr. Shoemaker Cardiology

## 2023-03-10 NOTE — PROGRESS NOTE ADULT - PROVIDER SPECIALTY LIST ADULT
Critical Care
Hospitalist
Cardiology
Hospitalist
Internal Medicine
Cardiology
Hospitalist
CCU
Cardiology
Critical Care
Critical Care
Gastroenterology
Gastroenterology

## 2023-03-10 NOTE — PROGRESS NOTE ADULT - SUBJECTIVE AND OBJECTIVE BOX
Patient is a 73y old  Female who presents with a chief complaint of chest pain, afib with RVR (09 Mar 2023 20:38)    Patient seen and examined at bedside.  no acute events overnight    ALLERGIES:  No Known Allergies        Vital Signs Last 24 Hrs  T(F): 97.8 (10 Mar 2023 05:21), Max: 98 (09 Mar 2023 21:09)  HR: 80 (10 Mar 2023 05:21) (67 - 89)  BP: 105/60 (10 Mar 2023 05:21) (97/60 - 114/75)  RR: 18 (10 Mar 2023 05:21) (16 - 20)  SpO2: 93% (10 Mar 2023 05:21) (93% - 96%)  I&O's Summary    09 Mar 2023 07:01  -  10 Mar 2023 07:00  --------------------------------------------------------  IN: 240 mL / OUT: 0 mL / NET: 240 mL      MEDICATIONS:  acetaminophen     Tablet .. 650 milliGRAM(s) Oral every 6 hours PRN  apixaban 5 milliGRAM(s) Oral every 12 hours  aspirin  chewable 81 milliGRAM(s) Oral daily  atorvastatin 80 milliGRAM(s) Oral at bedtime  chlorhexidine 2% Cloths 1 Application(s) Topical <User Schedule>  colchicine 0.6 milliGRAM(s) Oral two times a day  digoxin     Tablet 125 MICROGram(s) Oral daily  famotidine    Tablet 20 milliGRAM(s) Oral at bedtime  levothyroxine 50 MICROGram(s) Oral daily  magnesium oxide 400 milliGRAM(s) Oral three times a day with meals  memantine 5 milliGRAM(s) Oral daily  metoprolol tartrate 50 milliGRAM(s) Oral every 8 hours  mirtazapine 15 milliGRAM(s) Oral at bedtime  ondansetron Injectable 4 milliGRAM(s) IV Push every 6 hours PRN  pantoprazole    Tablet 40 milliGRAM(s) Oral before breakfast  polyethylene glycol 3350 17 Gram(s) Oral daily  predniSONE   Tablet 20 milliGRAM(s) Oral daily  senna 2 Tablet(s) Oral at bedtime      PHYSICAL EXAM:  General: NAD, A/O x 3  ENT: MMM, no thrush  Neck: Supple, No JVD  Lungs: Clear to auscultation bilaterally, non labored, good air entry  Cardio: S1S2 regular  Abdomen: Soft, Nontender, Nondistended; Bowel sounds present  Extremities: No cyanosis, No edema    LABS:                        10.9   4.69  )-----------( 281      ( 09 Mar 2023 07:00 )             36.5     03-09    139  |  101  |  10  ----------------------------<  79  4.9   |  29  |  0.47    Ca    7.9      09 Mar 2023 07:00  Phos  2.4     03-09  Mg     2.2     03-09    TPro  6.1  /  Alb  2.1  /  TBili  0.2  /  DBili  x   /  AST  13  /  ALT  14  /  AlkPhos  71  03-09        Lactate, Blood: 0.7 mmol/L (03-07 @ 15:43)    CARDIAC MARKERS ( 07 Mar 2023 15:43 )  x     / 9.5 ng/L / x     / x     / x          02-23 Chol 152 mg/dL LDL -- HDL 46 mg/dL Trig 146 mg/dL                      COVID-19 PCR: NotDetec (02-26-23 @ 17:49)  COVID-19 PCR: NotDetec (02-24-23 @ 13:00)      RADIOLOGY & ADDITIONAL TESTS:    Care Discussed with Consultants/Other Providers:

## 2023-04-24 NOTE — PROGRESS NOTE ADULT - ASSESSMENT
Assessment and Plan:   · Assessment	  ASSESSMENT: 72 yr old female with PMX of hypothyroidism, cerebral aneurysm s/p coiling, atrial fibrillation, recent pericarditis w/ pericardial effusion w/o tamponade subsequently found to have expressive aphasia in the early monring of 2/22/23.  CT head at the time demonstrated no acute infarction or hemorrhage.   CTA head/neck showed occlusion of a superiorly oriented left M3 subdivision. Findings compatible with focal acute ischemia in the left MCA territory, likely cardioembolic in setting of atrial fibrillation.  - Pt did not qualify for TNK due to last known well was over 4.5 hours. not thrombectomy candidate as pt's thrombus is in M3 branch which is too distal to intervene on    NEURO: Continue close monitoring for neurologic deterioration, q 2 stroke neuro checks, permissive HTN with SBP goal 130-160 mmHg, avoid hypotension and rapid fluctuations. Titrate statin to LDL goal less than 70, the MR study confirms a small acute left MCA territory infarct in the left frontal and temporal opercular region. No associated hemorrhage.  Physical therapy/OT/Speech eval/treatment.     ANTITHROMBOTIC THERAPY: ASA 81mg daily or 300mg per rectum while NPO. The MR study confirms a small acute left MCA territory infarct in the left frontal and temporal opercular region. No associated hemorrhage. Anticoagulation with Heparin gtt for now, PTT goal 50-70, further titration and transition to PO  based on clinical and radiological stability and course.  Will need follow up imaging prior to or with any change.    PULMONARY:  protecting airway, saturating well on room air    CARDIOVASCULAR: TTE as noted, cardiac monitoring, cardiology following along, ensure rate control.                          GASTROINTESTINAL: dysphagia screen failed, SLP evaluation prior to PO .      Diet: NPO    RENAL: BUN/Cr: 12/0.48, monitor urine output, keep hydrated as tolerated     Na Goal:  135-145      HEMATOLOGY: H/H 10.6/34.7. Platelets 414, close monitoring of thrombocytosis and anemia, patient should have all age and risk appropriate malignancy screenings with PCP or sooner if evidence or high risk for malignancy.    DVT ppx Heparin gtt with goal PTT 50-70 (therapeutic)    ID: afebrile, no leukocytosis, monitor for signs of infection    DISPOSITION: Rehab or home depending on PT eval once stable and workup is complete      CORE MEASURES:        Admission NIHSS: 7     TPA: [] YES [x] NO      LDL/HDL/A1C: 77/46/6.1     Depression Screen: P     Statin Therapy: Atorvastatin 80 mg daily     Dysphagia Screen: [] PASS [x] FAIL     Smoking [] YES [x] NO      Afib [x] YES [] NO     Stroke Education [x] YES [] NO   ----- Message from Amrit Kramer sent at 4/24/2023  3:11 PM CDT -----  Regarding: orders  Contact: patient  Patient need orders for bloodwork, patient will be establishing care in June, any questions please call back at [phone] (home)        72 yr old female with PMX of hypothyroidism, cerebral aneurysm s/p coiling, atrial fibrillation, recent pericarditis w/ pericardial effusion w/o tamponade subsequently found to have expressive aphasia in the early monring of 2/22/23.  CT head at the time demonstrated no acute infarction or hemorrhage.   CTA head/neck showed occlusion of a superiorly oriented left M3 subdivision. Findings compatible with focal acute ischemia in the left MCA territory, likely cardioembolic in setting of atrial fibrillation.  - Pt did not qualify for TNK due to last known well was over 4.5 hours. not thrombectomy candidate as pt's thrombus is in M3 branch which is too distal to intervene on    NEURO: Continue close monitoring for neurologic deterioration, q 2 stroke neuro checks, permissive HTN with SBP goal 130-160 mmHg, avoid hypotension and rapid fluctuations. Titrate statin to LDL goal less than 70, the MR study confirms a small acute left MCA territory infarct in the left frontal and temporal opercular region. No associated hemorrhage.  Physical therapy/OT/Speech eval/treatment.     ANTITHROMBOTIC THERAPY: ASA 81mg daily or 300mg per rectum while NPO. The MR study confirms a small acute left MCA territory infarct in the left frontal and temporal opercular region. No associated hemorrhage. Anticoagulation with Heparin gtt for now, PTT goal 50-70, further titration and transition to PO  based on clinical and radiological stability and course.  Will need follow up imaging prior to or with any change.    PULMONARY:  protecting airway, saturating well on room air    CARDIOVASCULAR: TTE as noted, cardiac monitoring, cardiology following along, ensure rate control.                          GASTROINTESTINAL: dysphagia screen failed, SLP evaluation prior to PO .      Diet: NPO    RENAL: BUN/Cr: 12/0.48, monitor urine output, keep hydrated as tolerated     Na Goal:  135-145      HEMATOLOGY: H/H 10.6/34.7. Platelets 414, close monitoring of thrombocytosis and anemia, patient should have all age and risk appropriate malignancy screenings with PCP or sooner if evidence or high risk for malignancy.    DVT ppx Heparin gtt with goal PTT 50-70 (therapeutic)    ID: afebrile, no leukocytosis, monitor for signs of infection    DISPOSITION: Rehab or home depending on PT eval once stable and workup is complete      CORE MEASURES:        Admission NIHSS: 7     TPA: [] YES [x] NO      LDL/HDL/A1C: 77/46/6.1     Depression Screen: P     Statin Therapy: Atorvastatin 80 mg daily     Dysphagia Screen: [] PASS [x] FAIL     Smoking [] YES [x] NO      Afib [x] YES [] NO     Stroke Education [x] YES [] NO   72 yr old female with PMX of hypothyroidism, cerebral aneurysm s/p coiling, atrial fibrillation, recent pericarditis w/ pericardial effusion w/o tamponade subsequently found to have expressive aphasia in the early monring of 2/22/23.  CT head at the time demonstrated no acute infarction or hemorrhage.   CTA head/neck showed occlusion of a superiorly oriented left M3 subdivision. Findings compatible with focal acute ischemia in the left MCA territory, likely cardioembolic in setting of atrial fibrillation.  - Pt did not qualify for TNK due to last known well was over 4.5 hours. not thrombectomy candidate as pt's thrombus is in M3 branch which is too distal to intervene on    NEURO: Continue close monitoring for neurologic deterioration, q 2 stroke neuro checks, permissive HTN with SBP goal 130-160 mmHg, avoid hypotension and rapid fluctuations. Titrate statin to LDL goal less than 70, the MR study confirms a small acute left MCA territory infarct in the left frontal and temporal opercular region. No associated hemorrhage.  Physical therapy/OT/Speech eval/treatment.     ANTITHROMBOTIC THERAPY: ASA 81mg daily. The MR study confirms a small acute left MCA territory infarct in the left frontal and temporal opercular region. No associated hemorrhage. Pt. may transition to Eliquis from neurological stand point.    PULMONARY:  protecting airway, saturating well on room air    CARDIOVASCULAR: TTE as noted, cardiac monitoring, cardiology following along, ensure rate control.                          GASTROINTESTINAL:  SLP evaluation completed/ pass     Diet: DASH diet as tolerated    RENAL: BUN/Cr: 12/0.48, monitor urine output, keep hydrated as tolerated     Na Goal:  135-145      HEMATOLOGY: H/H 10.6/34.7. Platelets 414, close monitoring of thrombocytosis and anemia, patient should have all age and risk appropriate malignancy screenings with PCP or sooner if evidence or high risk for malignancy.    DVT ppx Eliquis    ID: afebrile, no leukocytosis, monitor for signs of infection    DISPOSITION: Rehab or home depending on PT eval once stable and workup is complete      CORE MEASURES:        Admission NIHSS: 7     TPA: [] YES [x] NO      LDL/HDL/A1C: 77/46/6.1     Depression Screen: P     Statin Therapy: Atorvastatin 80 mg daily     Dysphagia Screen: [] PASS [x] FAIL     Smoking [] YES [x] NO      Afib [x] YES [] NO     Stroke Education [x] YES [] NO   72 yr old female with PMX of hypothyroidism, cerebral aneurysm s/p coiling, atrial fibrillation, recent pericarditis w/ pericardial effusion w/o tamponade subsequently found to have expressive aphasia in the early monring of 2/22/23.  CT head at the time demonstrated no acute infarction or hemorrhage.   CTA head/neck showed occlusion of a superiorly oriented left M3 subdivision. Findings compatible with focal acute ischemia in the left MCA territory, likely cardioembolic in setting of atrial fibrillation.  - Pt did not qualify for TNK due to last known well was over 4.5 hours. not thrombectomy candidate as pt's thrombus is in M3 branch which is too distal to intervene on    NEURO: Continue close monitoring for neurologic deterioration, q 2 stroke neuro checks, permissive HTN with SBP goal is normotension, avoid hypotension and rapid fluctuations. Titrate statin to LDL goal less than 70, the MR study confirms a small acute left MCA territory infarct in the left frontal and temporal opercular region. No associated hemorrhage.  Physical therapy/OT/Speech eval/treatment.     ANTITHROMBOTIC THERAPY: ASA 81mg daily. The MR study confirms a small acute left MCA territory infarct in the left frontal and temporal opercular region. No associated hemorrhage. Pt. may transition to Eliquis from neurological stand point.    PULMONARY:  protecting airway, saturating well on room air    CARDIOVASCULAR: TTE as noted, cardiac monitoring, cardiology following along, ensure rate control.                          GASTROINTESTINAL:  SLP evaluation completed/ pass     Diet: DASH diet as tolerated    RENAL: BUN/Cr: 12/0.48, monitor urine output, keep hydrated as tolerated     Na Goal:  135-145      HEMATOLOGY: H/H 10.6/34.7. Platelets 414, close monitoring of thrombocytosis and anemia, patient should have all age and risk appropriate malignancy screenings with PCP or sooner if evidence or high risk for malignancy.    DVT ppx Eliquis    ID: afebrile, no leukocytosis, monitor for signs of infection    DISPOSITION: Rehab or home depending on PT eval once stable and workup is complete      CORE MEASURES:        Admission NIHSS: 7     TPA: [] YES [x] NO      LDL/HDL/A1C: 77/46/6.1     Depression Screen: P     Statin Therapy: Atorvastatin 80 mg daily     Dysphagia Screen: [] PASS [x] FAIL     Smoking [] YES [x] NO      Afib [x] YES [] NO     Stroke Education [x] YES [] NO

## 2023-05-05 NOTE — DISCHARGE NOTE NURSING/CASE MANAGEMENT/SOCIAL WORK - NSTRANSFERBELONGINGSDISPO_GEN_A_NUR
with patient Post-Care Instructions: I reviewed with the patient in detail post-care instructions. Patient is to keep the area dry for 48 hours, and not to engage in any swimming until the area is healed. Should the patient develop any fevers, chills, bleeding, severe pain patient will contact the office immediately.

## 2023-06-13 PROBLEM — I48.0 PAROXYSMAL ATRIAL FIBRILLATION: Chronic | Status: ACTIVE | Noted: 2023-03-07

## 2023-06-13 PROBLEM — I63.9 CEREBRAL INFARCTION, UNSPECIFIED: Chronic | Status: ACTIVE | Noted: 2023-03-07

## 2023-07-13 ENCOUNTER — APPOINTMENT (OUTPATIENT)
Dept: PULMONOLOGY | Facility: CLINIC | Age: 73
End: 2023-07-13
Payer: MEDICARE

## 2023-07-13 VITALS
DIASTOLIC BLOOD PRESSURE: 72 MMHG | BODY MASS INDEX: 20.96 KG/M2 | HEART RATE: 69 BPM | RESPIRATION RATE: 16 BRPM | HEIGHT: 61 IN | OXYGEN SATURATION: 95 % | WEIGHT: 111 LBS | SYSTOLIC BLOOD PRESSURE: 118 MMHG

## 2023-07-13 DIAGNOSIS — J43.2 CENTRILOBULAR EMPHYSEMA: ICD-10-CM

## 2023-07-13 PROCEDURE — 99204 OFFICE O/P NEW MOD 45 MIN: CPT

## 2023-07-13 RX ORDER — DRONABINOL 5 MG/1
CAPSULE ORAL
Refills: 0 | Status: ACTIVE | COMMUNITY

## 2023-07-13 RX ORDER — SUCRALFATE 1 G/1
TABLET ORAL
Refills: 0 | Status: ACTIVE | COMMUNITY

## 2023-07-13 RX ORDER — DOCUSATE SODIUM 100 MG
TABLET ORAL
Refills: 0 | Status: ACTIVE | COMMUNITY

## 2023-07-13 RX ORDER — APIXABAN 5 MG/1
5 TABLET, FILM COATED ORAL
Refills: 0 | Status: ACTIVE | COMMUNITY

## 2023-07-13 RX ORDER — ATORVASTATIN CALCIUM 80 MG/1
TABLET, FILM COATED ORAL
Refills: 0 | Status: ACTIVE | COMMUNITY

## 2023-07-13 RX ORDER — LEVOTHYROXINE SODIUM 137 UG/1
TABLET ORAL
Refills: 0 | Status: ACTIVE | COMMUNITY

## 2023-07-13 RX ORDER — FUROSEMIDE 40 MG/1
40 TABLET ORAL
Refills: 0 | Status: ACTIVE | COMMUNITY

## 2023-07-13 RX ORDER — SACCHAROMYCES BOULARDII 250 MG
CAPSULE ORAL
Refills: 0 | Status: ACTIVE | COMMUNITY

## 2023-07-13 RX ORDER — PSYLLIUM HUSK 0.4 G
CAPSULE ORAL
Refills: 0 | Status: ACTIVE | COMMUNITY

## 2023-07-13 RX ORDER — DENOSUMAB 60 MG/ML
INJECTION SUBCUTANEOUS
Refills: 0 | Status: ACTIVE | COMMUNITY

## 2023-07-13 RX ORDER — METOPROLOL TARTRATE 75 MG/1
TABLET, FILM COATED ORAL
Refills: 0 | Status: ACTIVE | COMMUNITY

## 2023-07-13 NOTE — REVIEW OF SYSTEMS
[Dry Eyes] : dry eyes [Nausea] : nausea [Blood Transfusion] : blood transfusion [Thyroid Problem] : thyroid problem [Fever] : no fever [Recent Wt Gain (___ Lbs)] : ~T no recent weight gain [Chills] : no chills [Epistaxis] : no epistaxis [Sore Throat] : no sore throat [Dry Mouth] : no dry mouth [Cough] : no cough [Hemoptysis] : no hemoptysis [Chest Tightness] : no chest tightness [Sputum] : no sputum [Dyspnea] : no dyspnea [Wheezing] : no wheezing [SOB on Exertion] : no sob on exertion [Chest Discomfort] : no chest discomfort [Palpitations] : no palpitations [GERD] : no gerd [Abdominal Pain] : no abdominal pain [Vomiting] : no vomiting [Dysphagia] : no dysphagia [Bleeding] : no bleeding [Myalgias] : no myalgias [Chronic Pain] : no chronic pain [Rash] : no rash [Clotting Disorder/ Frequent bleeding] : no clotting disorder/ frequent bleeding [Diabetes] : no diabetes [Obesity] : no obesity [TextBox_3] : weight loss   due to hospitalization

## 2023-07-13 NOTE — REASON FOR VISIT
[Initial] : an initial visit [Shortness of Breath] : shortness of breath [Abnormal CXR/ Chest CT] : an abnormal CXR/ chest CT [Emphysema] : emphysema [Family Member] : family member

## 2023-07-13 NOTE — HISTORY OF PRESENT ILLNESS
[Former] : former [< 20 pack-years] : < 20 pack-years [Never] : never [TextBox_4] : 7/13/2023  first visit \par \par 72y/o  born    in [place]    ex smoker  ( one pack day  16- 30 years  -  )  retired     h/o     brain anerysm repair  here for abn ct   /e emphysema follow up \par \par \par -[facility]  left AMA :  \par \par Discharge Date	10-Mar-2023\par Admission Date	07-Mar-2023 05:17\par Reason for Admission	chest pain, afib with RVR\par Hospital Course	\par Hospital Course\par 73y Female with PMH of Paroxysmal Atrial Fibrillation, Pericarditis with\par pericardial effusion, Status post Cath with normal coronaries, cerebral\par aneurysm status post coiling, recent TIA admitted to acute rehab at [facility]\par 2/26, transferred to ICU with chest pain, nausea, Rapid Atrial fibrillation on\par 3/8.  Managed with esmolol drip and digoxin loading. ECHO done on 3/7 revealed\par new mildly reduced EF 40-45%, thought to be tachy mediated from Atrial\par Fibrillation with RVR.  Repeat study done on 3/10 showed improvement in LV\par systolic function.  Pt with persistent episodes of Paroxysmal Atrial\par fibrillation with RVR.  Spoke with Cardiology who is not comfortable "clearing"\par the patient for discharge.  Spoke with patient's son and patient who wish to be\par discharged.  I and other members of the healthcare team have made reasonable\par attempts to explain why leaving the hospital at his time may result in\par patient's harm, injury or death.  Explained that having Atrial Fibrillation\par with a rapid heart rate increases risk for Stroke and other thromboembolic\par events.  We have answered all patient's questions and fully answered and\par explained rationales.  Family and patient have been adamant about wanting to\par leave \par \par - went  [facility] -  [facility]\par -old records [facility] 6/5/2023\par presented with n/v+ \par constipation x 5 days\par - started on midodrine\par -   in and out of afib  then  taken  to [facility]\par \par -currently doing well on PT at home\par - no chest pain    eating  slow \par \par - weight loss   35 pounds   two blood transfusion   last two months  last  3 weeks ago \par -  can ambulate  5 blocks    slow \par -  [TextBox_11] : 1 [TextBox_13] : 15 [YearQuit] : 1980's

## 2023-07-13 NOTE — PHYSICAL EXAM
[Enlarged Base of the Tongue] : enlarged base of the tongue [IV] : Mallampati Class: IV [Normal Appearance] : normal appearance [Supple] : supple [No Neck Mass] : no neck mass [No JVD] : no jvd [Normal Rate/Rhythm] : normal rate/rhythm [Normal S1, S2] : normal s1, s2 [No Murmurs] : no murmurs [No Resp Distress] : no resp distress [Clear to Auscultation Bilaterally] : clear to auscultation bilaterally [Rhonchi] : rhonchi [Kyphosis] : kyphosis [Benign] : benign [Not Tender] : not tender [No Hernias] : no hernias [Normal Gait] : normal gait [No Clubbing] : no clubbing [No Edema] : no edema [No Rash] : no rash [No Motor Deficits] : no motor deficits [Normal Affect] : normal affect [Soft] : soft [TextBox_2] : pleasant  f  no distress speaking full sentences  no cough  [TextBox_11] : moist no lesions

## 2023-07-25 ENCOUNTER — NON-APPOINTMENT (OUTPATIENT)
Age: 73
End: 2023-07-25

## 2023-08-17 ENCOUNTER — NON-APPOINTMENT (OUTPATIENT)
Age: 73
End: 2023-08-17

## 2023-08-24 ENCOUNTER — APPOINTMENT (OUTPATIENT)
Dept: PULMONOLOGY | Facility: CLINIC | Age: 73
End: 2023-08-24

## 2023-08-28 NOTE — OCCUPATIONAL THERAPY INITIAL EVALUATION ADULT - PATIENT PROFILE REVIEW, REHAB EVAL
Pt is a "54 y/o F pmhx of anemia, hypothyroidism, osteoporosis, depression, anxiety, chronic hyponatremia, w/ multiple admissions for malnutrition failure to thrive presents to [facility] ED w/ complaints of weakness and hypoglycemia. RRT called this AM for unresponsiveness and hypoglycemic episode w/ shallow respirations. Intubated for airway protection and transferred to ICU for further eval and management."    Visited pt at bedside this am. Pt's sister present during visit today. Pt remains intubated/sedated at this time. Receiving propofol at current rate of 2.4cc/hr. Pt remains NPO at this time. Receiving IVFs; LR@70ml/hr. NKFA. No hx of chewing/swallowing difficulties. No N/V/D/C per chart review. +BM 8/28. CBW on admission 59#. Sister reports pt's UBW of ~80-90# x 2-3 years ago. Weight loss ongoing. Sister unaware of pt's UBW currently, although states pt appears much thinner. Recommend initiating enteral nutrition support when medically feasible. Pt at high refeeding risk.  yes Pt is a "54 y/o F pmhx of anemia, hypothyroidism, osteoporosis, depression, anxiety, chronic hyponatremia, w/ multiple admissions for malnutrition failure to thrive presents to [facility] ED w/ complaints of weakness and hypoglycemia. RRT called this AM for unresponsiveness and hypoglycemic episode w/ shallow respirations. Intubated for airway protection and transferred to ICU for further eval and management."    Visited pt at bedside this am. Pt's sister present during visit today. Pt remains intubated/sedated at this time. Receiving propofol at current rate of 2.4cc/hr. Pt remains NPO at this time. Receiving IVFs; LR@70ml/hr. NGT placed. NKFA. No hx of chewing/swallowing difficulties. No N/V/D/C per chart review. +BM 8/28. CBW on admission 59#. Sister reports pt's UBW of ~80-90# x 2-3 years ago. Weight loss ongoing. Sister unaware of pt's UBW currently, although states pt appears much thinner. No edema noted. Skin ecchymotic. Recommend initiating enteral nutrition support when medically feasible. Pt at high refeeding risk.

## 2023-09-07 ENCOUNTER — NON-APPOINTMENT (OUTPATIENT)
Age: 73
End: 2023-09-07

## 2023-12-18 ENCOUNTER — NON-APPOINTMENT (OUTPATIENT)
Age: 73
End: 2023-12-18

## 2023-12-22 ENCOUNTER — OUTPATIENT (OUTPATIENT)
Dept: OUTPATIENT SERVICES | Facility: HOSPITAL | Age: 73
LOS: 1 days | Discharge: HOME | End: 2023-12-22
Payer: MEDICARE

## 2023-12-22 DIAGNOSIS — I67.1 CEREBRAL ANEURYSM, NONRUPTURED: Chronic | ICD-10-CM

## 2023-12-22 DIAGNOSIS — Z98.890 OTHER SPECIFIED POSTPROCEDURAL STATES: Chronic | ICD-10-CM

## 2023-12-22 DIAGNOSIS — C34.90 MALIGNANT NEOPLASM OF UNSPECIFIED PART OF UNSPECIFIED BRONCHUS OR LUNG: ICD-10-CM

## 2023-12-28 ENCOUNTER — RESULT REVIEW (OUTPATIENT)
Age: 73
End: 2023-12-28

## 2023-12-28 LAB
SURGICAL PATHOLOGY STUDY: SIGNIFICANT CHANGE UP
SURGICAL PATHOLOGY STUDY: SIGNIFICANT CHANGE UP

## 2023-12-28 PROCEDURE — 88321 CONSLTJ&REPRT SLD PREP ELSWR: CPT

## 2023-12-29 ENCOUNTER — RESULT REVIEW (OUTPATIENT)
Age: 73
End: 2023-12-29

## 2023-12-29 LAB
NON-GYNECOLOGICAL CYTOLOGY STUDY: SIGNIFICANT CHANGE UP
NON-GYNECOLOGICAL CYTOLOGY STUDY: SIGNIFICANT CHANGE UP

## 2024-01-04 ENCOUNTER — NON-APPOINTMENT (OUTPATIENT)
Age: 74
End: 2024-01-04

## 2024-01-05 ENCOUNTER — OUTPATIENT (OUTPATIENT)
Dept: OUTPATIENT SERVICES | Facility: HOSPITAL | Age: 74
LOS: 1 days | End: 2024-01-05

## 2024-01-05 ENCOUNTER — APPOINTMENT (OUTPATIENT)
Dept: HEMATOLOGY ONCOLOGY | Facility: CLINIC | Age: 74
End: 2024-01-05
Payer: MEDICARE

## 2024-01-05 ENCOUNTER — NON-APPOINTMENT (OUTPATIENT)
Age: 74
End: 2024-01-05

## 2024-01-05 VITALS
HEIGHT: 60.24 IN | TEMPERATURE: 97.5 F | OXYGEN SATURATION: 95 % | DIASTOLIC BLOOD PRESSURE: 69 MMHG | SYSTOLIC BLOOD PRESSURE: 108 MMHG | HEART RATE: 69 BPM | WEIGHT: 94 LBS | BODY MASS INDEX: 18.22 KG/M2 | RESPIRATION RATE: 16 BRPM

## 2024-01-05 DIAGNOSIS — R91.1 SOLITARY PULMONARY NODULE: ICD-10-CM

## 2024-01-05 DIAGNOSIS — I67.1 CEREBRAL ANEURYSM, NONRUPTURED: Chronic | ICD-10-CM

## 2024-01-05 DIAGNOSIS — Z98.890 OTHER SPECIFIED POSTPROCEDURAL STATES: Chronic | ICD-10-CM

## 2024-01-05 LAB
ALBUMIN SERPL ELPH-MCNC: 4.1 G/DL
ALP BLD-CCNC: 52 U/L
ALT SERPL-CCNC: 15 U/L
ANION GAP SERPL CALC-SCNC: 14 MMOL/L
AST SERPL-CCNC: 18 U/L
BILIRUB SERPL-MCNC: 0.4 MG/DL
BUN SERPL-MCNC: 14 MG/DL
CALCIUM SERPL-MCNC: 9.6 MG/DL
CEA SERPL-MCNC: 64.6 NG/ML
CHLORIDE SERPL-SCNC: 98 MMOL/L
CO2 SERPL-SCNC: 25 MMOL/L
CREAT SERPL-MCNC: 0.97 MG/DL
EGFR: 62 ML/MIN/1.73M2
FERRITIN SERPL-MCNC: 810 NG/ML
FOLATE SERPL-MCNC: 13.4 NG/ML
GLUCOSE SERPL-MCNC: 99 MG/DL
IRON SATN MFR SERPL: 29 %
IRON SERPL-MCNC: 65 UG/DL
MAGNESIUM SERPL-MCNC: 1.8 MG/DL
POTASSIUM SERPL-SCNC: 4.2 MMOL/L
PROT SERPL-MCNC: 6.4 G/DL
SODIUM SERPL-SCNC: 137 MMOL/L
TIBC SERPL-MCNC: 222 UG/DL
TSH SERPL-ACNC: 0.73 UIU/ML
UIBC SERPL-MCNC: 157 UG/DL
VIT B12 SERPL-MCNC: 884 PG/ML

## 2024-01-05 PROCEDURE — 99205 OFFICE O/P NEW HI 60 MIN: CPT

## 2024-01-05 RX ORDER — DEXAMETHASONE 2 MG/1
2 TABLET ORAL
Qty: 14 | Refills: 0 | Status: ACTIVE | COMMUNITY
Start: 2024-01-05 | End: 1900-01-01

## 2024-01-05 RX ORDER — METOCLOPRAMIDE 5 MG/1
5 TABLET ORAL 4 TIMES DAILY
Qty: 30 | Refills: 0 | Status: ACTIVE | COMMUNITY
Start: 1900-01-01 | End: 1900-01-01

## 2024-01-05 RX ORDER — MIRTAZAPINE 7.5 MG/1
7.5 TABLET, FILM COATED ORAL
Qty: 30 | Refills: 0 | Status: ACTIVE | COMMUNITY
Start: 2024-01-05 | End: 1900-01-01

## 2024-01-06 LAB
HAV IGM SER QL: NONREACTIVE
HBV CORE IGG+IGM SER QL: NONREACTIVE
HBV CORE IGM SER QL: NONREACTIVE
HBV SURFACE AG SER QL: NONREACTIVE
HCV AB SER QL: NONREACTIVE
HCV S/CO RATIO: 0.15 S/CO

## 2024-01-09 LAB — EPO SERPL-MCNC: 9.2 MIU/ML

## 2024-01-18 ENCOUNTER — APPOINTMENT (OUTPATIENT)
Dept: HEMATOLOGY ONCOLOGY | Facility: CLINIC | Age: 74
End: 2024-01-18

## 2024-01-25 ENCOUNTER — APPOINTMENT (OUTPATIENT)
Dept: PULMONOLOGY | Facility: CLINIC | Age: 74
End: 2024-01-25

## 2024-10-03 NOTE — DISCHARGE NOTE PROVIDER - DID THE PATIENT PRESENT WITH OR WAS TREATED FOR MALNUTRITION DURING THIS ADMISSION
No

## 2024-10-06 NOTE — PHYSICAL THERAPY INITIAL EVALUATION ADULT - CRITERIA FOR SKILLED THERAPEUTIC INTERVENTIONS
LINDSEY/LAKESHIA Discharge Plan    Informed patient is ready for discharge.  Patient to be picked up by - RN to arrange transport  . Patient/interested person has been counseled for post hospitalization care.   Discharge plan communicated to MD, RN, Receiving Facility/Agency, and  left for pt's sister, Perla, with update .    Patient’s discharge destination is return to [facility]. Room 425. RN report: [phone] . Dr. Jones to follow.    Selected Continued Care - Admitted Since 10/2/2024       Destination  Coordination complete.      Service Provider Selected Services Address Phone Fax    [facility] [address] [facility] [address] [phone]                  Laly Jones LCSW     .   impairments found/functional limitations in following categories/risk reduction/prevention/rehab potential/therapy frequency/predicted duration of therapy intervention/anticipated equipment needs at discharge/anticipated discharge recommendation

## 2025-01-03 NOTE — PATIENT PROFILE ADULT - PRO INTERPRETER NEED 2
Instructions: This plan will send the code FBSD to the PM system.  DO NOT or CHANGE the price. Price (Do Not Change): 0.00 Detail Level: Simple Wolof

## 2025-07-28 ENCOUNTER — APPOINTMENT (OUTPATIENT)
Dept: ORTHOPEDIC SURGERY | Facility: CLINIC | Age: 75
End: 2025-07-28
Payer: MEDICARE

## 2025-07-28 VITALS
BODY MASS INDEX: 22.36 KG/M2 | WEIGHT: 131 LBS | DIASTOLIC BLOOD PRESSURE: 72 MMHG | SYSTOLIC BLOOD PRESSURE: 109 MMHG | HEIGHT: 64 IN

## 2025-07-28 DIAGNOSIS — M17.0 BILATERAL PRIMARY OSTEOARTHRITIS OF KNEE: ICD-10-CM

## 2025-07-28 PROCEDURE — 99204 OFFICE O/P NEW MOD 45 MIN: CPT | Mod: 25

## 2025-07-28 PROCEDURE — 73564 X-RAY EXAM KNEE 4 OR MORE: CPT | Mod: 50

## 2025-07-28 PROCEDURE — 20610 DRAIN/INJ JOINT/BURSA W/O US: CPT | Mod: 50

## 2025-09-05 ENCOUNTER — APPOINTMENT (OUTPATIENT)
Dept: ORTHOPEDIC SURGERY | Facility: CLINIC | Age: 75
End: 2025-09-05